# Patient Record
Sex: FEMALE | Race: WHITE | NOT HISPANIC OR LATINO | Employment: OTHER | URBAN - METROPOLITAN AREA
[De-identification: names, ages, dates, MRNs, and addresses within clinical notes are randomized per-mention and may not be internally consistent; named-entity substitution may affect disease eponyms.]

---

## 2017-03-01 ENCOUNTER — HOSPITAL ENCOUNTER (OUTPATIENT)
Dept: LAB | Facility: MEDICAL CENTER | Age: 72
End: 2017-03-01
Attending: INTERNAL MEDICINE
Payer: MEDICARE

## 2017-03-01 ENCOUNTER — HOSPITAL ENCOUNTER (OUTPATIENT)
Dept: LAB | Facility: MEDICAL CENTER | Age: 72
End: 2017-03-01
Attending: FAMILY MEDICINE
Payer: MEDICARE

## 2017-03-01 DIAGNOSIS — I10 ESSENTIAL HYPERTENSION: ICD-10-CM

## 2017-03-01 DIAGNOSIS — D50.0 IRON DEFICIENCY ANEMIA SECONDARY TO BLOOD LOSS (CHRONIC): ICD-10-CM

## 2017-03-01 DIAGNOSIS — E55.9 VITAMIN D DEFICIENCY: ICD-10-CM

## 2017-03-01 DIAGNOSIS — E11.9 DIABETES MELLITUS WITHOUT COMPLICATION (HCC): ICD-10-CM

## 2017-03-01 DIAGNOSIS — E03.9 HYPOTHYROIDISM, UNSPECIFIED TYPE: ICD-10-CM

## 2017-03-01 DIAGNOSIS — R74.8 ELEVATED LIVER ENZYMES: ICD-10-CM

## 2017-03-01 LAB
25(OH)D3 SERPL-MCNC: 32 NG/ML (ref 30–100)
ALBUMIN SERPL BCP-MCNC: 4.2 G/DL (ref 3.2–4.9)
ALBUMIN SERPL BCP-MCNC: 4.2 G/DL (ref 3.2–4.9)
ALBUMIN/GLOB SERPL: 1.4 G/DL
ALBUMIN/GLOB SERPL: 1.4 G/DL
ALP SERPL-CCNC: 98 U/L (ref 30–99)
ALP SERPL-CCNC: 99 U/L (ref 30–99)
ALT SERPL-CCNC: 55 U/L (ref 2–50)
ALT SERPL-CCNC: 56 U/L (ref 2–50)
ANION GAP SERPL CALC-SCNC: 10 MMOL/L (ref 0–11.9)
ANION GAP SERPL CALC-SCNC: 10 MMOL/L (ref 0–11.9)
AST SERPL-CCNC: 52 U/L (ref 12–45)
AST SERPL-CCNC: 52 U/L (ref 12–45)
BASOPHILS # BLD AUTO: 0.09 K/UL (ref 0–0.12)
BASOPHILS # BLD AUTO: 0.11 K/UL (ref 0–0.12)
BASOPHILS NFR BLD AUTO: 1.6 % (ref 0–1.8)
BASOPHILS NFR BLD AUTO: 1.9 % (ref 0–1.8)
BILIRUB SERPL-MCNC: 0.5 MG/DL (ref 0.1–1.5)
BILIRUB SERPL-MCNC: 0.5 MG/DL (ref 0.1–1.5)
BUN SERPL-MCNC: 18 MG/DL (ref 8–22)
BUN SERPL-MCNC: 18 MG/DL (ref 8–22)
CALCIUM SERPL-MCNC: 9.9 MG/DL (ref 8.5–10.5)
CALCIUM SERPL-MCNC: 9.9 MG/DL (ref 8.5–10.5)
CHLORIDE SERPL-SCNC: 103 MMOL/L (ref 96–112)
CHLORIDE SERPL-SCNC: 105 MMOL/L (ref 96–112)
CHOLEST SERPL-MCNC: 195 MG/DL (ref 100–199)
CO2 SERPL-SCNC: 23 MMOL/L (ref 20–33)
CO2 SERPL-SCNC: 23 MMOL/L (ref 20–33)
CREAT SERPL-MCNC: 0.94 MG/DL (ref 0.5–1.4)
CREAT SERPL-MCNC: 0.97 MG/DL (ref 0.5–1.4)
CREAT UR-MCNC: 137.8 MG/DL
EOSINOPHIL # BLD: 0.19 K/UL (ref 0–0.51)
EOSINOPHIL # BLD: 0.19 K/UL (ref 0–0.51)
EOSINOPHIL NFR BLD AUTO: 3.3 % (ref 0–6.9)
EOSINOPHIL NFR BLD AUTO: 3.3 % (ref 0–6.9)
ERYTHROCYTE [DISTWIDTH] IN BLOOD BY AUTOMATED COUNT: 47 FL (ref 35.9–50)
ERYTHROCYTE [DISTWIDTH] IN BLOOD BY AUTOMATED COUNT: 47.1 FL (ref 35.9–50)
EST. AVERAGE GLUCOSE BLD GHB EST-MCNC: 140 MG/DL
FERRITIN SERPL-MCNC: 6.8 NG/ML (ref 10–291)
GLOBULIN SER CALC-MCNC: 3.1 G/DL (ref 1.9–3.5)
GLOBULIN SER CALC-MCNC: 3.1 G/DL (ref 1.9–3.5)
GLUCOSE SERPL-MCNC: 174 MG/DL (ref 65–99)
GLUCOSE SERPL-MCNC: 174 MG/DL (ref 65–99)
HBA1C MFR BLD: 6.5 % (ref 0–5.6)
HCT VFR BLD AUTO: 36.5 % (ref 37–47)
HCT VFR BLD AUTO: 36.8 % (ref 37–47)
HDLC SERPL-MCNC: 49 MG/DL
HGB BLD-MCNC: 11 G/DL (ref 12–16)
HGB BLD-MCNC: 11.2 G/DL (ref 12–16)
IMM GRANULOCYTES # BLD AUTO: 0.01 K/UL (ref 0–0.11)
IMM GRANULOCYTES # BLD AUTO: 0.02 K/UL (ref 0–0.11)
IMM GRANULOCYTES NFR BLD AUTO: 0.2 % (ref 0–0.9)
IMM GRANULOCYTES NFR BLD AUTO: 0.3 % (ref 0–0.9)
IRON SATN MFR SERPL: 4 % (ref 15–55)
IRON SERPL-MCNC: 24 UG/DL (ref 40–170)
LDLC SERPL CALC-MCNC: 117 MG/DL
LYMPHOCYTES # BLD: 1.4 K/UL (ref 1–4.8)
LYMPHOCYTES # BLD: 1.4 K/UL (ref 1–4.8)
LYMPHOCYTES NFR BLD AUTO: 24.3 % (ref 22–41)
LYMPHOCYTES NFR BLD AUTO: 24.3 % (ref 22–41)
MCH RBC QN AUTO: 25.2 PG (ref 27–33)
MCH RBC QN AUTO: 25.3 PG (ref 27–33)
MCHC RBC AUTO-ENTMCNC: 30.1 G/DL (ref 33.6–35)
MCHC RBC AUTO-ENTMCNC: 30.4 G/DL (ref 33.6–35)
MCV RBC AUTO: 83.1 FL (ref 81.4–97.8)
MCV RBC AUTO: 83.5 FL (ref 81.4–97.8)
MICROALBUMIN UR-MCNC: 0.8 MG/DL
MICROALBUMIN/CREAT UR: 6 MG/G (ref 0–30)
MONOCYTES # BLD: 0.43 K/UL (ref 0–0.85)
MONOCYTES # BLD: 0.45 K/UL (ref 0–0.85)
MONOCYTES NFR BLD AUTO: 7.5 % (ref 0–13.4)
MONOCYTES NFR BLD AUTO: 7.8 % (ref 0–13.4)
NEUTROPHILS # BLD: 3.6 K/UL (ref 2–7.15)
NEUTROPHILS # BLD: 3.63 K/UL (ref 2–7.15)
NEUTROPHILS NFR BLD AUTO: 62.4 % (ref 44–72)
NEUTROPHILS NFR BLD AUTO: 63.1 % (ref 44–72)
NRBC # BLD AUTO: 0 K/UL
NRBC # BLD AUTO: 0 K/UL
NRBC BLD-RTO: 0 /100 WBC
NRBC BLD-RTO: 0 /100 WBC
PLATELET # BLD AUTO: 280 K/UL (ref 164–446)
PLATELET # BLD AUTO: 283 K/UL (ref 164–446)
PMV BLD AUTO: 9.8 FL (ref 9–12.9)
PMV BLD AUTO: 9.8 FL (ref 9–12.9)
POTASSIUM SERPL-SCNC: 4.6 MMOL/L (ref 3.6–5.5)
POTASSIUM SERPL-SCNC: 4.6 MMOL/L (ref 3.6–5.5)
PROT SERPL-MCNC: 7.3 G/DL (ref 6–8.2)
PROT SERPL-MCNC: 7.3 G/DL (ref 6–8.2)
RBC # BLD AUTO: 4.37 M/UL (ref 4.2–5.4)
RBC # BLD AUTO: 4.43 M/UL (ref 4.2–5.4)
SODIUM SERPL-SCNC: 136 MMOL/L (ref 135–145)
SODIUM SERPL-SCNC: 138 MMOL/L (ref 135–145)
T4 FREE SERPL-MCNC: 1.19 NG/DL (ref 0.53–1.43)
TIBC SERPL-MCNC: 594 UG/DL (ref 250–450)
TRIGL SERPL-MCNC: 143 MG/DL (ref 0–149)
TSH SERPL DL<=0.005 MIU/L-ACNC: 1.58 UIU/ML (ref 0.3–3.7)
WBC # BLD AUTO: 5.8 K/UL (ref 4.8–10.8)
WBC # BLD AUTO: 5.8 K/UL (ref 4.8–10.8)

## 2017-03-01 PROCEDURE — 36415 COLL VENOUS BLD VENIPUNCTURE: CPT

## 2017-03-01 PROCEDURE — 84439 ASSAY OF FREE THYROXINE: CPT

## 2017-03-01 PROCEDURE — 82306 VITAMIN D 25 HYDROXY: CPT

## 2017-03-01 PROCEDURE — 82043 UR ALBUMIN QUANTITATIVE: CPT

## 2017-03-01 PROCEDURE — 80061 LIPID PANEL: CPT

## 2017-03-01 PROCEDURE — 80053 COMPREHEN METABOLIC PANEL: CPT

## 2017-03-01 PROCEDURE — 83036 HEMOGLOBIN GLYCOSYLATED A1C: CPT

## 2017-03-01 PROCEDURE — 82570 ASSAY OF URINE CREATININE: CPT

## 2017-03-01 PROCEDURE — 84443 ASSAY THYROID STIM HORMONE: CPT

## 2017-03-01 PROCEDURE — 85025 COMPLETE CBC W/AUTO DIFF WBC: CPT

## 2017-03-17 DIAGNOSIS — E03.8 OTHER SPECIFIED HYPOTHYROIDISM: ICD-10-CM

## 2017-03-17 RX ORDER — LOSARTAN POTASSIUM 50 MG/1
TABLET ORAL
Qty: 90 TAB | Refills: 1 | Status: SHIPPED | OUTPATIENT
Start: 2017-03-17 | End: 2017-10-30 | Stop reason: SDUPTHER

## 2017-03-17 RX ORDER — LEVOTHYROXINE SODIUM 0.1 MG/1
TABLET ORAL
Qty: 90 TAB | Refills: 1 | Status: SHIPPED | OUTPATIENT
Start: 2017-03-17 | End: 2017-09-13 | Stop reason: SDUPTHER

## 2017-03-17 NOTE — TELEPHONE ENCOUNTER
1. Caller Name: Ally Doherty                      Call Back Number: [phone] (home)     2. Message: Notified pt prescriptions sent to pharmacy.    3. Patient approves office to leave a detailed voicemail/MyChart message: N\A

## 2017-03-20 ENCOUNTER — OUTPATIENT INFUSION SERVICES (OUTPATIENT)
Dept: ONCOLOGY | Facility: MEDICAL CENTER | Age: 72
End: 2017-03-20
Attending: INTERNAL MEDICINE
Payer: MEDICARE

## 2017-03-20 VITALS
HEART RATE: 77 BPM | SYSTOLIC BLOOD PRESSURE: 135 MMHG | RESPIRATION RATE: 18 BRPM | BODY MASS INDEX: 31.43 KG/M2 | WEIGHT: 195.55 LBS | DIASTOLIC BLOOD PRESSURE: 72 MMHG | HEIGHT: 66 IN | OXYGEN SATURATION: 97 % | TEMPERATURE: 97.4 F

## 2017-03-20 PROCEDURE — 96365 THER/PROPH/DIAG IV INF INIT: CPT

## 2017-03-20 PROCEDURE — 306780 HCHG STAT FOR TRANSFUSION PER CASE

## 2017-03-20 PROCEDURE — 700105 HCHG RX REV CODE 258: Performed by: INTERNAL MEDICINE

## 2017-03-20 PROCEDURE — 96366 THER/PROPH/DIAG IV INF ADDON: CPT

## 2017-03-20 PROCEDURE — 700102 HCHG RX REV CODE 250 W/ 637 OVERRIDE(OP): Performed by: INTERNAL MEDICINE

## 2017-03-20 PROCEDURE — 96375 TX/PRO/DX INJ NEW DRUG ADDON: CPT

## 2017-03-20 PROCEDURE — A9270 NON-COVERED ITEM OR SERVICE: HCPCS | Performed by: INTERNAL MEDICINE

## 2017-03-20 PROCEDURE — 700111 HCHG RX REV CODE 636 W/ 250 OVERRIDE (IP): Performed by: INTERNAL MEDICINE

## 2017-03-20 RX ORDER — METHYLPREDNISOLONE SODIUM SUCCINATE 125 MG/2ML
125 INJECTION, POWDER, LYOPHILIZED, FOR SOLUTION INTRAMUSCULAR; INTRAVENOUS ONCE
Status: COMPLETED | OUTPATIENT
Start: 2017-03-20 | End: 2017-03-20

## 2017-03-20 RX ORDER — M-VIT,TX,IRON,MINS/CALC/FOLIC 27MG-0.4MG
1 TABLET ORAL DAILY
COMMUNITY

## 2017-03-20 RX ORDER — ACETAMINOPHEN 325 MG/1
650 TABLET ORAL ONCE
Status: COMPLETED | OUTPATIENT
Start: 2017-03-20 | End: 2017-03-20

## 2017-03-20 RX ADMIN — METHYLPREDNISOLONE SODIUM SUCCINATE 125 MG: 125 INJECTION, POWDER, FOR SOLUTION INTRAMUSCULAR; INTRAVENOUS at 10:08

## 2017-03-20 RX ADMIN — ACETAMINOPHEN 650 MG: 325 TABLET, FILM COATED ORAL at 10:08

## 2017-03-20 RX ADMIN — IRON DEXTRAN 1500 MG: 50 INJECTION INTRAMUSCULAR; INTRAVENOUS at 12:34

## 2017-03-20 RX ADMIN — DIPHENHYDRAMINE HYDROCHLORIDE 25 MG: 50 INJECTION INTRAMUSCULAR; INTRAVENOUS at 10:08

## 2017-03-20 RX ADMIN — IRON DEXTRAN 25 MG: 50 INJECTION INTRAMUSCULAR; INTRAVENOUS at 10:47

## 2017-03-20 ASSESSMENT — PAIN SCALES - GENERAL: PAINLEVEL: NO PAIN

## 2017-03-21 ENCOUNTER — OFFICE VISIT (OUTPATIENT)
Dept: MEDICAL GROUP | Facility: PHYSICIAN GROUP | Age: 72
End: 2017-03-21
Payer: MEDICARE

## 2017-03-21 VITALS
BODY MASS INDEX: 31.82 KG/M2 | HEIGHT: 66 IN | SYSTOLIC BLOOD PRESSURE: 120 MMHG | OXYGEN SATURATION: 95 % | TEMPERATURE: 98.6 F | RESPIRATION RATE: 16 BRPM | WEIGHT: 198 LBS | HEART RATE: 72 BPM | DIASTOLIC BLOOD PRESSURE: 68 MMHG

## 2017-03-21 DIAGNOSIS — E78.5 HYPERLIPIDEMIA, UNSPECIFIED HYPERLIPIDEMIA TYPE: ICD-10-CM

## 2017-03-21 DIAGNOSIS — E03.9 HYPOTHYROIDISM, UNSPECIFIED TYPE: ICD-10-CM

## 2017-03-21 DIAGNOSIS — D50.0 IRON DEFICIENCY ANEMIA SECONDARY TO BLOOD LOSS (CHRONIC): ICD-10-CM

## 2017-03-21 DIAGNOSIS — I10 ESSENTIAL HYPERTENSION: ICD-10-CM

## 2017-03-21 DIAGNOSIS — E11.9 DIABETES MELLITUS WITHOUT COMPLICATION (HCC): ICD-10-CM

## 2017-03-21 DIAGNOSIS — E55.9 VITAMIN D DEFICIENCY: ICD-10-CM

## 2017-03-21 PROCEDURE — 99214 OFFICE O/P EST MOD 30 MIN: CPT | Performed by: FAMILY MEDICINE

## 2017-03-21 PROCEDURE — G8482 FLU IMMUNIZE ORDER/ADMIN: HCPCS | Performed by: FAMILY MEDICINE

## 2017-03-21 PROCEDURE — 3017F COLORECTAL CA SCREEN DOC REV: CPT | Performed by: FAMILY MEDICINE

## 2017-03-21 PROCEDURE — G8419 CALC BMI OUT NRM PARAM NOF/U: HCPCS | Performed by: FAMILY MEDICINE

## 2017-03-21 PROCEDURE — 3014F SCREEN MAMMO DOC REV: CPT | Performed by: FAMILY MEDICINE

## 2017-03-21 PROCEDURE — 1101F PT FALLS ASSESS-DOCD LE1/YR: CPT | Performed by: FAMILY MEDICINE

## 2017-03-21 PROCEDURE — 1036F TOBACCO NON-USER: CPT | Performed by: FAMILY MEDICINE

## 2017-03-21 PROCEDURE — 3044F HG A1C LEVEL LT 7.0%: CPT | Performed by: FAMILY MEDICINE

## 2017-03-21 PROCEDURE — 4040F PNEUMOC VAC/ADMIN/RCVD: CPT | Performed by: FAMILY MEDICINE

## 2017-03-21 PROCEDURE — G8432 DEP SCR NOT DOC, RNG: HCPCS | Performed by: FAMILY MEDICINE

## 2017-03-21 ASSESSMENT — PATIENT HEALTH QUESTIONNAIRE - PHQ9: CLINICAL INTERPRETATION OF PHQ2 SCORE: 0

## 2017-03-21 NOTE — PROGRESS NOTES
"Subjective:      Ally Doherty is a 72 y.o. female who presents with Follow-Up            HPI     Patient returns for follow-up of her medical problems.    In terms of her diabetes mellitus type 2, she continues to take metformin. Blood work was done before this visit.    Her blood pressure is under control on losartan.    For her dyslipidemia, she couldn't tolerate statins because of elevation of liver enzymes so she has been managing this only with her own efforts.    She continues to take thyroid replacement for hypothyroidism.    She takes vitamin D supplementation 3000 international units daily for vitamin D deficiency.    She continues to follow-up with her hematologist/oncologist Dr. Oliveira for history of breast cancer. She also has been dealing with iron deficiency anemia requiring iron infusion. 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 Her most recent blood work that was done about 2 weeks ago came back her hemoglobin dropped from 14-11 and her hematologist send her for another eye in infusion which she did yesterday. She couldn't take iron supplement by mouth because of stomach upset and constipation. Her hematologist follows her closely and will send her for the infusion if needed.    Past medical history, past surgical history, family history reviewed-no changes    Social history reviewed-no changes    Allergies reviewed-no changes    Medications reviewed-no changes    ROS    Review of systems as per history of present illness, the rest are negative.     Objective:     /68 mmHg  Pulse 72  Temp(Src) 37 °C (98.6 °F)  Resp 16  Ht 1.676 m (5' 5.98\")  Wt 89.812 kg (198 lb)  BMI 31.97 kg/m2  SpO2 95%  LMP 02/27/1997     Physical Exam     Examined alert, awake, oriented, not in distress    Neck-supple, no lymphadenopathy, no thyromegaly  Lungs-clear to auscultation, no rales, no " wheezes  Heart-regular rate and rhythm, no murmur  Extremities-no edema, clubbing, cyanosis       Results for CHARU BAEZ (MRN [id number]) as of 3/21/2017 08:15   Ref. Range 3/1/2017 10:11   Sodium Latest Ref Range: 135-145 mmol/L 138   Potassium Latest Ref Range: 3.6-5.5 mmol/L 4.6   Chloride Latest Ref Range:  mmol/L 105   Co2 Latest Ref Range: 20-33 mmol/L 23   Anion Gap Latest Ref Range: 0.0-11.9  10.0   Glucose Latest Ref Range: 65-99 mg/dL 174 (H)   Bun Latest Ref Range: 8-22 mg/dL 18   Creatinine Latest Ref Range: 0.50-1.40 mg/dL 0.97   GFR If  Latest Ref Range: >60 mL/min/1.73 m 2 >60   GFR If Non  Latest Ref Range: >60 mL/min/1.73 m 2 57 (A)   Calcium Latest Ref Range: 8.5-10.5 mg/dL 9.9   AST(SGOT) Latest Ref Range: 12-45 U/L 52 (H) previously 64    ALT(SGPT) Latest Ref Range: 2-50 U/L 55 (H) previously 77    Alkaline Phosphatase Latest Ref Range: 30-99 U/L 99   Total Bilirubin Latest Ref Range: 0.1-1.5 mg/dL 0.5   Albumin Latest Ref Range: 3.2-4.9 g/dL 4.2   Total Protein Latest Ref Range: 6.0-8.2 g/dL 7.3   Globulin Latest Ref Range: 1.9-3.5 g/dL 3.1   A-G Ratio Latest Units: g/dL 1.4   Glycohemoglobin Latest Ref Range: 0.0-5.6 % 6.5 (H) previously 6.9    Estim. Avg Glu Latest Units: mg/dL 140   Cholesterol,Tot Latest Ref Range: 100-199 mg/dL 195   Triglycerides Latest Ref Range: 0-149 mg/dL 143 previously 184    HDL Latest Ref Range: >=40 mg/dL  49 previously 49   LDL Latest Ref Range: <100 mg/dL 117 (H) previously 141    Micro Alb Creat Ratio Latest Ref Range: 0-30 mg/g 6   Creatinine, Urine Latest Units: mg/dL 137.80   Microalbumin, Urine Random Latest Units: mg/dL 0.8   25-Hydroxy   Vitamin D 25 Latest Ref Range:  ng/mL 32 previously 24    TSH Latest Ref Range: 0.300-3.700 uIU/mL 1.580   Free T-4 Latest Ref Range: 0.53-1.43 ng/dL 1.19        Assessment/Plan:     There are no diagnoses linked to this encounter.    1. Diabetes mellitus without  complication (CMS-HCC)  LIPID PROFILE    COMP METABOLIC PANEL    HEMOGLOBIN A1C    CBC WITH DIFFERENTIAL    VITAMIN D,25 HYDROXY    TSH   2. Essential hypertension  LIPID PROFILE    COMP METABOLIC PANEL    HEMOGLOBIN A1C    CBC WITH DIFFERENTIAL    VITAMIN D,25 HYDROXY    TSH   3. Hyperlipidemia, unspecified hyperlipidemia type  LIPID PROFILE    COMP METABOLIC PANEL    HEMOGLOBIN A1C    CBC WITH DIFFERENTIAL    VITAMIN D,25 HYDROXY    TSH   4. Hypothyroidism, unspecified type  LIPID PROFILE    COMP METABOLIC PANEL    HEMOGLOBIN A1C    CBC WITH DIFFERENTIAL    VITAMIN D,25 HYDROXY    TSH   5. 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 This is still under control even better than before. Continue metformin.  2. Controlled on her medication.  3. LDL improved with her own efforts only. She couldn't tolerate statins because of elevation of liver enzymes. Continue with own efforts.  4. Adequately replaced. Continue the same dose of thyroid replacement.  5. This is improved in is already normal. Continue current dose of thyroid medication.  6. She just had iron infusion ordered by her hematologist. We will follow along. She has a follow-up appointment with her hematologist in 3 months. I will see her in 6 months and we will do CBC at that time. I will get records from the hematologist.      Please note that this dictation was created using voice recognition software. I have worked with consultants from the vendor as well as technical experts from [employer] to optimize the interface. I have made every reasonable attempt to correct obvious errors, but I expect that there are errors of grammar and possibly content I did not discover before finalizing  the note.

## 2017-03-21 NOTE — PROGRESS NOTES
Pt arrived ambulatory, here for total body iron. IV access established with significant difficulty. No labs required. Premeds given and test dose administered.  Pt monitored for one hour - no apparent reactions observed. Full dose of iron administered. Pt vannesa well. Line flushed clear. IV flushed and removed. Pressure dressing applied. Pt will follow up with her MD, no future appts in place at this time. Pt discharged home under self care in no apparent distress.

## 2017-03-21 NOTE — MR AVS SNAPSHOT
"        Ally Doherty   3/21/2017 8:00 AM   Office Visit   MRN: [id number]    Department:  [facility]   Dept Phone:  [phone]    Description:  Female : 1945   Provider:  Su Randhawa M.D.           Reason for Visit     Follow-Up 3 month follow up      Allergies as of 3/21/2017     Allergen Noted Reactions    Penicillins 2012   Rash    ALl over    Statins [Hmg-Coa-R Inhibitors] 2015       Elevated liver enzymes      You were diagnosed with     Diabetes mellitus without complication (CMS-HCC)   [435288]       Essential hypertension   [5648747]       Hyperlipidemia, unspecified hyperlipidemia type   [5170219]       Hypothyroidism, unspecified type   [1380052]       Vitamin D deficiency   [9730158]       Iron deficiency anemia secondary to blood loss (chronic)   [280.0.ICD-9-CM]         Vital Signs     Blood Pressure Pulse Temperature Respirations Height Weight    120/68 mmHg 72 37 °C (98.6 °F) 16 1.676 m (5' 5.98\") 89.812 kg (198 lb)    Body Mass Index Oxygen Saturation Last Menstrual Period Smoking Status          31.97 kg/m2 95% 1997 Never Smoker         Basic Information     Date Of Birth Sex Race Ethnicity Preferred Language    1945 Female White Non- English      Your appointments     Sep 12, 2017  8:00 AM   Established Patient with Su Randhawa M.D.   [facility] (--)    [address]  [address]  [facility] [id number] [phone]           You will be receiving a confirmation call a few days before your appointment from our automated call confirmation system.              Problem List              ICD-10-CM Priority Class Noted - Resolved    HTN (hypertension) I10   2012 - Present    Hyperlipidemia E78.5   2012 - Present    Hypothyroidism E03.9   2012 - Present    Malignant neoplasm of right breast (CMS-HCC) C50.911   2012 - Present    DM II (diabetes mellitus, type II), controlled (CMS-HCC) E11.9   2012 - Present   "    S/P breast reconstruction Z98.82   6/24/2013 - Present    Fatty liver disease, nonalcoholic K76.0   9/10/2013 - Present    Vitamin D deficiency E55.9   5/27/2014 - Present    Diabetes mellitus without complication (CMS-HCC) E11.9   12/22/2016 - Present      Health Maintenance        Date Due Completion Dates    MAMMOGRAM 5/17/2017 5/17/2016, 8/5/2014, 7/29/2013, 6/21/2012, 7/17/2007, 7/17/2007    RETINAL SCREENING 8/3/2017 8/3/2016    A1C SCREENING 9/1/2017 3/1/2017, 9/8/2016, 3/16/2016, 11/5/2015, 6/22/2015, 12/16/2014, 11/12/2014, 5/16/2014, 12/5/2013, 9/3/2013, 3/4/2013, 10/22/2012, 7/9/2012, 3/16/2012    DIABETES MONOFILAMENT / LE EXAM 9/13/2017 9/13/2016 (N/S), 7/20/2015 (N/S), 12/1/2014 (N/S), 5/27/2014 (N/S), 5/28/2013 (Done)    Override on 9/13/2016: (N/S)    Override on 7/20/2015: (N/S)    Override on 12/1/2014: (N/S)    Override on 5/27/2014: (N/S)    Override on 5/28/2013: Done (Done at [facility])    FASTING LIPID PROFILE 3/1/2018 3/1/2017, 9/8/2016, 3/16/2016, 11/5/2015, 6/22/2015, 12/16/2014, 5/16/2014, 12/5/2013, 9/3/2013, 3/4/2013, 10/22/2012, 7/9/2012, 3/16/2012    URINE ACR / MICROALBUMIN 3/1/2018 3/1/2017, 3/16/2016, 6/22/2015, 5/16/2014, 7/9/2012, 3/16/2012    SERUM CREATININE 3/1/2018 3/1/2017, 3/1/2017, 9/8/2016, 3/16/2016, 11/5/2015, 8/12/2015, 6/22/2015, 2/10/2015, 1/9/2015, 11/12/2014, 8/11/2014, 6/23/2014, 5/16/2014, 2/28/2014, 12/5/2013, 9/3/2013, 6/19/2013, 4/23/2013, 3/4/2013, 10/22/2012, 7/9/2012, 6/19/2012, 3/16/2012    COLONOSCOPY 5/26/2021 5/26/2016, 1/3/2011    BONE DENSITY 9/6/2021 9/6/2016, 9/11/2014    IMM DTaP/Tdap/Td Vaccine (2 - Td) 10/4/2022 10/4/2012            Current Immunizations     13-VALENT PCV PREVNAR 7/20/2015 12:05 PM    Influenza TIV (IM) 10/30/2014, 10/6/2012    Influenza Vaccine Adult HD 9/6/2016  1:42 PM, 10/6/2015 11:33 AM, 9/2/2014    Pneumococcal polysaccharide vaccine (PPSV-23) 11/8/2012  9:30 AM    SHINGLES VACCINE 10/4/2012  9:45 AM     Tdap Vaccine 10/4/2012  9:45 AM      Below and/or attached are the medications your provider expects you to take. Review all of your home medications and newly ordered medications with your provider and/or pharmacist. Follow medication instructions as directed by your provider and/or pharmacist. Please keep your medication list with you and share with your provider. Update the information when medications are discontinued, doses are changed, or new medications (including over-the-counter products) are added; and carry medication information at all times in the event of emergency situations     Allergies:  PENICILLINS - Rash     STATINS - (reactions not documented)               Medications  Valid as of: March 21, 2017 -  8:27 AM    Generic Name Brand Name Tablet Size Instructions for use    Anastrozole (Tab) ARIMIDEX 1 MG Take 1 mg by mouth every morning.        Calcium Carbonate-Vitamin D   Take  by mouth.        Cholecalciferol (Cap) Vitamin D 2000 UNITS Take 2 Caps by mouth every day.        Levothyroxine Sodium (Tab) SYNTHROID 100 MCG TAKE 1 TABLET BY MOUTH EVERY MORNING ON AN EMPTYSTOMACH        Losartan Potassium (Tab) COZAAR 50 MG TAKE 1 TABLET BY MOUTH EVERY EVENING        MetFORMIN HCl (Tab) GLUCOPHAGE 1000 MG Take 1 Tab by mouth 2 times a day, with meals.        Multiple Vitamins-Minerals (Tab) THERAGRAN-M  Take 1 Tab by mouth every day.        .                 Medicines prescribed today were sent to:     [facility] # [facility] [place] - [address] [facility]    [address] [place] [address]    Phone: [phone] Fax: [phone]    Open 24 Hours?: No      Medication refill instructions:       If your prescription bottle indicates you have medication refills left, it is not necessary to call your provider’s office. Please contact your pharmacy and they will refill your medication.    If your prescription bottle indicates you do not have any refills left, you may request refills at any time through one  of the following ways: The online [employer] system (except Urgent Care), by calling your provider’s office, or by asking your pharmacy to contact your provider’s office with a refill request. Medication refills are processed only during regular business hours and may not be available until the next business day. Your provider may request additional information or to have a follow-up visit with you prior to refilling your medication.   *Please Note: Medication refills are assigned a new Rx number when refilled electronically. Your pharmacy may indicate that no refills were authorized even though a new prescription for the same medication is available at the pharmacy. Please request the medicine by name with the pharmacy before contacting your provider for a refill.        Your To Do List     Future Labs/Procedures Complete By Expires    CBC WITH DIFFERENTIAL  As directed 3/22/2018    COMP METABOLIC PANEL  As directed 3/22/2018    HEMOGLOBIN A1C  As directed 3/22/2018    LIPID PROFILE  As directed 3/22/2018    TSH  As directed 3/22/2018    VITAMIN D,25 HYDROXY  As directed 3/22/2018      Other Notes About Your Plan                 Please assess the following diagnosis:  E11.9-DM without complications  Z85.3-history of breast cancer                [employer] Access Code: Activation code not generated  Current [employer] Status: Active

## 2017-03-21 NOTE — Clinical Note
[facility]  Su Randhawa M.D.  [address]  [place] [address]  Fax: [phone]   Authorization for Release/Disclosure of   Protected Health Information   Name: CHARU BAEZ : 1945 SSN: [id number]   Address: [address] [place] [address]  [place] [address] Phone:    [phone] (home)    I authorize the entity listed below to release/disclose the PHI below to:   [facility]/Su Randhawa M.D. and Su Randhawa M.D.   Provider or Entity Name:     Address   City, State, Zip   Phone:      Fax:     Reason for request: continuity of care   Information to be released:    [  ] LAST COLONOSCOPY,  including any PATH REPORT and follow-up  [  ] LAST FIT/COLOGUARD RESULT [  ] LAST DEXA  [  ] LAST MAMMOGRAM  [  ] LAST PAP  [  ] LAST LABS [  ] RETINA EXAM REPORT  [  ] IMMUNIZATION RECORDS  [  ] Release all info      [  ] Check here and initial the line next to each item to release ALL health information INCLUDING  _____ Care and treatment for drug and / or alcohol abuse  _____ HIV testing, infection status, or AIDS  _____ Genetic Testing    DATES OF SERVICE OR TIME PERIOD TO BE DISCLOSED: _____________  I understand and acknowledge that:  * This Authorization may be revoked at any time by you in writing, except if your health information has already been used or disclosed.  * Your health information that will be used or disclosed as a result of you signing this authorization could be re-disclosed by the recipient. If this occurs, your re-disclosed health information may no longer be protected by State or Federal laws.  * You may refuse to sign this Authorization. Your refusal will not affect your ability to obtain treatment.  * This Authorization becomes effective upon signing and will  on (date) __________.      If no date is indicated, this Authorization will  one (1) year from the signature date.    Name: Charu Baez    Signature:   Date:     3/21/2017       PLEASE FAX  REQUESTED RECORDS BACK TO: [phone]

## 2017-04-26 ENCOUNTER — HOSPITAL ENCOUNTER (OUTPATIENT)
Dept: LAB | Facility: MEDICAL CENTER | Age: 72
End: 2017-04-26
Attending: PHYSICIAN ASSISTANT
Payer: MEDICARE

## 2017-04-26 LAB
ANISOCYTOSIS BLD QL SMEAR: ABNORMAL
BASOPHILS # BLD AUTO: 5.4 % (ref 0–1.8)
BASOPHILS # BLD: 0.31 K/UL (ref 0–0.12)
EOSINOPHIL # BLD AUTO: 0.42 K/UL (ref 0–0.51)
EOSINOPHIL NFR BLD: 7.3 % (ref 0–6.9)
ERYTHROCYTE [DISTWIDTH] IN BLOOD BY AUTOMATED COUNT: 73.6 FL (ref 35.9–50)
HCT VFR BLD AUTO: 47.6 % (ref 37–47)
HGB BLD-MCNC: 14 G/DL (ref 12–16)
LYMPHOCYTES # BLD AUTO: 1.53 K/UL (ref 1–4.8)
LYMPHOCYTES NFR BLD: 26.4 % (ref 22–41)
MACROCYTES BLD QL SMEAR: ABNORMAL
MANUAL DIFF BLD: NORMAL
MCH RBC QN AUTO: 26.8 PG (ref 27–33)
MCHC RBC AUTO-ENTMCNC: 29.4 G/DL (ref 33.6–35)
MCV RBC AUTO: 91 FL (ref 81.4–97.8)
MICROCYTES BLD QL SMEAR: ABNORMAL
MONOCYTES # BLD AUTO: 0.68 K/UL (ref 0–0.85)
MONOCYTES NFR BLD AUTO: 11.8 % (ref 0–13.4)
MORPHOLOGY BLD-IMP: NORMAL
NEUTROPHILS # BLD AUTO: 2.85 K/UL (ref 2–7.15)
NEUTROPHILS NFR BLD: 49.1 % (ref 44–72)
NRBC # BLD AUTO: 0 K/UL
NRBC BLD AUTO-RTO: 0 /100 WBC
PLATELET # BLD AUTO: 175 K/UL (ref 164–446)
PLATELET BLD QL SMEAR: NORMAL
PMV BLD AUTO: 10.8 FL (ref 9–12.9)
RBC # BLD AUTO: 5.23 M/UL (ref 4.2–5.4)
RBC BLD AUTO: PRESENT
SMUDGE CELLS BLD QL SMEAR: NORMAL
WBC # BLD AUTO: 5.8 K/UL (ref 4.8–10.8)

## 2017-04-26 PROCEDURE — 36415 COLL VENOUS BLD VENIPUNCTURE: CPT

## 2017-04-26 PROCEDURE — 85007 BL SMEAR W/DIFF WBC COUNT: CPT

## 2017-04-26 PROCEDURE — 85027 COMPLETE CBC AUTOMATED: CPT

## 2017-06-05 ENCOUNTER — HOSPITAL ENCOUNTER (OUTPATIENT)
Dept: LAB | Facility: MEDICAL CENTER | Age: 72
End: 2017-06-05
Attending: INTERNAL MEDICINE
Payer: MEDICARE

## 2017-06-05 LAB
FERRITIN SERPL-MCNC: 76.1 NG/ML (ref 10–291)
IRON SATN MFR SERPL: 14 % (ref 15–55)
IRON SERPL-MCNC: 60 UG/DL (ref 40–170)
TIBC SERPL-MCNC: 441 UG/DL (ref 250–450)

## 2017-06-05 PROCEDURE — 83540 ASSAY OF IRON: CPT

## 2017-06-05 PROCEDURE — 82728 ASSAY OF FERRITIN: CPT

## 2017-06-05 PROCEDURE — 36415 COLL VENOUS BLD VENIPUNCTURE: CPT

## 2017-06-05 PROCEDURE — 83550 IRON BINDING TEST: CPT

## 2017-07-19 ENCOUNTER — HOSPITAL ENCOUNTER (OUTPATIENT)
Dept: LAB | Facility: MEDICAL CENTER | Age: 72
End: 2017-07-19
Attending: PHYSICIAN ASSISTANT
Payer: MEDICARE

## 2017-07-19 LAB
BASOPHILS # BLD AUTO: 1.4 % (ref 0–1.8)
BASOPHILS # BLD: 0.09 K/UL (ref 0–0.12)
EOSINOPHIL # BLD AUTO: 0.2 K/UL (ref 0–0.51)
EOSINOPHIL NFR BLD: 3.1 % (ref 0–6.9)
ERYTHROCYTE [DISTWIDTH] IN BLOOD BY AUTOMATED COUNT: 48.5 FL (ref 35.9–50)
HCT VFR BLD AUTO: 48.1 % (ref 37–47)
HGB BLD-MCNC: 15.1 G/DL (ref 12–16)
IMM GRANULOCYTES # BLD AUTO: 0.03 K/UL (ref 0–0.11)
IMM GRANULOCYTES NFR BLD AUTO: 0.5 % (ref 0–0.9)
LYMPHOCYTES # BLD AUTO: 1.25 K/UL (ref 1–4.8)
LYMPHOCYTES NFR BLD: 19.3 % (ref 22–41)
MCH RBC QN AUTO: 27.8 PG (ref 27–33)
MCHC RBC AUTO-ENTMCNC: 31.4 G/DL (ref 33.6–35)
MCV RBC AUTO: 88.4 FL (ref 81.4–97.8)
MONOCYTES # BLD AUTO: 0.53 K/UL (ref 0–0.85)
MONOCYTES NFR BLD AUTO: 8.2 % (ref 0–13.4)
NEUTROPHILS # BLD AUTO: 4.37 K/UL (ref 2–7.15)
NEUTROPHILS NFR BLD: 67.5 % (ref 44–72)
NRBC # BLD AUTO: 0 K/UL
NRBC BLD AUTO-RTO: 0 /100 WBC
PLATELET # BLD AUTO: 157 K/UL (ref 164–446)
PMV BLD AUTO: 10.3 FL (ref 9–12.9)
RBC # BLD AUTO: 5.44 M/UL (ref 4.2–5.4)
WBC # BLD AUTO: 6.5 K/UL (ref 4.8–10.8)

## 2017-07-19 PROCEDURE — 85025 COMPLETE CBC W/AUTO DIFF WBC: CPT

## 2017-07-19 PROCEDURE — 36415 COLL VENOUS BLD VENIPUNCTURE: CPT

## 2017-09-01 ENCOUNTER — HOSPITAL ENCOUNTER (OUTPATIENT)
Dept: LAB | Facility: MEDICAL CENTER | Age: 72
End: 2017-09-01
Attending: FAMILY MEDICINE
Payer: MEDICARE

## 2017-09-01 DIAGNOSIS — I10 ESSENTIAL HYPERTENSION: ICD-10-CM

## 2017-09-01 DIAGNOSIS — E11.9 DIABETES MELLITUS WITHOUT COMPLICATION (HCC): ICD-10-CM

## 2017-09-01 DIAGNOSIS — D50.0 IRON DEFICIENCY ANEMIA SECONDARY TO BLOOD LOSS (CHRONIC): ICD-10-CM

## 2017-09-01 DIAGNOSIS — E03.9 HYPOTHYROIDISM, UNSPECIFIED TYPE: ICD-10-CM

## 2017-09-01 DIAGNOSIS — E55.9 VITAMIN D DEFICIENCY: ICD-10-CM

## 2017-09-01 DIAGNOSIS — E78.5 HYPERLIPIDEMIA, UNSPECIFIED HYPERLIPIDEMIA TYPE: ICD-10-CM

## 2017-09-01 LAB
25(OH)D3 SERPL-MCNC: 29 NG/ML (ref 30–100)
ALBUMIN SERPL BCP-MCNC: 4.3 G/DL (ref 3.2–4.9)
ALBUMIN/GLOB SERPL: 1.5 G/DL
ALP SERPL-CCNC: 97 U/L (ref 30–99)
ALT SERPL-CCNC: 97 U/L (ref 2–50)
ANION GAP SERPL CALC-SCNC: 10 MMOL/L (ref 0–11.9)
AST SERPL-CCNC: 82 U/L (ref 12–45)
BASOPHILS # BLD AUTO: 1.9 % (ref 0–1.8)
BASOPHILS # BLD: 0.11 K/UL (ref 0–0.12)
BILIRUB SERPL-MCNC: 0.3 MG/DL (ref 0.1–1.5)
BUN SERPL-MCNC: 15 MG/DL (ref 8–22)
CALCIUM SERPL-MCNC: 9.4 MG/DL (ref 8.5–10.5)
CHLORIDE SERPL-SCNC: 102 MMOL/L (ref 96–112)
CHOLEST SERPL-MCNC: 210 MG/DL (ref 100–199)
CO2 SERPL-SCNC: 25 MMOL/L (ref 20–33)
CREAT SERPL-MCNC: 0.75 MG/DL (ref 0.5–1.4)
EOSINOPHIL # BLD AUTO: 0.27 K/UL (ref 0–0.51)
EOSINOPHIL NFR BLD: 4.8 % (ref 0–6.9)
ERYTHROCYTE [DISTWIDTH] IN BLOOD BY AUTOMATED COUNT: 50.4 FL (ref 35.9–50)
EST. AVERAGE GLUCOSE BLD GHB EST-MCNC: 183 MG/DL
FASTING STATUS PATIENT QL REPORTED: NORMAL
GFR SERPL CREATININE-BSD FRML MDRD: >60 ML/MIN/1.73 M 2
GLOBULIN SER CALC-MCNC: 2.8 G/DL (ref 1.9–3.5)
GLUCOSE SERPL-MCNC: 207 MG/DL (ref 65–99)
HBA1C MFR BLD: 8 % (ref 0–5.6)
HCT VFR BLD AUTO: 45.7 % (ref 37–47)
HDLC SERPL-MCNC: 42 MG/DL
HGB BLD-MCNC: 14 G/DL (ref 12–16)
IMM GRANULOCYTES # BLD AUTO: 0.04 K/UL (ref 0–0.11)
IMM GRANULOCYTES NFR BLD AUTO: 0.7 % (ref 0–0.9)
LDLC SERPL CALC-MCNC: 129 MG/DL
LYMPHOCYTES # BLD AUTO: 1.33 K/UL (ref 1–4.8)
LYMPHOCYTES NFR BLD: 23.5 % (ref 22–41)
MCH RBC QN AUTO: 27.6 PG (ref 27–33)
MCHC RBC AUTO-ENTMCNC: 30.6 G/DL (ref 33.6–35)
MCV RBC AUTO: 90.1 FL (ref 81.4–97.8)
MONOCYTES # BLD AUTO: 0.45 K/UL (ref 0–0.85)
MONOCYTES NFR BLD AUTO: 8 % (ref 0–13.4)
NEUTROPHILS # BLD AUTO: 3.45 K/UL (ref 2–7.15)
NEUTROPHILS NFR BLD: 61.1 % (ref 44–72)
NRBC # BLD AUTO: 0 K/UL
NRBC BLD AUTO-RTO: 0 /100 WBC
PLATELET # BLD AUTO: 169 K/UL (ref 164–446)
PMV BLD AUTO: 10.6 FL (ref 9–12.9)
POTASSIUM SERPL-SCNC: 4.8 MMOL/L (ref 3.6–5.5)
PROT SERPL-MCNC: 7.1 G/DL (ref 6–8.2)
RBC # BLD AUTO: 5.07 M/UL (ref 4.2–5.4)
SODIUM SERPL-SCNC: 137 MMOL/L (ref 135–145)
TRIGL SERPL-MCNC: 194 MG/DL (ref 0–149)
TSH SERPL DL<=0.005 MIU/L-ACNC: 1.61 UIU/ML (ref 0.3–3.7)
WBC # BLD AUTO: 5.7 K/UL (ref 4.8–10.8)

## 2017-09-01 PROCEDURE — 82306 VITAMIN D 25 HYDROXY: CPT

## 2017-09-01 PROCEDURE — 36415 COLL VENOUS BLD VENIPUNCTURE: CPT

## 2017-09-01 PROCEDURE — 84443 ASSAY THYROID STIM HORMONE: CPT

## 2017-09-01 PROCEDURE — 85025 COMPLETE CBC W/AUTO DIFF WBC: CPT

## 2017-09-01 PROCEDURE — 80061 LIPID PANEL: CPT

## 2017-09-01 PROCEDURE — 83036 HEMOGLOBIN GLYCOSYLATED A1C: CPT

## 2017-09-01 PROCEDURE — 80053 COMPREHEN METABOLIC PANEL: CPT

## 2017-09-11 ENCOUNTER — TELEPHONE (OUTPATIENT)
Dept: MEDICAL GROUP | Facility: PHYSICIAN GROUP | Age: 72
End: 2017-09-11

## 2017-09-11 NOTE — TELEPHONE ENCOUNTER
ESTABLISHED PATIENT PRE-VISIT PLANNING     Note: Patient will not be contacted if there is no indication to call.     1.  Reviewed notes from the last few office visits within the medical group: Yes    2.  If any orders were placed at last visit or intended to be done for this visit (i.e. 6 mos follow-up), do we have Results/Consult Notes?        •  Labs - Labs ordered, completed and results are in chart.       •  Imaging - Imaging was not ordered at last office visit.       •  Referrals - No referrals were ordered at last office visit.    3. Is this appointment scheduled as a Hospital Follow-Up? No    4.  Immunizations were updated in [employer] using Web[employer]?: Yes       •  Web [employer] Recommendations: FLU, MMR  and TD    5.  Patient is due for the following Health Maintenance Topics:   Health Maintenance Due   Topic Date Due   • Annual Wellness Visit  08/21/2014   • MAMMOGRAM  05/17/2017   • RETINAL SCREENING  08/03/2017   • IMM INFLUENZA (1) 09/01/2017       - Patient has completed FLU, PNEUMOVAX (PPSV23), PREVNAR (PCV13) , TDAP and ZOSTAVAX (Shingles) Immunization(s) per WebIZ. Chart has been updated.      6.  Patient was NOT informed to arrive 15 min prior to their scheduled appointment and bring in their medication bottles.

## 2017-09-12 ENCOUNTER — OFFICE VISIT (OUTPATIENT)
Dept: MEDICAL GROUP | Facility: PHYSICIAN GROUP | Age: 72
End: 2017-09-12
Payer: MEDICARE

## 2017-09-12 VITALS
RESPIRATION RATE: 16 BRPM | TEMPERATURE: 97.8 F | SYSTOLIC BLOOD PRESSURE: 130 MMHG | BODY MASS INDEX: 32.6 KG/M2 | WEIGHT: 202.82 LBS | DIASTOLIC BLOOD PRESSURE: 90 MMHG | HEIGHT: 66 IN | OXYGEN SATURATION: 93 % | HEART RATE: 77 BPM

## 2017-09-12 DIAGNOSIS — E55.9 VITAMIN D DEFICIENCY: ICD-10-CM

## 2017-09-12 DIAGNOSIS — Z12.39 SCREENING FOR BREAST CANCER: ICD-10-CM

## 2017-09-12 DIAGNOSIS — E03.9 HYPOTHYROIDISM, UNSPECIFIED TYPE: ICD-10-CM

## 2017-09-12 DIAGNOSIS — Z23 NEED FOR INFLUENZA VACCINATION: ICD-10-CM

## 2017-09-12 DIAGNOSIS — D50.0 IRON DEFICIENCY ANEMIA DUE TO CHRONIC BLOOD LOSS: ICD-10-CM

## 2017-09-12 DIAGNOSIS — I10 ESSENTIAL HYPERTENSION: ICD-10-CM

## 2017-09-12 DIAGNOSIS — E78.5 DYSLIPIDEMIA: ICD-10-CM

## 2017-09-12 DIAGNOSIS — R74.8 ELEVATED LIVER ENZYMES: ICD-10-CM

## 2017-09-12 PROCEDURE — G0008 ADMIN INFLUENZA VIRUS VAC: HCPCS | Performed by: FAMILY MEDICINE

## 2017-09-12 PROCEDURE — 99214 OFFICE O/P EST MOD 30 MIN: CPT | Mod: 25 | Performed by: FAMILY MEDICINE

## 2017-09-12 PROCEDURE — 90686 IIV4 VACC NO PRSV 0.5 ML IM: CPT | Performed by: FAMILY MEDICINE

## 2017-09-13 NOTE — PROGRESS NOTES
Subjective:      Ally Doherty is a 72 y.o. female who presents with Follow-Up and Other (lump lft side neck)            HPI     Patient returns for follow-up of her medical problems.    In terms of her diabetes mellitus type 2, she continues to take metformin 1000 mg twice daily. She did blood work before his visit.    She continues to take losartan for hypertension. Blood pressure specifically the diastolic blood pressure is slightly elevated but previously running good.    In terms of her dyslipidemia, she continues to manage this with her own efforts. She had elevated liver enzymes with statins.    She continues to take thyroid replacement for hypothyroidism.    She takes vitamin D supplementation for vitamin D deficiency 2000 international units daily.    She has chronically elevated liver enzymes but not as high as they used to be. Prior imaging studies showed fatty infiltration. Her GI specialist is following this closely. Prior workup came back negative for viral hepatitis and other hepatobiliary diseases.    She is followed by her hematologist for iron deficiency anemia. She has required iron infusion in the past. GI workup with upper endoscopy showed ulcers in the cardia and colonoscopy showed angioectasia of the cecum which may be the sources of the bleeding. She couldn't take iron supplementation by mouth because of stomach upset and constipation. She said she normally gets the infusion every 8 months. Her CBC are being monitored closely. She takes omeprazole prescribed by GI specialist for upper GI problems mentioned above.    Past medical history, past surgical history, family history reviewed-no changes    Social history reviewed-no changes    Allergies reviewed-no changes    Medications reviewed-no changes    ROS     Review of systems as per history of present illness, the rest are negative.       Objective:     /90   Pulse 77   Temp 36.6 °C (97.8 °F)   Resp 16   Ht 1.676 m (5'  "6\")   Wt 92 kg (202 lb 13.2 oz)   LMP 02/27/1997   SpO2 93%   BMI 32.74 kg/m²      Physical Exam     Examined alert, awake, oriented, not in distress    Neck-supple, no lymphadenopathy, no thyromegaly  Lungs-clear to auscultation, no rales, no wheezes  Heart-regular rate and rhythm, no murmur  Extremities-no edema, clubbing, cyanosis  Monofilament testing with a 10 gram force: sensation intact: intact bilaterally  Visual Inspection: Feet without maceration, ulcers, fissures.  Pedal pulses: intact bilaterally       Hospital Outpatient Visit on 09/01/2017   Component Date Value   • Cholesterol,Tot 09/01/2017 210*Previously 195    • Triglycerides 09/01/2017 194*Previously 143    • HDL 09/01/2017 42 Previously 49    • LDL 09/01/2017 129*Previously 117    • Sodium 09/01/2017 137    • Potassium 09/01/2017 4.8    • Chloride 09/01/2017 102    • Co2 09/01/2017 25    • Anion Gap 09/01/2017 10.0    • Glucose 09/01/2017 207*   • Bun 09/01/2017 15    • Creatinine 09/01/2017 0.75    • Calcium 09/01/2017 9.4    • AST(SGOT) 09/01/2017 82*Previously 52    • ALT(SGPT) 09/01/2017 97*Previously 56    • Alkaline Phosphatase 09/01/2017 97    • Total Bilirubin 09/01/2017 0.3    • Albumin 09/01/2017 4.3    • Total Protein 09/01/2017 7.1    • Globulin 09/01/2017 2.8    • A-G Ratio 09/01/2017 1.5    • Glycohemoglobin 09/01/2017 8.0*Previously 6.5    • Est Avg Glucose 09/01/2017 183    • WBC 09/01/2017 5.7    • RBC 09/01/2017 5.07    • Hemoglobin 09/01/2017 14.0    • Hematocrit 09/01/2017 45.7    • MCV 09/01/2017 90.1    • MCH 09/01/2017 27.6    • MCHC 09/01/2017 30.6*   • RDW 09/01/2017 50.4*   • Platelet Count 09/01/2017 169    • MPV 09/01/2017 10.6    • Neutrophils-Polys 09/01/2017 61.10    • Lymphocytes 09/01/2017 23.50    • Monocytes 09/01/2017 8.00    • Eosinophils 09/01/2017 4.80    • Basophils 09/01/2017 1.90*   • Immature Granulocytes 09/01/2017 0.70    • Nucleated RBC 09/01/2017 0.00    • Neutrophils (Absolute) 09/01/2017 3.45  "   • Lymphs (Absolute) 09/01/2017 1.33    • Monos (Absolute) 09/01/2017 0.45    • Eos (Absolute) 09/01/2017 0.27    • Baso (Absolute) 09/01/2017 0.11    • Immature Granulocytes (a* 09/01/2017 0.04    • NRBC (Absolute) 09/01/2017 0.00    • 25-Hydroxy   Vitamin D 25 09/01/2017 29*   • TSH 09/01/2017 1.610    • Fasting Status 09/01/2017 Fasting    • GFR If  09/01/2017 >60    • GFR If Non  Ameri* 09/01/2017 >60         Assessment/Plan:     1. Uncontrolled type 2 diabetes mellitus without complication, without long-term current use of insulin (CMS-[facility])  This is now out of control. She wants to work on getting this improved with diet, exercise and weight loss. We will keep her on metformin 1000 mg twice a day and recheck in 3 months. If there is no improvement at that time or if this is worse we will start a second agent.  - Diabetic Monofilament Lower Extremity Exam  - LIPID PROFILE; Future  - COMP METABOLIC PANEL; Future  - HEMOGLOBIN A1C; Future  - VITAMIN D,25 HYDROXY; Future    2. Essential hypertension  Diastolic blood pressure slightly elevated. She will monitor her blood pressure who can keep a record. Advised to exercise and lose weight. Watch the salt foods. Reevaluate in 3 months. If this is not improved or if worse we will increase the losartan dose.  - LIPID PROFILE; Future  - COMP METABOLIC PANEL; Future  - HEMOGLOBIN A1C; Future  - VITAMIN D,25 HYDROXY; Future    3. Dyslipidemia  She is managing this with her efforts because she had elevated liver enzymes with statin. We discussed Zetia but this is not proven to prevent cardiovascular disease. For now she will continue to manage this with her own efforts with diet, exercise and weight loss. We need to get the LDL below 100.   - LIPID PROFILE; Future  - COMP METABOLIC PANEL; Future  - HEMOGLOBIN A1C; Future  - VITAMIN D,25 HYDROXY; Future    4. Hypothyroidism, unspecified type  Adequately replaced. Continue same dose of thyroid  medication.    5. Vitamin D deficiency  This is slightly low. She will increase her vitamin D supplementation to 3000 international units daily.  - LIPID PROFILE; Future  - COMP METABOLIC PANEL; Future  - HEMOGLOBIN A1C; Future  - VITAMIN D,25 HYDROXY; Future    6. Elevated liver enzymes  Liver enzymes slightly higher than before but not as high as they used to be in the 100s. She is followed closely by her GI specialist. She has fatty infiltration of the liver on imaging studies and this is probably due to that.  - LIPID PROFILE; Future  - COMP METABOLIC PANEL; Future  - HEMOGLOBIN A1C; Future  - VITAMIN D,25 HYDROXY; Future    7. Iron deficiency anemia due to chronic blood loss  H&H are currently normal. She is followed closely by her hematologist and GI specialist. She will go for iron transfusion as needed for hematologist.    8. Screening for breast cancer  Screening mammogram ordered.  - MA-SCREEN MAMMO W/CAD-BILAT    9. Need for influenza vaccination  Flu shot was given.  - Flu Quad Inj >3 Year Pre-Filled PF      Please note that this dictation was created using voice recognition software. I have worked with consultants from the vendor as well as technical experts from [employer] to optimize the interface. I have made every reasonable attempt to correct obvious errors, but I expect that there are errors of grammar and possibly content I did not discover before finalizing the note.

## 2017-09-14 RX ORDER — LEVOTHYROXINE SODIUM 0.1 MG/1
TABLET ORAL
Qty: 90 TAB | Refills: 1 | Status: SHIPPED | OUTPATIENT
Start: 2017-09-14 | End: 2018-03-13 | Stop reason: SDUPTHER

## 2017-09-20 ENCOUNTER — HOSPITAL ENCOUNTER (OUTPATIENT)
Dept: RADIOLOGY | Facility: MEDICAL CENTER | Age: 72
End: 2017-09-20
Attending: FAMILY MEDICINE
Payer: MEDICARE

## 2017-09-20 ENCOUNTER — TELEPHONE (OUTPATIENT)
Dept: MEDICAL GROUP | Facility: PHYSICIAN GROUP | Age: 72
End: 2017-09-20

## 2017-09-20 PROCEDURE — G0202 SCR MAMMO BI INCL CAD: HCPCS

## 2017-09-20 NOTE — TELEPHONE ENCOUNTER
----- Message from Su Randhawa M.D. sent at 9/20/2017 12:36 PM PDT -----  Mammogram came back within normal limits.

## 2017-12-04 ENCOUNTER — HOSPITAL ENCOUNTER (OUTPATIENT)
Dept: LAB | Facility: MEDICAL CENTER | Age: 72
End: 2017-12-04
Attending: FAMILY MEDICINE
Payer: MEDICARE

## 2017-12-04 DIAGNOSIS — I10 ESSENTIAL HYPERTENSION: ICD-10-CM

## 2017-12-04 DIAGNOSIS — E78.5 DYSLIPIDEMIA: ICD-10-CM

## 2017-12-04 DIAGNOSIS — R74.8 ELEVATED LIVER ENZYMES: ICD-10-CM

## 2017-12-04 DIAGNOSIS — E55.9 VITAMIN D DEFICIENCY: ICD-10-CM

## 2017-12-04 LAB
25(OH)D3 SERPL-MCNC: 30 NG/ML (ref 30–100)
ALBUMIN SERPL BCP-MCNC: 3.3 G/DL (ref 3.2–4.9)
ALBUMIN/GLOB SERPL: 1 G/DL
ALP SERPL-CCNC: 99 U/L (ref 30–99)
ALT SERPL-CCNC: 94 U/L (ref 2–50)
ANION GAP SERPL CALC-SCNC: 10 MMOL/L (ref 0–11.9)
AST SERPL-CCNC: 88 U/L (ref 12–45)
BILIRUB SERPL-MCNC: 0.5 MG/DL (ref 0.1–1.5)
BUN SERPL-MCNC: 13 MG/DL (ref 8–22)
CALCIUM SERPL-MCNC: 9.7 MG/DL (ref 8.5–10.5)
CHLORIDE SERPL-SCNC: 106 MMOL/L (ref 96–112)
CHOLEST SERPL-MCNC: 173 MG/DL (ref 100–199)
CO2 SERPL-SCNC: 27 MMOL/L (ref 20–33)
CREAT SERPL-MCNC: 0.78 MG/DL (ref 0.5–1.4)
EST. AVERAGE GLUCOSE BLD GHB EST-MCNC: 226 MG/DL
GFR SERPL CREATININE-BSD FRML MDRD: >60 ML/MIN/1.73 M 2
GLOBULIN SER CALC-MCNC: 3.3 G/DL (ref 1.9–3.5)
GLUCOSE SERPL-MCNC: 234 MG/DL (ref 65–99)
HBA1C MFR BLD: 9.5 % (ref 0–5.6)
HDLC SERPL-MCNC: 41 MG/DL
LDLC SERPL CALC-MCNC: 95 MG/DL
POTASSIUM SERPL-SCNC: 4.2 MMOL/L (ref 3.6–5.5)
PROT SERPL-MCNC: 6.6 G/DL (ref 6–8.2)
SODIUM SERPL-SCNC: 143 MMOL/L (ref 135–145)
TRIGL SERPL-MCNC: 187 MG/DL (ref 0–149)

## 2017-12-04 PROCEDURE — 80053 COMPREHEN METABOLIC PANEL: CPT

## 2017-12-04 PROCEDURE — 83036 HEMOGLOBIN GLYCOSYLATED A1C: CPT

## 2017-12-04 PROCEDURE — 82306 VITAMIN D 25 HYDROXY: CPT

## 2017-12-04 PROCEDURE — 80061 LIPID PANEL: CPT

## 2017-12-04 PROCEDURE — 36415 COLL VENOUS BLD VENIPUNCTURE: CPT

## 2017-12-11 ENCOUNTER — TELEPHONE (OUTPATIENT)
Dept: MEDICAL GROUP | Facility: PHYSICIAN GROUP | Age: 72
End: 2017-12-11

## 2017-12-11 NOTE — TELEPHONE ENCOUNTER
ESTABLISHED PATIENT PRE-VISIT PLANNING     Note: Patient will not be contacted if there is no indication to call.     1.  Reviewed notes from the last few office visits within the medical group: Yes    2.  If any orders were placed at last visit or intended to be done for this visit (i.e. 6 mos follow-up), do we have Results/Consult Notes?        •  Labs - Labs ordered, completed on 12/04/17 and results are in chart.   Note: If patient appointment is for lab review and patient did not complete labs, check with provider if OK to reschedule patient until labs completed.       •  Imaging - Imaging ordered, completed and results are in chart.  Mammo and DEXA done 09/06/17 & 09/12/17       •  Referrals - No referrals were ordered at last office visit.    3. Is this appointment scheduled as a Hospital Follow-Up? No    4.  Immunizations were updated in [employer] using WebIZ?: Yes Web IZ not working this morning went by what pt already had in the Immunization Tab       •  Web Iz Recommendations: Patient is up to date on all vaccines    5.  Patient is due for the following Health Maintenance Topics:   Health Maintenance Due   Topic Date Due   • Annual Wellness Visit  08/21/2014   • RETINAL SCREENING  08/03/2017       - Patient has completed FLU, PNEUMOVAX (PPSV23), PREVNAR (PCV13) , TDAP and ZOSTAVAX (Shingles) Immunization(s) per WebIZ. Chart has been updated.      6.  Patient was NOT informed to arrive 15 min prior to their scheduled appointment and bring in their medication bottles.

## 2017-12-12 ENCOUNTER — HOSPITAL ENCOUNTER (OUTPATIENT)
Dept: LAB | Facility: MEDICAL CENTER | Age: 72
End: 2017-12-12
Attending: FAMILY MEDICINE
Payer: MEDICARE

## 2017-12-12 ENCOUNTER — OFFICE VISIT (OUTPATIENT)
Dept: MEDICAL GROUP | Facility: PHYSICIAN GROUP | Age: 72
End: 2017-12-12
Payer: MEDICARE

## 2017-12-12 VITALS
BODY MASS INDEX: 31.78 KG/M2 | WEIGHT: 197.75 LBS | OXYGEN SATURATION: 91 % | HEIGHT: 66 IN | HEART RATE: 94 BPM | DIASTOLIC BLOOD PRESSURE: 70 MMHG | SYSTOLIC BLOOD PRESSURE: 134 MMHG | TEMPERATURE: 96.6 F

## 2017-12-12 DIAGNOSIS — D50.0 IRON DEFICIENCY ANEMIA DUE TO CHRONIC BLOOD LOSS: ICD-10-CM

## 2017-12-12 DIAGNOSIS — I10 ESSENTIAL HYPERTENSION: ICD-10-CM

## 2017-12-12 DIAGNOSIS — E78.5 DYSLIPIDEMIA: ICD-10-CM

## 2017-12-12 LAB
ERYTHROCYTE [DISTWIDTH] IN BLOOD BY AUTOMATED COUNT: 46.6 FL (ref 35.9–50)
HCT VFR BLD AUTO: 38.8 % (ref 37–47)
HGB BLD-MCNC: 12 G/DL (ref 12–16)
MCH RBC QN AUTO: 26.4 PG (ref 27–33)
MCHC RBC AUTO-ENTMCNC: 30.9 G/DL (ref 33.6–35)
MCV RBC AUTO: 85.3 FL (ref 81.4–97.8)
PLATELET # BLD AUTO: 231 K/UL (ref 164–446)
PMV BLD AUTO: 10.5 FL (ref 9–12.9)
RBC # BLD AUTO: 4.55 M/UL (ref 4.2–5.4)
WBC # BLD AUTO: 5.5 K/UL (ref 4.8–10.8)

## 2017-12-12 PROCEDURE — 85027 COMPLETE CBC AUTOMATED: CPT

## 2017-12-12 PROCEDURE — 99214 OFFICE O/P EST MOD 30 MIN: CPT | Performed by: FAMILY MEDICINE

## 2017-12-12 PROCEDURE — 36415 COLL VENOUS BLD VENIPUNCTURE: CPT

## 2017-12-12 RX ORDER — GLIMEPIRIDE 1 MG/1
1 TABLET ORAL EVERY MORNING
Qty: 90 TAB | Refills: 1 | Status: SHIPPED | OUTPATIENT
Start: 2017-12-12 | End: 2018-03-13 | Stop reason: SDUPTHER

## 2017-12-12 RX ORDER — OMEPRAZOLE 20 MG/1
20 CAPSULE, DELAYED RELEASE ORAL DAILY
COMMUNITY
End: 2018-03-13 | Stop reason: SDUPTHER

## 2017-12-14 NOTE — PROGRESS NOTES
"Subjective:      Ally Doherty is a 72 y.o. female who presents with Follow-Up            HPI     Patient returns for follow-up of her medical problems.    3 months ago. Her diabetes got out of control with hemoglobin A1c increased from 6.5-8.0. I recommended adding a second agent because she was already taking maximum dose of metformin, but she wanted to work on this first with watching the diet better, exercise and weight loss. She has lost 5 pounds since last visit. Blood work was done before his visit.    In terms of her hypertension, this is under control on losartan.    For her dyslipidemia, she is managing this with her own efforts only. Statins caused liver enzyme elevation.    We follow her for iron deficiency anemia. The cause of the problem is most likely from GI bleed. She had prior GI workup with upper endoscopy showing ulcers in the cardia and colonoscopy showing angioectasia of the cecum. She couldn't tolerate iron supplementation by mouth due to upset stomach and constipation. She gets iron infusion through her hematologist every 8 months. She takes omeprazole prescribed by her GI specialist regularly. She said she had an episode of black stools lasting for 3 days recently. She called her GI specialist office, but she couldn't get an appointment until March 2018. At this time. She is wondering if her anemia is back because she feels a little bit weak, although not that bad.    Past medical history, past surgical history, family history reviewed-no changes    Social history reviewed-no changes    Allergies reviewed-no changes    Medications reviewed-no changes    ROS     Review of systems as per history of present illness, the rest are negative.       Objective:     /70   Pulse 94   Temp 35.9 °C (96.6 °F)   Ht 1.676 m (5' 6\")   Wt 89.7 kg (197 lb 12 oz)   LMP 02/27/1997   SpO2 91%   BMI 31.92 kg/m²      Physical Exam     Examined alert, awake, oriented, not in " distress    Neck-supple, no lymphadenopathy, no thyromegaly  Lungs-clear to auscultation, no rales, no wheezes  Heart-regular rate and rhythm, no murmur  Extremities-no edema, clubbing, cyanosis        Results for CHARU BAEZ (MRN [id number]) as of 12/13/2017 17:00   Ref. Range 9/1/2017 10:25 12/4/2017 09:55   Sodium Latest Ref Range: 135 - 145 mmol/L 137 143   Potassium Latest Ref Range: 3.6 - 5.5 mmol/L 4.8 4.2   Chloride Latest Ref Range: 96 - 112 mmol/L 102 106   Co2 Latest Ref Range: 20 - 33 mmol/L 25 27   Anion Gap Latest Ref Range: 0.0 - 11.9  10.0 10.0   Glucose Latest Ref Range: 65 - 99 mg/dL 207 (H) 234 (H)   Bun Latest Ref Range: 8 - 22 mg/dL 15 13   Creatinine Latest Ref Range: 0.50 - 1.40 mg/dL 0.75 0.78   GFR If  Latest Ref Range: >60 mL/min/1.73 m 2 >60 >60   GFR If Non  Latest Ref Range: >60 mL/min/1.73 m 2 >60 >60   Calcium Latest Ref Range: 8.5 - 10.5 mg/dL 9.4 9.7   AST(SGOT) Latest Ref Range: 12 - 45 U/L 82 (H) 88 (H)   ALT(SGPT) Latest Ref Range: 2 - 50 U/L 97 (H) 94 (H)   Alkaline Phosphatase Latest Ref Range: 30 - 99 U/L 97 99   Total Bilirubin Latest Ref Range: 0.1 - 1.5 mg/dL 0.3 0.5   Albumin Latest Ref Range: 3.2 - 4.9 g/dL 4.3 3.3   Total Protein Latest Ref Range: 6.0 - 8.2 g/dL 7.1 6.6   Globulin Latest Ref Range: 1.9 - 3.5 g/dL 2.8 3.3   A-G Ratio Latest Units: g/dL 1.5 1.0   Glycohemoglobin Latest Ref Range: 0.0 - 5.6 % 8.0 (H) 9.5 (H)   Estim. Avg Glu Latest Units: mg/dL 183 226   Fasting Status Unknown Fasting    Cholesterol,Tot Latest Ref Range: 100 - 199 mg/dL 210 (H) 173   Triglycerides Latest Ref Range: 0 - 149 mg/dL 194 (H) 187 (H)   HDL Latest Ref Range: >=40 mg/dL 42 41   LDL Latest Ref Range: <100 mg/dL 129 (H) 95   Results for SASHA SHREYASDESTIN PICKENS (MRN [id number]) as of 12/13/2017 17:00   Ref. Range 9/1/2017 10:25 12/4/2017 09:55   25-Hydroxy   Vitamin D 25 Latest Ref Range: 30 - 100 ng/mL 29 (L) 30   TSH Latest Ref Range: 0.300  - 3.700 uIU/mL 1.610      Assessment/Plan:     1. Uncontrolled type 2 diabetes mellitus without complication, without long-term current use of insulin (CMS-[facility])  This is more out of control than before. I told the patient she needs to start on a second agent. We discussed different classes of meds and potential cost. She wants to be on something that she can afford. I will put her on glimepiride. Made her aware of potential weight gain with this medication. Other medications that are available are brand names and more expensive. We will proceed with glimepiride 1 mg one tablet daily every morning with breakfast. Continue metformin 1000 mg twice daily. Discussed diet, exercise and weight loss. We will do follow-up in 3 months.  - glimepiride (AMARYL) 1 MG tablet; Take 1 Tab by mouth every morning.  Dispense: 90 Tab; Refill: 1  - COMP METABOLIC PANEL; Future  - LIPID PROFILE; Future  - HEMOGLOBIN A1C; Future  - MICROALBUMIN CREAT RATIO URINE; Future    2. Essential hypertension  Controlled on her medication.  - COMP METABOLIC PANEL; Future  - LIPID PROFILE; Future  - HEMOGLOBIN A1C; Future  - MICROALBUMIN CREAT RATIO URINE; Future    3. Dyslipidemia  Triglycerides improved and closer to goal. HDL at 41. Advised increase activity to make this better. LDL is improved and is the low 100s. She can't tolerate statins because of elevated liver enzymes. She will continue to manage this with her own efforts.  - COMP METABOLIC PANEL; Future  - LIPID PROFILE; Future  - HEMOGLOBIN A1C; Future  - MICROALBUMIN CREAT RATIO URINE; Future    4. Iron deficiency anemia due to chronic blood loss  She had a recent tarry stools which lasted for 3 days. Her next appointment with GI specialist is not until March 2018. I will get a CBC to see if she became anemic from this recent GI bleed. Continue omeprazole. We will call her with results.  - CBC WITHOUT DIFFERENTIAL; Future      Please note that this dictation was created using voice  recognition software. I have worked with consultants from the vendor as well as technical experts from [facility] to optimize the interface. I have made every reasonable attempt to correct obvious errors, but I expect that there are errors of grammar and possibly content I did not discover before finalizing the note.

## 2018-01-16 ENCOUNTER — PATIENT OUTREACH (OUTPATIENT)
Dept: HEALTH INFORMATION MANAGEMENT | Facility: OTHER | Age: 73
End: 2018-01-16

## 2018-01-16 NOTE — PROGRESS NOTES
1. Attempt #: Final    2. HealthConnect Verified: yes    3. Verify PCP: yes    4. Care Team Updated:       •   DME Company (gait device, O2, CPAP, etc.): NO       •   Other Specialists (eye doctor, derm, GYN, cardiology, endo, etc): YES// Pt has another oncologist, will bring his name to the appt.    5.  Reviewed/Updated the following with patient:       •   Communication Preference Obtained? YES       •   Preferred Pharmacy? YES       •   Preferred Lab? YES       •   Family History (document living status of immediate family members and if + hx of cancer, diabetes, hypertension, hyperlipidemia, heart attack, stroke) NO// Pt said that she was adopted and doesn't have too much info.    6. [facility] Activation: already active    7. [facility] Anita: yes    8. Annual Wellness Visit Scheduling  Scheduling Status:Scheduled      9. Care Gap Scheduling (Attempt to Schedule EACH Overdue Care Gap!)     Health Maintenance Due   Topic Date Due   • Annual Wellness Visit  08/21/2014   • RETINAL SCREENING  08/03/2017 / Pt said that she was going to have the eye exam today (1/16/18)        Scheduled patient for Annual Wellness Visit      10. Patient was advised: “This is a free wellness visit. The provider will screen for medical conditions to help you stay healthy. If you have other concerns to address you may be asked to discuss these at a separate visit or there may be an additional fee.”     11. Patient was informed to arrive 15 min prior to their scheduled appointment and bring in their medication bottles.

## 2018-02-20 ENCOUNTER — TELEPHONE (OUTPATIENT)
Dept: MEDICAL GROUP | Facility: PHYSICIAN GROUP | Age: 73
End: 2018-02-20

## 2018-02-20 NOTE — TELEPHONE ENCOUNTER
Left message for patient to call back regarding pre-visit planning. Please transfer call to [place] at [phone].

## 2018-02-20 NOTE — TELEPHONE ENCOUNTER
PVP WITH OUTREACH  Future Appointments       Provider Department [place]    2/27/2018 10:00 AM Su Randhawa M.D.; [facility]  [facility] [place]     3/13/2018 8:00 AM Su Randhawa M.D. [facility] [place]           ANNUAL WELLNESS VISIT PRE-VISIT PLANNING     1.  Immunizations were updated in Epic using WebIZ?: Yes       •  WebIZ Recommendations: Patient is up to date on all vaccines       •  Is patient due for Tdap? NO       •  Is patient due for Shingles? NO     2.  Specialty Comments was updated with diagnosis information provided by [facility]: NO

## 2018-02-27 ENCOUNTER — OFFICE VISIT (OUTPATIENT)
Dept: MEDICAL GROUP | Facility: PHYSICIAN GROUP | Age: 73
End: 2018-02-27
Payer: MEDICARE

## 2018-02-27 VITALS
SYSTOLIC BLOOD PRESSURE: 135 MMHG | TEMPERATURE: 97.6 F | DIASTOLIC BLOOD PRESSURE: 78 MMHG | OXYGEN SATURATION: 93 % | BODY MASS INDEX: 30.69 KG/M2 | RESPIRATION RATE: 16 BRPM | WEIGHT: 195.55 LBS | HEART RATE: 82 BPM | HEIGHT: 67 IN

## 2018-02-27 DIAGNOSIS — E78.5 DYSLIPIDEMIA: ICD-10-CM

## 2018-02-27 DIAGNOSIS — E66.9 OBESITY (BMI 30-39.9): ICD-10-CM

## 2018-02-27 DIAGNOSIS — Z17.0 MALIGNANT NEOPLASM OF UPPER-INNER QUADRANT OF RIGHT BREAST IN FEMALE, ESTROGEN RECEPTOR POSITIVE (HCC): ICD-10-CM

## 2018-02-27 DIAGNOSIS — I10 ESSENTIAL HYPERTENSION: ICD-10-CM

## 2018-02-27 DIAGNOSIS — C50.211 MALIGNANT NEOPLASM OF UPPER-INNER QUADRANT OF RIGHT BREAST IN FEMALE, ESTROGEN RECEPTOR POSITIVE (HCC): ICD-10-CM

## 2018-02-27 DIAGNOSIS — D50.0 IRON DEFICIENCY ANEMIA DUE TO CHRONIC BLOOD LOSS: ICD-10-CM

## 2018-02-27 DIAGNOSIS — K76.0 FATTY LIVER DISEASE, NONALCOHOLIC: ICD-10-CM

## 2018-02-27 DIAGNOSIS — E55.9 VITAMIN D DEFICIENCY: ICD-10-CM

## 2018-02-27 DIAGNOSIS — Z00.00 MEDICARE ANNUAL WELLNESS VISIT, SUBSEQUENT: ICD-10-CM

## 2018-02-27 DIAGNOSIS — E03.9 HYPOTHYROIDISM, UNSPECIFIED TYPE: ICD-10-CM

## 2018-02-27 PROCEDURE — G0439 PPPS, SUBSEQ VISIT: HCPCS | Performed by: FAMILY MEDICINE

## 2018-02-27 ASSESSMENT — PATIENT HEALTH QUESTIONNAIRE - PHQ9
5. POOR APPETITE OR OVEREATING: 0 - NOT AT ALL
CLINICAL INTERPRETATION OF PHQ2 SCORE: 2
SUM OF ALL RESPONSES TO PHQ QUESTIONS 1-9: 4

## 2018-02-27 ASSESSMENT — ACTIVITIES OF DAILY LIVING (ADL): BATHING_REQUIRES_ASSISTANCE: 0

## 2018-02-27 NOTE — PROGRESS NOTES
Chief Complaint   Patient presents with   • Annual Wellness Visit         HPI:  Ally is a 73 y.o. here for Medicare Annual Wellness Visit        Patient Active Problem List    Diagnosis Date Noted   • Uncontrolled type 2 diabetes mellitus without complication, without long-term current use of insulin (CMS-HCC) 12/22/2016   • Vitamin D deficiency 05/27/2014   • Fatty liver disease, nonalcoholic 09/10/2013   • S/P breast reconstruction 06/24/2013   • DM II (diabetes mellitus, type II), controlled (CMS-HCC) 06/21/2012   • Essential hypertension 02/27/2012   • Hyperlipidemia 02/27/2012   • Hypothyroidism 02/27/2012   • Malignant neoplasm of right breast (CMS-HCC) 02/27/2012       Current Outpatient Prescriptions   Medication Sig Dispense Refill   • omeprazole (PRILOSEC) 20 MG delayed-release capsule Take 20 mg by mouth every day.     • glimepiride (AMARYL) 1 MG tablet Take 1 Tab by mouth every morning. 90 Tab 1   • losartan (COZAAR) 50 MG Tab TAKE 1 TABLET BY MOUTH EVERY EVENING 90 Tab 1   • metformin (GLUCOPHAGE) 1000 MG tablet TAKE 1 TAB BY MOUTH 2 TIMES A DAY, WITH MEALS. 180 Tab 1   • levothyroxine (SYNTHROID) 100 MCG Tab TAKE 1 TABLET BY MOUTH EVERY MORNING ON AN EMPTYSTOMACH 90 Tab 1   • therapeutic multivitamin-minerals (THERAGRAN-M) Tab Take 1 Tab by mouth every day.     • Calcium Carbonate-Vitamin D (CALTRATE 600+D PO) Take  by mouth.     • anastrozole (ARIMIDEX) 1 MG TABS Take 1 mg by mouth every morning.       No current facility-administered medications for this visit.         Patient is taking medications as noted in medication list.  Current supplements as per medication list.     Allergies: Byetta; Penicillins; and Statins [hmg-coa-r inhibitors]    Current social contact/activities: shopping, [employer] outings with friends and family      Is patient current with immunizations? Yes.    She  reports that she has never smoked. She has never used smokeless tobacco. She reports that she drinks alcohol. She  reports that she does not use drugs.  Counseling given: Not Answered        DPA/Advanced directive: Patient has Advanced Directive on file.     ROS:    Gait: Uses no assistive device   Ostomy: NO  Other tubes: NO   Amputations: no   Chronic oxygen use no   Last eye exam 02/18   Wears hearing aids: no   : Denies any urinary leakage during the last 6 months      Screening:        Little interest or pleasure in doing things?  1 - several days  Feeling down, depressed, or hopeless? 1 - several days  Trouble falling or staying asleep, or sleeping too much?  2 - more than half the days  Feeling tired or having little energy?  0 - not at all  Poor appetite or overeating?  0 - not at all  Feeling bad about yourself - or that you are a failure or have let yourself or your family down? 0 - not at all  Trouble concentrating on things, such as reading the newspaper or watching television? 0 - not at all  Moving or speaking so slowly that other people could have noticed.  Or the opposite - being so fidgety or restless that you have been moving around a lot more than usual?  0 - not at all  Thoughts that you would be better off dead, or of hurting yourself?  0 - not at all  Patient Health Questionnaire Score: 4      If depressive symptoms identified deferred to follow up visit unless specifically addressed in assessment and plan.    Interpretation of PHQ-9 Total Score   Score Severity   1-4 No Depression   5-9 Mild Depression   10-14 Moderate Depression   15-19 Moderately Severe Depression   20-27 Severe Depression      Screening for Cognitive Impairment    Three Minute Recall (apple, watch, chanel)  3/3 Village, Kitchen, Baby  Draw clock face with all 12 numbers set to the hand to show 10 minutes past 11 o'clock  1 Time 3:40  5/5  If cognitive concerns identified, deferred for follow up unless specifically addressed in assessment and plan.    Fall Risk Assessment    Has the patient had two or more falls in the last year or any  fall with injury in the last year?  No  If fall risk identified, deferred for follow up unless specifically addressed in assessment and plan.      Safety Assessment    Throw rugs on floor.  Yes  Handrails on all stairs.  Yes  Good lighting in all hallways.  Yes  Difficulty hearing.  No  Patient counseled about all safety risks that were identified.    Functional Assessment ADLs    Are there any barriers preventing you from cooking for yourself or meeting nutritional needs?  No.    Are there any barriers preventing you from driving safely or obtaining transportation?  No.    Are there any barriers preventing you from using a telephone or calling for help?  No.    Are there any barriers preventing you from shopping?  No.    Are there any barriers preventing you from taking care of your own finances?  No.    Are there any barriers preventing you from managing your medications?    No.    Are there any barriers preventing you from showering/bathing yourself?  No.    Are you currently engaging any exercise or physical activity?  No.       Health Maintenance Summary                Annual Wellness Visit Overdue 8/21/2014      Done 8/20/2013     RETINAL SCREENING Overdue 8/3/2017      Done 8/3/2016 REFERRAL FOR RETINAL SCREENING EXAM    URINE ACR / MICROALBUMIN Next Due 3/1/2018      Done 3/1/2017 MICROALBUMIN CREAT RATIO URINE     Patient has more history with this topic...    A1C SCREENING Next Due 6/4/2018      Done 12/4/2017 HEMOGLOBIN A1C      Patient has more history with this topic...    DIABETES MONOFILAMENT / LE EXAM Next Due 9/12/2018      Done 9/12/2017 AMB DIABETIC MONOFILAMENT LOWER EXTREMITY EXAM     Patient has more history with this topic...    MAMMOGRAM Next Due 9/20/2018      Done 9/20/2017 MA-MAMMO SCREENING BILAT W/TOMOSYNTHESIS W/CAD     Patient has more history with this topic...    FASTING LIPID PROFILE Next Due 12/4/2018      Done 12/4/2017 LIPID PROFILE      Patient has more history with this  topic...    SERUM CREATININE Next Due 12/4/2018      Done 12/4/2017 COMP METABOLIC PANEL      Patient has more history with this topic...    COLONOSCOPY Next Due 5/26/2021      Done 5/26/2016 AMB REFERRAL TO GI FOR COLONOSCOPY     Patient has more history with this topic...    BONE DENSITY Next Due 9/6/2021      Done 9/6/2016 DS-BONE DENSITY STUDY (DEXA)     Patient has more history with this topic...    IMM DTaP/Tdap/Td Vaccine Next Due 10/4/2022      Done 10/4/2012 Imm Admin: Tdap Vaccine          Patient Care Team:  Su Randhawa M.D. as PCP - General (Family Medicine)  Salomon Hilton as Consulting Physician (Optometry)  Micky Breen M.D. as Consulting Physician (Gastroenterology)  TARUN Cardona M.D. as Consulting Physician (Oncology)      Social History   Substance Use Topics   • Smoking status: Never Smoker   • Smokeless tobacco: Never Used   • Alcohol use 0.0 oz/week      Comment: < 1 per week     Family History   Problem Relation Age of Onset   • Other       adopted     She  has a past medical history of Anemia; Breast cancer (CMS-HCC) (2/27/2012); Cancer (CMS-HCC) (2010); DM hyperosmolarity type II (CMS-[facility]) (2/27/2012); Hiatus hernia syndrome; High cholesterol (02/10/15); HTN (hypertension) (02/12/15); Hyperlipidemia (2/27/2012); and Hypothyroidism (2/27/2012). She also has no past medical history of Cold; Dental disorder; or Pain.   Past Surgical History:   Procedure Laterality Date   • BREAST RECONSTRUCTION  2/12/2015    Performed by Edward Boo M.D. at [facility]   • MAMMOPLASTY REDUCTION  2/12/2015    Performed by Edward Boo M.D. at [facility]   • MOLE EXCISION  2/12/2015    Performed by Edward Boo M.D. at [facility]   • CAPSULECTOMY  3/6/2014    Performed by Edward Boo M.D. at [facility] ORS   • BREAST RECONSTRUCTION  6/24/2013    Performed by Edward Boo M.D. at [facility]   • TISSUE EXPANDER PLACE/REMOVE  6/24/2013     "Performed by Edward Boo M.D. at SURGERY [facility] ORS   • CAYETANO BY LAPAROSCOPY  2008   • TUBAL LIGATION  1974   • TONSILLECTOMY  1953   • MASTECTOMY      right breast   • PB RADIATION THERAPY PLAN SIMPLE     • OH CHEMOTHERAPY, UNSPECIFIED PROCEDURE           Exam:     Blood pressure 135/78, pulse 82, temperature 36.4 °C (97.6 °F), resp. rate 16, height 1.689 m (5' 6.5\"), weight 88.7 kg (195 lb 8.8 oz), last menstrual period 02/27/1997, SpO2 93 %. Body mass index is 31.09 kg/m².    Hearing good.    Dentition fair  Alert, oriented in no acute distress.  Eye contact is good, speech goal directed, affect calm    Skin:                 Warm, no rashes in visible areas    Head:               Atraumatic, normocephalic    Eyes:               Pupils equal, round and reactive to light, extraocular muscles movement  intact, clear conjunctivae    Ears:               Tympanic membranes intact without evidence of infection    Nose:              No nasal discharge, no obstruction    Mouth:             No oral lesions    Throat:            Tonsils not enlarged, no exudates    Neck:              Trachea midline, supple, no lymphadenopathy, no thyromegaly    Lungs:             Clear to auscultation, no rales, no wheezes, no rhonchi    Heart:              Regular rate and rhythm, no murmur    Abdomen:       Good bowel sounds, soft, nontender, no hepatosplenomegaly, no masses    Extremities:    No edema, no clubbing, no cyanosis    Psych:            Alert and oriented x3, normal affect    Neuro:            Cranial nerves intact, Motor strength 5/5 upper and lower extremities, DTRs 2+, sensation intact to light touch, negative Romberg    Assessment and Plan. The following treatment and monitoring plan is recommended:      1. Medicare annual wellness visit, subsequent  Up-to-date with immunizations. Up-to-date with flu shot and bone density scan.    2. Obesity (BMI 30-39.9)  Discussed diet, exercise and weight loss.    3. 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 She is on anastrozole followed by her oncologist. She has been cancer free.    4. Uncontrolled type 2 diabetes mellitus without complication, without long-term current use of insulin (CMS-HCC)  We added glimepiride 1 mg one tablet daily 3 months ago to her metformin 1000 mg twice a because her diabetes has not been controlled with hemoglobin A1c increased from 8.0-9.5. She has been tolerating the glimepiride. She will follow-up with me next month to see if the diabetes is better controlled. She will do blood work before that visit.    5. Essential hypertension  Controlled on losartan.    6. Dyslipidemia  She is managing this with her own efforts because the statins increased her liver enzymes. She will do follow-up lipid panel next visit.    7. Iron deficiency anemia due to chronic blood loss  She has iron deficiency anemia due to chronic blood loss from GI bleed. She is followed by the hematologist/oncologist and GI specialist. GI workup with upper endoscopy showed ulcers in the cardia and colonoscopy showing angioectasia of the cecum. She couldn't tolerate oral iron supplementation due to upset stomach and constipation. She gets iron infusion through her hematologist whenever H&H dropped significantly about every 8 months. She had recent episode of black tarry stools that lasted for 3 days in December 2017. This has not recurred. I did a CBC at that time that came back no evidence of anemia. She has a follow-up appointment with her GI specialist next month. We will await further recommendation.    8. Hypothyroidism, unspecified type  She is on thyroid replacement and this has been adequately replaced per last TSH in September 2017.    9. Vitamin D deficiency  She is on vitamin D supplementation and this was at normal level of 30 in December 2017.    10. Fatty  liver disease, nonalcoholic  She continues to have mild elevation of her liver enzymes due to fatty liver. She is followed closely by her GI specialist.      Services suggested: No services needed at this time  Health Care Screening recommendations as per orders if indicated.  Referrals offered: PT/OT/Nutrition counseling/Behavioral Health/Smoking cessation as per orders if indicated.    Discussion today about general wellness and lifestyle habits:    · Prevent falls and reduce trip hazards; Cautioned about securing or removing rugs.  · Have a working fire alarm and carbon monoxide detector;   · Engage in regular physical activity and social activities       Follow-up: Keep appointment as scheduled next month.      Please note that this dictation was created using voice recognition software. I have worked with consultants from the vendor as well as technical experts from [employer] to optimize the interface. I have made every reasonable attempt to correct obvious errors, but I expect that there are errors of grammar and possibly content I did not discover before finalizing the note.

## 2018-03-07 ENCOUNTER — HOSPITAL ENCOUNTER (OUTPATIENT)
Dept: LAB | Facility: MEDICAL CENTER | Age: 73
End: 2018-03-07
Attending: FAMILY MEDICINE
Payer: MEDICARE

## 2018-03-07 DIAGNOSIS — E78.5 DYSLIPIDEMIA: ICD-10-CM

## 2018-03-07 DIAGNOSIS — I10 ESSENTIAL HYPERTENSION: ICD-10-CM

## 2018-03-07 LAB
ALBUMIN SERPL BCP-MCNC: 4.4 G/DL (ref 3.2–4.9)
ALBUMIN/GLOB SERPL: 1.6 G/DL
ALP SERPL-CCNC: 111 U/L (ref 30–99)
ALT SERPL-CCNC: 83 U/L (ref 2–50)
ANION GAP SERPL CALC-SCNC: 11 MMOL/L (ref 0–11.9)
AST SERPL-CCNC: 83 U/L (ref 12–45)
BILIRUB SERPL-MCNC: 0.5 MG/DL (ref 0.1–1.5)
BUN SERPL-MCNC: 11 MG/DL (ref 8–22)
CALCIUM SERPL-MCNC: 9.3 MG/DL (ref 8.5–10.5)
CHLORIDE SERPL-SCNC: 107 MMOL/L (ref 96–112)
CHOLEST SERPL-MCNC: 183 MG/DL (ref 100–199)
CO2 SERPL-SCNC: 22 MMOL/L (ref 20–33)
CREAT SERPL-MCNC: 0.9 MG/DL (ref 0.5–1.4)
CREAT UR-MCNC: 145.8 MG/DL
EST. AVERAGE GLUCOSE BLD GHB EST-MCNC: 171 MG/DL
GLOBULIN SER CALC-MCNC: 2.8 G/DL (ref 1.9–3.5)
GLUCOSE SERPL-MCNC: 172 MG/DL (ref 65–99)
HBA1C MFR BLD: 7.6 % (ref 0–5.6)
HDLC SERPL-MCNC: 41 MG/DL
LDLC SERPL CALC-MCNC: 108 MG/DL
MICROALBUMIN UR-MCNC: 3.1 MG/DL
MICROALBUMIN/CREAT UR: 21 MG/G (ref 0–30)
POTASSIUM SERPL-SCNC: 4.8 MMOL/L (ref 3.6–5.5)
PROT SERPL-MCNC: 7.2 G/DL (ref 6–8.2)
SODIUM SERPL-SCNC: 140 MMOL/L (ref 135–145)
TRIGL SERPL-MCNC: 172 MG/DL (ref 0–149)

## 2018-03-07 PROCEDURE — 80061 LIPID PANEL: CPT

## 2018-03-07 PROCEDURE — 80053 COMPREHEN METABOLIC PANEL: CPT

## 2018-03-07 PROCEDURE — 83036 HEMOGLOBIN GLYCOSYLATED A1C: CPT

## 2018-03-07 PROCEDURE — 36415 COLL VENOUS BLD VENIPUNCTURE: CPT

## 2018-03-07 PROCEDURE — 82570 ASSAY OF URINE CREATININE: CPT

## 2018-03-07 PROCEDURE — 82043 UR ALBUMIN QUANTITATIVE: CPT

## 2018-03-12 ENCOUNTER — TELEPHONE (OUTPATIENT)
Dept: MEDICAL GROUP | Facility: PHYSICIAN GROUP | Age: 73
End: 2018-03-12

## 2018-03-12 NOTE — TELEPHONE ENCOUNTER
ESTABLISHED PATIENT PRE-VISIT PLANNING     Note: Patient will not be contacted if there is no indication to call.     1.  Reviewed notes from the last few office visits within the medical group: Yes    2.  If any orders were placed at last visit or intended to be done for this visit (i.e. 6 mos follow-up), do we have Results/Consult Notes?        •  Labs - Labs ordered, completed on 03/07/18 and results are in chart.   Note: If patient appointment is for lab review and patient did not complete labs, check with provider if OK to reschedule patient until labs completed.       •  Imaging - Imaging ordered, completed and results are in chart.       •  Referrals - No referrals were ordered at last office visit.    3. Is this appointment scheduled as a Hospital Follow-Up? No    4.  Immunizations were updated in [facility] [employer] using WebIZ?: Yes       •  Web Iz Recommendations: MMR  and TD    5.  Patient is due for the following Health Maintenance Topics:   There are no preventive care reminders to display for this patient.    - Patient has completed FLU, PNEUMOVAX (PPSV23), PREVNAR (PCV13) , TDAP and ZOSTAVAX (Shingles) Immunization(s) per WebIZ. Chart has been updated.    6.  MDX printed and highlighted for Provider? YES    7.  Patient was NOT informed to arrive 15 min prior to their scheduled appointment and bring in their medication bottles.

## 2018-03-13 ENCOUNTER — OFFICE VISIT (OUTPATIENT)
Dept: MEDICAL GROUP | Facility: PHYSICIAN GROUP | Age: 73
End: 2018-03-13
Payer: MEDICARE

## 2018-03-13 VITALS
HEART RATE: 96 BPM | TEMPERATURE: 97.3 F | SYSTOLIC BLOOD PRESSURE: 130 MMHG | OXYGEN SATURATION: 96 % | DIASTOLIC BLOOD PRESSURE: 70 MMHG | WEIGHT: 196.87 LBS | HEIGHT: 67 IN | BODY MASS INDEX: 30.9 KG/M2

## 2018-03-13 DIAGNOSIS — Z17.0 MALIGNANT NEOPLASM OF UPPER-INNER QUADRANT OF RIGHT BREAST IN FEMALE, ESTROGEN RECEPTOR POSITIVE (HCC): ICD-10-CM

## 2018-03-13 DIAGNOSIS — E03.9 HYPOTHYROIDISM, UNSPECIFIED TYPE: ICD-10-CM

## 2018-03-13 DIAGNOSIS — C50.211 MALIGNANT NEOPLASM OF UPPER-INNER QUADRANT OF RIGHT BREAST IN FEMALE, ESTROGEN RECEPTOR POSITIVE (HCC): ICD-10-CM

## 2018-03-13 DIAGNOSIS — I10 ESSENTIAL HYPERTENSION: ICD-10-CM

## 2018-03-13 DIAGNOSIS — E78.5 DYSLIPIDEMIA: ICD-10-CM

## 2018-03-13 PROCEDURE — 99214 OFFICE O/P EST MOD 30 MIN: CPT | Performed by: FAMILY MEDICINE

## 2018-03-13 RX ORDER — LOSARTAN POTASSIUM 50 MG/1
TABLET ORAL
Qty: 90 TAB | Refills: 1 | Status: SHIPPED | OUTPATIENT
Start: 2018-03-13 | End: 2018-07-30 | Stop reason: SDUPTHER

## 2018-03-13 RX ORDER — OMEPRAZOLE 20 MG/1
20 CAPSULE, DELAYED RELEASE ORAL DAILY
Qty: 90 CAP | Refills: 1 | Status: SHIPPED | OUTPATIENT
Start: 2018-03-13 | End: 2018-09-10 | Stop reason: SDUPTHER

## 2018-03-13 RX ORDER — LEVOTHYROXINE SODIUM 0.1 MG/1
TABLET ORAL
Qty: 90 TAB | Refills: 1 | Status: SHIPPED | OUTPATIENT
Start: 2018-03-13 | End: 2018-10-12 | Stop reason: SDUPTHER

## 2018-03-13 RX ORDER — GLIMEPIRIDE 1 MG/1
1 TABLET ORAL EVERY MORNING
Qty: 90 TAB | Refills: 1 | Status: SHIPPED | OUTPATIENT
Start: 2018-03-13 | End: 2018-09-10 | Stop reason: SDUPTHER

## 2018-03-14 NOTE — PROGRESS NOTES
"Subjective:      Ally Doherty is a 73 y.o. female who presents with Follow-Up and Medication Refill (all)            HPI     Patient returns for follow-up of her medical problems.    In terms of her diabetes mellitus type 2, we added glimepiride 3 months ago because her diabetes got out of control with hemoglobin A1c increased from 8.0-9.5 on maximum dose of metformin. She is tolerating the glimepiride without any side effects including hypoglycemia. Blood work was done before this visit.    Blood pressures under control on losartan.    She is managing her dyslipidemia with her own efforts only because she had elevation of her liver enzymes with statins. Liver enzymes continued to be elevated off statins but not as high as when she was on them. She was found to have fatty liver on abdominal ultrasound. She is followed by the gastroenterologist for the fatty liver.    She continues to take thyroid replacement for hypothyroidism. The last TSH in 9/17 came back adequately replaced.    In terms of her history of right breast cancer status post radical mastectomy with reconstructive surgery, she continues to take anastrozole. She is followed by her oncologist closely. She has been disease-free.    Past medical history, past surgical history, family history reviewed-no changes    Social history reviewed-no changes    Allergies reviewed-no changes    Medications reviewed-no changes        ROS     As per history of present illness, the rest are negative.       Objective:     /70   Pulse 96   Temp 36.3 °C (97.3 °F)   Ht 1.689 m (5' 6.5\")   Wt 89.3 kg (196 lb 13.9 oz)   LMP 02/27/1997   SpO2 96%   BMI 31.30 kg/m²      Physical Exam     Examined alert, awake, oriented, not in distress    Neck-supple, no lymphadenopathy, no thyromegaly  Lungs-clear to auscultation, no rales, no wheezes  Heart-regular rate and rhythm, no murmur  Extremities-no edema, clubbing, cyanosis        Results for SASHA, " CHARU PICKENS (MRN [id number]) as of 3/14/2018 06:13   Ref. Range 12/4/2017 09:55 3/7/2018 09:59   Sodium Latest Ref Range: 135 - 145 mmol/L 143 140   Potassium Latest Ref Range: 3.6 - 5.5 mmol/L 4.2 4.8   Chloride Latest Ref Range: 96 - 112 mmol/L 106 107   Co2 Latest Ref Range: 20 - 33 mmol/L 27 22   Anion Gap Latest Ref Range: 0.0 - 11.9  10.0 11.0   Glucose Latest Ref Range: 65 - 99 mg/dL 234 (H) 172 (H)   Bun Latest Ref Range: 8 - 22 mg/dL 13 11   Creatinine Latest Ref Range: 0.50 - 1.40 mg/dL 0.78 0.90   GFR If  Latest Ref Range: >60 mL/min/1.73 m 2 >60 >60   GFR If Non  Latest Ref Range: >60 mL/min/1.73 m 2 >60 >60   Calcium Latest Ref Range: 8.5 - 10.5 mg/dL 9.7 9.3   AST(SGOT) Latest Ref Range: 12 - 45 U/L 88 (H) 83 (H)   ALT(SGPT) Latest Ref Range: 2 - 50 U/L 94 (H) 83 (H)   Alkaline Phosphatase Latest Ref Range: 30 - 99 U/L 99 111 (H)   Total Bilirubin Latest Ref Range: 0.1 - 1.5 mg/dL 0.5 0.5   Albumin Latest Ref Range: 3.2 - 4.9 g/dL 3.3 4.4   Total Protein Latest Ref Range: 6.0 - 8.2 g/dL 6.6 7.2   Globulin Latest Ref Range: 1.9 - 3.5 g/dL 3.3 2.8   A-G Ratio Latest Units: g/dL 1.0 1.6   Glycohemoglobin Latest Ref Range: 0.0 - 5.6 % 9.5 (H) 7.6 (H)   Estim. Avg Glu Latest Units: mg/dL 226 171   Cholesterol,Tot Latest Ref Range: 100 - 199 mg/dL 173 183   Triglycerides Latest Ref Range: 0 - 149 mg/dL 187 (H) 172 (H)   HDL Latest Ref Range: >=40 mg/dL 41 41   LDL Latest Ref Range: <100 mg/dL 95 108 (H)   Results for CHARU BAEZ (MRN [id number]) as of 3/14/2018 06:13   Ref. Range 3/7/2018 10:00   Micro Alb Creat Ratio Latest Ref Range: 0 - 30 mg/g 21   Creatinine, Urine Latest Units: mg/dL 145.80   Microalbumin, Urine Random Latest Units: mg/dL 3.1     Assessment/Plan:     1. Uncontrolled type 2 diabetes mellitus without complication, without long-term current use of insulin (CMS-formerly [facility])  This is much improved and almost at goal. I will keep her on the same dose of  glimepiride and metformin. She will work harder on watching the diet, exercise and weight loss. Recheck blood work in 3 months.  - glimepiride (AMARYL) 1 MG tablet; Take 1 Tab by mouth every morning.  Dispense: 90 Tab; Refill: 1  - LIPID PROFILE; Future  - COMP METABOLIC PANEL; Future  - HEMOGLOBIN A1C; Future  - TSH; Future    2. Essential hypertension  Controlled on her medication.  - LIPID PROFILE; Future  - COMP METABOLIC PANEL; Future  - HEMOGLOBIN A1C; Future  - TSH; Future    3. Dyslipidemia  LDL cholesterol higher than last time. She is managing this with her own efforts only. Advised to watch the diet more closely in terms of facts. Increase vegetables and fish in the diet. Exercise regularly and keep a healthy weight. Triglycerides improve closer to goal. Continue to avoid sweets and decrease carbohydrates.  - LIPID PROFILE; Future  - COMP METABOLIC PANEL; Future  - HEMOGLOBIN A1C; Future  - TSH; Future    4. Hypothyroidism, unspecified type  Adequately replace per last blood work in September 2017. We will check another TSH next visit.  - LIPID PROFILE; Future  - COMP METABOLIC PANEL; Future  - HEMOGLOBIN A1C; Future  - TSH; Future    5. Malignant neoplasm of upper-inner quadrant of right breast in female, estrogen receptor positive (CMS-HCC)  Status post radical mastectomy with a constructive surgery currently on anastrozole. She is followed by her oncologist closely. She has been cancer free.      Please note that this dictation was created using voice recognition software. I have worked with consultants from the vendor as well as technical experts from [facility]  [employer] to optimize the interface. I have made every reasonable attempt to correct obvious errors, but I expect that there are errors of grammar and possibly content I did not discover before finalizing the note.

## 2018-03-26 ENCOUNTER — HOSPITAL ENCOUNTER (OUTPATIENT)
Dept: LAB | Facility: MEDICAL CENTER | Age: 73
End: 2018-03-26
Attending: INTERNAL MEDICINE
Payer: MEDICARE

## 2018-03-26 LAB
ALBUMIN SERPL BCP-MCNC: 4.1 G/DL (ref 3.2–4.9)
ALBUMIN/GLOB SERPL: 1.5 G/DL
ALP SERPL-CCNC: 100 U/L (ref 30–99)
ALT SERPL-CCNC: 68 U/L (ref 2–50)
ANION GAP SERPL CALC-SCNC: 10 MMOL/L (ref 0–11.9)
ANISOCYTOSIS BLD QL SMEAR: ABNORMAL
AST SERPL-CCNC: 65 U/L (ref 12–45)
BASOPHILS # BLD AUTO: 0.9 % (ref 0–1.8)
BASOPHILS # BLD: 0.05 K/UL (ref 0–0.12)
BILIRUB SERPL-MCNC: 0.5 MG/DL (ref 0.1–1.5)
BUN SERPL-MCNC: 14 MG/DL (ref 8–22)
CALCIUM SERPL-MCNC: 9.2 MG/DL (ref 8.5–10.5)
CHLORIDE SERPL-SCNC: 109 MMOL/L (ref 96–112)
CO2 SERPL-SCNC: 23 MMOL/L (ref 20–33)
CREAT SERPL-MCNC: 0.84 MG/DL (ref 0.5–1.4)
DACRYOCYTES BLD QL SMEAR: NORMAL
EOSINOPHIL # BLD AUTO: 0.47 K/UL (ref 0–0.51)
EOSINOPHIL NFR BLD: 8 % (ref 0–6.9)
ERYTHROCYTE [DISTWIDTH] IN BLOOD BY AUTOMATED COUNT: 48.8 FL (ref 35.9–50)
FERRITIN SERPL-MCNC: 7 NG/ML (ref 10–291)
GLOBULIN SER CALC-MCNC: 2.8 G/DL (ref 1.9–3.5)
GLUCOSE SERPL-MCNC: 174 MG/DL (ref 65–99)
HCT VFR BLD AUTO: 35.2 % (ref 37–47)
HGB BLD-MCNC: 10 G/DL (ref 12–16)
IRON SATN MFR SERPL: 4 % (ref 15–55)
IRON SERPL-MCNC: 22 UG/DL (ref 40–170)
LYMPHOCYTES # BLD AUTO: 1.15 K/UL (ref 1–4.8)
LYMPHOCYTES NFR BLD: 19.5 % (ref 22–41)
MANUAL DIFF BLD: NORMAL
MCH RBC QN AUTO: 21.1 PG (ref 27–33)
MCHC RBC AUTO-ENTMCNC: 28.4 G/DL (ref 33.6–35)
MCV RBC AUTO: 74.4 FL (ref 81.4–97.8)
MICROCYTES BLD QL SMEAR: ABNORMAL
MONOCYTES # BLD AUTO: 0.21 K/UL (ref 0–0.85)
MONOCYTES NFR BLD AUTO: 3.5 % (ref 0–13.4)
MORPHOLOGY BLD-IMP: NORMAL
NEUTROPHILS # BLD AUTO: 4.02 K/UL (ref 2–7.15)
NEUTROPHILS NFR BLD: 68.1 % (ref 44–72)
NRBC # BLD AUTO: 0 K/UL
NRBC BLD-RTO: 0 /100 WBC
OVALOCYTES BLD QL SMEAR: NORMAL
PLATELET # BLD AUTO: 228 K/UL (ref 164–446)
PLATELET BLD QL SMEAR: NORMAL
PMV BLD AUTO: 10.5 FL (ref 9–12.9)
POIKILOCYTOSIS BLD QL SMEAR: NORMAL
POTASSIUM SERPL-SCNC: 4.4 MMOL/L (ref 3.6–5.5)
PROT SERPL-MCNC: 6.9 G/DL (ref 6–8.2)
RBC # BLD AUTO: 4.73 M/UL (ref 4.2–5.4)
RBC BLD AUTO: PRESENT
SODIUM SERPL-SCNC: 142 MMOL/L (ref 135–145)
TIBC SERPL-MCNC: 610 UG/DL (ref 250–450)
WBC # BLD AUTO: 5.9 K/UL (ref 4.8–10.8)

## 2018-03-26 PROCEDURE — 83540 ASSAY OF IRON: CPT

## 2018-03-26 PROCEDURE — 85027 COMPLETE CBC AUTOMATED: CPT

## 2018-03-26 PROCEDURE — 85007 BL SMEAR W/DIFF WBC COUNT: CPT

## 2018-03-26 PROCEDURE — 36415 COLL VENOUS BLD VENIPUNCTURE: CPT

## 2018-03-26 PROCEDURE — 82728 ASSAY OF FERRITIN: CPT

## 2018-03-26 PROCEDURE — 83550 IRON BINDING TEST: CPT

## 2018-03-26 PROCEDURE — 80053 COMPREHEN METABOLIC PANEL: CPT

## 2018-04-25 NOTE — PROGRESS NOTES
"1455 RN notified Dr. Oliveira that Iron Dextran infusion remains unavailable through pharmacy at this time, MD is ok with pt receiving Venofer 200 mg IV x 5 doses over course of 14 days as a substitute if pt is agreeable. 1506 RN notified pt of Venofer POC and pt declines to receive this course of treatment as she \"does not want that many IVs\" over course of 14 days. RN left [facility] to update Dr. Oliveira of pt's decision and  will contact pt once Iron Dextran is available for her infusion.   "

## 2018-04-26 ENCOUNTER — APPOINTMENT (OUTPATIENT)
Dept: ONCOLOGY | Facility: MEDICAL CENTER | Age: 73
End: 2018-04-26
Attending: INTERNAL MEDICINE
Payer: MEDICARE

## 2018-07-09 ENCOUNTER — HOSPITAL ENCOUNTER (OUTPATIENT)
Dept: LAB | Facility: MEDICAL CENTER | Age: 73
End: 2018-07-09
Attending: FAMILY MEDICINE
Payer: MEDICARE

## 2018-07-09 DIAGNOSIS — I10 ESSENTIAL HYPERTENSION: ICD-10-CM

## 2018-07-09 DIAGNOSIS — E03.9 HYPOTHYROIDISM, UNSPECIFIED TYPE: ICD-10-CM

## 2018-07-09 DIAGNOSIS — E78.5 DYSLIPIDEMIA: ICD-10-CM

## 2018-07-09 LAB
ALBUMIN SERPL BCP-MCNC: 4.4 G/DL (ref 3.2–4.9)
ALBUMIN/GLOB SERPL: 1.6 G/DL
ALP SERPL-CCNC: 100 U/L (ref 30–99)
ALT SERPL-CCNC: 81 U/L (ref 2–50)
ANION GAP SERPL CALC-SCNC: 11 MMOL/L (ref 0–11.9)
AST SERPL-CCNC: 87 U/L (ref 12–45)
BILIRUB SERPL-MCNC: 0.5 MG/DL (ref 0.1–1.5)
BUN SERPL-MCNC: 15 MG/DL (ref 8–22)
CALCIUM SERPL-MCNC: 10.1 MG/DL (ref 8.5–10.5)
CHLORIDE SERPL-SCNC: 104 MMOL/L (ref 96–112)
CHOLEST SERPL-MCNC: 169 MG/DL (ref 100–199)
CO2 SERPL-SCNC: 22 MMOL/L (ref 20–33)
CREAT SERPL-MCNC: 0.9 MG/DL (ref 0.5–1.4)
EST. AVERAGE GLUCOSE BLD GHB EST-MCNC: 197 MG/DL
GLOBULIN SER CALC-MCNC: 2.8 G/DL (ref 1.9–3.5)
GLUCOSE SERPL-MCNC: 181 MG/DL (ref 65–99)
HBA1C MFR BLD: 8.5 % (ref 0–5.6)
HDLC SERPL-MCNC: 40 MG/DL
LDLC SERPL CALC-MCNC: 104 MG/DL
POTASSIUM SERPL-SCNC: 4.6 MMOL/L (ref 3.6–5.5)
PROT SERPL-MCNC: 7.2 G/DL (ref 6–8.2)
SODIUM SERPL-SCNC: 137 MMOL/L (ref 135–145)
TRIGL SERPL-MCNC: 123 MG/DL (ref 0–149)
TSH SERPL DL<=0.005 MIU/L-ACNC: 1.92 UIU/ML (ref 0.38–5.33)

## 2018-07-09 PROCEDURE — 80061 LIPID PANEL: CPT

## 2018-07-09 PROCEDURE — 84443 ASSAY THYROID STIM HORMONE: CPT

## 2018-07-09 PROCEDURE — 36415 COLL VENOUS BLD VENIPUNCTURE: CPT

## 2018-07-09 PROCEDURE — 83036 HEMOGLOBIN GLYCOSYLATED A1C: CPT

## 2018-07-09 PROCEDURE — 80053 COMPREHEN METABOLIC PANEL: CPT

## 2018-07-16 ENCOUNTER — TELEPHONE (OUTPATIENT)
Dept: MEDICAL GROUP | Facility: PHYSICIAN GROUP | Age: 73
End: 2018-07-16

## 2018-07-16 NOTE — TELEPHONE ENCOUNTER
PVP WITH OUTREACH  Future Appointments       Provider Department Center    7/17/2018 9:00 AM Su Randhawa M.D. [facility]         ESTABLISHED PATIENT PRE-VISIT PLANNING     Note: Patient will not be contacted if there is no indication to call.     1.  Reviewed notes from the last few office visits within the medical group: Yes    2.  If any orders were placed at last visit or intended to be done for this visit (i.e. 6 mos follow-up), do we have Results/Consult Notes?        •  Labs - Labs ordered, completed on 07/09/18 and results are in chart.   Note: If patient appointment is for lab review and patient did not complete labs, check with provider if OK to reschedule patient until labs completed.       •  Imaging - Imaging was not ordered at last office visit.       •  Referrals - No referrals were ordered at last office visit.    3. Is this appointment scheduled as a Hospital Follow-Up? No    4.  Immunizations were updated in Epic using WebIZ?: Epic matches WebIZ       •  Web Iz Recommendations: FLU, MMR , TD and ZOSTAVAX (Shingles)    5.  Patient is due for the following Health Maintenance Topics:   There are no preventive care reminders to display for this patient.    - Patient has completed FLU, PNEUMOVAX (PPSV23), PREVNAR (PCV13) , TDAP and SHINGRIX (Shingles) Immunization(s) per WebIZ. Chart has been updated.    6.  MDX printed for Provider? NO    7.  Patient was NOT informed to arrive 15 min prior to their scheduled appointment and bring in their medication bottles.

## 2018-07-17 ENCOUNTER — OFFICE VISIT (OUTPATIENT)
Dept: MEDICAL GROUP | Facility: PHYSICIAN GROUP | Age: 73
End: 2018-07-17
Payer: MEDICARE

## 2018-07-17 VITALS
DIASTOLIC BLOOD PRESSURE: 70 MMHG | HEART RATE: 94 BPM | SYSTOLIC BLOOD PRESSURE: 140 MMHG | OXYGEN SATURATION: 94 % | HEIGHT: 67 IN | BODY MASS INDEX: 31.18 KG/M2 | TEMPERATURE: 96.9 F | WEIGHT: 198.63 LBS

## 2018-07-17 DIAGNOSIS — E03.9 HYPOTHYROIDISM, UNSPECIFIED TYPE: ICD-10-CM

## 2018-07-17 DIAGNOSIS — K76.0 FATTY LIVER DISEASE, NONALCOHOLIC: ICD-10-CM

## 2018-07-17 DIAGNOSIS — E78.5 DYSLIPIDEMIA: ICD-10-CM

## 2018-07-17 DIAGNOSIS — R07.81 RIB PAIN: ICD-10-CM

## 2018-07-17 DIAGNOSIS — I10 ESSENTIAL HYPERTENSION: ICD-10-CM

## 2018-07-17 PROCEDURE — 99214 OFFICE O/P EST MOD 30 MIN: CPT | Performed by: FAMILY MEDICINE

## 2018-07-18 NOTE — PROGRESS NOTES
"Subjective:      Ally Doherty is a 73 y.o. female who presents with Diabetes            HPI     Patient returns for follow-up of her medical problems.    Her diabetes got better controlled with the addition of glimepiride 1 mg daily. She continues to take metformin 1000 mg twice a day. She admits that she has not been taking the glimepiride regularly because the pharmacist told her this has to be taken right before breakfast and she said she forgets to take it. She said she can remember better if she takes it after eating. Blood work was done before this visit.    She continues to take losartan for hypertension. Systolic blood pressure slightly elevated. Prior blood pressure readings were under control.    In terms of the dyslipidemia, this is managed with her own efforts only. She has elevated liver enzymes due to fatty liver and so we have not given her statin. She is followed by her GI specialist for the fatty liver.    We follow her for hypothyroidism and continues to take thyroid replacement.    She complains of pain on the anterior rib cage below the breasts bilaterally radiating laterally for about a month now. This is noted with movement but not with inspiration. She denies any trauma. She denies any cough, fever, chills.    Past medical history, past surgical history, family history reviewed-no changes    Social history reviewed-no changes    Allergies reviewed-no changes    Medications reviewed-no changes    ROS     As per history of present illness, the rest are negative.       Objective:     /70   Pulse 94   Temp 36.1 °C (96.9 °F)   Ht 1.689 m (5' 6.5\")   Wt 90.1 kg (198 lb 10.2 oz)   LMP 02/27/1997   SpO2 94%   BMI 31.58 kg/m²      Physical Exam     Examined alert, awake, oriented, not in distress    Neck-supple, no lymphadenopathy, no thyromegaly  Lungs-clear to auscultation, no rales, no wheezes  Heart-regular rate and rhythm, no murmur  Chest-no skin changes/ecchymosis, " there is tenderness on palpation of the anterior 10th through 12th ribs bilaterally  Extremities-no edema, clubbing, cyanosis     Results for CHARU BAEZ (MRN [id number]) as of 7/18/2018 12:19   Ref. Range 12/4/2017 09:55 3/7/2018 09:59 3/7/2018 10:00 3/26/2018 10:51 7/9/2018 08:42   Sodium Latest Ref Range: 135 - 145 mmol/L 143 140  142 137   Potassium Latest Ref Range: 3.6 - 5.5 mmol/L 4.2 4.8  4.4 4.6   Chloride Latest Ref Range: 96 - 112 mmol/L 106 107  109 104   Co2 Latest Ref Range: 20 - 33 mmol/L 27 22  23 22   Anion Gap Latest Ref Range: 0.0 - 11.9  10.0 11.0  10.0 11.0   Glucose Latest Ref Range: 65 - 99 mg/dL 234 (H) 172 (H)  174 (H) 181 (H)   Bun Latest Ref Range: 8 - 22 mg/dL 13 11  14 15   Creatinine Latest Ref Range: 0.50 - 1.40 mg/dL 0.78 0.90  0.84 0.90   GFR If  Latest Ref Range: >60 mL/min/1.73 m 2 >60 >60  >60 >60   GFR If Non  Latest Ref Range: >60 mL/min/1.73 m 2 >60 >60  >60 >60   Calcium Latest Ref Range: 8.5 - 10.5 mg/dL 9.7 9.3  9.2 10.1   AST(SGOT) Latest Ref Range: 12 - 45 U/L 88 (H) 83 (H)  65 (H) 87 (H)   ALT(SGPT) Latest Ref Range: 2 - 50 U/L 94 (H) 83 (H)  68 (H) 81 (H)   Alkaline Phosphatase Latest Ref Range: 30 - 99 U/L 99 111 (H)  100 (H) 100 (H)   Total Bilirubin Latest Ref Range: 0.1 - 1.5 mg/dL 0.5 0.5  0.5 0.5   Albumin Latest Ref Range: 3.2 - 4.9 g/dL 3.3 4.4  4.1 4.4   Total Protein Latest Ref Range: 6.0 - 8.2 g/dL 6.6 7.2  6.9 7.2   Globulin Latest Ref Range: 1.9 - 3.5 g/dL 3.3 2.8  2.8 2.8   A-G Ratio Latest Units: g/dL 1.0 1.6  1.5 1.6   Iron Latest Ref Range: 40 - 170 ug/dL    22 (L)    Total Iron Binding Latest Ref Range: 250 - 450 ug/dL    610 (H)    % Saturation Latest Ref Range: 15 - 55 %    4 (L)    Glycohemoglobin Latest Ref Range: 0.0 - 5.6 % 9.5 (H) 7.6 (H)   8.5 (H)   Estim. Avg Glu Latest Units: mg/dL 226 171   197   Cholesterol,Tot Latest Ref Range: 100 - 199 mg/dL 173 183   169   Triglycerides Latest Ref Range: 0 -  149 mg/dL 187 (H) 172 (H)   123   HDL Latest Ref Range: >=40 mg/dL 41 41   40   LDL Latest Ref Range: <100 mg/dL 95 108 (H)   104 (H)   Micro Alb Creat Ratio Latest Ref Range: 0 - 30 mg/g   21     Creatinine, Urine Latest Units: mg/dL   145.80     Microalbumin, Urine Random Latest Units: mg/dL   3.1     Results for CHARU BAEZ (MRN [id number]) as of 7/18/2018 12:19   Ref. Range 7/9/2018 08:42   TSH Latest Ref Range: 0.380 - 5.330 uIU/mL 1.920        Assessment/Plan:     1. Uncontrolled type 2 diabetes mellitus without complication, without long-term current use of insulin (HCC)   Hemoglobin A1c increased from 7.6-8.5 which makes her diabetes more out of control. She has not been taking the glimepiride regularly. Advised patient to take it regularly and she can take it right after eating her breakfast if she has difficulty remembering to take it right before breakfast. Continue metformin twice daily regularly. Recheck blood work next visit. If not improved we will increase the glimepiride dose.  - LIPID PROFILE; Future  - COMP METABOLIC PANEL; Future  - HEMOGLOBIN A1C; Future    2. Essential hypertension  Mild elevation of the systolic blood pressure but previously under control. Continue current dose of medication and recheck next visit. Consider higher dose not improved.  - LIPID PROFILE; Future  - COMP METABOLIC PANEL; Future  - HEMOGLOBIN A1C; Future    3. Dyslipidemia  LDL slightly better but still needs to be below 100. Triglycerides down to goal. I discussed with patient statin. She continues have elevation of her liver enzymes due to fatty liver disease but these are mild elevations. I made her aware that treating her with statin may help with her fatty liver disease. Patient continues to refuse statin.  - LIPID PROFILE; Future  - COMP METABOLIC PANEL; Future  - HEMOGLOBIN A1C; Future    4. Hypothyroidism, unspecified type  Adequately replaced. Continue the same dose of thyroid medication.    5.  Fatty liver disease, nonalcoholic  Stable liver enzyme elevation. Continue to follow-up with GI specialist.    6. Rib pain  No history of trauma. She denies pleuritic pain. No cough, fever, chills. We will do x-ray of the ribs with one view of the chest. Most likely rib strain Advised warm compresses. We will communicate results to patient. Further recommendation will depend on results.  - [id number] (WITH 1-VIEW CXR); Future      Please note that this dictation was created using voice recognition software. I have worked with consultants from the vendor as well as technical experts from [facility]  [employer] to optimize the interface. I have made every reasonable attempt to correct obvious errors, but I expect that there are errors of grammar and possibly content I did not discover before finalizing the note.

## 2018-07-19 ENCOUNTER — HOSPITAL ENCOUNTER (OUTPATIENT)
Dept: RADIOLOGY | Facility: MEDICAL CENTER | Age: 73
End: 2018-07-19
Attending: FAMILY MEDICINE
Payer: MEDICARE

## 2018-07-19 DIAGNOSIS — R07.81 RIB PAIN: ICD-10-CM

## 2018-07-19 PROCEDURE — 71111 X-RAY EXAM RIBS/CHEST4/> VWS: CPT

## 2018-07-31 RX ORDER — LOSARTAN POTASSIUM 50 MG/1
TABLET ORAL
Qty: 90 TAB | Refills: 1 | Status: SHIPPED | OUTPATIENT
Start: 2018-07-31 | End: 2018-11-28

## 2018-08-15 ENCOUNTER — HOSPITAL ENCOUNTER (OUTPATIENT)
Dept: LAB | Facility: MEDICAL CENTER | Age: 73
End: 2018-08-15
Attending: PHYSICIAN ASSISTANT
Payer: MEDICARE

## 2018-08-15 ENCOUNTER — OFFICE VISIT (OUTPATIENT)
Dept: URGENT CARE | Facility: CLINIC | Age: 73
End: 2018-08-15
Payer: MEDICARE

## 2018-08-15 VITALS
OXYGEN SATURATION: 97 % | SYSTOLIC BLOOD PRESSURE: 120 MMHG | HEART RATE: 84 BPM | DIASTOLIC BLOOD PRESSURE: 82 MMHG | BODY MASS INDEX: 30.61 KG/M2 | RESPIRATION RATE: 16 BRPM | WEIGHT: 195 LBS | TEMPERATURE: 97.8 F | HEIGHT: 67 IN

## 2018-08-15 DIAGNOSIS — R10.9 ACUTE RIGHT FLANK PAIN: ICD-10-CM

## 2018-08-15 LAB
ALBUMIN SERPL BCP-MCNC: 4.4 G/DL (ref 3.2–4.9)
ALBUMIN/GLOB SERPL: 1.4 G/DL
ALP SERPL-CCNC: 99 U/L (ref 30–99)
ALT SERPL-CCNC: 71 U/L (ref 2–50)
ANION GAP SERPL CALC-SCNC: 12 MMOL/L (ref 0–11.9)
ANISOCYTOSIS BLD QL SMEAR: ABNORMAL
APPEARANCE UR: NORMAL
AST SERPL-CCNC: 85 U/L (ref 12–45)
BASOPHILS # BLD AUTO: 1.1 % (ref 0–1.8)
BASOPHILS # BLD: 0.07 K/UL (ref 0–0.12)
BILIRUB SERPL-MCNC: 0.5 MG/DL (ref 0.1–1.5)
BILIRUB UR STRIP-MCNC: NORMAL MG/DL
BUN SERPL-MCNC: 10 MG/DL (ref 8–22)
CALCIUM SERPL-MCNC: 9.6 MG/DL (ref 8.5–10.5)
CHLORIDE SERPL-SCNC: 106 MMOL/L (ref 96–112)
CO2 SERPL-SCNC: 22 MMOL/L (ref 20–33)
COLOR UR AUTO: YELLOW
COMMENT 1642: NORMAL
CREAT SERPL-MCNC: 0.85 MG/DL (ref 0.5–1.4)
EOSINOPHIL # BLD AUTO: 0.34 K/UL (ref 0–0.51)
EOSINOPHIL NFR BLD: 5.5 % (ref 0–6.9)
ERYTHROCYTE [DISTWIDTH] IN BLOOD BY AUTOMATED COUNT: 49.1 FL (ref 35.9–50)
GLOBULIN SER CALC-MCNC: 3.1 G/DL (ref 1.9–3.5)
GLUCOSE SERPL-MCNC: 139 MG/DL (ref 65–99)
GLUCOSE UR STRIP.AUTO-MCNC: NORMAL MG/DL
HCT VFR BLD AUTO: 34.8 % (ref 37–47)
HGB BLD-MCNC: 9.6 G/DL (ref 12–16)
HYPOCHROMIA BLD QL SMEAR: ABNORMAL
IMM GRANULOCYTES # BLD AUTO: 0.02 K/UL (ref 0–0.11)
IMM GRANULOCYTES NFR BLD AUTO: 0.3 % (ref 0–0.9)
KETONES UR STRIP.AUTO-MCNC: NORMAL MG/DL
LEUKOCYTE ESTERASE UR QL STRIP.AUTO: NORMAL
LYMPHOCYTES # BLD AUTO: 1.45 K/UL (ref 1–4.8)
LYMPHOCYTES NFR BLD: 23.6 % (ref 22–41)
MCH RBC QN AUTO: 19 PG (ref 27–33)
MCHC RBC AUTO-ENTMCNC: 27.6 G/DL (ref 33.6–35)
MCV RBC AUTO: 68.8 FL (ref 81.4–97.8)
MICROCYTES BLD QL SMEAR: ABNORMAL
MONOCYTES # BLD AUTO: 0.58 K/UL (ref 0–0.85)
MONOCYTES NFR BLD AUTO: 9.4 % (ref 0–13.4)
MORPHOLOGY BLD-IMP: NORMAL
NEUTROPHILS # BLD AUTO: 3.68 K/UL (ref 2–7.15)
NEUTROPHILS NFR BLD: 60.1 % (ref 44–72)
NITRITE UR QL STRIP.AUTO: NORMAL
NRBC # BLD AUTO: 0 K/UL
NRBC BLD-RTO: 0 /100 WBC
OVALOCYTES BLD QL SMEAR: NORMAL
PH UR STRIP.AUTO: 6 [PH] (ref 5–8)
PLATELET # BLD AUTO: 181 K/UL (ref 164–446)
PLATELET BLD QL SMEAR: NORMAL
PMV BLD AUTO: 9.9 FL (ref 9–12.9)
POIKILOCYTOSIS BLD QL SMEAR: NORMAL
POTASSIUM SERPL-SCNC: 4.8 MMOL/L (ref 3.6–5.5)
PROT SERPL-MCNC: 7.5 G/DL (ref 6–8.2)
PROT UR QL STRIP: NORMAL MG/DL
RBC # BLD AUTO: 5.06 M/UL (ref 4.2–5.4)
RBC BLD AUTO: PRESENT
RBC UR QL AUTO: NORMAL
SODIUM SERPL-SCNC: 140 MMOL/L (ref 135–145)
SP GR UR STRIP.AUTO: NORMAL
UROBILINOGEN UR STRIP-MCNC: 0.2 MG/DL
WBC # BLD AUTO: 6.1 K/UL (ref 4.8–10.8)

## 2018-08-15 PROCEDURE — 36415 COLL VENOUS BLD VENIPUNCTURE: CPT

## 2018-08-15 PROCEDURE — 81002 URINALYSIS NONAUTO W/O SCOPE: CPT | Performed by: PHYSICIAN ASSISTANT

## 2018-08-15 PROCEDURE — 80053 COMPREHEN METABOLIC PANEL: CPT

## 2018-08-15 PROCEDURE — 99213 OFFICE O/P EST LOW 20 MIN: CPT | Performed by: PHYSICIAN ASSISTANT

## 2018-08-15 PROCEDURE — 85025 COMPLETE CBC W/AUTO DIFF WBC: CPT

## 2018-08-15 RX ORDER — CIPROFLOXACIN 500 MG/1
500 TABLET, FILM COATED ORAL 2 TIMES DAILY
Qty: 14 TAB | Refills: 0 | Status: SHIPPED | OUTPATIENT
Start: 2018-08-15 | End: 2018-08-22

## 2018-08-15 RX ORDER — KETOROLAC TROMETHAMINE 30 MG/ML
30 INJECTION, SOLUTION INTRAMUSCULAR; INTRAVENOUS ONCE
Status: COMPLETED | OUTPATIENT
Start: 2018-08-15 | End: 2018-08-15

## 2018-08-15 RX ORDER — NAPROXEN 500 MG/1
500 TABLET ORAL 2 TIMES DAILY WITH MEALS
Qty: 14 TAB | Refills: 0 | Status: SHIPPED | OUTPATIENT
Start: 2018-08-15 | End: 2019-12-04

## 2018-08-15 RX ADMIN — KETOROLAC TROMETHAMINE 30 MG: 30 INJECTION, SOLUTION INTRAMUSCULAR; INTRAVENOUS at 14:38

## 2018-08-15 ASSESSMENT — ENCOUNTER SYMPTOMS
FEVER: 0
ABDOMINAL PAIN: 1
NEUROLOGICAL NEGATIVE: 1
CONSTITUTIONAL NEGATIVE: 1
MUSCULOSKELETAL NEGATIVE: 1

## 2018-08-15 NOTE — PROGRESS NOTES
"Subjective:      Ally Doherty is a 73 y.o. female who presents with Side Pain (x yesterday, Rt. side pain)            Other   This is a new problem. The current episode started yesterday (r side pn). The problem occurs constantly. The problem has been unchanged. Associated symptoms include abdominal pain and chest pain. Pertinent negatives include no fever. Nothing aggravates the symptoms. She has tried nothing for the symptoms. The treatment provided no relief.       Review of Systems   Constitutional: Negative.  Negative for fever.   HENT: Negative.    Cardiovascular: Positive for chest pain.   Gastrointestinal: Positive for abdominal pain.   Genitourinary: Negative.    Musculoskeletal: Negative.    Skin: Negative.    Neurological: Negative.           Objective:     /82   Pulse 84   Temp 36.6 °C (97.8 °F)   Resp 16   Ht 1.689 m (5' 6.5\")   Wt 88.5 kg (195 lb)   LMP 02/27/1997   SpO2 97%   BMI 31.00 kg/m²      Physical Exam   Constitutional: She is oriented to person, place, and time. She appears well-developed and well-nourished. No distress.   Abdominal: She exhibits no distension. There is tenderness (ttp along r flank/costal margin area; no CVAT).   Neurological: She is alert and oriented to person, place, and time.   Skin: Skin is warm and dry.   Psychiatric: She has a normal mood and affect. Her behavior is normal.   Nursing note and vitals reviewed.    Active Ambulatory Problems     Diagnosis Date Noted   • Essential hypertension 02/27/2012   • Hyperlipidemia 02/27/2012   • Hypothyroidism 02/27/2012   • Malignant neoplasm of right breast (HCC) 02/27/2012   • DM II (diabetes mellitus, type II), controlled ([facility]) 06/21/2012   • S/P breast reconstruction 06/24/2013   • Fatty liver disease, nonalcoholic 09/10/2013   • Vitamin D deficiency 05/27/2014   • Uncontrolled type 2 diabetes mellitus without complication, without long-term current use of insulin ([facility]) 12/22/2016   • Obesity (BMI " 30-39.9) 02/27/2018   • Malignant neoplasm of upper-inner quadrant of right breast in female, estrogen receptor positive (HCC) 03/13/2018   • Dyslipidemia 07/17/2018     Resolved Ambulatory Problems     Diagnosis Date Noted   • DM hyperosmolarity type II (HCC) 02/27/2012   • Vitamin C deficiency 11/08/2012   • Vitamin d deficiency 03/08/2013   • Acquired absence of breast and nipple 06/24/2013   • Capsular contracture of breast implant 03/06/2014   • 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/2015   • Hypoxemia requiring supplemental oxygen 02/12/2015     Past Medical History:   Diagnosis Date   • Anemia    • Breast cancer (HCC) 2/27/2012   • Cancer (HCC) 2010   • DM hyperosmolarity type II (HCC) 2/27/2012   • Hiatus hernia syndrome    • High cholesterol 02/10/15   • HTN (hypertension) 02/12/15   • Hyperlipidemia 2/27/2012   • Hypothyroidism 2/27/2012     Current Outpatient Prescriptions on File Prior to Visit   Medication Sig Dispense Refill   • losartan (COZAAR) 50 MG Tab take 1 tablet by mouth every evening 90 Tab 1   • omeprazole (PRILOSEC) 20 MG delayed-release capsule Take 1 Cap by mouth every day. 90 Cap 1   • glimepiride (AMARYL) 1 MG tablet Take 1 Tab by mouth every morning. 90 Tab 1   • metformin (GLUCOPHAGE) 1000 MG tablet Take 1 Tab by mouth 2 times a day, with meals. 180 Tab 1   • levothyroxine (SYNTHROID) 100 MCG Tab TAKE 1 TABLET BY MOUTH EVERY MORNING ON AN EMPTYSTOMACH 90 Tab 1   • therapeutic multivitamin-minerals (THERAGRAN-M) Tab Take 1 Tab by mouth every day.     • Calcium Carbonate-Vitamin D (CALTRATE 600+D PO) Take  by mouth.     • anastrozole (ARIMIDEX) 1 MG TABS Take 1 mg by mouth every morning.       No current facility-administered medications on file prior to visit.      Social History     Social History   • Marital status:      Spouse name: N/A   • Number of children: N/A   • Years of education: N/A     Occupational History   • Not on file.     Social History Main Topics   • Smoking status:  Never Smoker   • Smokeless tobacco: Never Used   • Alcohol use 0.0 oz/week      Comment: < 1 per week   • Drug use: No   • Sexual activity: No     Other Topics Concern   • Not on file     Social History Narrative   • No narrative on file     Family History   Problem Relation Age of Onset   • Other Unknown         adopted     [place]; Penicillins; and Statins [hmg-coa-r inhibitors]              Assessment/Plan:     ·  r side pn      ·

## 2018-08-16 ENCOUNTER — TELEPHONE (OUTPATIENT)
Dept: URGENT CARE | Facility: CLINIC | Age: 73
End: 2018-08-16

## 2018-08-16 ENCOUNTER — HOSPITAL ENCOUNTER (OUTPATIENT)
Dept: RADIOLOGY | Facility: MEDICAL CENTER | Age: 73
End: 2018-08-16
Attending: PHYSICIAN ASSISTANT
Payer: MEDICARE

## 2018-08-16 DIAGNOSIS — R10.9 ACUTE RIGHT FLANK PAIN: ICD-10-CM

## 2018-08-16 PROCEDURE — 74176 CT ABD & PELVIS W/O CONTRAST: CPT

## 2018-08-17 NOTE — TELEPHONE ENCOUNTER
Called back on labs [hx chronic anemia, sl elev lft's...]; no acute pathol on CT; pt improving on cipro. rw

## 2018-09-10 RX ORDER — OMEPRAZOLE 20 MG/1
20 CAPSULE, DELAYED RELEASE ORAL DAILY
Qty: 90 CAP | Refills: 1 | Status: SHIPPED | OUTPATIENT
Start: 2018-09-10 | End: 2019-03-11 | Stop reason: SDUPTHER

## 2018-09-10 RX ORDER — GLIMEPIRIDE 1 MG/1
1 TABLET ORAL EVERY MORNING
Qty: 90 TAB | Refills: 1 | Status: SHIPPED | OUTPATIENT
Start: 2018-09-10 | End: 2019-08-30 | Stop reason: SDUPTHER

## 2018-10-08 ENCOUNTER — HOSPITAL ENCOUNTER (OUTPATIENT)
Dept: LAB | Facility: MEDICAL CENTER | Age: 73
End: 2018-10-08
Attending: INTERNAL MEDICINE
Payer: MEDICARE

## 2018-10-08 LAB
ALBUMIN SERPL BCP-MCNC: 3.9 G/DL (ref 3.2–4.9)
ALBUMIN/GLOB SERPL: 1.4 G/DL
ALP SERPL-CCNC: 79 U/L (ref 30–99)
ALT SERPL-CCNC: 53 U/L (ref 2–50)
ANION GAP SERPL CALC-SCNC: 13 MMOL/L (ref 0–11.9)
ANISOCYTOSIS BLD QL SMEAR: ABNORMAL
AST SERPL-CCNC: 52 U/L (ref 12–45)
BASOPHILS # BLD AUTO: 1 % (ref 0–1.8)
BASOPHILS # BLD: 0.06 K/UL (ref 0–0.12)
BILIRUB SERPL-MCNC: 0.5 MG/DL (ref 0.1–1.5)
BUN SERPL-MCNC: 15 MG/DL (ref 8–22)
CALCIUM SERPL-MCNC: 9.5 MG/DL (ref 8.5–10.5)
CHLORIDE SERPL-SCNC: 104 MMOL/L (ref 96–112)
CO2 SERPL-SCNC: 20 MMOL/L (ref 20–33)
COMMENT 1642: NORMAL
CREAT SERPL-MCNC: 0.84 MG/DL (ref 0.5–1.4)
EOSINOPHIL # BLD AUTO: 0.21 K/UL (ref 0–0.51)
EOSINOPHIL NFR BLD: 3.5 % (ref 0–6.9)
ERYTHROCYTE [DISTWIDTH] IN BLOOD BY AUTOMATED COUNT: 45.7 FL (ref 35.9–50)
GLOBULIN SER CALC-MCNC: 2.7 G/DL (ref 1.9–3.5)
GLUCOSE SERPL-MCNC: 157 MG/DL (ref 65–99)
HCT VFR BLD AUTO: 27.2 % (ref 37–47)
HGB BLD-MCNC: 7.2 G/DL (ref 12–16)
IMM GRANULOCYTES # BLD AUTO: 0.03 K/UL (ref 0–0.11)
IMM GRANULOCYTES NFR BLD AUTO: 0.5 % (ref 0–0.9)
IRON SATN MFR SERPL: ABNORMAL % (ref 15–55)
IRON SERPL-MCNC: <10 UG/DL (ref 40–170)
LYMPHOCYTES # BLD AUTO: 1.34 K/UL (ref 1–4.8)
LYMPHOCYTES NFR BLD: 22.4 % (ref 22–41)
MCH RBC QN AUTO: 17.9 PG (ref 27–33)
MCHC RBC AUTO-ENTMCNC: 26.5 G/DL (ref 33.6–35)
MCV RBC AUTO: 67.7 FL (ref 81.4–97.8)
MICROCYTES BLD QL SMEAR: ABNORMAL
MONOCYTES # BLD AUTO: 0.59 K/UL (ref 0–0.85)
MONOCYTES NFR BLD AUTO: 9.8 % (ref 0–13.4)
MORPHOLOGY BLD-IMP: NORMAL
NEUTROPHILS # BLD AUTO: 3.76 K/UL (ref 2–7.15)
NEUTROPHILS NFR BLD: 62.8 % (ref 44–72)
NRBC # BLD AUTO: 0 K/UL
NRBC BLD-RTO: 0 /100 WBC
OVALOCYTES BLD QL SMEAR: NORMAL
PLATELET # BLD AUTO: 194 K/UL (ref 164–446)
PLATELET BLD QL SMEAR: NORMAL
PMV BLD AUTO: 10.5 FL (ref 9–12.9)
POIKILOCYTOSIS BLD QL SMEAR: NORMAL
POTASSIUM SERPL-SCNC: 4.5 MMOL/L (ref 3.6–5.5)
PROT SERPL-MCNC: 6.6 G/DL (ref 6–8.2)
RBC # BLD AUTO: 4.02 M/UL (ref 4.2–5.4)
RBC BLD AUTO: PRESENT
SODIUM SERPL-SCNC: 137 MMOL/L (ref 135–145)
TIBC SERPL-MCNC: 568 UG/DL (ref 250–450)
WBC # BLD AUTO: 6 K/UL (ref 4.8–10.8)

## 2018-10-08 PROCEDURE — 82728 ASSAY OF FERRITIN: CPT

## 2018-10-08 PROCEDURE — 80053 COMPREHEN METABOLIC PANEL: CPT

## 2018-10-08 PROCEDURE — 83540 ASSAY OF IRON: CPT

## 2018-10-08 PROCEDURE — 83550 IRON BINDING TEST: CPT

## 2018-10-08 PROCEDURE — 36415 COLL VENOUS BLD VENIPUNCTURE: CPT

## 2018-10-08 PROCEDURE — 85025 COMPLETE CBC W/AUTO DIFF WBC: CPT

## 2018-10-09 LAB — FERRITIN SERPL-MCNC: 3.8 NG/ML (ref 10–291)

## 2018-10-12 DIAGNOSIS — D50.8 OTHER IRON DEFICIENCY ANEMIA: ICD-10-CM

## 2018-10-12 PROBLEM — D50.9 IRON DEFICIENCY ANEMIA: Status: ACTIVE | Noted: 2018-10-12

## 2018-10-12 RX ORDER — ACETAMINOPHEN 325 MG/1
650 TABLET ORAL ONCE
Status: CANCELLED | OUTPATIENT
Start: 2018-10-15 | End: 2018-10-15

## 2018-10-12 RX ORDER — DIPHENHYDRAMINE HCL 25 MG
25 TABLET ORAL ONCE
Status: CANCELLED | OUTPATIENT
Start: 2018-10-15 | End: 2018-10-15

## 2018-10-12 RX ORDER — ACETAMINOPHEN 325 MG/1
650 TABLET ORAL PRN
Status: CANCELLED | OUTPATIENT
Start: 2018-10-15

## 2018-10-15 ENCOUNTER — OUTPATIENT INFUSION SERVICES (OUTPATIENT)
Dept: ONCOLOGY | Facility: MEDICAL CENTER | Age: 73
End: 2018-10-15
Attending: INTERNAL MEDICINE
Payer: MEDICARE

## 2018-10-15 VITALS
HEIGHT: 67 IN | WEIGHT: 192.46 LBS | DIASTOLIC BLOOD PRESSURE: 52 MMHG | HEART RATE: 73 BPM | RESPIRATION RATE: 18 BRPM | BODY MASS INDEX: 30.21 KG/M2 | TEMPERATURE: 98.2 F | OXYGEN SATURATION: 95 % | SYSTOLIC BLOOD PRESSURE: 132 MMHG

## 2018-10-15 DIAGNOSIS — D50.8 OTHER IRON DEFICIENCY ANEMIA: ICD-10-CM

## 2018-10-15 LAB
ABO GROUP BLD: NORMAL
BARCODED ABORH UBTYP: 5100
BARCODED ABORH UBTYP: 5100
BARCODED PRD CODE UBPRD: NORMAL
BARCODED PRD CODE UBPRD: NORMAL
BARCODED UNIT NUM UBUNT: NORMAL
BARCODED UNIT NUM UBUNT: NORMAL
BLD GP AB SCN SERPL QL: NORMAL
COMPONENT R 8504R: NORMAL
COMPONENT R 8504R: NORMAL
PRODUCT TYPE UPROD: NORMAL
PRODUCT TYPE UPROD: NORMAL
RH BLD: NORMAL
UNIT STATUS USTAT: NORMAL
UNIT STATUS USTAT: NORMAL

## 2018-10-15 PROCEDURE — 86923 COMPATIBILITY TEST ELECTRIC: CPT

## 2018-10-15 PROCEDURE — 86901 BLOOD TYPING SEROLOGIC RH(D): CPT

## 2018-10-15 PROCEDURE — A9270 NON-COVERED ITEM OR SERVICE: HCPCS | Performed by: INTERNAL MEDICINE

## 2018-10-15 PROCEDURE — 306780 HCHG STAT FOR TRANSFUSION PER CASE

## 2018-10-15 PROCEDURE — P9016 RBC LEUKOCYTES REDUCED: HCPCS

## 2018-10-15 PROCEDURE — 36430 TRANSFUSION BLD/BLD COMPNT: CPT

## 2018-10-15 PROCEDURE — 86900 BLOOD TYPING SEROLOGIC ABO: CPT

## 2018-10-15 PROCEDURE — 36415 COLL VENOUS BLD VENIPUNCTURE: CPT

## 2018-10-15 PROCEDURE — 700102 HCHG RX REV CODE 250 W/ 637 OVERRIDE(OP): Performed by: INTERNAL MEDICINE

## 2018-10-15 PROCEDURE — 86850 RBC ANTIBODY SCREEN: CPT

## 2018-10-15 RX ORDER — ACETAMINOPHEN 325 MG/1
650 TABLET ORAL ONCE
Status: COMPLETED | OUTPATIENT
Start: 2018-10-15 | End: 2018-10-15

## 2018-10-15 RX ORDER — ACETAMINOPHEN 325 MG/1
650 TABLET ORAL ONCE
Status: CANCELLED | OUTPATIENT
Start: 2018-10-15 | End: 2018-10-15

## 2018-10-15 RX ORDER — DIPHENHYDRAMINE HCL 25 MG
25 TABLET ORAL ONCE
Status: CANCELLED | OUTPATIENT
Start: 2018-10-15 | End: 2018-10-15

## 2018-10-15 RX ORDER — DIPHENHYDRAMINE HCL 25 MG
25 TABLET ORAL ONCE
Status: COMPLETED | OUTPATIENT
Start: 2018-10-15 | End: 2018-10-15

## 2018-10-15 RX ORDER — ACETAMINOPHEN 325 MG/1
650 TABLET ORAL PRN
Status: CANCELLED | OUTPATIENT
Start: 2018-10-15

## 2018-10-15 RX ADMIN — DIPHENHYDRAMINE HCL 25 MG: 25 TABLET ORAL at 09:23

## 2018-10-15 RX ADMIN — ACETAMINOPHEN 650 MG: 325 TABLET, FILM COATED ORAL at 09:23

## 2018-10-15 ASSESSMENT — PAIN SCALES - GENERAL: PAINLEVEL_OUTOF10: 0

## 2018-10-15 NOTE — PROGRESS NOTES
Pt ambulated into department for a blood transfusion with . Pt reported being fatigued, denied any other symptoms at this time. PIV started, had brisk blood return. Pt had CBC drawn within the past 7 days; Hb.2 on 10/8/18 and met parameters for transfuse. POC explained to Pt and , blood consents reviewed and signed. Pt given 2 units of PRBC's, tolerated transfusion well, VSS throughout. IV removed after, bleeding controlled with gauze and coban. Pt's next appointment on 10/17/18 at 08:00 am for Iron Dextran confirmed with Pt prior to leaving, left department with  appearing in good spirits and NAD.

## 2018-10-17 ENCOUNTER — OUTPATIENT INFUSION SERVICES (OUTPATIENT)
Dept: ONCOLOGY | Facility: MEDICAL CENTER | Age: 73
End: 2018-10-17
Attending: INTERNAL MEDICINE
Payer: MEDICARE

## 2018-10-17 VITALS
RESPIRATION RATE: 18 BRPM | TEMPERATURE: 97.8 F | WEIGHT: 194.45 LBS | BODY MASS INDEX: 30.52 KG/M2 | HEIGHT: 67 IN | DIASTOLIC BLOOD PRESSURE: 56 MMHG | HEART RATE: 85 BPM | OXYGEN SATURATION: 96 % | SYSTOLIC BLOOD PRESSURE: 144 MMHG

## 2018-10-17 PROCEDURE — 306780 HCHG STAT FOR TRANSFUSION PER CASE

## 2018-10-17 PROCEDURE — 96366 THER/PROPH/DIAG IV INF ADDON: CPT

## 2018-10-17 PROCEDURE — 700105 HCHG RX REV CODE 258: Performed by: INTERNAL MEDICINE

## 2018-10-17 PROCEDURE — A9270 NON-COVERED ITEM OR SERVICE: HCPCS | Performed by: INTERNAL MEDICINE

## 2018-10-17 PROCEDURE — 96375 TX/PRO/DX INJ NEW DRUG ADDON: CPT

## 2018-10-17 PROCEDURE — 700102 HCHG RX REV CODE 250 W/ 637 OVERRIDE(OP): Performed by: INTERNAL MEDICINE

## 2018-10-17 PROCEDURE — 700111 HCHG RX REV CODE 636 W/ 250 OVERRIDE (IP): Performed by: INTERNAL MEDICINE

## 2018-10-17 PROCEDURE — 96365 THER/PROPH/DIAG IV INF INIT: CPT

## 2018-10-17 RX ORDER — METHYLPREDNISOLONE SODIUM SUCCINATE 125 MG/2ML
125 INJECTION, POWDER, LYOPHILIZED, FOR SOLUTION INTRAMUSCULAR; INTRAVENOUS ONCE
Status: COMPLETED | OUTPATIENT
Start: 2018-10-17 | End: 2018-10-17

## 2018-10-17 RX ORDER — ACETAMINOPHEN 325 MG/1
650 TABLET ORAL ONCE
Status: COMPLETED | OUTPATIENT
Start: 2018-10-17 | End: 2018-10-17

## 2018-10-17 RX ADMIN — ACETAMINOPHEN 650 MG: 325 TABLET, FILM COATED ORAL at 08:30

## 2018-10-17 RX ADMIN — IRON DEXTRAN 2000 MG: 50 INJECTION INTRAMUSCULAR; INTRAVENOUS at 11:42

## 2018-10-17 RX ADMIN — DIPHENHYDRAMINE HYDROCHLORIDE 25 MG: 50 INJECTION INTRAMUSCULAR; INTRAVENOUS at 08:40

## 2018-10-17 RX ADMIN — METHYLPREDNISOLONE SODIUM SUCCINATE 125 MG: 125 INJECTION, POWDER, FOR SOLUTION INTRAMUSCULAR; INTRAVENOUS at 08:40

## 2018-10-17 RX ADMIN — IRON DEXTRAN 25 MG: 50 INJECTION INTRAMUSCULAR; INTRAVENOUS at 09:50

## 2018-10-17 ASSESSMENT — PAIN SCALES - GENERAL: PAINLEVEL_OUTOF10: 0

## 2018-10-18 NOTE — PROGRESS NOTES
Pt arrived ambulatory to  for iron dextran infusion.  POC discussed, pt verbalized understanding.  She confirmed she has mad medication in the past without complications.  PIV started to LFA after multiple attempts by 3 RNs.  PIV in place with good blood return.  Premedications administered as ordered, tolerated well.  Iron test dose given over 30 minutes followed by one hour of observation per pharmacy protocol.  No adverse reaction.  Therapeutic dose delivered over 4.5 hours, pt tolerated well without reaction.  PIV flushed, removed, and site covered with gauze and coban.  No additional appointments needed at this time.  Pt discharged from  in [facility] under care of spouse.

## 2018-10-30 ENCOUNTER — HOSPITAL ENCOUNTER (OUTPATIENT)
Dept: LAB | Facility: MEDICAL CENTER | Age: 73
End: 2018-10-30
Attending: INTERNAL MEDICINE
Payer: MEDICARE

## 2018-10-30 LAB
ALBUMIN SERPL BCP-MCNC: 4.2 G/DL (ref 3.2–4.9)
ALBUMIN/GLOB SERPL: 1.4 G/DL
ALP SERPL-CCNC: 116 U/L (ref 30–99)
ALT SERPL-CCNC: 73 U/L (ref 2–50)
ANION GAP SERPL CALC-SCNC: 10 MMOL/L (ref 0–11.9)
ANISOCYTOSIS BLD QL SMEAR: ABNORMAL
AST SERPL-CCNC: 84 U/L (ref 12–45)
BASOPHILS # BLD AUTO: 1.2 % (ref 0–1.8)
BASOPHILS # BLD: 0.08 K/UL (ref 0–0.12)
BILIRUB SERPL-MCNC: 0.5 MG/DL (ref 0.1–1.5)
BUN SERPL-MCNC: 9 MG/DL (ref 8–22)
CALCIUM SERPL-MCNC: 10 MG/DL (ref 8.5–10.5)
CHLORIDE SERPL-SCNC: 108 MMOL/L (ref 96–112)
CO2 SERPL-SCNC: 24 MMOL/L (ref 20–33)
COMMENT 1642: NORMAL
CREAT SERPL-MCNC: 0.75 MG/DL (ref 0.5–1.4)
DACRYOCYTES BLD QL SMEAR: NORMAL
EOSINOPHIL # BLD AUTO: 0.27 K/UL (ref 0–0.51)
EOSINOPHIL NFR BLD: 4.1 % (ref 0–6.9)
ERYTHROCYTE [DISTWIDTH] IN BLOOD BY AUTOMATED COUNT: 92.9 FL (ref 35.9–50)
FASTING STATUS PATIENT QL REPORTED: NORMAL
FERRITIN SERPL-MCNC: 848.8 NG/ML (ref 10–291)
GLOBULIN SER CALC-MCNC: 3 G/DL (ref 1.9–3.5)
GLUCOSE SERPL-MCNC: 150 MG/DL (ref 65–99)
HCT VFR BLD AUTO: 40.5 % (ref 37–47)
HGB BLD-MCNC: 12 G/DL (ref 12–16)
HYPOCHROMIA BLD QL SMEAR: ABNORMAL
IMM GRANULOCYTES # BLD AUTO: 0.04 K/UL (ref 0–0.11)
IMM GRANULOCYTES NFR BLD AUTO: 0.6 % (ref 0–0.9)
IRON SATN MFR SERPL: 19 % (ref 15–55)
IRON SERPL-MCNC: 82 UG/DL (ref 40–170)
LYMPHOCYTES # BLD AUTO: 1.19 K/UL (ref 1–4.8)
LYMPHOCYTES NFR BLD: 17.9 % (ref 22–41)
MACROCYTES BLD QL SMEAR: ABNORMAL
MCH RBC QN AUTO: 24.2 PG (ref 27–33)
MCHC RBC AUTO-ENTMCNC: 29.6 G/DL (ref 33.6–35)
MCV RBC AUTO: 81.7 FL (ref 81.4–97.8)
MICROCYTES BLD QL SMEAR: ABNORMAL
MONOCYTES # BLD AUTO: 0.55 K/UL (ref 0–0.85)
MONOCYTES NFR BLD AUTO: 8.3 % (ref 0–13.4)
MORPHOLOGY BLD-IMP: NORMAL
NEUTROPHILS # BLD AUTO: 4.53 K/UL (ref 2–7.15)
NEUTROPHILS NFR BLD: 67.9 % (ref 44–72)
NRBC # BLD AUTO: 0 K/UL
NRBC BLD-RTO: 0 /100 WBC
OVALOCYTES BLD QL SMEAR: NORMAL
PLATELET # BLD AUTO: 127 K/UL (ref 164–446)
PLATELET BLD QL SMEAR: NORMAL
POIKILOCYTOSIS BLD QL SMEAR: NORMAL
POTASSIUM SERPL-SCNC: 4.3 MMOL/L (ref 3.6–5.5)
PROT SERPL-MCNC: 7.2 G/DL (ref 6–8.2)
RBC # BLD AUTO: 4.96 M/UL (ref 4.2–5.4)
RBC BLD AUTO: PRESENT
SCHISTOCYTES BLD QL SMEAR: NORMAL
SODIUM SERPL-SCNC: 142 MMOL/L (ref 135–145)
TIBC SERPL-MCNC: 430 UG/DL (ref 250–450)
WBC # BLD AUTO: 6.7 K/UL (ref 4.8–10.8)

## 2018-10-30 PROCEDURE — 85025 COMPLETE CBC W/AUTO DIFF WBC: CPT

## 2018-10-30 PROCEDURE — 36415 COLL VENOUS BLD VENIPUNCTURE: CPT

## 2018-10-30 PROCEDURE — 80053 COMPREHEN METABOLIC PANEL: CPT

## 2018-10-30 PROCEDURE — 83540 ASSAY OF IRON: CPT

## 2018-10-30 PROCEDURE — 82728 ASSAY OF FERRITIN: CPT

## 2018-10-30 PROCEDURE — 83550 IRON BINDING TEST: CPT

## 2018-11-26 ENCOUNTER — HOSPITAL ENCOUNTER (OUTPATIENT)
Dept: LAB | Facility: MEDICAL CENTER | Age: 73
End: 2018-11-26
Attending: FAMILY MEDICINE
Payer: MEDICARE

## 2018-11-26 DIAGNOSIS — E78.5 DYSLIPIDEMIA: ICD-10-CM

## 2018-11-26 DIAGNOSIS — I10 ESSENTIAL HYPERTENSION: ICD-10-CM

## 2018-11-26 LAB
EST. AVERAGE GLUCOSE BLD GHB EST-MCNC: 160 MG/DL
HBA1C MFR BLD: 7.2 % (ref 0–5.6)

## 2018-11-26 PROCEDURE — 83036 HEMOGLOBIN GLYCOSYLATED A1C: CPT

## 2018-11-26 PROCEDURE — 80061 LIPID PANEL: CPT

## 2018-11-26 PROCEDURE — 80053 COMPREHEN METABOLIC PANEL: CPT

## 2018-11-26 PROCEDURE — 36415 COLL VENOUS BLD VENIPUNCTURE: CPT

## 2018-11-27 ENCOUNTER — TELEPHONE (OUTPATIENT)
Dept: MEDICAL GROUP | Facility: PHYSICIAN GROUP | Age: 73
End: 2018-11-27

## 2018-11-27 LAB
ALBUMIN SERPL BCP-MCNC: 4.1 G/DL (ref 3.2–4.9)
ALBUMIN/GLOB SERPL: 1.3 G/DL
ALP SERPL-CCNC: 129 U/L (ref 30–99)
ALT SERPL-CCNC: 82 U/L (ref 2–50)
ANION GAP SERPL CALC-SCNC: 10 MMOL/L (ref 0–11.9)
AST SERPL-CCNC: 75 U/L (ref 12–45)
BILIRUB SERPL-MCNC: 0.7 MG/DL (ref 0.1–1.5)
BUN SERPL-MCNC: 13 MG/DL (ref 8–22)
CALCIUM SERPL-MCNC: 9.6 MG/DL (ref 8.5–10.5)
CHLORIDE SERPL-SCNC: 105 MMOL/L (ref 96–112)
CHOLEST SERPL-MCNC: 196 MG/DL (ref 100–199)
CO2 SERPL-SCNC: 25 MMOL/L (ref 20–33)
CREAT SERPL-MCNC: 0.83 MG/DL (ref 0.5–1.4)
FASTING STATUS PATIENT QL REPORTED: NORMAL
GLOBULIN SER CALC-MCNC: 3.1 G/DL (ref 1.9–3.5)
GLUCOSE SERPL-MCNC: 222 MG/DL (ref 65–99)
HDLC SERPL-MCNC: 44 MG/DL
LDLC SERPL CALC-MCNC: 119 MG/DL
POTASSIUM SERPL-SCNC: 3.9 MMOL/L (ref 3.6–5.5)
PROT SERPL-MCNC: 7.2 G/DL (ref 6–8.2)
SODIUM SERPL-SCNC: 140 MMOL/L (ref 135–145)
TRIGL SERPL-MCNC: 164 MG/DL (ref 0–149)

## 2018-11-27 NOTE — TELEPHONE ENCOUNTER
ESTABLISHED PATIENT PRE-VISIT PLANNING     Patient was NOT contacted to complete PVP.     Note: Patient will not be contacted if there is no indication to call.     1.  Reviewed notes from the last few office visits within the medical group: Yes    2.  If any orders were placed at last visit or intended to be done for this visit (i.e. 6 mos follow-up), do we have Results/Consult Notes?        •  Labs - Labs ordered, completed on 11/26/18 and results are in chart.   Note: If patient appointment is for lab review and patient did not complete labs, check with provider if OK to reschedule patient until labs completed.       •  Imaging - Imaging ordered, NOT completed. Patient advised to complete prior to next appointment.       •  Referrals - No referrals were ordered at last office visit.    3. Is this appointment scheduled as a Hospital Follow-Up? No    4.  Immunizations were updated in Epic using WebIZ?: Epic matches WebIZ       •  Web Iz Recommendations: FLU, MMR , TD and ZOSTAVAX (Shingles)    5.  Patient is due for the following Health Maintenance Topics:   Health Maintenance Due   Topic Date Due   • IMM HEP B VACCINE (1 of 3 - Risk 3-dose series) 02/04/1964   • IMM ZOSTER VACCINES (2 of 3) 11/29/2012   • IMM INFLUENZA (1) 09/01/2018   • DIABETES MONOFILAMENT / LE EXAM  09/12/2018   • MAMMOGRAM  09/20/2018       - Patient has completed FLU, PNEUMOVAX (PPSV23), PREVNAR (PCV13) , TDAP and SHINGRIX (Shingles) Immunization(s) per WebIZ. Chart has been updated.    6.  MDX printed for Provider? NO    7.  Patient was NOT informed to arrive 15 min prior to their scheduled appointment and bring in their medication bottles.

## 2018-11-28 ENCOUNTER — OFFICE VISIT (OUTPATIENT)
Dept: MEDICAL GROUP | Facility: PHYSICIAN GROUP | Age: 73
End: 2018-11-28
Payer: MEDICARE

## 2018-11-28 VITALS
WEIGHT: 188.49 LBS | HEIGHT: 66 IN | DIASTOLIC BLOOD PRESSURE: 80 MMHG | TEMPERATURE: 97.6 F | OXYGEN SATURATION: 94 % | BODY MASS INDEX: 30.29 KG/M2 | HEART RATE: 87 BPM | SYSTOLIC BLOOD PRESSURE: 160 MMHG

## 2018-11-28 DIAGNOSIS — E78.5 DYSLIPIDEMIA: ICD-10-CM

## 2018-11-28 DIAGNOSIS — K76.0 FATTY LIVER: ICD-10-CM

## 2018-11-28 DIAGNOSIS — Z23 NEED FOR VACCINATION: ICD-10-CM

## 2018-11-28 DIAGNOSIS — I10 ESSENTIAL HYPERTENSION: ICD-10-CM

## 2018-11-28 DIAGNOSIS — E03.9 HYPOTHYROIDISM, UNSPECIFIED TYPE: ICD-10-CM

## 2018-11-28 PROCEDURE — 99214 OFFICE O/P EST MOD 30 MIN: CPT | Mod: 25 | Performed by: FAMILY MEDICINE

## 2018-11-28 PROCEDURE — 90662 IIV NO PRSV INCREASED AG IM: CPT | Performed by: FAMILY MEDICINE

## 2018-11-28 PROCEDURE — G0008 ADMIN INFLUENZA VIRUS VAC: HCPCS | Performed by: FAMILY MEDICINE

## 2018-11-28 RX ORDER — LOSARTAN POTASSIUM 100 MG/1
100 TABLET ORAL DAILY
Qty: 90 TAB | Refills: 1 | Status: SHIPPED | OUTPATIENT
Start: 2018-11-28 | End: 2019-03-11 | Stop reason: SDUPTHER

## 2018-11-28 NOTE — PROGRESS NOTES
Subjective:      Ally Doherty is a 73 y.o. female who presents with Diabetes        HPI:  The patient is a 73 y.o. Female who presents for her 4 month follow up on her medical problems.     Her diabetes is managed with glimepiride 1mg every morning and metformin 1000mg twice daily. She states she is not taking her glimepiride daily, because she forgets to take it. She thinks she only takes glimepiride about 3 times a week. She has lost 6lbs since her last visit.     Her hypothyroid is well controlled on levothyroxine 100mg daily.     For hypertension, she continues to take losartan 50 mg 1 tablet daily regularly.  Today the blood pressure is elevated.  On her last visit it was already slightly elevated.  She denies any headache, dizziness, lightheadedness, chest pain, palpitations, shortness of breath, leg edema.    For her dyslipidemia, patient is managing this with her own efforts only.  She has refused statin.    She has not received an influenza vaccine. She is requesting one.    She has not received a shingrix vaccine and will receive it during her next appointment when available.    She notes she receives an iron infusion and blood transfusion about every 8 months. After her most recent infusions her anemia improved. She states her upper GI bleed is very slow and gradual. She denies any pain and only knows she is anemic when she becomes tired and has episodes of melena. When she receives her infusions the melena resolves and her energy returns.  Per patient her problem can be fixed with surgery however as long as she can tolerate the iron infusions they do not want to proceed with surgery.     We also follow her for elevated liver enzymes due to fatty liver.  She is followed by her GI specialist.      Past medical history, past surgical history, family history reviewed-no changes    Social history reviewed-no changes    Allergies reviewed-no changes    Medications reviewed-no changes      ROS:  As  "per the HPI as shown above, the rest are negative.       Objective:     /80 (BP Location: Left arm, Patient Position: Sitting, BP Cuff Size: Adult)   Pulse 87   Temp 36.4 °C (97.6 °F) (Temporal)   Ht 1.676 m (5' 6\")   Wt 85.5 kg (188 lb 7.9 oz)   LMP 02/27/1997   SpO2 94%   BMI 30.42 kg/m²     Physical Exam  Examined alert, awake, oriented, not in distress    Neck-supple, no lymphadenopathy, no thyromegaly  Lungs-clear to auscultation, no rales, no wheezes  Heart-regular rate and rhythm, no murmur  Extremities-no edema, clubbing, cyanosis  Monofilament testing with a 10 gram force: sensation intact: intact bilaterally  Visual Inspection: Feet without maceration, ulcers, fissures.  Pedal pulses: intact bilaterally       Labs:  Hospital Outpatient Visit on 11/26/2018   Component Date Value Ref Range Status   • Sodium 11/26/2018 140  135 - 145 mmol/L Final   • Potassium 11/26/2018 3.9  3.6 - 5.5 mmol/L Final   • Chloride 11/26/2018 105  96 - 112 mmol/L Final   • Co2 11/26/2018 25  20 - 33 mmol/L Final   • Anion Gap 11/26/2018 10.0  0.0 - 11.9 Final   • Glucose 11/26/2018 222* 65 - 99 mg/dL Final   • Bun 11/26/2018 13  8 - 22 mg/dL Final   • Creatinine 11/26/2018 0.83  0.50 - 1.40 mg/dL Final   • Calcium 11/26/2018 9.6  8.5 - 10.5 mg/dL Final   • AST(SGOT) 11/26/2018 75* 12 - 45 U/L Final   • ALT(SGPT) 11/26/2018 82* 2 - 50 U/L Final   • Alkaline Phosphatase 11/26/2018 129* 30 - 99 U/L Final   • Total Bilirubin 11/26/2018 0.7  0.1 - 1.5 mg/dL Final   • Albumin 11/26/2018 4.1  3.2 - 4.9 g/dL Final   • Total Protein 11/26/2018 7.2  6.0 - 8.2 g/dL Final   • Globulin 11/26/2018 3.1  1.9 - 3.5 g/dL Final   • A-G Ratio 11/26/2018 1.3  g/dL Final   • Glycohemoglobin 11/26/2018 7.2* 0.0 - 5.6 % Final    Comment: Increased risk for diabetes:  5.7 -6.4%  Diabetes:  >6.4%  Glycemic control for adults with diabetes:  <7.0%  The above interpretations are per ADA guidelines.  Diagnosis  of diabetes mellitus on the basis " of elevated Hemoglobin A1c  should be confirmed by repeating the Hb A1c test.     • Est Avg Glucose 11/26/2018 160  mg/dL Final    Comment: The eAG calculation is based on the A1c-Derived Daily Glucose  (ADAG) study.  See the ADA's website for additional information.     • Cholesterol,Tot 11/26/2018 196  100 - 199 mg/dL Final   • Triglycerides 11/26/2018 164* 0 - 149 mg/dL Final   • HDL 11/26/2018 44  >=40 mg/dL Final   • LDL 11/26/2018 119* <100 mg/dL Final   • Fasting Status 11/26/2018 Fasting   Final   • GFR If  11/26/2018 >60  >60 mL/min/1.73 m 2 Final   • GFR If Non  11/26/2018 >60  >60 mL/min/1.73 m 2 Final          Assessment/Plan:    1. Need for vaccination  Given in office today.   - INFLUENZA VACCINE, HIGH DOSE (65+ ONLY)    2. Uncontrolled type 2 diabetes mellitus without complication, without long-term current use of insulin (HCC)  Her glucose is elevated at 222. Her hemoglobin A1C decreased from 8.5 to 7.2. I instructed her to continue improving her diet. I advised her to take her glimepiride and metformin together after eating so she will not forget. Her liver and kidneys are functioning well.   - Diabetic Monofilament Lower Extremity Exam  - Lipid Profile; Future  - COMP METABOLIC PANEL; Future  - HEMOGLOBIN A1C; Future  - TSH; Future  - MICROALBUMIN CREAT RATIO URINE; Future    3. Dyslipidemia  Her LDL has increased to 119 and her HDL has decreased to 44. Her triglyceride levels have also elevated to 164.  She continues to refuse statin.  Advised to continue to work harder on watching her diet, exercise and keeping a healthy weight.  - Lipid Profile; Future  - COMP METABOLIC PANEL; Future  - HEMOGLOBIN A1C; Future  - TSH; Future  - MICROALBUMIN CREAT RATIO URINE; Future    4. Essential hypertension  Blood pressure is not controlled.  I will increase her Losartan dose to 100mg. Repeat blood pressure measured at 156/90 in office. I instructed her to monitor her blood  pressure at home to make sure it is below 140/90.  I will reevaluate next visit.  - Lipid Profile; Future  - COMP METABOLIC PANEL; Future  - HEMOGLOBIN A1C; Future  - TSH; Future  - MICROALBUMIN CREAT RATIO URINE; Future    5. Fatty liver  Liver enzymes are still elevated and about the same as before.  Continue follow-up with GI specialist.  - Lipid Profile; Future  - COMP METABOLIC PANEL; Future  - HEMOGLOBIN A1C; Future  - TSH; Future  - MICROALBUMIN CREAT RATIO URINE; Future    6. Hypothyroidism, unspecified type  Last TSH was in July 2018 and was adequately replaced.  Continue the same dose of thyroid medication.    - Lipid Profile; Future  - COMP METABOLIC PANEL; Future  - HEMOGLOBIN A1C; Future  - TSH; Future  - MICROALBUMIN CREAT RATIO URINE; Future      Follow up with me in 4 months with repeat blood work.        IMichelle (Scribe), am scribing for, and in the presence of, Su Randhawa MD     Electronically signed by: Michelle Marino (Scribe), 11/28/2018    ISu MD personally performed the services described in this documentation, as scribed by Michelle Marino in my presence, and it is both accurate and complete.

## 2018-12-04 ENCOUNTER — HOSPITAL ENCOUNTER (OUTPATIENT)
Dept: RADIOLOGY | Facility: MEDICAL CENTER | Age: 73
End: 2018-12-04
Attending: FAMILY MEDICINE
Payer: MEDICARE

## 2018-12-04 DIAGNOSIS — Z12.31 VISIT FOR SCREENING MAMMOGRAM: ICD-10-CM

## 2018-12-04 PROCEDURE — 77067 SCR MAMMO BI INCL CAD: CPT

## 2019-03-11 DIAGNOSIS — I10 ESSENTIAL HYPERTENSION: ICD-10-CM

## 2019-03-11 RX ORDER — LOSARTAN POTASSIUM 100 MG/1
100 TABLET ORAL DAILY
Qty: 90 TAB | Refills: 1 | Status: SHIPPED | OUTPATIENT
Start: 2019-03-11 | End: 2019-04-03 | Stop reason: SDUPTHER

## 2019-03-11 RX ORDER — OMEPRAZOLE 20 MG/1
20 CAPSULE, DELAYED RELEASE ORAL DAILY
Qty: 90 CAP | Refills: 1 | Status: SHIPPED | OUTPATIENT
Start: 2019-03-11 | End: 2019-09-16 | Stop reason: SDUPTHER

## 2019-03-11 RX ORDER — LEVOTHYROXINE SODIUM 0.1 MG/1
100 TABLET ORAL EVERY MORNING
Qty: 90 TAB | Refills: 1 | Status: SHIPPED | OUTPATIENT
Start: 2019-03-11 | End: 2019-10-15 | Stop reason: SDUPTHER

## 2019-03-28 ENCOUNTER — HOSPITAL ENCOUNTER (OUTPATIENT)
Dept: LAB | Facility: MEDICAL CENTER | Age: 74
End: 2019-03-28
Attending: FAMILY MEDICINE
Payer: MEDICARE

## 2019-03-28 DIAGNOSIS — E03.9 HYPOTHYROIDISM, UNSPECIFIED TYPE: ICD-10-CM

## 2019-03-28 DIAGNOSIS — I10 ESSENTIAL HYPERTENSION: ICD-10-CM

## 2019-03-28 DIAGNOSIS — K76.0 FATTY LIVER: ICD-10-CM

## 2019-03-28 DIAGNOSIS — E78.5 DYSLIPIDEMIA: ICD-10-CM

## 2019-03-28 LAB
ALBUMIN SERPL BCP-MCNC: 4.3 G/DL (ref 3.2–4.9)
ALBUMIN/GLOB SERPL: 1.5 G/DL
ALP SERPL-CCNC: 117 U/L (ref 30–99)
ALT SERPL-CCNC: 80 U/L (ref 2–50)
ANION GAP SERPL CALC-SCNC: 11 MMOL/L (ref 0–11.9)
AST SERPL-CCNC: 69 U/L (ref 12–45)
BILIRUB SERPL-MCNC: 0.8 MG/DL (ref 0.1–1.5)
BUN SERPL-MCNC: 13 MG/DL (ref 8–22)
CALCIUM SERPL-MCNC: 9.6 MG/DL (ref 8.5–10.5)
CHLORIDE SERPL-SCNC: 104 MMOL/L (ref 96–112)
CHOLEST SERPL-MCNC: 207 MG/DL (ref 100–199)
CO2 SERPL-SCNC: 25 MMOL/L (ref 20–33)
CREAT SERPL-MCNC: 0.8 MG/DL (ref 0.5–1.4)
CREAT UR-MCNC: 99.6 MG/DL
FASTING STATUS PATIENT QL REPORTED: NORMAL
GLOBULIN SER CALC-MCNC: 2.8 G/DL (ref 1.9–3.5)
GLUCOSE SERPL-MCNC: 213 MG/DL (ref 65–99)
HDLC SERPL-MCNC: 51 MG/DL
LDLC SERPL CALC-MCNC: 125 MG/DL
MICROALBUMIN UR-MCNC: 12 MG/DL
MICROALBUMIN/CREAT UR: 120 MG/G (ref 0–30)
POTASSIUM SERPL-SCNC: 4.2 MMOL/L (ref 3.6–5.5)
PROT SERPL-MCNC: 7.1 G/DL (ref 6–8.2)
SODIUM SERPL-SCNC: 140 MMOL/L (ref 135–145)
TRIGL SERPL-MCNC: 156 MG/DL (ref 0–149)
TSH SERPL DL<=0.005 MIU/L-ACNC: 1.29 UIU/ML (ref 0.38–5.33)

## 2019-03-28 PROCEDURE — 80061 LIPID PANEL: CPT

## 2019-03-28 PROCEDURE — 36415 COLL VENOUS BLD VENIPUNCTURE: CPT

## 2019-03-28 PROCEDURE — 82043 UR ALBUMIN QUANTITATIVE: CPT

## 2019-03-28 PROCEDURE — 84443 ASSAY THYROID STIM HORMONE: CPT

## 2019-03-28 PROCEDURE — 80053 COMPREHEN METABOLIC PANEL: CPT

## 2019-03-28 PROCEDURE — 82570 ASSAY OF URINE CREATININE: CPT

## 2019-03-28 PROCEDURE — 83036 HEMOGLOBIN GLYCOSYLATED A1C: CPT

## 2019-03-29 LAB
EST. AVERAGE GLUCOSE BLD GHB EST-MCNC: 209 MG/DL
HBA1C MFR BLD: 8.9 % (ref 0–5.6)

## 2019-04-03 DIAGNOSIS — I10 ESSENTIAL HYPERTENSION: ICD-10-CM

## 2019-04-03 RX ORDER — LOSARTAN POTASSIUM 100 MG/1
100 TABLET ORAL DAILY
Qty: 100 TAB | Refills: 1 | Status: SHIPPED | OUTPATIENT
Start: 2019-04-03 | End: 2019-06-10 | Stop reason: SDUPTHER

## 2019-04-03 NOTE — TELEPHONE ENCOUNTER
Was the patient seen in the last year in this department? Yes    Does patient have an active prescription for medications requested? No     Received Request Via: Pharmacy       SCP requesting 100 day supply

## 2019-04-09 ENCOUNTER — OFFICE VISIT (OUTPATIENT)
Dept: MEDICAL GROUP | Facility: PHYSICIAN GROUP | Age: 74
End: 2019-04-09
Payer: MEDICARE

## 2019-04-09 VITALS
SYSTOLIC BLOOD PRESSURE: 140 MMHG | HEIGHT: 66 IN | WEIGHT: 194.45 LBS | DIASTOLIC BLOOD PRESSURE: 80 MMHG | OXYGEN SATURATION: 93 % | TEMPERATURE: 99.2 F | BODY MASS INDEX: 31.25 KG/M2 | HEART RATE: 82 BPM

## 2019-04-09 DIAGNOSIS — C50.211 MALIGNANT NEOPLASM OF UPPER-INNER QUADRANT OF RIGHT BREAST IN FEMALE, ESTROGEN RECEPTOR POSITIVE (HCC): ICD-10-CM

## 2019-04-09 DIAGNOSIS — I10 ESSENTIAL HYPERTENSION: ICD-10-CM

## 2019-04-09 DIAGNOSIS — E03.9 HYPOTHYROIDISM, UNSPECIFIED TYPE: ICD-10-CM

## 2019-04-09 DIAGNOSIS — E78.5 DYSLIPIDEMIA: ICD-10-CM

## 2019-04-09 DIAGNOSIS — Z17.0 MALIGNANT NEOPLASM OF UPPER-INNER QUADRANT OF RIGHT BREAST IN FEMALE, ESTROGEN RECEPTOR POSITIVE (HCC): ICD-10-CM

## 2019-04-09 PROCEDURE — 8041 PR SCP AHA: Performed by: FAMILY MEDICINE

## 2019-04-09 PROCEDURE — 99214 OFFICE O/P EST MOD 30 MIN: CPT | Performed by: FAMILY MEDICINE

## 2019-04-09 ASSESSMENT — PATIENT HEALTH QUESTIONNAIRE - PHQ9: CLINICAL INTERPRETATION OF PHQ2 SCORE: 0

## 2019-04-09 NOTE — PROGRESS NOTES
Annual Health Assessment Questions:    1.  Are you currently engaging in any exercise or physical activity? No    2.  How would you describe your mood or emotional well-being today? good    3.  Have you had any falls in the last year? No    4.  Have you noticed any problems with your balance or had difficulty walking? No    5.  In the last six months have you experienced any leakage of urine? No    6. DPA/Advanced Directive: Patient has Advanced Directive on file.

## 2019-04-09 NOTE — PROGRESS NOTES
Subjective:      Ally Doherty is a 74 y.o. female who presents with Diabetes (SCP AHA)        HPI:    Patient is here for follow-up of her medical problems as well as for some concerns.    Uncontrolled type 2 diabetes mellitus without complication, without long-term current use of insulin  Her diabetes is managed with glimepiride 1 mg every morning and metformin 1000 mg twice daily. She has been taking the glimepiride more frequently than in November 2018, but she still does not take it every day. She takes it maybe 3-4 times a week and was taking it around 1-2 times before our visit in November 2018. She had her eye exam on 3/20/2019 at [facility]. She states she has a cataract forming, but her eye exam was otherwise normal.  We do not have the eye exam report.    Dyslipidemia  Patient is managing her dyslipidemia this with her own efforts only. She cannot tolerate a statin because it causes her liver enzymes to increase relative to her baseline elevation.    Essential hypertension  For hypertension, she is taking losartan 100 mg daily. This was increased from 50 mg during our last visit. She denies any side effects. Her blood pressures is in the 120s/80s at home. Her blood pressure is 140/80 in the office today.    Hypothyroidism, unspecified type  She is adequately replaced on levothyroxine 100mg daily.    Iron deficiency anemia  She has not required an iron infusion since our last visit. Patient denies any recent problems associated with her anemia including blood in stools or decreased energy.    Breast cancer  In 2009 the patient was treated with mastectomy, chemotherapy and radiation therapy for stage II breast cancer. She also underwent breast reconstructive surgery. Patient is in remission. She is taking anastrozole prescribed by Dr. Oliveira, Oncology.    Annual Health Assessment Questions:     1.  Are you currently engaging in any exercise or physical activity? No     2.  How would you  "describe your mood or emotional well-being today? good     3.  Have you had any falls in the last year? No     4.  Have you noticed any problems with your balance or had difficulty walking? No     5.  In the last six months have you experienced any leakage of urine? No     6. DPA/Advanced Directive: Patient has Advanced Directive on file.     Past medical history, past surgical history, family history reviewed-no changes    Social history reviewed-no changes    Allergies reviewed-no changes    Medications reviewed-no changes    ROS:  As per the HPI as shown above, the rest are negative.       Objective:     /80 (BP Location: Left arm, Patient Position: Sitting, BP Cuff Size: Adult)   Pulse 82   Temp 37.3 °C (99.2 °F) (Temporal)   Ht 1.676 m (5' 6\")   Wt 88.2 kg (194 lb 7.1 oz)   LMP 02/27/1997   SpO2 93%   BMI 31.38 kg/m²     Physical Exam    Examined alert, awake, oriented, not in distress    Neck-supple, no lymphadenopathy, no thyromegaly  Lungs-clear to auscultation, no rales, no wheezes  Heart-regular rate and rhythm, no murmur  Extremities-no edema, clubbing, cyanosis     Labs:  Hospital Outpatient Visit on 03/28/2019   Component Date Value Ref Range Status   • Cholesterol,Tot 03/28/2019 207* 100 - 199 mg/dL Final   • Triglycerides 03/28/2019 156* 0 - 149 mg/dL Final   • HDL 03/28/2019 51  >=40 mg/dL Final   • LDL 03/28/2019 125* <100 mg/dL Final   • Sodium 03/28/2019 140  135 - 145 mmol/L Final   • Potassium 03/28/2019 4.2  3.6 - 5.5 mmol/L Final   • Chloride 03/28/2019 104  96 - 112 mmol/L Final   • Co2 03/28/2019 25  20 - 33 mmol/L Final   • Anion Gap 03/28/2019 11.0  0.0 - 11.9 Final   • Glucose 03/28/2019 213* 65 - 99 mg/dL Final   • Bun 03/28/2019 13  8 - 22 mg/dL Final   • Creatinine 03/28/2019 0.80  0.50 - 1.40 mg/dL Final   • Calcium 03/28/2019 9.6  8.5 - 10.5 mg/dL Final   • AST(SGOT) 03/28/2019 69* 12 - 45 U/L Final   • ALT(SGPT) 03/28/2019 80* 2 - 50 U/L Final   • Alkaline Phosphatase " 03/28/2019 117* 30 - 99 U/L Final   • Total Bilirubin 03/28/2019 0.8  0.1 - 1.5 mg/dL Final   • Albumin 03/28/2019 4.3  3.2 - 4.9 g/dL Final   • Total Protein 03/28/2019 7.1  6.0 - 8.2 g/dL Final   • Globulin 03/28/2019 2.8  1.9 - 3.5 g/dL Final   • A-G Ratio 03/28/2019 1.5  g/dL Final   • Glycohemoglobin 03/28/2019 8.9* 0.0 - 5.6 % Final    Comment: Increased risk for diabetes:  5.7 -6.4%  Diabetes:  >6.4%  Glycemic control for adults with diabetes:  <7.0%  The above interpretations are per ADA guidelines.  Diagnosis  of diabetes mellitus on the basis of elevated Hemoglobin A1c  should be confirmed by repeating the Hb A1c test.     • Est Avg Glucose 03/28/2019 209  mg/dL Final    Comment: The eAG calculation is based on the A1c-Derived Daily Glucose  (ADAG) study.  See the ADA's website for additional information.     • TSH 03/28/2019 1.290  0.380 - 5.330 uIU/mL Final    Comment: Please note new reference ranges effective 12/14/2017 10:00 AM  Pregnant Females, 1st Trimester  0.050-3.700  Pregnant Females, 2nd Trimester  0.310-4.350  Pregnant Females, 3rd Trimester  0.410-5.180     • Creatinine, Urine 03/28/2019 99.60  mg/dL Final   • Microalbumin, Urine Random 03/28/2019 12.0  mg/dL Final   • Micro Alb Creat Ratio 03/28/2019 120* 0 - 30 mg/g Final   • Fasting Status 03/28/2019 Fasting   Final   • GFR If  03/28/2019 >60  >60 mL/min/1.73 m 2 Final   • GFR If Non  03/28/2019 >60  >60 mL/min/1.73 m 2 Final       Assessment/Plan:     1. Uncontrolled type 2 diabetes mellitus without complication, without long-term current use of insulin (HCC)  Uncontrolled. Her A1c is elevated at 8.9 on most recent blood work, which is increased from 7.2 in November 2018. I explained that she has to take the glimepiride 1 tablet daily with her metformin. If we cannot get her diabetes under control we may need to increase the glimepiride dose.  Recheck blood work in 3 months.  Advised to work harder on  watching her diet.  - Lipid Profile; Future  - Comp Metabolic Panel; Future  - HEMOGLOBIN A1C; Future    2. Dyslipidemia  Total cholesterol is elevated at 207, triglycerides elevated 156, HDL at the right level at 51, and LDL elevated 125 on most recent lipid panel. Total cholesterol and LDL are both increased from prior blood work in November 2019. She cannot tolerate statins and we may need to start her on Zetia. Advised avoid fatty foods, try to have fish 3 times a week eat more vegetables and fruits, exercise regularly and keep a healthy weight.  Recheck in 3 months.  - Lipid Profile; Future  - Comp Metabolic Panel; Future  - HEMOGLOBIN A1C; Future    3. Essential hypertension  Blood pressure is running better at home than in the office.  Continue current dose of blood pressure medication.  Continue to monitor blood pressure at home and keep a record and we will reevaluate next visit.  - Lipid Profile; Future  - Comp Metabolic Panel; Future  - HEMOGLOBIN A1C; Future    4. Hypothyroidism, unspecified type  Adequately replaced. TSH of 1.290 on most recent blood work.    5. Malignant neoplasm of upper-inner quadrant of right breast in female, estrogen receptor positive (HCC)  Stable and disease-free. Currently taking taking anastrozole prescribed by Dr. Oliveira Oncology. Continue follow up with Oncology.    6.  BMI 31.0-31.9, adult  Patient's body mass index is 31.38 kg/m². Exercise and nutrition counseling were performed at this visit.    Jj ABAD (Scribe), am scribing for, and in the presence of, Su Randhawa MD     Electronically signed by: Jj Livingston (Scribe), 4/9/2019    ISu MD personally performed the services described in this documentation, as scribed by Jj Livingston in my presence, and it is both accurate and complete.

## 2019-04-09 NOTE — LETTER
[facility]  Su Randhawa M.D.  [address]  [place] [address]  Fax: [phone]   Authorization for Release/Disclosure of   Protected Health Information   Name: CHARU BAEZ : 1945 SSN: [id number]   Address: [address] [place] [address]  [place] [address] Phone:    [phone] (home)    I authorize the entity listed below to release/disclose the PHI below to:   [facility]/Su Randhawa M.D. and Su Randhawa M.D.   Provider or Entity Name:    [address], [facility], [facility]   Phone:      Fax:     Reason for request: continuity of care   Information to be released:    [  ] LAST COLONOSCOPY,  including any PATH REPORT and follow-up  [  ] LAST FIT/COLOGUARD RESULT [  ] LAST DEXA  [  ] LAST MAMMOGRAM  [  ] LAST PAP  [  ] LAST LABS [x  ] RETINA EXAM REPORT  [  ] IMMUNIZATION RECORDS  [  ] Release all info      [  ] Check here and initial the line next to each item to release ALL health information INCLUDING  _____ Care and treatment for drug and / or alcohol abuse  _____ HIV testing, infection status, or AIDS  _____ Genetic Testing    DATES OF SERVICE OR TIME PERIOD TO BE DISCLOSED: _____________  I understand and acknowledge that:  * This Authorization may be revoked at any time by you in writing, except if your health information has already been used or disclosed.  * Your health information that will be used or disclosed as a result of you signing this authorization could be re-disclosed by the recipient. If this occurs, your re-disclosed health information may no longer be protected by State or Federal laws.  * You may refuse to sign this Authorization. Your refusal will not affect your ability to obtain treatment.  * This Authorization becomes effective upon signing and will  on (date) __________.      If no date is indicated, this Authorization will  one (1) year from the signature date.    Name: Charu Baez    Signature:   Date:      4/9/2019       PLEASE FAX REQUESTED RECORDS BACK TO: [phone]

## 2019-04-17 ENCOUNTER — OFFICE VISIT (OUTPATIENT)
Dept: URGENT CARE | Facility: CLINIC | Age: 74
End: 2019-04-17
Payer: MEDICARE

## 2019-04-17 VITALS
HEIGHT: 66 IN | WEIGHT: 187 LBS | TEMPERATURE: 98.6 F | BODY MASS INDEX: 30.05 KG/M2 | SYSTOLIC BLOOD PRESSURE: 140 MMHG | DIASTOLIC BLOOD PRESSURE: 86 MMHG | RESPIRATION RATE: 16 BRPM | OXYGEN SATURATION: 97 % | HEART RATE: 86 BPM

## 2019-04-17 DIAGNOSIS — J22 LRTI (LOWER RESPIRATORY TRACT INFECTION): ICD-10-CM

## 2019-04-17 PROCEDURE — 99214 OFFICE O/P EST MOD 30 MIN: CPT | Performed by: FAMILY MEDICINE

## 2019-04-17 RX ORDER — AZITHROMYCIN 250 MG/1
TABLET, FILM COATED ORAL
Qty: 6 TAB | Refills: 0 | Status: SHIPPED | OUTPATIENT
Start: 2019-04-17 | End: 2019-12-04

## 2019-04-17 ASSESSMENT — ENCOUNTER SYMPTOMS
CHILLS: 0
FEVER: 0
SHORTNESS OF BREATH: 1
COUGH: 1
WHEEZING: 1

## 2019-04-17 NOTE — PROGRESS NOTES
"Subjective:   Ally Doherty is a 74 y.o. female who presents for Cough (x 3 days, productive cough, chest hurst from coughing)        Cough   This is a new problem. The current episode started in the past 7 days. The problem has been gradually worsening. The problem occurs every few minutes. The cough is productive of sputum. Associated symptoms include shortness of breath and wheezing. Pertinent negatives include no chills, fever, nasal congestion or postnasal drip.     Review of Systems   Constitutional: Negative for chills and fever.   HENT: Negative for postnasal drip.    Respiratory: Positive for cough, shortness of breath and wheezing.      Allergies   Allergen Reactions   • Byetta      pancreatitis   • Penicillins Rash     ALl over   • Statins [Hmg-Coa-R Inhibitors]      Elevated liver enzymes      Objective:   /86 (BP Location: Left arm, Patient Position: Sitting, BP Cuff Size: Large adult)   Pulse 86   Temp 37 °C (98.6 °F) (Temporal)   Resp 16   Ht 1.676 m (5' 6\")   Wt 84.8 kg (187 lb)   LMP 02/27/1997   SpO2 97%   BMI 30.18 kg/m²   Physical Exam   Constitutional: She is oriented to person, place, and time. She appears well-developed and well-nourished. No distress.   HENT:   Head: Normocephalic and atraumatic.   Eyes: Pupils are equal, round, and reactive to light. Conjunctivae and EOM are normal.   Cardiovascular: Normal rate and regular rhythm.    No murmur heard.  Pulmonary/Chest: Effort normal. No respiratory distress. She has wheezes. She has no rales.   Abdominal: Soft. She exhibits no distension. There is no tenderness.   Neurological: She is alert and oriented to person, place, and time. She has normal reflexes. No sensory deficit.   Skin: Skin is warm and dry.   Psychiatric: She has a normal mood and affect.         Assessment/Plan:   1. LRTI (lower respiratory tract infection)  - azithromycin (ZITHROMAX) 250 MG Tab; Take 2 tablets by mouth on day one. Take one tablet by " mouth the remaining days until gone  Dispense: 6 Tab; Refill: 0    Differential diagnosis, natural history, supportive care, and indications for immediate follow-up discussed.

## 2019-05-13 ENCOUNTER — HOSPITAL ENCOUNTER (OUTPATIENT)
Dept: LAB | Facility: MEDICAL CENTER | Age: 74
End: 2019-05-13
Attending: INTERNAL MEDICINE
Payer: MEDICARE

## 2019-05-13 LAB
ALBUMIN SERPL BCP-MCNC: 3.9 G/DL (ref 3.2–4.9)
ALBUMIN/GLOB SERPL: 1.3 G/DL
ALP SERPL-CCNC: 95 U/L (ref 30–99)
ALT SERPL-CCNC: 70 U/L (ref 2–50)
ANION GAP SERPL CALC-SCNC: 10 MMOL/L (ref 0–11.9)
AST SERPL-CCNC: 63 U/L (ref 12–45)
BASOPHILS # BLD AUTO: 1.4 % (ref 0–1.8)
BASOPHILS # BLD: 0.06 K/UL (ref 0–0.12)
BILIRUB SERPL-MCNC: 0.4 MG/DL (ref 0.1–1.5)
BUN SERPL-MCNC: 13 MG/DL (ref 8–22)
CALCIUM SERPL-MCNC: 9.3 MG/DL (ref 8.5–10.5)
CHLORIDE SERPL-SCNC: 110 MMOL/L (ref 96–112)
CO2 SERPL-SCNC: 23 MMOL/L (ref 20–33)
CREAT SERPL-MCNC: 0.8 MG/DL (ref 0.5–1.4)
EOSINOPHIL # BLD AUTO: 0.16 K/UL (ref 0–0.51)
EOSINOPHIL NFR BLD: 3.7 % (ref 0–6.9)
ERYTHROCYTE [DISTWIDTH] IN BLOOD BY AUTOMATED COUNT: 50.1 FL (ref 35.9–50)
FERRITIN SERPL-MCNC: 115.4 NG/ML (ref 10–291)
GLOBULIN SER CALC-MCNC: 3 G/DL (ref 1.9–3.5)
GLUCOSE SERPL-MCNC: 187 MG/DL (ref 65–99)
HCT VFR BLD AUTO: 46.3 % (ref 37–47)
HGB BLD-MCNC: 14.4 G/DL (ref 12–16)
IMM GRANULOCYTES # BLD AUTO: 0.01 K/UL (ref 0–0.11)
IMM GRANULOCYTES NFR BLD AUTO: 0.2 % (ref 0–0.9)
IRON SATN MFR SERPL: 15 % (ref 15–55)
IRON SERPL-MCNC: 60 UG/DL (ref 40–170)
LYMPHOCYTES # BLD AUTO: 1.08 K/UL (ref 1–4.8)
LYMPHOCYTES NFR BLD: 24.7 % (ref 22–41)
MCH RBC QN AUTO: 29.4 PG (ref 27–33)
MCHC RBC AUTO-ENTMCNC: 31.1 G/DL (ref 33.6–35)
MCV RBC AUTO: 94.7 FL (ref 81.4–97.8)
MONOCYTES # BLD AUTO: 0.39 K/UL (ref 0–0.85)
MONOCYTES NFR BLD AUTO: 8.9 % (ref 0–13.4)
NEUTROPHILS # BLD AUTO: 2.67 K/UL (ref 2–7.15)
NEUTROPHILS NFR BLD: 61.1 % (ref 44–72)
NRBC # BLD AUTO: 0 K/UL
NRBC BLD-RTO: 0 /100 WBC
PLATELET # BLD AUTO: 99 K/UL (ref 164–446)
PMV BLD AUTO: 10.5 FL (ref 9–12.9)
POTASSIUM SERPL-SCNC: 4.2 MMOL/L (ref 3.6–5.5)
PROT SERPL-MCNC: 6.9 G/DL (ref 6–8.2)
RBC # BLD AUTO: 4.89 M/UL (ref 4.2–5.4)
SODIUM SERPL-SCNC: 143 MMOL/L (ref 135–145)
TIBC SERPL-MCNC: 402 UG/DL (ref 250–450)
WBC # BLD AUTO: 4.4 K/UL (ref 4.8–10.8)

## 2019-05-13 PROCEDURE — 80053 COMPREHEN METABOLIC PANEL: CPT

## 2019-05-13 PROCEDURE — 83540 ASSAY OF IRON: CPT

## 2019-05-13 PROCEDURE — 85025 COMPLETE CBC W/AUTO DIFF WBC: CPT

## 2019-05-13 PROCEDURE — 36415 COLL VENOUS BLD VENIPUNCTURE: CPT

## 2019-05-13 PROCEDURE — 83550 IRON BINDING TEST: CPT

## 2019-05-13 PROCEDURE — 82728 ASSAY OF FERRITIN: CPT

## 2019-05-24 ENCOUNTER — APPOINTMENT (OUTPATIENT)
Dept: RADIOLOGY | Facility: MEDICAL CENTER | Age: 74
End: 2019-05-24
Attending: INTERNAL MEDICINE
Payer: MEDICARE

## 2019-05-30 ENCOUNTER — HOSPITAL ENCOUNTER (OUTPATIENT)
Dept: LAB | Facility: MEDICAL CENTER | Age: 74
End: 2019-05-30
Attending: INTERNAL MEDICINE
Payer: MEDICARE

## 2019-05-30 LAB
BASOPHILS # BLD AUTO: 1.2 % (ref 0–1.8)
BASOPHILS # BLD: 0.05 K/UL (ref 0–0.12)
EOSINOPHIL # BLD AUTO: 0.11 K/UL (ref 0–0.51)
EOSINOPHIL NFR BLD: 2.7 % (ref 0–6.9)
ERYTHROCYTE [DISTWIDTH] IN BLOOD BY AUTOMATED COUNT: 48.9 FL (ref 35.9–50)
HCT VFR BLD AUTO: 45.3 % (ref 37–47)
HGB BLD-MCNC: 14.2 G/DL (ref 12–16)
IMM GRANULOCYTES # BLD AUTO: 0.01 K/UL (ref 0–0.11)
IMM GRANULOCYTES NFR BLD AUTO: 0.2 % (ref 0–0.9)
LYMPHOCYTES # BLD AUTO: 0.88 K/UL (ref 1–4.8)
LYMPHOCYTES NFR BLD: 21.6 % (ref 22–41)
MCH RBC QN AUTO: 28.9 PG (ref 27–33)
MCHC RBC AUTO-ENTMCNC: 31.3 G/DL (ref 33.6–35)
MCV RBC AUTO: 92.3 FL (ref 81.4–97.8)
MONOCYTES # BLD AUTO: 0.37 K/UL (ref 0–0.85)
MONOCYTES NFR BLD AUTO: 9.1 % (ref 0–13.4)
NEUTROPHILS # BLD AUTO: 2.65 K/UL (ref 2–7.15)
NEUTROPHILS NFR BLD: 65.2 % (ref 44–72)
NRBC # BLD AUTO: 0 K/UL
NRBC BLD-RTO: 0 /100 WBC
PLATELET # BLD AUTO: 87 K/UL (ref 164–446)
PMV BLD AUTO: 10.1 FL (ref 9–12.9)
RBC # BLD AUTO: 4.91 M/UL (ref 4.2–5.4)
WBC # BLD AUTO: 4.1 K/UL (ref 4.8–10.8)

## 2019-05-30 PROCEDURE — 85025 COMPLETE CBC W/AUTO DIFF WBC: CPT

## 2019-05-30 PROCEDURE — 36415 COLL VENOUS BLD VENIPUNCTURE: CPT

## 2019-05-31 ENCOUNTER — HOSPITAL ENCOUNTER (OUTPATIENT)
Dept: RADIOLOGY | Facility: MEDICAL CENTER | Age: 74
End: 2019-05-31
Attending: INTERNAL MEDICINE
Payer: MEDICARE

## 2019-05-31 DIAGNOSIS — C50.211 MALIGNANT NEOPLASM OF UPPER-INNER QUADRANT OF RIGHT FEMALE BREAST, UNSPECIFIED ESTROGEN RECEPTOR STATUS (HCC): ICD-10-CM

## 2019-05-31 DIAGNOSIS — D69.6 THROMBOCYTOPENIA, UNSPECIFIED (HCC): ICD-10-CM

## 2019-05-31 PROCEDURE — 76700 US EXAM ABDOM COMPLETE: CPT

## 2019-06-10 DIAGNOSIS — I10 ESSENTIAL HYPERTENSION: ICD-10-CM

## 2019-06-10 RX ORDER — LOSARTAN POTASSIUM 100 MG/1
100 TABLET ORAL DAILY
Qty: 100 TAB | Refills: 1 | Status: SHIPPED | OUTPATIENT
Start: 2019-06-10 | End: 2019-12-30

## 2019-06-11 ENCOUNTER — HOSPITAL ENCOUNTER (OUTPATIENT)
Dept: LAB | Facility: MEDICAL CENTER | Age: 74
End: 2019-06-11
Attending: PHYSICIAN ASSISTANT
Payer: MEDICARE

## 2019-06-11 LAB
25(OH)D3 SERPL-MCNC: 18 NG/ML (ref 30–100)
HBV SURFACE AB SERPL IA-ACNC: 3.62 MIU/ML (ref 0–10)
INR PPP: 1.02 (ref 0.87–1.13)
PROTHROMBIN TIME: 13.6 SEC (ref 12–14.6)

## 2019-06-11 PROCEDURE — 86708 HEPATITIS A ANTIBODY: CPT

## 2019-06-11 PROCEDURE — 36415 COLL VENOUS BLD VENIPUNCTURE: CPT

## 2019-06-11 PROCEDURE — 84630 ASSAY OF ZINC: CPT

## 2019-06-11 PROCEDURE — 82105 ALPHA-FETOPROTEIN SERUM: CPT

## 2019-06-11 PROCEDURE — 86706 HEP B SURFACE ANTIBODY: CPT

## 2019-06-11 PROCEDURE — 82306 VITAMIN D 25 HYDROXY: CPT

## 2019-06-11 PROCEDURE — 84590 ASSAY OF VITAMIN A: CPT

## 2019-06-11 PROCEDURE — 85610 PROTHROMBIN TIME: CPT

## 2019-06-13 LAB
AFP-TM SERPL-MCNC: 6 NG/ML (ref 0–9)
HAV AB SER QL IA: NEGATIVE

## 2019-06-14 LAB — ZINC SERPL-MCNC: 71.1 UG/DL (ref 60–120)

## 2019-06-15 LAB
ANNOTATION COMMENT IMP: NORMAL
RETINYL PALMITATE SERPL-MCNC: <0.02 MG/L (ref 0–0.1)
VIT A SERPL-MCNC: 0.44 MG/L (ref 0.3–1.2)

## 2019-06-26 ENCOUNTER — HOSPITAL ENCOUNTER (OUTPATIENT)
Facility: MEDICAL CENTER | Age: 74
End: 2019-06-26
Attending: PHYSICIAN ASSISTANT
Payer: MEDICARE

## 2019-06-26 PROCEDURE — 87209 SMEAR COMPLEX STAIN: CPT

## 2019-06-26 PROCEDURE — 87046 STOOL CULTR AEROBIC BACT EA: CPT

## 2019-06-26 PROCEDURE — 89055 LEUKOCYTE ASSESSMENT FECAL: CPT

## 2019-06-26 PROCEDURE — 87899 AGENT NOS ASSAY W/OPTIC: CPT

## 2019-06-26 PROCEDURE — 87177 OVA AND PARASITES SMEARS: CPT

## 2019-06-26 PROCEDURE — 87493 C DIFF AMPLIFIED PROBE: CPT

## 2019-06-26 PROCEDURE — 87045 FECES CULTURE AEROBIC BACT: CPT

## 2019-06-27 LAB — WBC STL QL MICRO: NORMAL

## 2019-06-27 PROCEDURE — 87209 SMEAR COMPLEX STAIN: CPT

## 2019-06-27 PROCEDURE — 87177 OVA AND PARASITES SMEARS: CPT

## 2019-06-28 LAB
C DIFF DNA SPEC QL NAA+PROBE: NEGATIVE
C DIFF TOX GENS STL QL NAA+PROBE: NEGATIVE
E COLI SXT1+2 STL IA: NORMAL
SIGNIFICANT IND 70042: NORMAL
SITE SITE: NORMAL
SOURCE SOURCE: NORMAL

## 2019-06-30 LAB
BACTERIA STL CULT: NORMAL
E COLI SXT1+2 STL IA: NORMAL
SIGNIFICANT IND 70042: NORMAL
SITE SITE: NORMAL
SOURCE SOURCE: NORMAL

## 2019-07-02 ENCOUNTER — TELEPHONE (OUTPATIENT)
Dept: MEDICAL GROUP | Facility: PHYSICIAN GROUP | Age: 74
End: 2019-07-02

## 2019-07-02 LAB
O+P STL MICRO: NEGATIVE
O+P STL TRI STN: NEGATIVE

## 2019-07-05 ENCOUNTER — HOSPITAL ENCOUNTER (OUTPATIENT)
Dept: LAB | Facility: MEDICAL CENTER | Age: 74
End: 2019-07-05
Attending: FAMILY MEDICINE
Payer: MEDICARE

## 2019-07-05 DIAGNOSIS — I10 ESSENTIAL HYPERTENSION: ICD-10-CM

## 2019-07-05 DIAGNOSIS — E78.5 DYSLIPIDEMIA: ICD-10-CM

## 2019-07-05 LAB
ALBUMIN SERPL BCP-MCNC: 4.3 G/DL (ref 3.2–4.9)
ALBUMIN/GLOB SERPL: 1.4 G/DL
ALP SERPL-CCNC: 94 U/L (ref 30–99)
ALT SERPL-CCNC: 63 U/L (ref 2–50)
ANION GAP SERPL CALC-SCNC: 14 MMOL/L (ref 0–11.9)
AST SERPL-CCNC: 60 U/L (ref 12–45)
BILIRUB SERPL-MCNC: 0.9 MG/DL (ref 0.1–1.5)
BUN SERPL-MCNC: 15 MG/DL (ref 8–22)
CALCIUM SERPL-MCNC: 9.8 MG/DL (ref 8.5–10.5)
CHLORIDE SERPL-SCNC: 108 MMOL/L (ref 96–112)
CHOLEST SERPL-MCNC: 184 MG/DL (ref 100–199)
CO2 SERPL-SCNC: 21 MMOL/L (ref 20–33)
CREAT SERPL-MCNC: 0.85 MG/DL (ref 0.5–1.4)
EST. AVERAGE GLUCOSE BLD GHB EST-MCNC: 169 MG/DL
FASTING STATUS PATIENT QL REPORTED: NORMAL
GLOBULIN SER CALC-MCNC: 3 G/DL (ref 1.9–3.5)
GLUCOSE SERPL-MCNC: 150 MG/DL (ref 65–99)
HBA1C MFR BLD: 7.5 % (ref 0–5.6)
HDLC SERPL-MCNC: 41 MG/DL
LDLC SERPL CALC-MCNC: 110 MG/DL
POTASSIUM SERPL-SCNC: 4.1 MMOL/L (ref 3.6–5.5)
PROT SERPL-MCNC: 7.3 G/DL (ref 6–8.2)
SODIUM SERPL-SCNC: 143 MMOL/L (ref 135–145)
TRIGL SERPL-MCNC: 165 MG/DL (ref 0–149)

## 2019-07-05 PROCEDURE — 80053 COMPREHEN METABOLIC PANEL: CPT

## 2019-07-05 PROCEDURE — 36415 COLL VENOUS BLD VENIPUNCTURE: CPT

## 2019-07-05 PROCEDURE — 80061 LIPID PANEL: CPT

## 2019-07-05 PROCEDURE — 83036 HEMOGLOBIN GLYCOSYLATED A1C: CPT

## 2019-07-09 ENCOUNTER — OFFICE VISIT (OUTPATIENT)
Dept: MEDICAL GROUP | Facility: PHYSICIAN GROUP | Age: 74
End: 2019-07-09
Payer: MEDICARE

## 2019-07-09 VITALS
TEMPERATURE: 98.3 F | HEIGHT: 66 IN | HEART RATE: 74 BPM | BODY MASS INDEX: 30.05 KG/M2 | WEIGHT: 186.95 LBS | OXYGEN SATURATION: 98 % | DIASTOLIC BLOOD PRESSURE: 60 MMHG | SYSTOLIC BLOOD PRESSURE: 140 MMHG

## 2019-07-09 DIAGNOSIS — E78.5 DYSLIPIDEMIA: ICD-10-CM

## 2019-07-09 DIAGNOSIS — K74.60 CIRRHOSIS OF LIVER WITH ASCITES, UNSPECIFIED HEPATIC CIRRHOSIS TYPE (HCC): ICD-10-CM

## 2019-07-09 DIAGNOSIS — I10 ESSENTIAL HYPERTENSION: ICD-10-CM

## 2019-07-09 DIAGNOSIS — Z23 NEED FOR HEPATITIS B VACCINATION: ICD-10-CM

## 2019-07-09 DIAGNOSIS — E03.9 HYPOTHYROIDISM, UNSPECIFIED TYPE: ICD-10-CM

## 2019-07-09 DIAGNOSIS — R18.8 CIRRHOSIS OF LIVER WITH ASCITES, UNSPECIFIED HEPATIC CIRRHOSIS TYPE (HCC): ICD-10-CM

## 2019-07-09 PROCEDURE — 90746 HEPB VACCINE 3 DOSE ADULT IM: CPT | Performed by: FAMILY MEDICINE

## 2019-07-09 PROCEDURE — G0010 ADMIN HEPATITIS B VACCINE: HCPCS | Performed by: FAMILY MEDICINE

## 2019-07-09 PROCEDURE — 99214 OFFICE O/P EST MOD 30 MIN: CPT | Mod: 25 | Performed by: FAMILY MEDICINE

## 2019-07-09 NOTE — PROGRESS NOTES
Subjective:      Ally Doherty is a 74 y.o. female who presents with Diabetes        HPI:    The patient is here for follow-up of her chronic medical problems.     Uncontrolled type 2 diabetes mellitus without complication, without long-term current use of insulin (HCC)  For her diabetes patient was previously noncompliant with taking the glimepiride but since the last visit she has only missed 1 dose when she was sick.  The patient has been taking Glimepiride 1 mg daily as well as Metformin 1000 mg BID. Her A1C has improved from 8.9 to 7.5 at this time with a goal of 7.0 or below.     Dyslipidemia  Regarding her dyslipidemia, patient continues to manage this with her own dietary efforts. She cannot tolerate a statin due to her baseline elevation in liver enzymes. Her most recent labs indicate that her total cholesterol has decreased from 207 to 184. Her LDL has decreased from 125 to 110 as well.     Essential hypertension  Patient continues to take Losartan 100 mg daily for her hypertension. She denies any side effects with the medication. Her blood pressure is 140/60 in office today. She states that it tends to run in the 120s/80s and home and sometimes even in the 70s diastolicaly.     Hypothyroidism, unspecified type  This is chronic and stable on levothyroxine 100 mcg daily. No thyroid related complaints today.     Cirrhosis of liver with ascites, unspecified hepatic cirrhosis type (HCC)  This is a new problem for the patient. She was recently diagnosed with cirrhosis of the liver, splenomegaly and ascites after meeting with GI and completing an ultrasound. There is no evidence of cancer on the ultrasound. She does have a history of chronically elevated liver enzymes.  Previous work-up were negative for autoimmune disease or infectious cause but she had evidence of fatty liver disease in the past.  Her most recent AST and ALT are elevated at 60 and 63, respectively. GI has recommended that the  "patient be given vaccinations against hepatitis B due to the newly diagnosed cirrhosis. She denies any abdominal pain at this time. Patient will follow up with GI in 3 months.         Past medical history, past surgical history, family history reviewed-no changes    Social history reviewed-no changes    Allergies reviewed-no changes    Medications reviewed-no changes      ROS:  As per the HPI as shown above, the rest are negative.       Objective:     /60 (BP Location: Left arm, Patient Position: Sitting, BP Cuff Size: Adult)   Pulse 74   Temp 36.8 °C (98.3 °F) (Temporal)   Ht 1.676 m (5' 6\")   Wt 84.8 kg (186 lb 15.2 oz)   LMP 02/27/1997   SpO2 98%   BMI 30.17 kg/m²     Physical Exam  Examined alert, awake, oriented, not in distress    Neck-supple, no lymphadenopathy, no thyromegaly  Lungs-clear to auscultation, no rales, no wheezes  Heart-regular rate and rhythm, no murmur  Extremities-no edema, clubbing, cyanosis       Labs:  Hospital Outpatient Visit on 07/05/2019   Component Date Value Ref Range Status   • Cholesterol,Tot 07/05/2019 184  100 - 199 mg/dL Final   • Triglycerides 07/05/2019 165* 0 - 149 mg/dL Final   • HDL 07/05/2019 41  >=40 mg/dL Final   • LDL 07/05/2019 110* <100 mg/dL Final   • Sodium 07/05/2019 143  135 - 145 mmol/L Final   • Potassium 07/05/2019 4.1  3.6 - 5.5 mmol/L Final   • Chloride 07/05/2019 108  96 - 112 mmol/L Final   • Co2 07/05/2019 21  20 - 33 mmol/L Final   • Anion Gap 07/05/2019 14.0* 0.0 - 11.9 Final   • Glucose 07/05/2019 150* 65 - 99 mg/dL Final   • Bun 07/05/2019 15  8 - 22 mg/dL Final   • Creatinine 07/05/2019 0.85  0.50 - 1.40 mg/dL Final   • Calcium 07/05/2019 9.8  8.5 - 10.5 mg/dL Final   • AST(SGOT) 07/05/2019 60* 12 - 45 U/L Final   • ALT(SGPT) 07/05/2019 63* 2 - 50 U/L Final   • Alkaline Phosphatase 07/05/2019 94  30 - 99 U/L Final   • Total Bilirubin 07/05/2019 0.9  0.1 - 1.5 mg/dL Final   • Albumin 07/05/2019 4.3  3.2 - 4.9 g/dL Final   • Total Protein " 07/05/2019 7.3  6.0 - 8.2 g/dL Final   • Globulin 07/05/2019 3.0  1.9 - 3.5 g/dL Final   • A-G Ratio 07/05/2019 1.4  g/dL Final   • Glycohemoglobin 07/05/2019 7.5* 0.0 - 5.6 % Final   • Est Avg Glucose 07/05/2019 169  mg/dL Final   • Fasting Status 07/05/2019 Fasting   Final   • GFR If  07/05/2019 >60  >60 mL/min/1.73 m 2 Final   • GFR If Non  07/05/2019 >60  >60 mL/min/1.73 m 2 Final          Assessment/Plan:     1. Uncontrolled type 2 diabetes mellitus without complication, without long-term current use of insulin (HCC)  A1C has improved from 8.9 to 7.5 now that she is compliant with taking the glimepiride. Patient is to continue taking her Glimepiride and Metformin as prescribed to hopefully lower her A1C below 7.0. Encouraged patient to continue to watch her diet, weight loss and engage in daily exercise. Recheck A1C in 4 months.   - Lipid Profile; Future  - Comp Metabolic Panel; Future  - HEMOGLOBIN A1C; Future  - TSH; Future    2. Dyslipidemia  Patient continues to manage this with her own dietary efforts. Her most recent labs indicate that her total cholesterol has decreased from 207 to 184. Her LDL has decreased from 125 to 110 as well. Advised avoid fatty foods, try to have fish 3 times a week eat more vegetables and fruits, exercise regularly and keep a healthy weight. Follow up lipid panel ordered.   - Lipid Profile; Future  - Comp Metabolic Panel; Future  - HEMOGLOBIN A1C; Future  - TSH; Future    3. Essential hypertension  Blood pressure remains in the 120s/80s at home.  Blood pressure tends to run higher in the office.  Continue current antihypertensive medication regimen. Patient is to continue monitoring her blood pressure at home. Follow up labs ordered.   - Lipid Profile; Future  - Comp Metabolic Panel; Future  - HEMOGLOBIN A1C; Future  - TSH; Future    4. Hypothyroidism, unspecified type  Chronic and stable on current dose of levothyroxine.  The last TSH in March  2019 came back adequately replaced.  Follow up TSH ordered.   - Lipid Profile; Future  - Comp Metabolic Panel; Future  - HEMOGLOBIN A1C; Future  - TSH; Future    5. Cirrhosis of liver with ascites, unspecified hepatic cirrhosis type (HCC)  This is a newly diagnosed problem for the patient revealed through ultrasound. She is closely followed by GI. Her AST and ALT remain elevated at 60 and 63, respectively. Follow up labs ordered.   - Lipid Profile; Future  - Comp Metabolic Panel; Future  - HEMOGLOBIN A1C; Future  - TSH; Future    6. Need for hepatitis B vaccination  We have discussed vaccinating the patient against hepatitis B given her immunity status and newly diagnosed cirrhosis of the liver as recommended by GI. Patient understands and would like to begin receiving the vaccination. First dose given in office today.  She will return in 1 month for second dose and in 6 months for the third dose.  - Hep B Adult 20+      Iftikhar ABAD (Gera), am scribing for, and in the presence of, Su Randhawa MD     Electronically signed by: Iftikhar Sotelo (Gera), 7/9/2019    ISu MD personally performed the services described in this documentation, as scribed by Iftikhar Sotelo in my presence, and it is both accurate and complete.

## 2019-08-13 ENCOUNTER — HOSPITAL ENCOUNTER (OUTPATIENT)
Dept: LAB | Facility: MEDICAL CENTER | Age: 74
End: 2019-08-13
Attending: INTERNAL MEDICINE
Payer: MEDICARE

## 2019-08-13 ENCOUNTER — NON-PROVIDER VISIT (OUTPATIENT)
Dept: MEDICAL GROUP | Facility: PHYSICIAN GROUP | Age: 74
End: 2019-08-13
Payer: MEDICARE

## 2019-08-13 DIAGNOSIS — Z23 NEED FOR VACCINATION: ICD-10-CM

## 2019-08-13 LAB
ALBUMIN SERPL BCP-MCNC: 3.9 G/DL (ref 3.2–4.9)
ALBUMIN/GLOB SERPL: 1.1 G/DL
ALP SERPL-CCNC: 93 U/L (ref 30–99)
ALT SERPL-CCNC: 56 U/L (ref 2–50)
ANION GAP SERPL CALC-SCNC: 11 MMOL/L (ref 0–11.9)
AST SERPL-CCNC: 48 U/L (ref 12–45)
BASOPHILS # BLD AUTO: 1.1 % (ref 0–1.8)
BASOPHILS # BLD: 0.04 K/UL (ref 0–0.12)
BILIRUB SERPL-MCNC: 0.5 MG/DL (ref 0.1–1.5)
BUN SERPL-MCNC: 16 MG/DL (ref 8–22)
CALCIUM SERPL-MCNC: 9.8 MG/DL (ref 8.5–10.5)
CHLORIDE SERPL-SCNC: 107 MMOL/L (ref 96–112)
CO2 SERPL-SCNC: 23 MMOL/L (ref 20–33)
CREAT SERPL-MCNC: 0.98 MG/DL (ref 0.5–1.4)
EOSINOPHIL # BLD AUTO: 0.17 K/UL (ref 0–0.51)
EOSINOPHIL NFR BLD: 4.5 % (ref 0–6.9)
ERYTHROCYTE [DISTWIDTH] IN BLOOD BY AUTOMATED COUNT: 48.2 FL (ref 35.9–50)
FERRITIN SERPL-MCNC: 74.5 NG/ML (ref 10–291)
GLOBULIN SER CALC-MCNC: 3.4 G/DL (ref 1.9–3.5)
GLUCOSE SERPL-MCNC: 227 MG/DL (ref 65–99)
HCT VFR BLD AUTO: 44.5 % (ref 37–47)
HGB BLD-MCNC: 13.9 G/DL (ref 12–16)
IMM GRANULOCYTES # BLD AUTO: 0.01 K/UL (ref 0–0.11)
IMM GRANULOCYTES NFR BLD AUTO: 0.3 % (ref 0–0.9)
IRON SATN MFR SERPL: 16 % (ref 15–55)
IRON SERPL-MCNC: 62 UG/DL (ref 40–170)
LYMPHOCYTES # BLD AUTO: 1.05 K/UL (ref 1–4.8)
LYMPHOCYTES NFR BLD: 28.1 % (ref 22–41)
MCH RBC QN AUTO: 28.4 PG (ref 27–33)
MCHC RBC AUTO-ENTMCNC: 31.2 G/DL (ref 33.6–35)
MCV RBC AUTO: 90.8 FL (ref 81.4–97.8)
MONOCYTES # BLD AUTO: 0.3 K/UL (ref 0–0.85)
MONOCYTES NFR BLD AUTO: 8 % (ref 0–13.4)
NEUTROPHILS # BLD AUTO: 2.17 K/UL (ref 2–7.15)
NEUTROPHILS NFR BLD: 58 % (ref 44–72)
NRBC # BLD AUTO: 0 K/UL
NRBC BLD-RTO: 0 /100 WBC
PLATELET # BLD AUTO: 86 K/UL (ref 164–446)
PMV BLD AUTO: 10.3 FL (ref 9–12.9)
POTASSIUM SERPL-SCNC: 4.2 MMOL/L (ref 3.6–5.5)
PROT SERPL-MCNC: 7.3 G/DL (ref 6–8.2)
RBC # BLD AUTO: 4.9 M/UL (ref 4.2–5.4)
SODIUM SERPL-SCNC: 141 MMOL/L (ref 135–145)
TIBC SERPL-MCNC: 385 UG/DL (ref 250–450)
WBC # BLD AUTO: 3.7 K/UL (ref 4.8–10.8)

## 2019-08-13 PROCEDURE — 83540 ASSAY OF IRON: CPT

## 2019-08-13 PROCEDURE — 85025 COMPLETE CBC W/AUTO DIFF WBC: CPT

## 2019-08-13 PROCEDURE — 80053 COMPREHEN METABOLIC PANEL: CPT

## 2019-08-13 PROCEDURE — 36415 COLL VENOUS BLD VENIPUNCTURE: CPT

## 2019-08-13 PROCEDURE — 90746 HEPB VACCINE 3 DOSE ADULT IM: CPT | Performed by: FAMILY MEDICINE

## 2019-08-13 PROCEDURE — G0010 ADMIN HEPATITIS B VACCINE: HCPCS | Performed by: FAMILY MEDICINE

## 2019-08-13 PROCEDURE — 83550 IRON BINDING TEST: CPT

## 2019-08-13 PROCEDURE — 82728 ASSAY OF FERRITIN: CPT

## 2019-08-13 NOTE — PROGRESS NOTES
"Ally Doherty is a 74 y.o. female here for a non-provider visit for:   HEPATITIS B 2 of 3    Reason for immunization: Overdue/Provider Recommended  Immunization records indicate need for vaccine: Yes, confirmed with Epic  Minimum interval has been met for this vaccine: Yes  ABN completed: Yes    Order and dose verified by: Azul BASHIR Dated  10/12/2018 was given to patient: Yes  All IAC Questionnaire questions were answered \"No.\"    Patient tolerated injection and no adverse effects were observed or reported: Yes    Pt scheduled for next dose in series: No  "

## 2019-08-30 RX ORDER — GLIMEPIRIDE 1 MG/1
TABLET ORAL
Qty: 90 TAB | Refills: 1 | Status: SHIPPED | OUTPATIENT
Start: 2019-08-30 | End: 2019-09-05 | Stop reason: SDUPTHER

## 2019-09-05 RX ORDER — GLIMEPIRIDE 1 MG/1
TABLET ORAL
Qty: 100 TAB | Refills: 1 | Status: SHIPPED
Start: 2019-09-05 | End: 2020-03-13

## 2019-10-01 ENCOUNTER — IMMUNIZATION (OUTPATIENT)
Dept: SOCIAL WORK | Facility: CLINIC | Age: 74
End: 2019-10-01
Payer: MEDICARE

## 2019-10-01 DIAGNOSIS — Z23 NEED FOR VACCINATION: ICD-10-CM

## 2019-10-01 PROCEDURE — 90662 IIV NO PRSV INCREASED AG IM: CPT | Performed by: REGISTERED NURSE

## 2019-10-01 PROCEDURE — G0008 ADMIN INFLUENZA VIRUS VAC: HCPCS | Performed by: REGISTERED NURSE

## 2019-10-08 ENCOUNTER — TELEPHONE (OUTPATIENT)
Dept: MEDICAL GROUP | Facility: PHYSICIAN GROUP | Age: 74
End: 2019-10-08

## 2019-10-08 NOTE — TELEPHONE ENCOUNTER
Phone Number Called: [phone] (home)     Call outcome: left message for patient to call back regarding message below    Message: LVM for patient to call back to provide questions or specifics on what exactly she wants  to advise on

## 2019-10-08 NOTE — TELEPHONE ENCOUNTER
"VOICEMAIL  1. Caller Name: Ally Doherty                          Call Back Number: [phone] (home)       2. Message: Patient called saying she is suppose to have surgery soon to have one breast implant removed as well as have a mastectomy of the other breast. Patient wanted \" to know what  thought of this surgery\"    No other details     "

## 2019-10-10 NOTE — TELEPHONE ENCOUNTER
Phone Number Called: [phone] (home)     Call outcome: left message for patient to call back regarding message below    Message: 2nd attempt to reach patient in regards to voicemail

## 2019-10-11 NOTE — TELEPHONE ENCOUNTER
Phone Number Called: [phone] (home)     Call outcome: spoke to patient regarding message below    Message: Spoke to patient and she wants Dr. Cassidy opinion on whether or not she should be under anesthesia for surgery while being a type 2 diabetic     Please advise

## 2019-10-13 NOTE — TELEPHONE ENCOUNTER
As far as I know these surgical procedures (removal of breast implant and mastectomy) require general anesthesia.  Her surgeon may request her to be cleared by me before she goes for surgery.  She will have to ask and confirm with her surgeon if surgeon requires medical clearance from her primary care physician.

## 2019-10-15 RX ORDER — LEVOTHYROXINE SODIUM 0.1 MG/1
TABLET ORAL
Qty: 90 TAB | Refills: 1 | Status: SHIPPED | OUTPATIENT
Start: 2019-10-15 | End: 2020-03-12 | Stop reason: SDUPTHER

## 2019-10-15 NOTE — TELEPHONE ENCOUNTER
Phone Number Called: [phone] (home)     Call outcome: left message for patient to call back regarding message below    Message: 2nd attempt to reach patient to give Dr. Cassidy message

## 2019-10-15 NOTE — TELEPHONE ENCOUNTER
Lisa called back and I called and talked to her about the implant removal and having the other breast removed.  She spoke with Dr. Randhawa about the surgery and if she should or should or should not have it done and if she would be ok since she is a diabetic.

## 2019-11-04 ENCOUNTER — HOSPITAL ENCOUNTER (OUTPATIENT)
Dept: LAB | Facility: MEDICAL CENTER | Age: 74
End: 2019-11-04
Attending: FAMILY MEDICINE
Payer: MEDICARE

## 2019-11-04 ENCOUNTER — HOSPITAL ENCOUNTER (OUTPATIENT)
Dept: LAB | Facility: MEDICAL CENTER | Age: 74
End: 2019-11-04
Attending: PHYSICIAN ASSISTANT
Payer: MEDICARE

## 2019-11-04 DIAGNOSIS — K74.60 CIRRHOSIS OF LIVER WITH ASCITES, UNSPECIFIED HEPATIC CIRRHOSIS TYPE (HCC): ICD-10-CM

## 2019-11-04 DIAGNOSIS — E78.5 DYSLIPIDEMIA: ICD-10-CM

## 2019-11-04 DIAGNOSIS — R18.8 CIRRHOSIS OF LIVER WITH ASCITES, UNSPECIFIED HEPATIC CIRRHOSIS TYPE (HCC): ICD-10-CM

## 2019-11-04 DIAGNOSIS — I10 ESSENTIAL HYPERTENSION: ICD-10-CM

## 2019-11-04 DIAGNOSIS — E03.9 HYPOTHYROIDISM, UNSPECIFIED TYPE: ICD-10-CM

## 2019-11-04 PROCEDURE — 36415 COLL VENOUS BLD VENIPUNCTURE: CPT

## 2019-11-04 PROCEDURE — 80061 LIPID PANEL: CPT

## 2019-11-04 PROCEDURE — 84443 ASSAY THYROID STIM HORMONE: CPT

## 2019-11-04 PROCEDURE — 80053 COMPREHEN METABOLIC PANEL: CPT

## 2019-11-04 PROCEDURE — 80076 HEPATIC FUNCTION PANEL: CPT

## 2019-11-04 PROCEDURE — 83036 HEMOGLOBIN GLYCOSYLATED A1C: CPT

## 2019-11-04 PROCEDURE — 82306 VITAMIN D 25 HYDROXY: CPT

## 2019-11-05 LAB
25(OH)D3 SERPL-MCNC: 62 NG/ML (ref 30–100)
ALBUMIN SERPL BCP-MCNC: 4.3 G/DL (ref 3.2–4.9)
ALBUMIN SERPL BCP-MCNC: 4.3 G/DL (ref 3.2–4.9)
ALBUMIN/GLOB SERPL: 1.3 G/DL
ALP SERPL-CCNC: 100 U/L (ref 30–99)
ALP SERPL-CCNC: 100 U/L (ref 30–99)
ALT SERPL-CCNC: 54 U/L (ref 2–50)
ALT SERPL-CCNC: 54 U/L (ref 2–50)
ANION GAP SERPL CALC-SCNC: 9 MMOL/L (ref 0–11.9)
AST SERPL-CCNC: 56 U/L (ref 12–45)
AST SERPL-CCNC: 57 U/L (ref 12–45)
BILIRUB CONJ SERPL-MCNC: 0.2 MG/DL (ref 0.1–0.5)
BILIRUB INDIRECT SERPL-MCNC: 0.4 MG/DL (ref 0–1)
BILIRUB SERPL-MCNC: 0.6 MG/DL (ref 0.1–1.5)
BILIRUB SERPL-MCNC: 0.7 MG/DL (ref 0.1–1.5)
BUN SERPL-MCNC: 19 MG/DL (ref 8–22)
CALCIUM SERPL-MCNC: 10.2 MG/DL (ref 8.5–10.5)
CHLORIDE SERPL-SCNC: 107 MMOL/L (ref 96–112)
CHOLEST SERPL-MCNC: 205 MG/DL (ref 100–199)
CO2 SERPL-SCNC: 25 MMOL/L (ref 20–33)
CREAT SERPL-MCNC: 0.92 MG/DL (ref 0.5–1.4)
EST. AVERAGE GLUCOSE BLD GHB EST-MCNC: 171 MG/DL
GLOBULIN SER CALC-MCNC: 3.2 G/DL (ref 1.9–3.5)
GLUCOSE SERPL-MCNC: 177 MG/DL (ref 65–99)
HBA1C MFR BLD: 7.6 % (ref 0–5.6)
HDLC SERPL-MCNC: 46 MG/DL
LDLC SERPL CALC-MCNC: 132 MG/DL
POTASSIUM SERPL-SCNC: 4.3 MMOL/L (ref 3.6–5.5)
PROT SERPL-MCNC: 7.5 G/DL (ref 6–8.2)
PROT SERPL-MCNC: 7.6 G/DL (ref 6–8.2)
SODIUM SERPL-SCNC: 141 MMOL/L (ref 135–145)
TRIGL SERPL-MCNC: 137 MG/DL (ref 0–149)
TSH SERPL DL<=0.005 MIU/L-ACNC: 1.17 UIU/ML (ref 0.38–5.33)

## 2019-11-12 ENCOUNTER — OFFICE VISIT (OUTPATIENT)
Dept: MEDICAL GROUP | Facility: PHYSICIAN GROUP | Age: 74
End: 2019-11-12
Payer: MEDICARE

## 2019-11-12 VITALS
SYSTOLIC BLOOD PRESSURE: 136 MMHG | WEIGHT: 186.51 LBS | HEIGHT: 66 IN | OXYGEN SATURATION: 91 % | HEART RATE: 78 BPM | BODY MASS INDEX: 29.97 KG/M2 | DIASTOLIC BLOOD PRESSURE: 70 MMHG | TEMPERATURE: 98.8 F

## 2019-11-12 DIAGNOSIS — E78.5 DYSLIPIDEMIA: ICD-10-CM

## 2019-11-12 DIAGNOSIS — E55.9 VITAMIN D DEFICIENCY: ICD-10-CM

## 2019-11-12 DIAGNOSIS — K74.60 CIRRHOSIS OF LIVER WITH ASCITES, UNSPECIFIED HEPATIC CIRRHOSIS TYPE (HCC): ICD-10-CM

## 2019-11-12 DIAGNOSIS — I10 ESSENTIAL HYPERTENSION: ICD-10-CM

## 2019-11-12 DIAGNOSIS — R18.8 CIRRHOSIS OF LIVER WITH ASCITES, UNSPECIFIED HEPATIC CIRRHOSIS TYPE (HCC): ICD-10-CM

## 2019-11-12 DIAGNOSIS — E03.9 HYPOTHYROIDISM, UNSPECIFIED TYPE: ICD-10-CM

## 2019-11-12 DIAGNOSIS — Z23 NEED FOR SHINGLES VACCINE: ICD-10-CM

## 2019-11-12 PROCEDURE — 90750 HZV VACC RECOMBINANT IM: CPT | Performed by: FAMILY MEDICINE

## 2019-11-12 PROCEDURE — 99214 OFFICE O/P EST MOD 30 MIN: CPT | Mod: 25 | Performed by: FAMILY MEDICINE

## 2019-11-12 PROCEDURE — 90471 IMMUNIZATION ADMIN: CPT | Performed by: FAMILY MEDICINE

## 2019-11-12 NOTE — PROGRESS NOTES
Subjective:      Ally oDherty is a 74 y.o. female who presents with Diabetes      HPI:    The patient is here for follow up on her chronic medical problems.    Uncontrolled type 2 diabetes mellitus without complication, without long-term current use of insulin (HCC)  The patient has been compliant limepiride 1 mg daily as well as Metformin 1000 mg BID. Her A1C has increased from 7.5 to 7.6 on 11/4/2019 with a goal of 7.0 or below.   Patient denies any side effects from the medications.     Essential hypertension  Patient continues to take Losartan 100 mg daily for her hypertension. She denies any side effects with the medication. Her Losartan medication was not among the lot numbers that were recalled. Her blood pressure is 130/70 in office today.     Dyslipidemia  Regarding her dyslipidemia, patient continues to manage this with her own dietary efforts.  We could not give her statin due to her baseline elevation in liver enzymes from fatty liver disease. Her most recent labs indicate that her total cholesterol has increased from 184 to 205 and her LDL has increased from 110 to 132. Her HDL and triglycerides are within goal.     Hypothyroidism, unspecified type  This is chronic and stable on levothyroxine 100 mcg daily. Her last TSH was in 1.17 and came back adequately replaced. No thyroid related complaints today.      Cirrhosis of liver with ascites, unspecified hepatic cirrhosis type (HCC)  The patient was diagnosed with cirrhosis of the liver, splenomegaly and ascites after meeting with GI. She does have a history of chronically elevated liver enzymes. Previous work-up were negative for autoimmune disease or infectious cause but she had evidence of fatty liver disease in the past.  Her most recent AST and ALT are elevated at 57 and 54, respectively.  She had normal upper endoscopy in September 2019 without evidence of esophageal varices. She denies any abdominal pain at this time. She has had two  "Hepatitis B shots and is due for her 3rd shot in January 2020.     Vitamin D deficiency  She took a prescription course of Vitamin D supplementation prescribed by her GI doctor. Blood work was completed prior to this visit.    Health Maintenance   The patient is due for a mammogram but states she has an appointment scheduled for a complete mastectomy because of history of breast cancer including removal of breast implant, which she states has been giving her mild discomfort.  She therefore does not feel that she would need to have a screening mammogram anymore.  She is requesting her shingles vaccination.    Past medical history, past surgical history, family history reviewed-no changes    Social history reviewed-no changes    Allergies reviewed-no changes    Medications reviewed-no changes      ROS:  As per the HPI as shown above, the rest are negative.       Objective:     /70 (BP Location: Left arm, Patient Position: Sitting, BP Cuff Size: Adult)   Pulse 78   Temp 37.1 °C (98.8 °F) (Temporal)   Ht 1.676 m (5' 6\")   Wt 84.6 kg (186 lb 8.2 oz)   LMP 02/27/1997   SpO2 91%   BMI 30.10 kg/m²     Physical Exam  Examined alert, awake, oriented, not in distress    Neck-supple, no lymphadenopathy, no thyromegaly  Lungs-clear to auscultation, no rales, no wheezes  Heart-regular rate and rhythm, no murmur  Extremities-no edema, clubbing, cyanosis    Monofilament testing with a 10 gram force: sensation intact: intact bilaterally  Visual Inspection: Feet without maceration, ulcers, fissures.  Pedal pulses: intact bilaterally       Labs:  Component Date Value Ref Range Status   • TSH 11/04/2019 1.170  0.380 - 5.330 uIU/mL Final   • Glycohemoglobin 11/04/2019 7.6* 0.0 - 5.6 % Final   • Est Avg Glucose 11/04/2019 171  mg/dL Final   • Glucose 11/04/2019 177* 65 - 99 mg/dL Final   • AST(SGOT) 11/04/2019 57* 12 - 45 U/L Final   • ALT(SGPT) 11/04/2019 54* 2 - 50 U/L Final   • Cholesterol,Tot 11/04/2019 205* 100 - 199 " mg/dL Final   • Triglycerides 11/04/2019 137  0 - 149 mg/dL Final   • HDL 11/04/2019 46  >=40 mg/dL Final   • LDL 11/04/2019 132* <100 mg/dL Final     Assessment/Plan:     1. Uncontrolled type 2 diabetes mellitus without complication, without long-term current use of insulin (HCC)  Hemoglobin A1c increased from 7.5-7.6 which is slightly more out of control than before.  She wants to work harder on getting this improved with our goal of 7.0 or below.  She does not want me to increase the dose of glimepiride yet at this time.  We will continue the same dose of glimepiride and metformin.  Advised to work harder on watching the diet with avoidance of sweets, decreasing the carbs, regular exercise and weight loss.  If not improved next visit or if worse we will increase the glimepiride dose.   - Lipid Profile; Future  - Comp Metabolic Panel; Future  - HEMOGLOBIN A1C; Future  - MICROALBUMIN CREAT RATIO URINE; Future    2. Essential hypertension  Blood pressure is stable and within goal on current medications. We will continue the current dosages. I have advised her to avoid salty foods and exercise regularly.    - Lipid Profile; Future  - Comp Metabolic Panel; Future  - HEMOGLOBIN A1C; Future  - MICROALBUMIN CREAT RATIO URINE; Future    3. Dyslipidemia  Her lipid panel HDL and Triglyceride values were within goal, her LDL was elevated at 132 and her total cholesterol at 205. Patient will continue to manage this with her own efforts. She cannot take statin due to her baseline elevation in liver enzymes from liver disease. I have advised the patient to increase her exercise regimen, lose weight and avoid fatty foods. Labs have been ordered for the next follow up visit.     - Lipid Profile; Future  - Comp Metabolic Panel; Future  - HEMOGLOBIN A1C; Future  - MICROALBUMIN CREAT RATIO URINE; Future    4. Hypothyroidism, unspecified type  Stable on thyroid replacement medications. TSH is adequately replaced at 1.17. We will  continue the current plan of care. Labs have been ordered for the next follow up visit.    - Lipid Profile; Future  - Comp Metabolic Panel; Future  - HEMOGLOBIN A1C; Future  - MICROALBUMIN CREAT RATIO URINE; Future    5. Cirrhosis of liver with ascites, unspecified hepatic cirrhosis type (HCC)  She is being closely followed by GI. Her 3rd Hepatitis B vaccination will be given on her next visit. Her most recent AST and ALT are still elevated at 57 and 54, respectively and stable compared to before. Follow up labs ordered.  - Lipid Profile; Future  - Comp Metabolic Panel; Future  - HEMOGLOBIN A1C; Future  - MICROALBUMIN CREAT RATIO URINE; Future    6. Vitamin D deficiency  Her Vitamin D deficiency is being managed by her gastroenterologist. She finished her course of prescription strength Vitamin D supplements and will have her levels reevaluated by GI.    Danielle ABAD (Gera), am scribing for, and in the presence of, Su Randhawa MD     Electronically signed by: Danielle Garcia (Gera), 11/12/2019    Su ABAD MD personally performed the services described in this documentation, as scribed by Danielle Garcia in my presence, and it is both accurate and complete.

## 2019-12-04 DIAGNOSIS — Z01.810 PRE-OPERATIVE CARDIOVASCULAR EXAMINATION: ICD-10-CM

## 2019-12-04 DIAGNOSIS — Z01.812 PRE-OPERATIVE LABORATORY EXAMINATION: ICD-10-CM

## 2019-12-04 LAB
ANION GAP SERPL CALC-SCNC: 12 MMOL/L (ref 0–11.9)
BUN SERPL-MCNC: 14 MG/DL (ref 8–22)
CALCIUM SERPL-MCNC: 9.2 MG/DL (ref 8.5–10.5)
CHLORIDE SERPL-SCNC: 108 MMOL/L (ref 96–112)
CO2 SERPL-SCNC: 23 MMOL/L (ref 20–33)
CREAT SERPL-MCNC: 0.82 MG/DL (ref 0.5–1.4)
EKG IMPRESSION: NORMAL
ERYTHROCYTE [DISTWIDTH] IN BLOOD BY AUTOMATED COUNT: 49.4 FL (ref 35.9–50)
GLUCOSE SERPL-MCNC: 152 MG/DL (ref 65–99)
HCT VFR BLD AUTO: 42.6 % (ref 37–47)
HGB BLD-MCNC: 13.4 G/DL (ref 12–16)
MCH RBC QN AUTO: 28.2 PG (ref 27–33)
MCHC RBC AUTO-ENTMCNC: 31.5 G/DL (ref 33.6–35)
MCV RBC AUTO: 89.7 FL (ref 81.4–97.8)
PLATELET # BLD AUTO: 112 K/UL (ref 164–446)
PMV BLD AUTO: 9.6 FL (ref 9–12.9)
POTASSIUM SERPL-SCNC: 3.8 MMOL/L (ref 3.6–5.5)
RBC # BLD AUTO: 4.75 M/UL (ref 4.2–5.4)
SODIUM SERPL-SCNC: 143 MMOL/L (ref 135–145)
WBC # BLD AUTO: 4 K/UL (ref 4.8–10.8)

## 2019-12-04 PROCEDURE — 93010 ELECTROCARDIOGRAM REPORT: CPT | Performed by: INTERNAL MEDICINE

## 2019-12-04 PROCEDURE — 85027 COMPLETE CBC AUTOMATED: CPT

## 2019-12-04 PROCEDURE — 93005 ELECTROCARDIOGRAM TRACING: CPT

## 2019-12-04 PROCEDURE — 80048 BASIC METABOLIC PNL TOTAL CA: CPT

## 2019-12-04 PROCEDURE — 36415 COLL VENOUS BLD VENIPUNCTURE: CPT

## 2019-12-04 SDOH — HEALTH STABILITY: MENTAL HEALTH: HOW OFTEN DO YOU HAVE A DRINK CONTAINING ALCOHOL?: 2-4 TIMES A MONTH

## 2019-12-09 ENCOUNTER — ANESTHESIA EVENT (OUTPATIENT)
Dept: SURGERY | Facility: MEDICAL CENTER | Age: 74
End: 2019-12-09
Payer: MEDICARE

## 2019-12-09 ENCOUNTER — HOSPITAL ENCOUNTER (OUTPATIENT)
Facility: MEDICAL CENTER | Age: 74
End: 2019-12-09
Attending: PLASTIC SURGERY | Admitting: PLASTIC SURGERY
Payer: MEDICARE

## 2019-12-09 ENCOUNTER — ANESTHESIA (OUTPATIENT)
Dept: SURGERY | Facility: MEDICAL CENTER | Age: 74
End: 2019-12-09
Payer: MEDICARE

## 2019-12-09 VITALS
TEMPERATURE: 97 F | HEART RATE: 76 BPM | RESPIRATION RATE: 15 BRPM | DIASTOLIC BLOOD PRESSURE: 62 MMHG | WEIGHT: 186.07 LBS | BODY MASS INDEX: 29.2 KG/M2 | OXYGEN SATURATION: 93 % | HEIGHT: 67 IN | SYSTOLIC BLOOD PRESSURE: 139 MMHG

## 2019-12-09 DIAGNOSIS — G89.18 ACUTE POST-OPERATIVE PAIN: ICD-10-CM

## 2019-12-09 LAB
GLUCOSE BLD-MCNC: 165 MG/DL (ref 65–99)
GLUCOSE BLD-MCNC: 170 MG/DL (ref 65–99)
PATHOLOGY CONSULT NOTE: NORMAL

## 2019-12-09 PROCEDURE — 700102 HCHG RX REV CODE 250 W/ 637 OVERRIDE(OP): Performed by: PLASTIC SURGERY

## 2019-12-09 PROCEDURE — 160036 HCHG PACU - EA ADDL 30 MINS PHASE I: Performed by: PLASTIC SURGERY

## 2019-12-09 PROCEDURE — 700111 HCHG RX REV CODE 636 W/ 250 OVERRIDE (IP): Performed by: ANESTHESIOLOGY

## 2019-12-09 PROCEDURE — 500054 HCHG BANDAGE, ELASTIC 6: Performed by: PLASTIC SURGERY

## 2019-12-09 PROCEDURE — A6402 STERILE GAUZE <= 16 SQ IN: HCPCS | Performed by: PLASTIC SURGERY

## 2019-12-09 PROCEDURE — 160035 HCHG PACU - 1ST 60 MINS PHASE I: Performed by: PLASTIC SURGERY

## 2019-12-09 PROCEDURE — 700102 HCHG RX REV CODE 250 W/ 637 OVERRIDE(OP): Performed by: ANESTHESIOLOGY

## 2019-12-09 PROCEDURE — 160002 HCHG RECOVERY MINUTES (STAT): Performed by: PLASTIC SURGERY

## 2019-12-09 PROCEDURE — 88305 TISSUE EXAM BY PATHOLOGIST: CPT

## 2019-12-09 PROCEDURE — 700101 HCHG RX REV CODE 250: Performed by: ANESTHESIOLOGY

## 2019-12-09 PROCEDURE — 160047 HCHG PACU  - EA ADDL 30 MINS PHASE II: Performed by: PLASTIC SURGERY

## 2019-12-09 PROCEDURE — A9270 NON-COVERED ITEM OR SERVICE: HCPCS | Performed by: PLASTIC SURGERY

## 2019-12-09 PROCEDURE — 700105 HCHG RX REV CODE 258: Performed by: PLASTIC SURGERY

## 2019-12-09 PROCEDURE — 160046 HCHG PACU - 1ST 60 MINS PHASE II: Performed by: PLASTIC SURGERY

## 2019-12-09 PROCEDURE — 700101 HCHG RX REV CODE 250

## 2019-12-09 PROCEDURE — 501838 HCHG SUTURE GENERAL: Performed by: PLASTIC SURGERY

## 2019-12-09 PROCEDURE — 500448 HCHG DRESSING, TELFA 3X4: Performed by: PLASTIC SURGERY

## 2019-12-09 PROCEDURE — 160048 HCHG OR STATISTICAL LEVEL 1-5: Performed by: PLASTIC SURGERY

## 2019-12-09 PROCEDURE — 160025 RECOVERY II MINUTES (STATS): Performed by: PLASTIC SURGERY

## 2019-12-09 PROCEDURE — 500371 HCHG DRAIN, BLAKE 10MM: Performed by: PLASTIC SURGERY

## 2019-12-09 PROCEDURE — 160028 HCHG SURGERY MINUTES - 1ST 30 MINS LEVEL 3: Performed by: PLASTIC SURGERY

## 2019-12-09 PROCEDURE — 160009 HCHG ANES TIME/MIN: Performed by: PLASTIC SURGERY

## 2019-12-09 PROCEDURE — 82962 GLUCOSE BLOOD TEST: CPT

## 2019-12-09 PROCEDURE — A9270 NON-COVERED ITEM OR SERVICE: HCPCS | Performed by: ANESTHESIOLOGY

## 2019-12-09 PROCEDURE — 160039 HCHG SURGERY MINUTES - EA ADDL 1 MIN LEVEL 3: Performed by: PLASTIC SURGERY

## 2019-12-09 PROCEDURE — 500389 HCHG DRAIN, RESERVOIR SUCT JP 100CC: Performed by: PLASTIC SURGERY

## 2019-12-09 RX ORDER — LIDOCAINE HYDROCHLORIDE 20 MG/ML
INJECTION, SOLUTION EPIDURAL; INFILTRATION; INTRACAUDAL; PERINEURAL PRN
Status: DISCONTINUED | OUTPATIENT
Start: 2019-12-09 | End: 2019-12-09 | Stop reason: SURG

## 2019-12-09 RX ORDER — ONDANSETRON 2 MG/ML
4 INJECTION INTRAMUSCULAR; INTRAVENOUS
Status: COMPLETED | OUTPATIENT
Start: 2019-12-09 | End: 2019-12-09

## 2019-12-09 RX ORDER — SCOLOPAMINE TRANSDERMAL SYSTEM 1 MG/1
1 PATCH, EXTENDED RELEASE TRANSDERMAL
Status: DISCONTINUED | OUTPATIENT
Start: 2019-12-09 | End: 2019-12-09 | Stop reason: HOSPADM

## 2019-12-09 RX ORDER — MEPERIDINE HYDROCHLORIDE 25 MG/ML
6.25 INJECTION INTRAMUSCULAR; INTRAVENOUS; SUBCUTANEOUS
Status: DISCONTINUED | OUTPATIENT
Start: 2019-12-09 | End: 2019-12-09 | Stop reason: HOSPADM

## 2019-12-09 RX ORDER — OXYCODONE HCL 5 MG/5 ML
5 SOLUTION, ORAL ORAL
Status: COMPLETED | OUTPATIENT
Start: 2019-12-09 | End: 2019-12-09

## 2019-12-09 RX ORDER — BUPIVACAINE HYDROCHLORIDE AND EPINEPHRINE 2.5; 5 MG/ML; UG/ML
INJECTION, SOLUTION EPIDURAL; INFILTRATION; INTRACAUDAL; PERINEURAL
Status: DISCONTINUED
Start: 2019-12-09 | End: 2019-12-09 | Stop reason: HOSPADM

## 2019-12-09 RX ORDER — SODIUM CHLORIDE, SODIUM LACTATE, POTASSIUM CHLORIDE, CALCIUM CHLORIDE 600; 310; 30; 20 MG/100ML; MG/100ML; MG/100ML; MG/100ML
INJECTION, SOLUTION INTRAVENOUS CONTINUOUS
Status: DISCONTINUED | OUTPATIENT
Start: 2019-12-09 | End: 2019-12-09 | Stop reason: HOSPADM

## 2019-12-09 RX ORDER — HALOPERIDOL 5 MG/ML
1 INJECTION INTRAMUSCULAR
Status: DISCONTINUED | OUTPATIENT
Start: 2019-12-09 | End: 2019-12-09 | Stop reason: HOSPADM

## 2019-12-09 RX ORDER — DEXAMETHASONE SODIUM PHOSPHATE 4 MG/ML
INJECTION, SOLUTION INTRA-ARTICULAR; INTRALESIONAL; INTRAMUSCULAR; INTRAVENOUS; SOFT TISSUE PRN
Status: DISCONTINUED | OUTPATIENT
Start: 2019-12-09 | End: 2019-12-09 | Stop reason: SURG

## 2019-12-09 RX ORDER — MIDAZOLAM HYDROCHLORIDE 1 MG/ML
INJECTION INTRAMUSCULAR; INTRAVENOUS PRN
Status: DISCONTINUED | OUTPATIENT
Start: 2019-12-09 | End: 2019-12-09 | Stop reason: SURG

## 2019-12-09 RX ORDER — OXYCODONE HCL 5 MG/5 ML
10 SOLUTION, ORAL ORAL
Status: COMPLETED | OUTPATIENT
Start: 2019-12-09 | End: 2019-12-09

## 2019-12-09 RX ORDER — DIPHENHYDRAMINE HYDROCHLORIDE 50 MG/ML
12.5 INJECTION INTRAMUSCULAR; INTRAVENOUS
Status: DISCONTINUED | OUTPATIENT
Start: 2019-12-09 | End: 2019-12-09 | Stop reason: HOSPADM

## 2019-12-09 RX ORDER — ACETAMINOPHEN 500 MG
TABLET ORAL
Status: DISCONTINUED
Start: 2019-12-09 | End: 2019-12-09 | Stop reason: HOSPADM

## 2019-12-09 RX ORDER — CELECOXIB 200 MG/1
CAPSULE ORAL
Status: DISCONTINUED
Start: 2019-12-09 | End: 2019-12-09 | Stop reason: HOSPADM

## 2019-12-09 RX ORDER — ONDANSETRON 2 MG/ML
INJECTION INTRAMUSCULAR; INTRAVENOUS PRN
Status: DISCONTINUED | OUTPATIENT
Start: 2019-12-09 | End: 2019-12-09 | Stop reason: SURG

## 2019-12-09 RX ORDER — BUPIVACAINE HYDROCHLORIDE AND EPINEPHRINE 2.5; 5 MG/ML; UG/ML
INJECTION, SOLUTION INFILTRATION; PERINEURAL
Status: DISCONTINUED | OUTPATIENT
Start: 2019-12-09 | End: 2019-12-09 | Stop reason: HOSPADM

## 2019-12-09 RX ORDER — SODIUM CHLORIDE, SODIUM LACTATE, POTASSIUM CHLORIDE, CALCIUM CHLORIDE 600; 310; 30; 20 MG/100ML; MG/100ML; MG/100ML; MG/100ML
INJECTION, SOLUTION INTRAVENOUS CONTINUOUS
Status: DISCONTINUED | OUTPATIENT
Start: 2019-12-09 | End: 2019-12-09

## 2019-12-09 RX ORDER — OXYCODONE HYDROCHLORIDE AND ACETAMINOPHEN 5; 325 MG/1; MG/1
1-2 TABLET ORAL EVERY 4 HOURS PRN
Qty: 30 TAB | Refills: 0 | Status: SHIPPED | OUTPATIENT
Start: 2019-12-09 | End: 2019-12-16

## 2019-12-09 RX ORDER — SODIUM CHLORIDE 9 MG/ML
INJECTION, SOLUTION INTRAVENOUS ONCE
Status: COMPLETED | OUTPATIENT
Start: 2019-12-09 | End: 2019-12-09

## 2019-12-09 RX ORDER — CEFAZOLIN SODIUM 1 G/3ML
INJECTION, POWDER, FOR SOLUTION INTRAMUSCULAR; INTRAVENOUS PRN
Status: DISCONTINUED | OUTPATIENT
Start: 2019-12-09 | End: 2019-12-09 | Stop reason: SURG

## 2019-12-09 RX ORDER — CEPHALEXIN 500 MG/1
500 CAPSULE ORAL 3 TIMES DAILY
Qty: 15 CAP | Refills: 0 | Status: SHIPPED | OUTPATIENT
Start: 2019-12-09 | End: 2020-11-19

## 2019-12-09 RX ADMIN — ONDANSETRON 4 MG: 2 INJECTION INTRAMUSCULAR; INTRAVENOUS at 09:00

## 2019-12-09 RX ADMIN — SODIUM CHLORIDE: 9 INJECTION, SOLUTION INTRAVENOUS at 08:10

## 2019-12-09 RX ADMIN — ONDANSETRON 4 MG: 2 INJECTION INTRAMUSCULAR; INTRAVENOUS at 09:59

## 2019-12-09 RX ADMIN — LIDOCAINE HYDROCHLORIDE 40 MG: 20 INJECTION, SOLUTION EPIDURAL; INFILTRATION; INTRACAUDAL at 08:33

## 2019-12-09 RX ADMIN — SCOPALAMINE 1 PATCH: 1 PATCH, EXTENDED RELEASE TRANSDERMAL at 07:43

## 2019-12-09 RX ADMIN — FENTANYL CITRATE 100 MCG: 50 INJECTION, SOLUTION INTRAMUSCULAR; INTRAVENOUS at 08:39

## 2019-12-09 RX ADMIN — CEFAZOLIN 2 G: 330 INJECTION, POWDER, FOR SOLUTION INTRAMUSCULAR; INTRAVENOUS at 08:06

## 2019-12-09 RX ADMIN — DEXAMETHASONE SODIUM PHOSPHATE 10 MG: 4 INJECTION, SOLUTION INTRA-ARTICULAR; INTRALESIONAL; INTRAMUSCULAR; INTRAVENOUS; SOFT TISSUE at 08:39

## 2019-12-09 RX ADMIN — FENTANYL CITRATE 50 MCG: 0.05 INJECTION, SOLUTION INTRAMUSCULAR; INTRAVENOUS at 10:00

## 2019-12-09 RX ADMIN — MIDAZOLAM 2 MG: 1 INJECTION INTRAMUSCULAR; INTRAVENOUS at 08:10

## 2019-12-09 RX ADMIN — FENTANYL CITRATE 100 MCG: 50 INJECTION, SOLUTION INTRAMUSCULAR; INTRAVENOUS at 08:33

## 2019-12-09 RX ADMIN — OXYCODONE HYDROCHLORIDE 10 MG: 5 SOLUTION ORAL at 09:19

## 2019-12-09 RX ADMIN — PROPOFOL 150 MG: 10 INJECTION, EMULSION INTRAVENOUS at 08:33

## 2019-12-09 ASSESSMENT — PAIN SCALES - GENERAL: PAIN_LEVEL: 2

## 2019-12-09 NOTE — OP REPORT
DATE OF SERVICE:  12/09/2019    PREOPERATIVE DIAGNOSIS:  Painful right breast reconstruction.    POSTOPERATIVE DIAGNOSIS:  Painful right breast reconstruction.    PROCEDURE PERFORMED:  1.  Explantation of right breast implant with en bloc capsulectomy of right   breast.  2.  Mastectomy simple complete of left breast for symmetry.    SURGEON:  Edward Boo MD    ASSISTANT:  Rip Hilario MD    ANESTHESIOLOGIST:  Sim Niño MD    OPERATIVE INDICATIONS:  The patient is 74.  She had a personal history of   right breast cancer.  She had a right breast reconstruction.  She developed a   minor infection, which was treated; however, she subsequently developed pain   consistent with a right breast capsular contracture.  The patient was given   the option of complete capsulectomy or capsulotomy for treatment.  However,   the patient felt that she has never liked her implant and that with the   capsule and the pain, she wished to have her implant removed.  The patient   also requested a mastectomy on the left for symmetry purposes.  The patient   states she would be totally fine with bilateral mastectomy defects.  In any   event, I referred her back to her primary surgeon.  However, the patient   wished that I just perform both procedures at the same time.  In the   preoperative holding area, we discussed the surgical procedures at hand.  We   confirmed that we would be doing a capsulectomy and implant removal on the   right and a left mastectomy.  She was marked and she consented to proceed with   surgery.    OPERATIVE DESCRIPTION:  After induction of general endotracheal anesthesia,   the patient's chest was prepped and draped sterilely.  On the right side, the   mastectomy scar, which was very thin was excised as an ellipse; #1, because I   thought we were going to have excess skin and #2, because it was so thin at   the scar, I wanted some healthier tissue.  In any event, skin incisions were   made.  An en bloc  capsulectomy was performed with the implant removed at the   same time.  This was rather easy without any complication.  The wounds were   made hemostatic.  The wounds were closed in layers over a 10 flat Sukhdev drain.    On the patient's left breast, the patient had a star-shaped incision to remove   the nipple.  This was done primarily because the patient had a previous   breast reduction and we could not use a standard mastectomy incision without   potentially compromising some of the skin flaps.  Skin flaps were elevated at   the tissue line between subcutaneous tissue and the breast parenchyma.    Mastectomy was performed including the fascia of the pectoralis major muscle.    Wounds were irrigated.  Wounds were made hemostatic.  Inspection of the skin   flaps found that they were uniform and of good thickness.  There was no area   of skin flap compromise.  A 10 flat Sukhdev drain was brought out through a   separate stab incision.  Wounds were closed in layers.  Sterile dressings and   compressive wraps were applied.  The patient was extubated and taken to   recovery room in good condition.       ____________________________________     MD KAYA DUBOSE / SARAH    DD:  12/09/2019 09:18:30  DT:  12/09/2019 09:44:25    D#:  [id number]  Job#:  [id number]

## 2019-12-09 NOTE — ANESTHESIA PREPROCEDURE EVALUATION
Relevant Problems   CARDIAC   (+) Essential hypertension         (+) Cirrhosis of liver with ascites (HCC)   (+) Fatty liver disease, nonalcoholic      ENDO   (+) DM II (diabetes mellitus, type II), controlled (HCC)   (+) Hypothyroidism   (+) Uncontrolled type 2 diabetes mellitus without complication, without long-term current use of insulin (HCC)       Physical Exam    Airway   Mallampati: II  TM distance: >3 FB  Neck ROM: full       Cardiovascular - normal exam  Rhythm: regular  Rate: normal  (-) murmur     Dental - normal exam         Pulmonary - normal exam  Breath sounds clear to auscultation     Abdominal    Neurological - normal exam                 Anesthesia Plan    ASA 2       Plan - general       Airway plan will be LMA        Induction: intravenous    Postoperative Plan: Postoperative administration of opioids is intended.    Pertinent diagnostic labs and testing reviewed    Informed Consent:    Anesthetic plan and risks discussed with patient.    Use of blood products discussed with: patient whom consented to blood products.

## 2019-12-09 NOTE — DISCHARGE INSTRUCTIONS
ACTIVITY: Rest and take it easy for the first 24 hours.  A responsible adult is recommended to remain with you during that time.  It is normal to feel sleepy.  We encourage you to not do anything that requires balance, judgment or coordination.    MILD FLU-LIKE SYMPTOMS ARE NORMAL. YOU MAY EXPERIENCE GENERALIZED MUSCLE ACHES, THROAT IRRITATION, HEADACHE AND/OR SOME NAUSEA.    FOR 24 HOURS DO NOT:  Drive, operate machinery or run household appliances.  Drink beer or alcoholic beverages.   Make important decisions or sign legal documents.    SPECIAL INSTRUCTIONS:OK to shower in 48 hours  Routine FREYA care  Continue with compression until dc'd by MD    DIET: To avoid nausea, slowly advance diet as tolerated, avoiding spicy or greasy foods for the first day.  Add more substantial food to your diet according to your physician's instructions.  Babies can be fed formula or breast milk as soon as they are hungry.  INCREASE FLUIDS AND FIBER TO AVOID CONSTIPATION.    SURGICAL DRESSING/BATHING: May shower in 48 hours**    FOLLOW-UP APPOINTMENT:  A follow-up appointment should be arranged with your doctor in *1 week; call to schedule..[phone]*    You should CALL YOUR PHYSICIAN if you develop:  Fever greater than 101 degrees F.  Pain not relieved by medication, or persistent nausea or vomiting.  Excessive bleeding (blood soaking through dressing) or unexpected drainage from the wound.  Extreme redness or swelling around the incision site, drainage of pus or foul smelling drainage.  Inability to urinate or empty your bladder within 8 hours.  Problems with breathing or chest pain.    You should call 911 if you develop problems with breathing or chest pain    If any questions arise, call your doctor.  If your doctor is not available, please feel free to call the Surgical Center at [phone].  The Center is open Monday through Friday from 7AM to 7PM.  You can also call the HEALTH HOTLINE open 24 hours/day, 7 days/week and  speak to a nurse at [phone], or toll free at [phone].    A registered nurse may call you a few days after your surgery to see how you are doing after your procedure.    MEDICATIONS: Resume taking daily medication.  Take prescribed pain medication with food.  If no medication is prescribed, you may take non-aspirin pain medication if needed.  PAIN MEDICATION CAN BE VERY CONSTIPATING.  Take a stool softener or laxative such as senokot, pericolace, or milk of magnesia if needed.    Prescription given for **Percocet and Keflex*.  Last pain medication given at *09:20 am*    If your physician has prescribed pain medication that includes Acetaminophen (Tylenol), do not take additional Acetaminophen (Tylenol) while taking the prescribed medication.    Depression / Suicide Risk    As you are discharged from this [facility] facility, it is important to learn how to keep safe from harming yourself.    Recognize the warning signs:  · Abrupt changes in personality, positive or negative- including increase in energy   · Giving away possessions  · Change in eating patterns- significant weight changes-  positive or negative  · Change in sleeping patterns- unable to sleep or sleeping all the time   · Unwillingness or inability to communicate  · Depression  · Unusual sadness, discouragement and loneliness  · Talk of wanting to die  · Neglect of personal appearance   · Rebelliousness- reckless behavior  · Withdrawal from people/activities they love  · Confusion- inability to concentrate     If you or a loved one observes any of these behaviors or has concerns about self-harm, here's what you can do:  · Talk about it- your feelings and reasons for harming yourself  · Remove any means that you might use to hurt yourself (examples: pills, rope, extension cords, firearm)  · Get professional help from the community (Mental Health, Substance Abuse, psychological counseling)  · Do not be alone:Call your Safe Contact- someone  whom you trust who will be there for you.  · Call your local CRISIS HOTLINE 624-0540 or 395-960-8096  · Call your local Children's Mobile Crisis Response Team [place] (416) 392-3388 or www.[employer]  · Call the toll free National Suicide Prevention Hotlines   · National Suicide Prevention Lifeline 111-730-BQUT (5246)  National [employer] Line Network 800-SUICIDE (102-3004)

## 2019-12-09 NOTE — OR SURGEON
Immediate Post OP Note    PreOp Diagnosis: painful right breast reconstruction, s/p left breast reduction, breast asymmetry after mastectomy for cancer    PostOp Diagnosis: Same    Procedure(s):  MASTECTOMY - Wound Class: Clean  REMOVAL, IMPLANT, BREAST - Wound Class: Clean    Surgeon(s):  KASSIDY Clarke Jr., M.D.    Anesthesiologist/Type of Anesthesia:  Anesthesiologist: Sim Niño M.D./General    Surgical Staff:  Circulator: Vy Anna R.N.  Scrub Person: Gurjit Geiger    Specimens removed if any:  ID Type Source Tests Collected by Time Destination   A : Left Breast Tissue Tissue Breast PATHOLOGY SPECIMEN Edward Boo M.D. 12/9/2019  7:12 AM        Estimated Blood Loss:3 mlk  Findings: capsule, no seroma or abnormality    Complications: none        12/9/2019 9:06 AM Edward Boo M.D.

## 2019-12-09 NOTE — ANESTHESIA QCDR
2019 [facility] Clinical Data Registry (for Quality Improvement)     Postoperative nausea/vomiting risk protocol (Adult = 18 yrs and Pediatric 3-17 yrs)- (430 and 463)  General inhalation anesthetic (NOT TIVA) with PONV risk factors: Yes  Provision of anti-emetic therapy with at least 2 different classes of agents: Yes   Patient DID NOT receive anti-emetic therapy and reason is documented in Medical Record:  N/A    Multimodal Pain Management- (AQI59)  Patient undergoing Elective Surgery (i.e. Outpatient, or ASC, or Prescheduled Surgery prior to Hospital Admission): Yes  Use of Multimodal Pain Management, two or more drugs and/or interventions, NOT including systemic opioids: Yes   Exception: Documented allergy to multiple classes of analgesics:  N/A    PACU assessment of acute postoperative pain prior to Anesthesia Care End- Applies to Patients Age = 18- (ABG7)  Initial PACU pain score is which of the following: < 7/10  Patient unable to report pain score: N/A    Post-anesthetic transfer of care checklist/protocol to PACU/ICU- (426 and 427)  Upon conclusion of case, patient transferred to which of the following locations: PACU/Non-ICU  Use of transfer checklist/protocol: Yes  Exclusion: Service Performed in Patient Hospital Room (and thus did not require transfer): N/A    PACU Reintubation- (AQI31)  General anesthesia requiring endotracheal intubation (ETT) along with subsequent extubation in OR or PACU: No  Required reintubation in the PACU: N/A  Extubation was a planned trial documented in the medical record prior to removal of the original airway device: N/A    Unplanned admission to ICU related to anesthesia service up through end of PACU care- (MD51)  Unplanned admission to ICU (not initially anticipated at anesthesia start time): No

## 2019-12-09 NOTE — ANESTHESIA PROCEDURE NOTES
Airway  Date/Time: 12/9/2019 8:17 AM  Performed by: Sim Niño M.D.  Authorized by: Sim Niño M.D.     Location:  OR  Urgency:  Elective  Indications for Airway Management:  Anesthesia  Spontaneous Ventilation: absent    Sedation Level:  Deep  Preoxygenated: Yes    Patient Position:  Sniffing  Final Airway Type:  Supraglottic airway  SGA Size:  4  Number of Attempts at Approach:  1

## 2019-12-09 NOTE — OR NURSING
0909  Arrived PACU  accompanied by anesthesia and OR RN. Airway patent, placed on cardiac monitor. Side rails up x 2. Arousing slightly. Dressings dry, FREYA drains x 2 patent. IV patent with normal saline infusing.    0919  Medicated for pain.    0959 medicated for nausea.    1000 Medicated for pain.    1005 Room air O2 sat 85%, O2 placed 2l NC.    1039 Sleeping, VSS.    1107 Meets stage two criteria. Eating a popsicle, spouse to side.     1215 Discharge instructions explained to patient and caregiver. Copy signed by adult caregiver, copy of instructions given to patient/family. IV discontinued, intact. Drain care taught and recording sheet given. Pt and spouse have empties draiins before and are comfortable with it, supportive bra in place. Drsg clean and dry.     1224 To family car by wheelchair. Home with spouse as . VSS.

## 2019-12-09 NOTE — ANESTHESIA POSTPROCEDURE EVALUATION
Patient: Ally Doherty    Procedure Summary     Date:  12/09/19 Room / Location:  [facility] ROOM 25 / SURGERY SAME DAY [facility]    Anesthesia Start:  0810 Anesthesia Stop:  0911    Procedures:       MASTECTOMY (Left Breast)      REMOVAL, IMPLANT, BREAST (Right Breast) Diagnosis:  (BREAST CANCER HISTORY)    Surgeon:  Edward Boo M.D. Responsible Provider:  Sim Niño M.D.    Anesthesia Type:  general ASA Status:  2          Final Anesthesia Type: general  Last vitals  BP   Blood Pressure : 134/58    Temp   37.1 °C (98.7 °F)    Pulse   Pulse: 86   Resp   14    SpO2   95 %      Anesthesia Post Evaluation    Patient location during evaluation: PACU  Patient participation: complete - patient participated  Level of consciousness: awake and alert  Pain score: 2    Airway patency: patent  Anesthetic complications: no  Cardiovascular status: hemodynamically stable  Respiratory status: acceptable  Hydration status: euvolemic    PONV: none           Nurse Pain Score: 0 (NPRS)

## 2019-12-09 NOTE — ANESTHESIA TIME REPORT
Anesthesia Start and Stop Event Times     Date Time Event    12/9/2019 0804 Ready for Procedure     0810 Anesthesia Start     0911 Anesthesia Stop        Responsible Staff  12/09/19    Name Role Begin End    Sim Niño M.D. Anesth 0810 0911        Preop Diagnosis (Free Text):  Pre-op Diagnosis     BREAST CANCER HISTORY        Preop Diagnosis (Codes):    Post op Diagnosis  Breast cancer (HCC)      Premium Reason  Non-Premium    Comments:

## 2019-12-09 NOTE — H&P
"Surgery Plastic History & Physical Note    Date  12/9/2019    Primary Care Physician  Su Randhawa M.D.    CC  Personal history of breast cancer, pt not happy with reconstructed right breast    HPI  This is a 74 y.o. female who presented with unhappiness with her right breast reconstruction secondary to capsular contracture.  She wishes the capsule and implant be removed on the right and she wishes a mastectomy on the left for symmetry reasons    Past Medical History:   Diagnosis Date   • Anemia    • Breast cancer (HCC) 2/27/2012   • Cancer (HCC) 2010    right breast   • DM hyperosmolarity type II (HCC) 2/27/2012    oral meds   • Hiatus hernia syndrome    • High cholesterol 02/10/15    Pt denies   • HTN (hypertension) 02/12/15    more \"preventative\" for DM-per her DR   • Hyperlipidemia 2/27/2012   • Hypothyroidism 2/27/2012   • Liver cirrhosis (HCC)     couldn't take statins       Past Surgical History:   Procedure Laterality Date   • BREAST RECONSTRUCTION  2/12/2015    Performed by Edward Boo M.D. at SURGERY [facility]   • MAMMOPLASTY REDUCTION  2/12/2015    Performed by Edward Boo M.D. at SURGERY [facility]   • MOLE EXCISION  2/12/2015    Performed by Edward Boo M.D. at SURGERY [facility]   • CAPSULECTOMY  3/6/2014    Performed by Edward Boo M.D. at SURGERY [facility]   • BREAST RECONSTRUCTION  6/24/2013    Performed by Edward Boo M.D. at SURGERY [facility]   • TISSUE EXPANDER PLACE/REMOVE  6/24/2013    Performed by Edward Boo M.D. at SURGERY [facility]   • CAYETANO BY LAPAROSCOPY  2008   • TUBAL LIGATION  1974   • TONSILLECTOMY  1953   • MASTECTOMY      right breast   • PB RADIATION THERAPY PLAN SIMPLE     • ID CHEMOTHERAPY, UNSPECIFIED PROCEDURE         No current facility-administered medications for this encounter.        Social History     Socioeconomic History   • Marital status:      Spouse name: Not on file   • Number of children: Not on file   • " Years of education: Not on file   • Highest education level: Not on file   Occupational History   • Not on file   Social Needs   • Financial resource strain: Not on file   • Food insecurity:     Worry: Not on file     Inability: Not on file   • Transportation needs:     Medical: Not on file     Non-medical: Not on file   Tobacco Use   • Smoking status: Never Smoker   • Smokeless tobacco: Never Used   Substance and Sexual Activity   • Alcohol use: Yes     Alcohol/week: 0.0 oz     Frequency: 2-4 times a month     Comment: < 1 per week   • Drug use: No   • Sexual activity: Never   Lifestyle   • Physical activity:     Days per week: Not on file     Minutes per session: Not on file   • Stress: Not on file   Relationships   • Social connections:     Talks on phone: Not on file     Gets together: Not on file     Attends Voodoo service: Not on file     Active member of club or organization: Not on file     Attends meetings of clubs or organizations: Not on file     Relationship status: Not on file   • Intimate partner violence:     Fear of current or ex partner: Not on file     Emotionally abused: Not on file     Physically abused: Not on file     Forced sexual activity: Not on file   Other Topics Concern   • Not on file   Social History Narrative   • Not on file       Family History   Problem Relation Age of Onset   • Other Other         adopted       Allergies  Byetta; Penicillins; and Statins [hmg-coa-r inhibitors]    Review of Systems  Non contributory    Physical Exam    Vital Signs                        Right breast with capsular contracture  Lungs are clear  Heart is RR    Labs:                    Radiology:  No orders to display         Assessment/Plan:  Painful right breast reconstruction    For right breast capsulectomy and explant of implant and left breast mastectomy for symmetry

## 2020-02-05 ENCOUNTER — PATIENT OUTREACH (OUTPATIENT)
Dept: HEALTH INFORMATION MANAGEMENT | Facility: OTHER | Age: 75
End: 2020-02-05

## 2020-02-13 ENCOUNTER — HOSPITAL ENCOUNTER (OUTPATIENT)
Dept: LAB | Facility: MEDICAL CENTER | Age: 75
End: 2020-02-13
Attending: PHYSICIAN ASSISTANT
Payer: MEDICARE

## 2020-02-13 LAB
ALBUMIN SERPL BCP-MCNC: 4.1 G/DL (ref 3.2–4.9)
ALP SERPL-CCNC: 98 U/L (ref 30–99)
ALT SERPL-CCNC: 42 U/L (ref 2–50)
ANISOCYTOSIS BLD QL SMEAR: ABNORMAL
AST SERPL-CCNC: 51 U/L (ref 12–45)
BASOPHILS # BLD AUTO: 1.5 % (ref 0–1.8)
BASOPHILS # BLD: 0.06 K/UL (ref 0–0.12)
BILIRUB CONJ SERPL-MCNC: 0.1 MG/DL (ref 0.1–0.5)
BILIRUB INDIRECT SERPL-MCNC: 0.4 MG/DL (ref 0–1)
BILIRUB SERPL-MCNC: 0.5 MG/DL (ref 0.1–1.5)
BURR CELLS BLD QL SMEAR: NORMAL
COMMENT 1642: NORMAL
EOSINOPHIL # BLD AUTO: 0.16 K/UL (ref 0–0.51)
EOSINOPHIL NFR BLD: 4 % (ref 0–6.9)
ERYTHROCYTE [DISTWIDTH] IN BLOOD BY AUTOMATED COUNT: 44.1 FL (ref 35.9–50)
HCT VFR BLD AUTO: 36 % (ref 37–47)
HGB BLD-MCNC: 10.4 G/DL (ref 12–16)
HYPOCHROMIA BLD QL SMEAR: ABNORMAL
IMM GRANULOCYTES # BLD AUTO: 0.01 K/UL (ref 0–0.11)
IMM GRANULOCYTES NFR BLD AUTO: 0.2 % (ref 0–0.9)
INR PPP: 1.01 (ref 0.87–1.13)
LYMPHOCYTES # BLD AUTO: 0.89 K/UL (ref 1–4.8)
LYMPHOCYTES NFR BLD: 22.1 % (ref 22–41)
MACROCYTES BLD QL SMEAR: ABNORMAL
MCH RBC QN AUTO: 22.9 PG (ref 27–33)
MCHC RBC AUTO-ENTMCNC: 28.9 G/DL (ref 33.6–35)
MCV RBC AUTO: 79.1 FL (ref 81.4–97.8)
MICROCYTES BLD QL SMEAR: ABNORMAL
MONOCYTES # BLD AUTO: 0.35 K/UL (ref 0–0.85)
MONOCYTES NFR BLD AUTO: 8.7 % (ref 0–13.4)
MORPHOLOGY BLD-IMP: NORMAL
NEUTROPHILS # BLD AUTO: 2.55 K/UL (ref 2–7.15)
NEUTROPHILS NFR BLD: 63.5 % (ref 44–72)
NRBC # BLD AUTO: 0 K/UL
NRBC BLD-RTO: 0 /100 WBC
OVALOCYTES BLD QL SMEAR: NORMAL
PLATELET # BLD AUTO: 140 K/UL (ref 164–446)
PLATELET BLD QL SMEAR: NORMAL
PMV BLD AUTO: 11.2 FL (ref 9–12.9)
POIKILOCYTOSIS BLD QL SMEAR: NORMAL
POLYCHROMASIA BLD QL SMEAR: NORMAL
PROT SERPL-MCNC: 7.3 G/DL (ref 6–8.2)
PROTHROMBIN TIME: 13.5 SEC (ref 12–14.6)
RBC # BLD AUTO: 4.55 M/UL (ref 4.2–5.4)
RBC BLD AUTO: PRESENT
WBC # BLD AUTO: 4 K/UL (ref 4.8–10.8)

## 2020-02-13 PROCEDURE — 80076 HEPATIC FUNCTION PANEL: CPT

## 2020-02-13 PROCEDURE — 85025 COMPLETE CBC W/AUTO DIFF WBC: CPT

## 2020-02-13 PROCEDURE — 36415 COLL VENOUS BLD VENIPUNCTURE: CPT

## 2020-02-13 PROCEDURE — 82105 ALPHA-FETOPROTEIN SERUM: CPT

## 2020-02-13 PROCEDURE — 85610 PROTHROMBIN TIME: CPT

## 2020-02-15 LAB — AFP-TM SERPL-MCNC: 4 NG/ML (ref 0–9)

## 2020-03-09 ENCOUNTER — HOSPITAL ENCOUNTER (OUTPATIENT)
Dept: LAB | Facility: MEDICAL CENTER | Age: 75
End: 2020-03-09
Attending: FAMILY MEDICINE
Payer: MEDICARE

## 2020-03-09 DIAGNOSIS — I10 ESSENTIAL HYPERTENSION: ICD-10-CM

## 2020-03-09 DIAGNOSIS — K74.60 CIRRHOSIS OF LIVER WITH ASCITES, UNSPECIFIED HEPATIC CIRRHOSIS TYPE (HCC): ICD-10-CM

## 2020-03-09 DIAGNOSIS — E03.9 HYPOTHYROIDISM, UNSPECIFIED TYPE: ICD-10-CM

## 2020-03-09 DIAGNOSIS — E78.5 DYSLIPIDEMIA: ICD-10-CM

## 2020-03-09 DIAGNOSIS — R18.8 CIRRHOSIS OF LIVER WITH ASCITES, UNSPECIFIED HEPATIC CIRRHOSIS TYPE (HCC): ICD-10-CM

## 2020-03-09 LAB
CREAT UR-MCNC: 83.8 MG/DL
EST. AVERAGE GLUCOSE BLD GHB EST-MCNC: 186 MG/DL
HBA1C MFR BLD: 8.1 % (ref 0–5.6)
MICROALBUMIN UR-MCNC: 1.7 MG/DL
MICROALBUMIN/CREAT UR: 20 MG/G (ref 0–30)

## 2020-03-09 PROCEDURE — 36415 COLL VENOUS BLD VENIPUNCTURE: CPT

## 2020-03-09 PROCEDURE — 82570 ASSAY OF URINE CREATININE: CPT

## 2020-03-09 PROCEDURE — 80053 COMPREHEN METABOLIC PANEL: CPT

## 2020-03-09 PROCEDURE — 82043 UR ALBUMIN QUANTITATIVE: CPT

## 2020-03-09 PROCEDURE — 80061 LIPID PANEL: CPT

## 2020-03-09 PROCEDURE — 83036 HEMOGLOBIN GLYCOSYLATED A1C: CPT

## 2020-03-10 ENCOUNTER — HOSPITAL ENCOUNTER (OUTPATIENT)
Dept: RADIOLOGY | Facility: MEDICAL CENTER | Age: 75
End: 2020-03-10
Attending: PHYSICIAN ASSISTANT
Payer: MEDICARE

## 2020-03-10 DIAGNOSIS — K74.60 HEPATIC CIRRHOSIS, UNSPECIFIED HEPATIC CIRRHOSIS TYPE, UNSPECIFIED WHETHER ASCITES PRESENT (HCC): ICD-10-CM

## 2020-03-10 DIAGNOSIS — R11.2 NAUSEA AND VOMITING, INTRACTABILITY OF VOMITING NOT SPECIFIED, UNSPECIFIED VOMITING TYPE: ICD-10-CM

## 2020-03-10 DIAGNOSIS — R19.4 FREQUENT BOWEL MOVEMENTS: ICD-10-CM

## 2020-03-10 LAB
ALBUMIN SERPL BCP-MCNC: 3.9 G/DL (ref 3.2–4.9)
ALBUMIN/GLOB SERPL: 1.3 G/DL
ALP SERPL-CCNC: 81 U/L (ref 30–99)
ALT SERPL-CCNC: 33 U/L (ref 2–50)
ANION GAP SERPL CALC-SCNC: 9 MMOL/L (ref 0–11.9)
AST SERPL-CCNC: 39 U/L (ref 12–45)
BILIRUB SERPL-MCNC: 0.5 MG/DL (ref 0.1–1.5)
BUN SERPL-MCNC: 19 MG/DL (ref 8–22)
CALCIUM SERPL-MCNC: 9.5 MG/DL (ref 8.5–10.5)
CHLORIDE SERPL-SCNC: 109 MMOL/L (ref 96–112)
CHOLEST SERPL-MCNC: 151 MG/DL (ref 100–199)
CO2 SERPL-SCNC: 22 MMOL/L (ref 20–33)
CREAT SERPL-MCNC: 0.94 MG/DL (ref 0.5–1.4)
FASTING STATUS PATIENT QL REPORTED: NORMAL
GLOBULIN SER CALC-MCNC: 2.9 G/DL (ref 1.9–3.5)
GLUCOSE SERPL-MCNC: 169 MG/DL (ref 65–99)
HDLC SERPL-MCNC: 46 MG/DL
LDLC SERPL CALC-MCNC: 88 MG/DL
POTASSIUM SERPL-SCNC: 4.7 MMOL/L (ref 3.6–5.5)
PROT SERPL-MCNC: 6.8 G/DL (ref 6–8.2)
SODIUM SERPL-SCNC: 140 MMOL/L (ref 135–145)
TRIGL SERPL-MCNC: 84 MG/DL (ref 0–149)

## 2020-03-10 PROCEDURE — 76700 US EXAM ABDOM COMPLETE: CPT

## 2020-03-11 ENCOUNTER — TELEPHONE (OUTPATIENT)
Dept: MEDICAL GROUP | Facility: PHYSICIAN GROUP | Age: 75
End: 2020-03-11

## 2020-03-11 NOTE — TELEPHONE ENCOUNTER
ESTABLISHED PATIENT PRE-VISIT PLANNING     Patient was NOT contacted to complete PVP.      1.  Reviewed notes from the last few office visits within the medical group: Yes    2.  If any orders were placed at last visit or intended to be done for this visit (i.e. 6 mos follow-up), do we have Results/Consult Notes?        •  Labs - Labs ordered, completed on 03/09/2020 and results are in chart.         •  Imaging - Imaging was not ordered at last office visit.       •  Referrals - No referrals were ordered at last office visit.    3. Is this appointment scheduled as a Hospital Follow-Up? No    4.  Immunizations were updated in Epic using WebIZ?: Epic matches WebIZ       •  Web Iz Recommendations: SHINGRIX (Shingles)    5.  Patient is due for the following Health Maintenance Topics:   Health Maintenance Due   Topic Date Due   • Annual Wellness Visit  02/28/2019   • MAMMOGRAM  12/04/2019   • IMM HEP B VACCINE (3 of 3 - Risk 3-dose series) 12/13/2019   • RETINAL SCREENING  03/20/2020       - Patient plans to schedule appointment for Annual Wellness Visit (AWV), Immunizations: HEPATITIS B and Mammogram.    6. Orders for overdue Health Maintenance topics pended in Pre-Charting? NO    7.  AHA (MDX) form printed for Provider? No, already completed    8.  Patient was NOT informed to arrive 15 min prior to their scheduled appointment and bring in their medication bottles.

## 2020-03-12 ENCOUNTER — OFFICE VISIT (OUTPATIENT)
Dept: MEDICAL GROUP | Facility: PHYSICIAN GROUP | Age: 75
End: 2020-03-12
Payer: MEDICARE

## 2020-03-12 VITALS
OXYGEN SATURATION: 95 % | BODY MASS INDEX: 29.3 KG/M2 | SYSTOLIC BLOOD PRESSURE: 140 MMHG | HEIGHT: 66 IN | TEMPERATURE: 97.6 F | HEART RATE: 83 BPM | DIASTOLIC BLOOD PRESSURE: 70 MMHG | WEIGHT: 182.32 LBS

## 2020-03-12 DIAGNOSIS — K74.60 CIRRHOSIS OF LIVER WITH ASCITES, UNSPECIFIED HEPATIC CIRRHOSIS TYPE (HCC): ICD-10-CM

## 2020-03-12 DIAGNOSIS — I10 ESSENTIAL HYPERTENSION: ICD-10-CM

## 2020-03-12 DIAGNOSIS — D69.6 THROMBOCYTOPENIA (HCC): ICD-10-CM

## 2020-03-12 DIAGNOSIS — E78.5 DYSLIPIDEMIA: ICD-10-CM

## 2020-03-12 DIAGNOSIS — D50.9 IRON DEFICIENCY ANEMIA, UNSPECIFIED IRON DEFICIENCY ANEMIA TYPE: ICD-10-CM

## 2020-03-12 DIAGNOSIS — Z17.0 MALIGNANT NEOPLASM OF UPPER-INNER QUADRANT OF RIGHT BREAST IN FEMALE, ESTROGEN RECEPTOR POSITIVE (HCC): ICD-10-CM

## 2020-03-12 DIAGNOSIS — C50.211 MALIGNANT NEOPLASM OF UPPER-INNER QUADRANT OF RIGHT BREAST IN FEMALE, ESTROGEN RECEPTOR POSITIVE (HCC): ICD-10-CM

## 2020-03-12 DIAGNOSIS — H61.21 IMPACTED CERUMEN, RIGHT EAR: ICD-10-CM

## 2020-03-12 DIAGNOSIS — E03.9 HYPOTHYROIDISM, UNSPECIFIED TYPE: ICD-10-CM

## 2020-03-12 DIAGNOSIS — R18.8 CIRRHOSIS OF LIVER WITH ASCITES, UNSPECIFIED HEPATIC CIRRHOSIS TYPE (HCC): ICD-10-CM

## 2020-03-12 DIAGNOSIS — Z23 NEED FOR HEPATITIS B VACCINATION: ICD-10-CM

## 2020-03-12 PROCEDURE — 90746 HEPB VACCINE 3 DOSE ADULT IM: CPT | Performed by: FAMILY MEDICINE

## 2020-03-12 PROCEDURE — G0010 ADMIN HEPATITIS B VACCINE: HCPCS | Performed by: FAMILY MEDICINE

## 2020-03-12 PROCEDURE — 69210 REMOVE IMPACTED EAR WAX UNI: CPT | Performed by: FAMILY MEDICINE

## 2020-03-12 PROCEDURE — 8041 PR SCP AHA: Performed by: FAMILY MEDICINE

## 2020-03-12 PROCEDURE — 99214 OFFICE O/P EST MOD 30 MIN: CPT | Mod: 25 | Performed by: FAMILY MEDICINE

## 2020-03-12 RX ORDER — GLIMEPIRIDE 2 MG/1
2 TABLET ORAL EVERY MORNING
Qty: 100 TAB | Refills: 1 | Status: SHIPPED | OUTPATIENT
Start: 2020-03-12 | End: 2020-10-05

## 2020-03-12 RX ORDER — LOSARTAN POTASSIUM 100 MG/1
100 TABLET ORAL DAILY
Qty: 100 TAB | Refills: 1 | Status: SHIPPED | OUTPATIENT
Start: 2020-03-12 | End: 2020-11-02

## 2020-03-12 RX ORDER — LEVOTHYROXINE SODIUM 0.1 MG/1
TABLET ORAL
Qty: 100 TAB | Refills: 1 | Status: SHIPPED | OUTPATIENT
Start: 2020-03-12 | End: 2020-10-05

## 2020-03-12 ASSESSMENT — FIBROSIS 4 INDEX: FIB4 SCORE: 3.54

## 2020-03-12 ASSESSMENT — PATIENT HEALTH QUESTIONNAIRE - PHQ9: CLINICAL INTERPRETATION OF PHQ2 SCORE: 0

## 2020-03-12 NOTE — PROGRESS NOTES
Subjective:      Ally Doherty is a 75 y.o. female who presents with Diabetes        HPI:    The patient is here for follow up on her chronic medical problems, evaluation of right ear pain and for a HA    Uncontrolled type 2 diabetes mellitus without complication, without long-term current use of insulin (HCC)  On her last visit hemoglobin A1c increased from 7.5-7.6.  She did not want me to increase the dose of her glimepiride at that time and she wanted to try to make this better with her efforts.  She states that she has been eating more sweets recently due to craving for them.  She said she tends to have craving for sweets when she is anemic.  She continues to take metformin 1000 mg BID and glimepiride 1 mg for diabetes mellitus type 2 without any problems or side effects. She is due for a retinal exam. Blood work was completed prior to this visit which demonstrate fasting blood glucose increased from 152 to 169, and hemoglobin A1c increased from 7.6 to 8.1, with a goal of 7.0 or below.     Essential Hypertension  She takes losartan 100 mg for her hypertension without any side effects. Blood pressure in the office today is elevated at 140/70.     Dyslipidemia  She continues to manage her dyslipidemia through her own efforts. Patient is unable to take statins due to baseline elevation in liver enzymes secondary to fatty liver disease. Most recent labs demonstrate total cholesterol improved from 205 to 151, and LDL improved from 132 to 88.     Hypothyroidism, unspecified type  Patient continues to take thyroid replacement medication of levothyroxine 100 mcg daily as prescribed. Last TSH was 1.170 on 11/4/19. No thyroid related complaints today.    Cirrhosis of liver with ascites, unspecified hepatic cirrhosis type (HCC)  Patient was previously diagnosed with cirrhosis of the liver, splenomegaly and ascites. Most recent imaging showed resolution of ascites and no gross masses. Impression of the ultrasound  "completed on 3/10 demonstrates: \"1.  Findings consistent with cirrhosis and portal hypertension.  2.  Prior cholecystectomy.  3.  No biliary dilation.  4.  Bilateral kidney cysts.\"  Previous work-up was negative for autoimmune disease or infectious cause.  This is most likely to fatty liver disease.  She continues to follow-up with GI specialist for this problem.    Iron deficiency anemia, unspecified iron deficiency anemia type  Hemoglobin decreased from 13.4 to 10.4, and hematocrit decreased from 42.6 to 36. Patient notes when she is anemic, she notices she craves sweets. Patient receives intermittent iron infusions by Oncology for anemia.    Malignant neoplasm of upper-inner quadrant of right breast in female, estrogen receptor positive (HCC)  Patient is s/p bilateral mastectomy, reconstructive surgery, chemotherapy and radiation treatment.  Breast implants removed subsequently due to discomfort.  Patient does not need any screening mammograms anymore. She continues to take anastrozole 1 mg and is followed by Dr. Oliveira.     Thrombocytopenia (HCC)  Most likely from liver cirrhosis. Patient's most recent platelet count improved from 112 to 140k.     Need for hepatitis B vaccination  She is due to 3rd dose of Hepatitis vaccine.     Impacted cerumen, right ear  She states she has right ear pain onset one week ago. Denies any hearing loss or drainage.      Annual Health Assessment Questions:     1.  Are you currently engaging in any exercise or physical activity? Yes     2.  How would you describe your mood or emotional well-being today? good     3.  Have you had any falls in the last year? No     4.  Have you noticed any problems with your balance or had difficulty walking? No     5.  In the last six months have you experienced any leakage of urine? No     6. DPA/Advanced Directive: Patient has Advanced Directive on file.             I reviewed the following    Past Medical History:   Diagnosis Date   • Anemia    • " "Breast cancer (HCC) 2/27/2012   • Cancer (HCC) 2010    right breast   • DM hyperosmolarity type II (HCC) 2/27/2012    oral meds   • Hiatus hernia syndrome    • High cholesterol 02/10/15    Pt denies   • HTN (hypertension) 02/12/15    more \"preventative\" for DM-per her DR   • Hyperlipidemia 2/27/2012   • Hypothyroidism 2/27/2012   • Liver cirrhosis (HCC)     couldn't take statins        Past Surgical History:   Procedure Laterality Date   • MASTECTOMY Left 12/9/2019    Procedure: MASTECTOMY;  Surgeon: Edward Boo M.D.;  Location: SURGERY SAME DAY [facility];  Service: Plastics   • BREAST IMPLANT REMOVAL Right 12/9/2019    Procedure: REMOVAL, IMPLANT, BREAST;  Surgeon: Edward Boo M.D.;  Location: SURGERY SAME DAY [facility];  Service: Plastics   • BREAST RECONSTRUCTION  2/12/2015    Performed by Edward Boo M.D. at SURGERY [facility]   • MAMMOPLASTY REDUCTION  2/12/2015    Performed by Edward Boo M.D. at SURGERY [facility]   • MOLE EXCISION  2/12/2015    Performed by Edward Boo M.D. at SURGERY [facility]   • CAPSULECTOMY  3/6/2014    Performed by Edward Boo M.D. at SURGERY [facility]   • BREAST RECONSTRUCTION  6/24/2013    Performed by Edward Boo M.D. at SURGERY [facility]   • TISSUE EXPANDER PLACE/REMOVE  6/24/2013    Performed by Edward oBo M.D. at SURGERY [facility]   • CAYETANO BY LAPAROSCOPY  2008   • TUBAL LIGATION  1974   • TONSILLECTOMY  1953   • MASTECTOMY      right breast   • PB RADIATION THERAPY PLAN SIMPLE     • OR CHEMOTHERAPY, UNSPECIFIED PROCEDURE         Allergies   Allergen Reactions   • Byetta      pancreatitis   • Penicillins Rash     ALl over   • Statins [Hmg-Coa-R Inhibitors]      Elevated liver enzymes       Current Outpatient Medications   Medication Sig Dispense Refill   • glimepiride (AMARYL) 2 MG Tab Take 1 Tab by mouth every morning. 100 Tab 1   • metformin (GLUCOPHAGE) 1000 MG tablet take 1 tablet by mouth twice daily with meals 200 " Tab 1   • losartan (COZAAR) 100 MG Tab Take 1 Tab by mouth every day. 100 Tab 1   • levothyroxine (SYNTHROID) 100 MCG Tab TAKE ONE TABLET BY MOUTH IN THE MORNING ON AN EMPTY STOMACH 100 Tab 1   • cephALEXin (KEFLEX) 500 MG Cap Take 1 Cap by mouth 3 times a day. 15 Cap 0   • omeprazole (PRILOSEC) 20 MG delayed-release capsule TAKE ONE CAPSULE BY MOUTH ONE TIME DAILY  (Patient taking differently: 20 mg every 48 hours.) 90 Cap 1   • glimepiride (AMARYL) 1 MG tablet take 1 tablet by mouth every morning 100 Tab 1   • therapeutic multivitamin-minerals (THERAGRAN-M) Tab Take 1 Tab by mouth every day.     • Calcium Carbonate-Vitamin D (CALTRATE 600+D PO) Take  by mouth.     • anastrozole (ARIMIDEX) 1 MG TABS Take 1 mg by mouth every morning.       No current facility-administered medications for this visit.         Family History   Problem Relation Age of Onset   • Other Other         adopted       Social History     Socioeconomic History   • Marital status:      Spouse name: Not on file   • Number of children: Not on file   • Years of education: Not on file   • Highest education level: Not on file   Occupational History   • Not on file   Social Needs   • Financial resource strain: Not on file   • Food insecurity     Worry: Not on file     Inability: Not on file   • Transportation needs     Medical: Not on file     Non-medical: Not on file   Tobacco Use   • Smoking status: Never Smoker   • Smokeless tobacco: Never Used   Substance and Sexual Activity   • Alcohol use: Yes     Alcohol/week: 0.0 oz     Frequency: 2-4 times a month     Comment: < 1 per week   • Drug use: No   • Sexual activity: Never   Lifestyle   • Physical activity     Days per week: Not on file     Minutes per session: Not on file   • Stress: Not on file   Relationships   • Social connections     Talks on phone: Not on file     Gets together: Not on file     Attends Anglican service: Not on file     Active member of club or organization: Not on file  "    Attends meetings of clubs or organizations: Not on file     Relationship status: Not on file   • Intimate partner violence     Fear of current or ex partner: Not on file     Emotionally abused: Not on file     Physically abused: Not on file     Forced sexual activity: Not on file   Other Topics Concern   • Not on file   Social History Narrative   • Not on file          ROS:  As per the HPI as shown above, the rest are negative.       Objective:     /70 (BP Location: Left arm, Patient Position: Sitting, BP Cuff Size: Adult)   Pulse 83   Temp 36.4 °C (97.6 °F) (Temporal)   Ht 1.676 m (5' 6\")   Wt 82.7 kg (182 lb 5.1 oz)   LMP 02/27/1997   SpO2 95%   BMI 29.43 kg/m²     Physical Exam  Examined alert, awake, oriented, not in distress       Ears-L ear: no evidence of cerumen in canal, TM is clear, No infection. R ear: excessive cerumen in canal which completely blocks the canal, TM is unable to be visualized.  Nose-turbinates normal without swelling, discharge, or obstruction   Throat-tonsils are normal without enlargement, exudate, or erythema   Neck-supple, no lymphadenopathy, no thyromegaly  Lungs-clear to auscultation, no rales, no wheezes  Heart-regular rate and rhythm, no murmur  Extremities-no edema, clubbing, cyanosis     Hospital Outpatient Visit on 03/09/2020   Component Date Value Ref Range Status   • Creatinine, Urine 03/09/2020 83.80  mg/dL Final   • Microalbumin, Urine Random 03/09/2020 1.7  mg/dL Final   • Micro Alb Creat Ratio 03/09/2020 20  0 - 30 mg/g Final   • Glycohemoglobin 03/09/2020 8.1* 0.0 - 5.6 % Final    Comment: Increased risk for diabetes:  5.7 -6.4%  Diabetes:  >6.4%  Glycemic control for adults with diabetes:  <7.0%  The above interpretations are per ADA guidelines.  Diagnosis  of diabetes mellitus on the basis of elevated Hemoglobin A1c  should be confirmed by repeating the Hb A1c test.     • Est Avg Glucose 03/09/2020 186  mg/dL Final    Comment: The eAG calculation is " based on the A1c-Derived Daily Glucose  (ADAG) study.  See the ADA's website for additional information.     • Sodium 03/09/2020 140  135 - 145 mmol/L Final   • Potassium 03/09/2020 4.7  3.6 - 5.5 mmol/L Final   • Chloride 03/09/2020 109  96 - 112 mmol/L Final   • Co2 03/09/2020 22  20 - 33 mmol/L Final   • Anion Gap 03/09/2020 9.0  0.0 - 11.9 Final   • Glucose 03/09/2020 169* 65 - 99 mg/dL Final   • Bun 03/09/2020 19  8 - 22 mg/dL Final   • Creatinine 03/09/2020 0.94  0.50 - 1.40 mg/dL Final   • Calcium 03/09/2020 9.5  8.5 - 10.5 mg/dL Final   • AST(SGOT) 03/09/2020 39  12 - 45 U/L Final   • ALT(SGPT) 03/09/2020 33  2 - 50 U/L Final   • Alkaline Phosphatase 03/09/2020 81  30 - 99 U/L Final   • Total Bilirubin 03/09/2020 0.5  0.1 - 1.5 mg/dL Final   • Albumin 03/09/2020 3.9  3.2 - 4.9 g/dL Final   • Total Protein 03/09/2020 6.8  6.0 - 8.2 g/dL Final   • Globulin 03/09/2020 2.9  1.9 - 3.5 g/dL Final   • A-G Ratio 03/09/2020 1.3  g/dL Final   • Cholesterol,Tot 03/09/2020 151  100 - 199 mg/dL Final   • Triglycerides 03/09/2020 84  0 - 149 mg/dL Final   • HDL 03/09/2020 46  >=40 mg/dL Final   • LDL 03/09/2020 88  <100 mg/dL Final   • Fasting Status 03/09/2020 Fasting   Final   • GFR If  03/09/2020 >60  >60 mL/min/1.73 m 2 Final   • GFR If Non  03/09/2020 58* >60 mL/min/1.73 m 2 Final            Assessment/Plan:     1. Uncontrolled type 2 diabetes mellitus without complication, without long-term current use of insulin (HCC)  Diabetes is now more out of control with hemoglobin A1c increased from 7.6 to 8.1.  We discussed the need to increase the glimepiride dose.  Glimepiride dosage will be increased to 2 mg daily.  Continue the same dose of metformin.  I advised her to make diet changes to improve her glucose levels.  Advised her to avoid sweets, decrease her carbohydrate intake, exercise regularly and keep a healthy weight. Labs have been ordered for the next follow up visit.      -  glimepiride (AMARYL) 2 MG Tab; Take 1 Tab by mouth every morning.  Dispense: 100 Tab; Refill: 1  - metformin (GLUCOPHAGE) 1000 MG tablet; take 1 tablet by mouth twice daily with meals  Dispense: 200 Tab; Refill: 1  - Lipid Profile; Future  - Comp Metabolic Panel; Future  - HEMOGLOBIN A1C; Future  - CBC WITH DIFFERENTIAL; Future  - TSH; Future    2. Essential hypertension  Blood pressure mildly elevated specifically the systolic blood pressure.  Advised to work harder on avoidance of salty foods, regular exercise and weight loss and reevaluate next visit.  We will add a second agent if this is not adequately controlled at that time.  - losartan (COZAAR) 100 MG Tab; Take 1 Tab by mouth every day.  Dispense: 100 Tab; Refill: 1  - Lipid Profile; Future  - Comp Metabolic Panel; Future  - HEMOGLOBIN A1C; Future  - CBC WITH DIFFERENTIAL; Future  - TSH; Future    3. Dyslipidemia  Most recent labs demonstrate total cholesterol improved from 205 to 151, and LDL improved from 132 to 88. She will continue to manage this through her own efforts. I have advised the patient to increase her exercise regimen and avoid fatty foods. Labs have been ordered for the next follow up visit.   - Lipid Profile; Future  - Comp Metabolic Panel; Future  - HEMOGLOBIN A1C; Future  - CBC WITH DIFFERENTIAL; Future  - TSH; Future    4. Hypothyroidism, unspecified type  TSH is adequately replaced at 1.17. We will continue the current plan of care. Labs have been ordered for the next follow up visit.  - levothyroxine (SYNTHROID) 100 MCG Tab; TAKE ONE TABLET BY MOUTH IN THE MORNING ON AN EMPTY STOMACH  Dispense: 100 Tab; Refill: 1  - Lipid Profile; Future  - Comp Metabolic Panel; Future  - HEMOGLOBIN A1C; Future  - CBC WITH DIFFERENTIAL; Future  - TSH; Future    5. Cirrhosis of liver with ascites, unspecified hepatic cirrhosis type (HCC)  She continues to be closely followed by GI. 3rd Hepatitis B vaccine was given today in office, without adverse  effect. AST and ALT improved to 39 and 33 respectively. Most recent ultrasound completed on 3/10/20 showed resolution of ascites and no gross masses.  AFP was within normal range at 4.  - Lipid Profile; Future  - Comp Metabolic Panel; Future  - HEMOGLOBIN A1C; Future  - CBC WITH DIFFERENTIAL; Future  - TSH; Future    6. Iron deficiency anemia, unspecified iron deficiency anemia type  Hemoglobin decreased from 13.4 to 10.4, and hematocrit decreased from 42.6 to 36. Patient receives intermittent iron infusions by Oncology for anemia.  Advised to follow-up with oncologist to get set up for iron infusion.  Will obtain future labs for follow up.  - Lipid Profile; Future  - Comp Metabolic Panel; Future  - HEMOGLOBIN A1C; Future  - CBC WITH DIFFERENTIAL; Future  - TSH; Future    7. Malignant neoplasm of upper-inner quadrant of right breast in female, estrogen receptor positive (HCC)  Patient is s/p bilateral mastectomy, chemotherapy, radiation treatment.  Continue to take anastrozole 1 mg and follow up with Oncology, Dr. Oliveira.   - Lipid Profile; Future  - Comp Metabolic Panel; Future  - HEMOGLOBIN A1C; Future  - CBC WITH DIFFERENTIAL; Future  - TSH; Future    8. Thrombocytopenia (HCC)  Most likely from liver cirrhosis. Patient's platelet count improved from 112 to 140k.  Will continue to follow closely.  Will obtain future labs for follow up.   - Lipid Profile; Future  - Comp Metabolic Panel; Future  - HEMOGLOBIN A1C; Future  - CBC WITH DIFFERENTIAL; Future  - TSH; Future    9. Need for hepatitis B vaccination  Vaccine was administered today without adverse event.   - Hep B Adult 20+    10. Impacted cerumen, right ear  Ear cerumen removal done in office today. See below for further details.      Ear Cerumen Removal Procedure    Indication: ear cerumen impaction and unable to visualize tympanic membrane    Procedure: After placing the patient's head in the appropriate position, the patient's right ear canal was irrigated.  Ear irrigation attempted without success, and cerumen was scooped out with curette with compete removal of cerumen. Reexamination showed intact TM without evidence of infection. At this point, the procedure was complete.     The patient tolerated the procedure well.    Complications: None       Lena ABAD (Scribe), am scribing for, and in the presence of, Su Randhawa MD     Electronically signed by: Lena Arana (Scribe), 3/12/2020    ISu MD personally performed the services described in this documentation, as scribed by Lena Arana in my presence, and it is both accurate and complete.

## 2020-05-05 ENCOUNTER — OUTPATIENT INFUSION SERVICES (OUTPATIENT)
Dept: ONCOLOGY | Facility: MEDICAL CENTER | Age: 75
End: 2020-05-05
Attending: INTERNAL MEDICINE
Payer: MEDICARE

## 2020-05-05 VITALS
HEART RATE: 94 BPM | TEMPERATURE: 97.3 F | WEIGHT: 182.98 LBS | RESPIRATION RATE: 18 BRPM | SYSTOLIC BLOOD PRESSURE: 149 MMHG | HEIGHT: 66 IN | DIASTOLIC BLOOD PRESSURE: 60 MMHG | BODY MASS INDEX: 29.41 KG/M2 | OXYGEN SATURATION: 95 %

## 2020-05-05 DIAGNOSIS — D50.0 IRON DEFICIENCY ANEMIA DUE TO CHRONIC BLOOD LOSS: ICD-10-CM

## 2020-05-05 LAB
ANISOCYTOSIS BLD QL SMEAR: ABNORMAL
BASOPHILS # BLD AUTO: 1.8 % (ref 0–1.8)
BASOPHILS # BLD: 0.1 K/UL (ref 0–0.12)
BURR CELLS BLD QL SMEAR: NORMAL
COMMENT 1642: NORMAL
DACRYOCYTES BLD QL SMEAR: NORMAL
EOSINOPHIL # BLD AUTO: 0.2 K/UL (ref 0–0.51)
EOSINOPHIL NFR BLD: 3.6 % (ref 0–6.9)
ERYTHROCYTE [DISTWIDTH] IN BLOOD BY AUTOMATED COUNT: 44.9 FL (ref 35.9–50)
FERRITIN SERPL-MCNC: 4.9 NG/ML (ref 10–291)
HCT VFR BLD AUTO: 29.6 % (ref 37–47)
HGB BLD-MCNC: 7.7 G/DL (ref 12–16)
HYPOCHROMIA BLD QL SMEAR: ABNORMAL
IMM GRANULOCYTES # BLD AUTO: 0.02 K/UL (ref 0–0.11)
IMM GRANULOCYTES NFR BLD AUTO: 0.4 % (ref 0–0.9)
IRON SATN MFR SERPL: 4 % (ref 15–55)
IRON SERPL-MCNC: 21 UG/DL (ref 40–170)
LYMPHOCYTES # BLD AUTO: 1.22 K/UL (ref 1–4.8)
LYMPHOCYTES NFR BLD: 22.1 % (ref 22–41)
MACROCYTES BLD QL SMEAR: ABNORMAL
MCH RBC QN AUTO: 17.8 PG (ref 27–33)
MCHC RBC AUTO-ENTMCNC: 26 G/DL (ref 33.6–35)
MCV RBC AUTO: 68.5 FL (ref 81.4–97.8)
MICROCYTES BLD QL SMEAR: ABNORMAL
MONOCYTES # BLD AUTO: 0.59 K/UL (ref 0–0.85)
MONOCYTES NFR BLD AUTO: 10.7 % (ref 0–13.4)
MORPHOLOGY BLD-IMP: NORMAL
NEUTROPHILS # BLD AUTO: 3.38 K/UL (ref 2–7.15)
NEUTROPHILS NFR BLD: 61.4 % (ref 44–72)
NRBC # BLD AUTO: 0 K/UL
NRBC BLD-RTO: 0 /100 WBC
OVALOCYTES BLD QL SMEAR: NORMAL
PLATELET # BLD AUTO: 198 K/UL (ref 164–446)
PLATELET BLD QL SMEAR: NORMAL
PMV BLD AUTO: 10.6 FL (ref 9–12.9)
POIKILOCYTOSIS BLD QL SMEAR: NORMAL
POLYCHROMASIA BLD QL SMEAR: NORMAL
RBC # BLD AUTO: 4.32 M/UL (ref 4.2–5.4)
RBC BLD AUTO: PRESENT
SCHISTOCYTES BLD QL SMEAR: NORMAL
TIBC SERPL-MCNC: 494 UG/DL (ref 250–450)
UIBC SERPL-MCNC: 473 UG/DL (ref 110–370)
WBC # BLD AUTO: 5.5 K/UL (ref 4.8–10.8)

## 2020-05-05 PROCEDURE — 96365 THER/PROPH/DIAG IV INF INIT: CPT

## 2020-05-05 PROCEDURE — 85025 COMPLETE CBC W/AUTO DIFF WBC: CPT

## 2020-05-05 PROCEDURE — A9270 NON-COVERED ITEM OR SERVICE: HCPCS | Performed by: INTERNAL MEDICINE

## 2020-05-05 PROCEDURE — 36415 COLL VENOUS BLD VENIPUNCTURE: CPT

## 2020-05-05 PROCEDURE — 96366 THER/PROPH/DIAG IV INF ADDON: CPT

## 2020-05-05 PROCEDURE — 82728 ASSAY OF FERRITIN: CPT

## 2020-05-05 PROCEDURE — 700105 HCHG RX REV CODE 258: Performed by: INTERNAL MEDICINE

## 2020-05-05 PROCEDURE — 306780 HCHG STAT FOR TRANSFUSION PER CASE

## 2020-05-05 PROCEDURE — 83550 IRON BINDING TEST: CPT

## 2020-05-05 PROCEDURE — 83540 ASSAY OF IRON: CPT

## 2020-05-05 PROCEDURE — 700111 HCHG RX REV CODE 636 W/ 250 OVERRIDE (IP): Mod: JG | Performed by: INTERNAL MEDICINE

## 2020-05-05 PROCEDURE — 700102 HCHG RX REV CODE 250 W/ 637 OVERRIDE(OP): Performed by: INTERNAL MEDICINE

## 2020-05-05 PROCEDURE — 96375 TX/PRO/DX INJ NEW DRUG ADDON: CPT

## 2020-05-05 RX ORDER — ACETAMINOPHEN 325 MG/1
650 TABLET ORAL ONCE
Status: COMPLETED | OUTPATIENT
Start: 2020-05-05 | End: 2020-05-05

## 2020-05-05 RX ORDER — DIPHENHYDRAMINE HCL 25 MG
25 TABLET ORAL ONCE
Status: COMPLETED | OUTPATIENT
Start: 2020-05-05 | End: 2020-05-05

## 2020-05-05 RX ADMIN — ACETAMINOPHEN 650 MG: 325 TABLET ORAL at 11:38

## 2020-05-05 RX ADMIN — DIPHENHYDRAMINE HCL 25 MG: 25 TABLET ORAL at 11:38

## 2020-05-05 RX ADMIN — SODIUM CHLORIDE 25 MG: 9 INJECTION, SOLUTION INTRAVENOUS at 12:02

## 2020-05-05 RX ADMIN — HYDROCORTISONE SODIUM SUCCINATE 100 MG: 100 INJECTION, POWDER, FOR SOLUTION INTRAMUSCULAR; INTRAVENOUS at 11:38

## 2020-05-05 RX ADMIN — SODIUM CHLORIDE 1000 MG: 9 INJECTION, SOLUTION INTRAVENOUS at 13:46

## 2020-05-05 ASSESSMENT — FIBROSIS 4 INDEX: FIB4 SCORE: 3.54

## 2020-05-05 NOTE — PROGRESS NOTES
Pt arrived ambulatory to IS for iron dextran.  POC discussed.  PIV started to LFA, blood return confirmed and labs drawn as ordered.  Results reviewed and reported to Dr. Oliveira.  Orders received to proceed with iron.  No blood transfusion indicated at this time.  Premedications given followed by iron dextran test dose.  One hour observation completed without adverse reaction.  Therapeutic dose infusing without issue.  Report given to MAGDA Velarde to assume care of patient for remainder of infusion.  No additional appts made at this time.

## 2020-05-06 NOTE — PROGRESS NOTES
Assumed care of patient. Iron dextran completed. PIV flushed with normal saline and removed; gauze/coban applied to site. Discharged to self care; no apparent distress noted.

## 2020-07-06 ENCOUNTER — HOSPITAL ENCOUNTER (OUTPATIENT)
Dept: LAB | Facility: MEDICAL CENTER | Age: 75
End: 2020-07-06
Attending: FAMILY MEDICINE
Payer: MEDICARE

## 2020-07-06 DIAGNOSIS — K74.60 CIRRHOSIS OF LIVER WITH ASCITES, UNSPECIFIED HEPATIC CIRRHOSIS TYPE (HCC): ICD-10-CM

## 2020-07-06 DIAGNOSIS — Z17.0 MALIGNANT NEOPLASM OF UPPER-INNER QUADRANT OF RIGHT BREAST IN FEMALE, ESTROGEN RECEPTOR POSITIVE (HCC): ICD-10-CM

## 2020-07-06 DIAGNOSIS — D50.9 IRON DEFICIENCY ANEMIA, UNSPECIFIED IRON DEFICIENCY ANEMIA TYPE: ICD-10-CM

## 2020-07-06 DIAGNOSIS — R18.8 CIRRHOSIS OF LIVER WITH ASCITES, UNSPECIFIED HEPATIC CIRRHOSIS TYPE (HCC): ICD-10-CM

## 2020-07-06 DIAGNOSIS — I10 ESSENTIAL HYPERTENSION: ICD-10-CM

## 2020-07-06 DIAGNOSIS — E78.5 DYSLIPIDEMIA: ICD-10-CM

## 2020-07-06 DIAGNOSIS — C50.211 MALIGNANT NEOPLASM OF UPPER-INNER QUADRANT OF RIGHT BREAST IN FEMALE, ESTROGEN RECEPTOR POSITIVE (HCC): ICD-10-CM

## 2020-07-06 DIAGNOSIS — E03.9 HYPOTHYROIDISM, UNSPECIFIED TYPE: ICD-10-CM

## 2020-07-06 DIAGNOSIS — D69.6 THROMBOCYTOPENIA (HCC): ICD-10-CM

## 2020-07-06 LAB
ALBUMIN SERPL BCP-MCNC: 4 G/DL (ref 3.2–4.9)
ALBUMIN/GLOB SERPL: 1.4 G/DL
ALP SERPL-CCNC: 103 U/L (ref 30–99)
ALT SERPL-CCNC: 37 U/L (ref 2–50)
ANION GAP SERPL CALC-SCNC: 14 MMOL/L (ref 7–16)
ANISOCYTOSIS BLD QL SMEAR: ABNORMAL
AST SERPL-CCNC: 35 U/L (ref 12–45)
BASOPHILS # BLD AUTO: 0.9 % (ref 0–1.8)
BASOPHILS # BLD: 0.04 K/UL (ref 0–0.12)
BILIRUB SERPL-MCNC: 0.3 MG/DL (ref 0.1–1.5)
BUN SERPL-MCNC: 11 MG/DL (ref 8–22)
CALCIUM SERPL-MCNC: 9 MG/DL (ref 8.5–10.5)
CHLORIDE SERPL-SCNC: 105 MMOL/L (ref 96–112)
CHOLEST SERPL-MCNC: 162 MG/DL (ref 100–199)
CO2 SERPL-SCNC: 22 MMOL/L (ref 20–33)
CREAT SERPL-MCNC: 0.85 MG/DL (ref 0.5–1.4)
EOSINOPHIL # BLD AUTO: 0.26 K/UL (ref 0–0.51)
EOSINOPHIL NFR BLD: 5.3 % (ref 0–6.9)
EST. AVERAGE GLUCOSE BLD GHB EST-MCNC: 180 MG/DL
FASTING STATUS PATIENT QL REPORTED: NORMAL
GLOBULIN SER CALC-MCNC: 2.9 G/DL (ref 1.9–3.5)
GLUCOSE SERPL-MCNC: 181 MG/DL (ref 65–99)
HBA1C MFR BLD: 7.9 % (ref 0–5.6)
HCT VFR BLD AUTO: 39.7 % (ref 37–47)
HDLC SERPL-MCNC: 45 MG/DL
HGB BLD-MCNC: 11.8 G/DL (ref 12–16)
LDLC SERPL CALC-MCNC: 97 MG/DL
LYMPHOCYTES # BLD AUTO: 1.29 K/UL (ref 1–4.8)
LYMPHOCYTES NFR BLD: 26.3 % (ref 22–41)
MANUAL DIFF BLD: NORMAL
MCH RBC QN AUTO: 25.2 PG (ref 27–33)
MCHC RBC AUTO-ENTMCNC: 29.7 G/DL (ref 33.6–35)
MCV RBC AUTO: 84.6 FL (ref 81.4–97.8)
MICROCYTES BLD QL SMEAR: ABNORMAL
MONOCYTES # BLD AUTO: 0.26 K/UL (ref 0–0.85)
MONOCYTES NFR BLD AUTO: 5.3 % (ref 0–13.4)
MORPHOLOGY BLD-IMP: NORMAL
NEUTROPHILS # BLD AUTO: 3.05 K/UL (ref 2–7.15)
NEUTROPHILS NFR BLD: 62.3 % (ref 44–72)
NRBC # BLD AUTO: 0 K/UL
NRBC BLD-RTO: 0 /100 WBC
OVALOCYTES BLD QL SMEAR: NORMAL
PLATELET # BLD AUTO: 151 K/UL (ref 164–446)
PLATELET BLD QL SMEAR: NORMAL
PMV BLD AUTO: 9.2 FL (ref 9–12.9)
POIKILOCYTOSIS BLD QL SMEAR: NORMAL
POLYCHROMASIA BLD QL SMEAR: NORMAL
POTASSIUM SERPL-SCNC: 4.2 MMOL/L (ref 3.6–5.5)
PROT SERPL-MCNC: 6.9 G/DL (ref 6–8.2)
RBC # BLD AUTO: 4.69 M/UL (ref 4.2–5.4)
RBC BLD AUTO: PRESENT
SODIUM SERPL-SCNC: 141 MMOL/L (ref 135–145)
TRIGL SERPL-MCNC: 99 MG/DL (ref 0–149)
TSH SERPL DL<=0.005 MIU/L-ACNC: 4.35 UIU/ML (ref 0.38–5.33)
WBC # BLD AUTO: 4.9 K/UL (ref 4.8–10.8)

## 2020-07-06 PROCEDURE — 80061 LIPID PANEL: CPT

## 2020-07-06 PROCEDURE — 83036 HEMOGLOBIN GLYCOSYLATED A1C: CPT

## 2020-07-06 PROCEDURE — 85027 COMPLETE CBC AUTOMATED: CPT

## 2020-07-06 PROCEDURE — 36415 COLL VENOUS BLD VENIPUNCTURE: CPT

## 2020-07-06 PROCEDURE — 85007 BL SMEAR W/DIFF WBC COUNT: CPT

## 2020-07-06 PROCEDURE — 84443 ASSAY THYROID STIM HORMONE: CPT

## 2020-07-06 PROCEDURE — 80053 COMPREHEN METABOLIC PANEL: CPT

## 2020-07-07 NOTE — PROGRESS NOTES
1. [facility] Verified: yes    2. Verify PCP: yes    3. Review and add  to Care Team: yes      4. Reviewed/Updated the following with patient:       •   Communication Preference Obtained? YES  • MyChart Activation: already active       •   E-Mail Address Obtained? YES       •   Appointment Day and Time Preferences? YES       •   Preferred Pharmacy? YES       •   Preferred Lab? YES    5. Care Gap Scheduling (Attempt to Schedule EACH Overdue Care Gap!)    Pended  AWV

## 2020-07-09 ENCOUNTER — TELEPHONE (OUTPATIENT)
Dept: MEDICAL GROUP | Facility: PHYSICIAN GROUP | Age: 75
End: 2020-07-09

## 2020-07-09 NOTE — TELEPHONE ENCOUNTER
ESTABLISHED PATIENT PRE-VISIT PLANNING     Patient was NOT contacted to complete PVP.     Note: Patient will not be contacted if there is no indication to call.     1.  Reviewed notes from the last few office visits within the medical group: Yes    2.  If any orders were placed at last visit or intended to be done for this visit (i.e. 6 mos follow-up), do we have Results/Consult Notes?        •  Labs - Labs ordered, completed on 07/06/2020 and results are in chart.   Note: If patient appointment is for lab review and patient did not complete labs, check with provider if OK to reschedule patient until labs completed.       •  Imaging - Imaging was not ordered at last office visit.       •  Referrals - No referrals were ordered at last office visit.    3. Is this appointment scheduled as a Hospital Follow-Up? No    4.  Immunizations were updated in Epic using WebIZ?: Epic matches WebIZ       •  Web Iz Recommendations: SHINGRIX (Shingles)    5.  Patient is due for the following Health Maintenance Topics:   Health Maintenance Due   Topic Date Due   • Annual Wellness Visit  02/28/2019   • RETINAL SCREENING  03/20/2020       - Patient plans to schedule appointment for Annual Wellness Visit (AWV) and Retinal Eye Exam.    6. Orders for overdue Health Maintenance topics pended in Pre-Charting? N\A    7.  AHA (MDX) form printed for Provider? YES    8.  Patient was NOT informed to arrive 15 min prior to their scheduled appointment and bring in their medication bottles.

## 2020-07-13 ENCOUNTER — OFFICE VISIT (OUTPATIENT)
Dept: MEDICAL GROUP | Facility: PHYSICIAN GROUP | Age: 75
End: 2020-07-13
Payer: MEDICARE

## 2020-07-13 VITALS
DIASTOLIC BLOOD PRESSURE: 60 MMHG | OXYGEN SATURATION: 97 % | SYSTOLIC BLOOD PRESSURE: 120 MMHG | WEIGHT: 185.41 LBS | TEMPERATURE: 97.4 F | BODY MASS INDEX: 30.89 KG/M2 | HEART RATE: 80 BPM | HEIGHT: 65 IN

## 2020-07-13 DIAGNOSIS — E03.9 HYPOTHYROIDISM, UNSPECIFIED TYPE: ICD-10-CM

## 2020-07-13 DIAGNOSIS — D50.9 IRON DEFICIENCY ANEMIA, UNSPECIFIED IRON DEFICIENCY ANEMIA TYPE: ICD-10-CM

## 2020-07-13 DIAGNOSIS — E11.65 UNCONTROLLED TYPE 2 DIABETES MELLITUS WITH HYPERGLYCEMIA (HCC): ICD-10-CM

## 2020-07-13 DIAGNOSIS — R18.8 CIRRHOSIS OF LIVER WITH ASCITES, UNSPECIFIED HEPATIC CIRRHOSIS TYPE (HCC): ICD-10-CM

## 2020-07-13 DIAGNOSIS — I10 ESSENTIAL HYPERTENSION: ICD-10-CM

## 2020-07-13 DIAGNOSIS — K74.60 CIRRHOSIS OF LIVER WITH ASCITES, UNSPECIFIED HEPATIC CIRRHOSIS TYPE (HCC): ICD-10-CM

## 2020-07-13 DIAGNOSIS — E78.5 DYSLIPIDEMIA: ICD-10-CM

## 2020-07-13 PROCEDURE — 99214 OFFICE O/P EST MOD 30 MIN: CPT | Performed by: FAMILY MEDICINE

## 2020-07-13 ASSESSMENT — FIBROSIS 4 INDEX: FIB4 SCORE: 2.78

## 2020-07-13 NOTE — LETTER
[facility]  Su Randhawa M.D.  [address]  [place] [address]  Fax: [phone]   Authorization for Release/Disclosure of   Protected Health Information   Name: CHARU BAEZ : 1945 SSN: [id number]   Address: [address] [place] [address]  [place] [address] Phone:    [phone] (home)    I authorize the entity listed below to release/disclose the PHI below to:   [facility]/Su Randhawa M.D. and Su Randhawa M.D.   Provider or Entity Name:    [facility]   Address   City, State, Zip   Phone:      Fax:     Reason for request: continuity of care   Information to be released:    [  ] LAST COLONOSCOPY,  including any PATH REPORT and follow-up  [  ] LAST FIT/COLOGUARD RESULT [  ] LAST DEXA  [  ] LAST MAMMOGRAM  [  ] LAST PAP  [  ] LAST LABS [  ] RETINA EXAM REPORT  [  ] IMMUNIZATION RECORDS  [  ] Release all info      [  ] Check here and initial the line next to each item to release ALL health information INCLUDING  _____ Care and treatment for drug and / or alcohol abuse  _____ HIV testing, infection status, or AIDS  _____ Genetic Testing    DATES OF SERVICE OR TIME PERIOD TO BE DISCLOSED: _____________  I understand and acknowledge that:  * This Authorization may be revoked at any time by you in writing, except if your health information has already been used or disclosed.  * Your health information that will be used or disclosed as a result of you signing this authorization could be re-disclosed by the recipient. If this occurs, your re-disclosed health information may no longer be protected by State or Federal laws.  * You may refuse to sign this Authorization. Your refusal will not affect your ability to obtain treatment.  * This Authorization becomes effective upon signing and will  on (date) __________.      If no date is indicated, this Authorization will  one (1) year from the signature date.    Name: Charu Baez    Signature:   Date:     2020       PLEASE FAX  REQUESTED RECORDS BACK TO: [phone]

## 2020-07-13 NOTE — LETTER
[facility]  Su Randhawa M.D.  [address]  [place] [address]  Fax: [phone]   Authorization for Release/Disclosure of   Protected Health Information   Name: CHARU DOHERTY : 1945 SSN: [id number]   Address: [address] [place] [address]  [place] [address] Phone:    [phone] (home)    I authorize the entity listed below to release/disclose the PHI below to:   [facility]/Su Randhawa M.D. and Su Randhawa M.D.   Provider or Entity Name:    Dr. Oliveira - hematology/oncology   Address   City, [facility], [facility]   Phone:      Fax:     Reason for request: continuity of care   Information to be released:    [  ] LAST COLONOSCOPY,  including any PATH REPORT and follow-up  [  ] LAST FIT/COLOGUARD RESULT [  ] LAST DEXA  [  ] LAST MAMMOGRAM  [  ] LAST PAP  [  ] LAST LABS [  ] RETINA EXAM REPORT  [  ] IMMUNIZATION RECORDS  [  ] Release all info      [  ] Check here and initial the line next to each item to release ALL health information INCLUDING  _____ Care and treatment for drug and / or alcohol abuse  _____ HIV testing, infection status, or AIDS  _____ Genetic Testing    DATES OF SERVICE OR TIME PERIOD TO BE DISCLOSED: _____________  I understand and acknowledge that:  * This Authorization may be revoked at any time by you in writing, except if your health information has already been used or disclosed.  * Your health information that will be used or disclosed as a result of you signing this authorization could be re-disclosed by the recipient. If this occurs, your re-disclosed health information may no longer be protected by State or Federal laws.  * You may refuse to sign this Authorization. Your refusal will not affect your ability to obtain treatment.  * This Authorization becomes effective upon signing and will  on (date) __________.      If no date is indicated, this Authorization will  one (1) year from the signature date.    Name: Charu Doherty    Signature:   Date:     2020       PLEASE FAX REQUESTED RECORDS BACK TO: [phone]

## 2020-07-14 NOTE — PROGRESS NOTES
Subjective:      Ally Doherty is a 75 y.o. female who presents with Diabetes            HPI     Patient returns for follow-up of her medical problems.    In terms of her uncontrolled diabetes mellitus type 2, we increase the dose of the glimepiride from 1 mg to 2 mg daily 4 months ago because her diabetes got more out of control.  She is also on metformin 1000 mg twice a day.  She is tolerating the higher dose without side effects.  She said in the last few months she has been forgetting to take the evening dose of the metformin.  She just started taking the metformin regularly twice daily recently.  Blood work was done before this visit.  Patient is due for retinal exam.    In terms of her hypertension, this is under control on losartan.  Denies any side effects.    For her dyslipidemia, she continues to manage just with her own efforts.  She could not take statins because of elevated liver enzymes due to fatty liver disease.    We follow her for hypothyroidism and she continues to take thyroid replacement.    For her iron deficiency anemia, she recently had iron transfusion in May 2020 ordered by her hematologist/oncologist because her hemoglobin dropped to 7.  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  She continues to take omeprazole 20 mg 1 tablet every other day as her maintenance dose.  She had about 1 and half weeks ago she had 3-day duration of small amount of blood mixed in the stool which cleared on its own.  She most likely continues to have on and off bleed coming from the colon due to the angioectasia causing periodic drop in her hemoglobin hematocrit.  Patient could not tolerate p.o. iron supplement.    For her cirrhosis of the liver due to fatty liver disease, she continues to follow-up with the GI specialist closely.  She has not had any recent ascites or lower extremity edema.  She had ultrasound of the abdomen in  "March 2020 that showed cirrhosis and portal hypertension, prior cholecystectomy, no biliary dilatation and bilateral kidney cysts.  Her GI specialist will do another ultrasound in 6 months including elastography to evaluate the cirrhosis more in detail.    Past medical history, past surgical history, family history reviewed-no changes    Social history reviewed-no changes    Allergies reviewed-no changes    Medications reviewed-no changes    ROS     Per HPI, the rest are negative.       Objective:     /60 (BP Location: Left arm, Patient Position: Sitting, BP Cuff Size: Adult)   Pulse 80   Temp 36.3 °C (97.4 °F) (Temporal)   Ht 1.651 m (5' 5\")   Wt 84.1 kg (185 lb 6.5 oz)   LMP 02/27/1997   SpO2 97%   BMI 30.85 kg/m²      Physical Exam     Examined alert, awake, oriented, not in distress    Neck-supple, no lymphadenopathy, no thyromegaly  Lungs-clear to auscultation, no rales, no wheezes  Heart-regular rate and rhythm, no murmur  Extremities-no edema, clubbing, cyanosis       Hospital Outpatient Visit on 07/06/2020   Component Date Value Ref Range Status   • TSH 07/06/2020 4.350  0.380 - 5.330 uIU/mL Final    Comment: Please note new reference ranges effective 12/14/2017 10:00 AM  Pregnant Females, 1st Trimester  0.050-3.700  Pregnant Females, 2nd Trimester  0.310-4.350  Pregnant Females, 3rd Trimester  0.410-5.180     • WBC 07/06/2020 4.9  4.8 - 10.8 K/uL Final   • RBC 07/06/2020 4.69  4.20 - 5.40 M/uL Final   • Hemoglobin 07/06/2020 11.8* 12.0 - 16.0 g/dL Final   • Hematocrit 07/06/2020 39.7  37.0 - 47.0 % Final   • MCV 07/06/2020 84.6  81.4 - 97.8 fL Final   • MCH 07/06/2020 25.2* 27.0 - 33.0 pg Final   • MCHC 07/06/2020 29.7* 33.6 - 35.0 g/dL Final   • Platelet Count 07/06/2020 151* 164 - 446 K/uL Final   • MPV 07/06/2020 9.2  9.0 - 12.9 fL Final   • Neutrophils-Polys 07/06/2020 62.30  44.00 - 72.00 % Final   • Lymphocytes 07/06/2020 26.30  22.00 - 41.00 % Final   • Monocytes 07/06/2020 5.30  0.00 - " 13.40 % Final   • Eosinophils 07/06/2020 5.30  0.00 - 6.90 % Final   • Basophils 07/06/2020 0.90  0.00 - 1.80 % Final   • Nucleated RBC 07/06/2020 0.00  /100 WBC Final   • Neutrophils (Absolute) 07/06/2020 3.05  2.00 - 7.15 K/uL Final    Includes immature neutrophils, if present.   • Lymphs (Absolute) 07/06/2020 1.29  1.00 - 4.80 K/uL Final   • Monos (Absolute) 07/06/2020 0.26  0.00 - 0.85 K/uL Final   • Eos (Absolute) 07/06/2020 0.26  0.00 - 0.51 K/uL Final   • Baso (Absolute) 07/06/2020 0.04  0.00 - 0.12 K/uL Final   • NRBC (Absolute) 07/06/2020 0.00  K/uL Final   • Anisocytosis 07/06/2020 1+   Final   • Microcytosis 07/06/2020 1+   Final   • Glycohemoglobin 07/06/2020 7.9* 0.0 - 5.6 % Final    Comment: Increased risk for diabetes:  5.7 -6.4%  Diabetes:  >6.4%  Glycemic control for adults with diabetes:  <7.0%  The above interpretations are per ADA guidelines.  Diagnosis  of diabetes mellitus on the basis of elevated Hemoglobin A1c  should be confirmed by repeating the Hb A1c test.     • Est Avg Glucose 07/06/2020 180  mg/dL Final    Comment: The eAG calculation is based on the A1c-Derived Daily Glucose  (ADAG) study.  See the ADA's website for additional information.     • Sodium 07/06/2020 141  135 - 145 mmol/L Final   • Potassium 07/06/2020 4.2  3.6 - 5.5 mmol/L Final   • Chloride 07/06/2020 105  96 - 112 mmol/L Final   • Co2 07/06/2020 22  20 - 33 mmol/L Final   • Anion Gap 07/06/2020 14.0  7.0 - 16.0 Final   • Glucose 07/06/2020 181* 65 - 99 mg/dL Final   • Bun 07/06/2020 11  8 - 22 mg/dL Final   • Creatinine 07/06/2020 0.85  0.50 - 1.40 mg/dL Final   • Calcium 07/06/2020 9.0  8.5 - 10.5 mg/dL Final   • AST(SGOT) 07/06/2020 35  12 - 45 U/L Final   • ALT(SGPT) 07/06/2020 37  2 - 50 U/L Final   • Alkaline Phosphatase 07/06/2020 103* 30 - 99 U/L Final   • Total Bilirubin 07/06/2020 0.3  0.1 - 1.5 mg/dL Final   • Albumin 07/06/2020 4.0  3.2 - 4.9 g/dL Final   • Total Protein 07/06/2020 6.9  6.0 - 8.2 g/dL Final    • Globulin 07/06/2020 2.9  1.9 - 3.5 g/dL Final   • A-G Ratio 07/06/2020 1.4  g/dL Final   • Cholesterol,Tot 07/06/2020 162  100 - 199 mg/dL Final   • Triglycerides 07/06/2020 99  0 - 149 mg/dL Final   • HDL 07/06/2020 45  >=40 mg/dL Final   • LDL 07/06/2020 97  <100 mg/dL Final   • Fasting Status 07/06/2020 Fasting   Final   • GFR If  07/06/2020 >60  >60 mL/min/1.73 m 2 Final   • GFR If Non  07/06/2020 >60  >60 mL/min/1.73 m 2 Final   • Manual Diff Status 07/06/2020 PERFORMED   Final   • Peripheral Smear Review 07/06/2020 see below   Final    Comment: Due to instrument suspect flags, further review of peripheral smear is  indicated on this patient sample. This review may or may not result in  abnormal findings.     • Plt Estimation 07/06/2020 Decreased   Final   • RBC Morphology 07/06/2020 Present   Final   • Polychromia 07/06/2020 1+   Final   • Poikilocytosis 07/06/2020 1+   Final   • Ovalocytes 07/06/2020 1+   Final          Assessment/Plan:       1. Uncontrolled type 2 diabetes mellitus with hyperglycemia (HCC)  Diabetes improved with hemoglobin A1c decreased from 8.1-7.9.  She feels that this would even be better if she was more compliant with the metformin.  She has been taking the metformin twice a day regularly recently and glimepiride 2 mg 1 tablet daily.  Advised to work harder on watching the diet in terms of avoidance of sweets and decreasing the carbs and recheck blood work in 4 months.  - Lipid Profile; Future  - Comp Metabolic Panel; Future  - HEMOGLOBIN A1C; Future  - CBC WITH DIFFERENTIAL; Future  - TSH; Future    2. Essential hypertension  Controlled on losartan.  - Lipid Profile; Future  - Comp Metabolic Panel; Future  - HEMOGLOBIN A1C; Future  - CBC WITH DIFFERENTIAL; Future  - TSH; Future    3. Dyslipidemia  Lipid panel still all at target.  Continue with own efforts.  - Lipid Profile; Future  - Comp Metabolic Panel; Future  - HEMOGLOBIN A1C; Future  - CBC WITH  DIFFERENTIAL; Future  - TSH; Future    4. Hypothyroidism, unspecified type  Adequately replaced.  Continue the same dose of thyroid medication.  - Lipid Profile; Future  - Comp Metabolic Panel; Future  - HEMOGLOBIN A1C; Future  - CBC WITH DIFFERENTIAL; Future  - TSH; Future    5. Iron deficiency anemia, unspecified iron deficiency anemia type  Significant improvement of the hemoglobin from 7.7-11.8 and hematocrit from 29.6-39.7.  She is followed closely by her hematologist and GI specialist.  Her hematologist will order the iron transfusion as needed depending on her hemoglobin level.  She has intermittent drop in her hemoglobin most likely from lower GI bleed from the angioectasia of the colon.  We will follow along with her specialists.  - Lipid Profile; Future  - Comp Metabolic Panel; Future  - HEMOGLOBIN A1C; Future  - CBC WITH DIFFERENTIAL; Future  - TSH; Future    6. Cirrhosis of liver with ascites, unspecified hepatic cirrhosis type (HCC)  Currently stable and compensated.  She is followed by her GI specialist closely and she will have another abdominal ultrasound including elastography in September.  At this time she is compensated.  - Lipid Profile; Future  - Comp Metabolic Panel; Future  - HEMOGLOBIN A1C; Future  - CBC WITH DIFFERENTIAL; Future  - TSH; Future    Follow-up in 4 months or sooner if needed.      Please note that this dictation was created using voice recognition software. I have worked with consultants from the vendor as well as technical experts from [employer] to optimize the interface. I have made every reasonable attempt to correct obvious errors, but I expect that there are errors of grammar and possibly content I did not discover before finalizing the note.

## 2020-07-28 ENCOUNTER — HOSPITAL ENCOUNTER (OUTPATIENT)
Dept: RADIOLOGY | Facility: MEDICAL CENTER | Age: 75
End: 2020-07-28
Attending: PHYSICIAN ASSISTANT
Payer: MEDICARE

## 2020-07-28 DIAGNOSIS — D50.9 IRON DEFICIENCY ANEMIA, UNSPECIFIED IRON DEFICIENCY ANEMIA TYPE: ICD-10-CM

## 2020-07-28 DIAGNOSIS — K74.60 HEPATIC CIRRHOSIS, UNSPECIFIED HEPATIC CIRRHOSIS TYPE, UNSPECIFIED WHETHER ASCITES PRESENT (HCC): ICD-10-CM

## 2020-07-28 PROCEDURE — 76700 US EXAM ABDOM COMPLETE: CPT

## 2020-08-14 ENCOUNTER — HOSPITAL ENCOUNTER (OUTPATIENT)
Dept: LAB | Facility: MEDICAL CENTER | Age: 75
End: 2020-08-14
Attending: PHYSICIAN ASSISTANT
Payer: MEDICARE

## 2020-08-14 LAB
ALBUMIN SERPL BCP-MCNC: 4.3 G/DL (ref 3.2–4.9)
ALBUMIN/GLOB SERPL: 1.5 G/DL
ALP SERPL-CCNC: 100 U/L (ref 30–99)
ALT SERPL-CCNC: 53 U/L (ref 2–50)
ANION GAP SERPL CALC-SCNC: 17 MMOL/L (ref 7–16)
ANISOCYTOSIS BLD QL SMEAR: ABNORMAL
AST SERPL-CCNC: 55 U/L (ref 12–45)
BASOPHILS # BLD AUTO: 1.5 % (ref 0–1.8)
BASOPHILS # BLD: 0.09 K/UL (ref 0–0.12)
BILIRUB SERPL-MCNC: 0.5 MG/DL (ref 0.1–1.5)
BUN SERPL-MCNC: 15 MG/DL (ref 8–22)
CALCIUM SERPL-MCNC: 9.5 MG/DL (ref 8.5–10.5)
CHLORIDE SERPL-SCNC: 101 MMOL/L (ref 96–112)
CO2 SERPL-SCNC: 22 MMOL/L (ref 20–33)
COMMENT 1642: NORMAL
CREAT SERPL-MCNC: 0.85 MG/DL (ref 0.5–1.4)
EOSINOPHIL # BLD AUTO: 0.31 K/UL (ref 0–0.51)
EOSINOPHIL NFR BLD: 5.3 % (ref 0–6.9)
ERYTHROCYTE [DISTWIDTH] IN BLOOD BY AUTOMATED COUNT: 49.6 FL (ref 35.9–50)
FASTING STATUS PATIENT QL REPORTED: NORMAL
GLOBULIN SER CALC-MCNC: 2.8 G/DL (ref 1.9–3.5)
GLUCOSE SERPL-MCNC: 177 MG/DL (ref 65–99)
HCT VFR BLD AUTO: 38.2 % (ref 37–47)
HGB BLD-MCNC: 11.2 G/DL (ref 12–16)
IMM GRANULOCYTES # BLD AUTO: 0.01 K/UL (ref 0–0.11)
IMM GRANULOCYTES NFR BLD AUTO: 0.2 % (ref 0–0.9)
INR PPP: 1.03 (ref 0.87–1.13)
LYMPHOCYTES # BLD AUTO: 1.14 K/UL (ref 1–4.8)
LYMPHOCYTES NFR BLD: 19.5 % (ref 22–41)
MCH RBC QN AUTO: 24.1 PG (ref 27–33)
MCHC RBC AUTO-ENTMCNC: 29.3 G/DL (ref 33.6–35)
MCV RBC AUTO: 82.2 FL (ref 81.4–97.8)
MICROCYTES BLD QL SMEAR: ABNORMAL
MONOCYTES # BLD AUTO: 0.45 K/UL (ref 0–0.85)
MONOCYTES NFR BLD AUTO: 7.7 % (ref 0–13.4)
MORPHOLOGY BLD-IMP: NORMAL
NEUTROPHILS # BLD AUTO: 3.85 K/UL (ref 2–7.15)
NEUTROPHILS NFR BLD: 65.8 % (ref 44–72)
NRBC # BLD AUTO: 0 K/UL
NRBC BLD-RTO: 0 /100 WBC
OVALOCYTES BLD QL SMEAR: NORMAL
PLATELET # BLD AUTO: 174 K/UL (ref 164–446)
PLATELET BLD QL SMEAR: NORMAL
PMV BLD AUTO: 10.3 FL (ref 9–12.9)
POIKILOCYTOSIS BLD QL SMEAR: NORMAL
POLYCHROMASIA BLD QL SMEAR: NORMAL
POTASSIUM SERPL-SCNC: 4.5 MMOL/L (ref 3.6–5.5)
PROT SERPL-MCNC: 7.1 G/DL (ref 6–8.2)
PROTHROMBIN TIME: 13.8 SEC (ref 12–14.6)
RBC # BLD AUTO: 4.65 M/UL (ref 4.2–5.4)
RBC BLD AUTO: PRESENT
SODIUM SERPL-SCNC: 140 MMOL/L (ref 135–145)
VARIANT LYMPHS BLD QL SMEAR: NORMAL
WBC # BLD AUTO: 5.9 K/UL (ref 4.8–10.8)

## 2020-08-14 PROCEDURE — 85025 COMPLETE CBC W/AUTO DIFF WBC: CPT

## 2020-08-14 PROCEDURE — 82105 ALPHA-FETOPROTEIN SERUM: CPT

## 2020-08-14 PROCEDURE — 36415 COLL VENOUS BLD VENIPUNCTURE: CPT

## 2020-08-14 PROCEDURE — 85610 PROTHROMBIN TIME: CPT

## 2020-08-14 PROCEDURE — 80053 COMPREHEN METABOLIC PANEL: CPT

## 2020-08-16 LAB — AFP-TM SERPL-MCNC: 4 NG/ML (ref 0–9)

## 2020-09-16 ENCOUNTER — HOSPITAL ENCOUNTER (OUTPATIENT)
Dept: LAB | Facility: MEDICAL CENTER | Age: 75
End: 2020-09-16
Attending: PHYSICIAN ASSISTANT
Payer: MEDICARE

## 2020-09-16 LAB
25(OH)D3 SERPL-MCNC: 30 NG/ML (ref 30–100)
ALBUMIN SERPL BCP-MCNC: 4.2 G/DL (ref 3.2–4.9)
ALBUMIN/GLOB SERPL: 1.7 G/DL
ALP SERPL-CCNC: 104 U/L (ref 30–99)
ALT SERPL-CCNC: 41 U/L (ref 2–50)
ANION GAP SERPL CALC-SCNC: 15 MMOL/L (ref 7–16)
AST SERPL-CCNC: 44 U/L (ref 12–45)
BILIRUB SERPL-MCNC: 0.5 MG/DL (ref 0.1–1.5)
BUN SERPL-MCNC: 12 MG/DL (ref 8–22)
CALCIUM SERPL-MCNC: 9.3 MG/DL (ref 8.5–10.5)
CHLORIDE SERPL-SCNC: 106 MMOL/L (ref 96–112)
CO2 SERPL-SCNC: 21 MMOL/L (ref 20–33)
CREAT SERPL-MCNC: 0.74 MG/DL (ref 0.5–1.4)
FASTING STATUS PATIENT QL REPORTED: NORMAL
FERRITIN SERPL-MCNC: 7 NG/ML (ref 10–291)
GLOBULIN SER CALC-MCNC: 2.5 G/DL (ref 1.9–3.5)
GLUCOSE SERPL-MCNC: 211 MG/DL (ref 65–99)
INR PPP: 1.05 (ref 0.87–1.13)
IRON SATN MFR SERPL: 7 % (ref 15–55)
IRON SERPL-MCNC: 35 UG/DL (ref 40–170)
POTASSIUM SERPL-SCNC: 4.8 MMOL/L (ref 3.6–5.5)
PROT SERPL-MCNC: 6.7 G/DL (ref 6–8.2)
PROTHROMBIN TIME: 14 SEC (ref 12–14.6)
SODIUM SERPL-SCNC: 142 MMOL/L (ref 135–145)
TIBC SERPL-MCNC: 485 UG/DL (ref 250–450)
UIBC SERPL-MCNC: 450 UG/DL (ref 110–370)
VIT B12 SERPL-MCNC: 493 PG/ML (ref 211–911)

## 2020-09-16 PROCEDURE — 82105 ALPHA-FETOPROTEIN SERUM: CPT

## 2020-09-16 PROCEDURE — 82728 ASSAY OF FERRITIN: CPT

## 2020-09-16 PROCEDURE — 85610 PROTHROMBIN TIME: CPT

## 2020-09-16 PROCEDURE — 84630 ASSAY OF ZINC: CPT

## 2020-09-16 PROCEDURE — 82607 VITAMIN B-12: CPT

## 2020-09-16 PROCEDURE — 80053 COMPREHEN METABOLIC PANEL: CPT

## 2020-09-16 PROCEDURE — 84590 ASSAY OF VITAMIN A: CPT

## 2020-09-16 PROCEDURE — 83540 ASSAY OF IRON: CPT

## 2020-09-16 PROCEDURE — 82306 VITAMIN D 25 HYDROXY: CPT

## 2020-09-16 PROCEDURE — 83550 IRON BINDING TEST: CPT

## 2020-09-16 PROCEDURE — 36415 COLL VENOUS BLD VENIPUNCTURE: CPT

## 2020-09-18 LAB — AFP-TM SERPL-MCNC: 4 NG/ML (ref 0–9)

## 2020-09-20 LAB — ZINC BLD-MCNC: 460.9 UG/DL (ref 440–860)

## 2020-09-21 LAB
ANNOTATION COMMENT IMP: ABNORMAL
RETINYL PALMITATE SERPL-MCNC: <0.02 MG/L (ref 0–0.1)
VIT A SERPL-MCNC: 0.28 MG/L (ref 0.3–1.2)

## 2020-10-05 DIAGNOSIS — E03.9 HYPOTHYROIDISM, UNSPECIFIED TYPE: ICD-10-CM

## 2020-10-05 RX ORDER — GLIMEPIRIDE 2 MG/1
TABLET ORAL
Qty: 100 TAB | Refills: 1 | Status: SHIPPED | OUTPATIENT
Start: 2020-10-05 | End: 2021-05-03

## 2020-10-05 RX ORDER — LEVOTHYROXINE SODIUM 0.1 MG/1
TABLET ORAL
Qty: 100 TAB | Refills: 1 | Status: SHIPPED | OUTPATIENT
Start: 2020-10-05 | End: 2021-04-28 | Stop reason: SDUPTHER

## 2020-10-14 ENCOUNTER — TELEPHONE (OUTPATIENT)
Dept: TRANSPLANT | Facility: MEDICAL CENTER | Age: 75
End: 2020-10-14

## 2020-10-14 ENCOUNTER — HOSPITAL ENCOUNTER (OUTPATIENT)
Dept: LAB | Facility: MEDICAL CENTER | Age: 75
End: 2020-10-14
Attending: INTERNAL MEDICINE
Payer: MEDICARE

## 2020-10-14 LAB
ALBUMIN SERPL BCP-MCNC: 4.2 G/DL (ref 3.2–4.9)
ALBUMIN/GLOB SERPL: 1.4 G/DL
ALP SERPL-CCNC: 88 U/L (ref 30–99)
ALT SERPL-CCNC: 34 U/L (ref 2–50)
ANION GAP SERPL CALC-SCNC: 12 MMOL/L (ref 7–16)
ANISOCYTOSIS BLD QL SMEAR: ABNORMAL
AST SERPL-CCNC: 40 U/L (ref 12–45)
BASOPHILS # BLD AUTO: 1.4 % (ref 0–1.8)
BASOPHILS # BLD: 0.06 K/UL (ref 0–0.12)
BILIRUB SERPL-MCNC: 0.4 MG/DL (ref 0.1–1.5)
BUN SERPL-MCNC: 12 MG/DL (ref 8–22)
BURR CELLS BLD QL SMEAR: NORMAL
CALCIUM SERPL-MCNC: 9.3 MG/DL (ref 8.5–10.5)
CHLORIDE SERPL-SCNC: 106 MMOL/L (ref 96–112)
CO2 SERPL-SCNC: 21 MMOL/L (ref 20–33)
COMMENT 1642: NORMAL
CREAT SERPL-MCNC: 0.85 MG/DL (ref 0.5–1.4)
DACRYOCYTES BLD QL SMEAR: NORMAL
EOSINOPHIL # BLD AUTO: 0.22 K/UL (ref 0–0.51)
EOSINOPHIL NFR BLD: 5 % (ref 0–6.9)
ERYTHROCYTE [DISTWIDTH] IN BLOOD BY AUTOMATED COUNT: 47.6 FL (ref 35.9–50)
FERRITIN SERPL-MCNC: 7.4 NG/ML (ref 10–291)
GLOBULIN SER CALC-MCNC: 2.9 G/DL (ref 1.9–3.5)
GLUCOSE SERPL-MCNC: 180 MG/DL (ref 65–99)
HCT VFR BLD AUTO: 33.5 % (ref 37–47)
HGB BLD-MCNC: 9.2 G/DL (ref 12–16)
HYPOCHROMIA BLD QL SMEAR: ABNORMAL
IMM GRANULOCYTES # BLD AUTO: 0.01 K/UL (ref 0–0.11)
IMM GRANULOCYTES NFR BLD AUTO: 0.2 % (ref 0–0.9)
IRON SATN MFR SERPL: 5 % (ref 15–55)
IRON SERPL-MCNC: 24 UG/DL (ref 40–170)
LYMPHOCYTES # BLD AUTO: 0.91 K/UL (ref 1–4.8)
LYMPHOCYTES NFR BLD: 20.6 % (ref 22–41)
MCH RBC QN AUTO: 20.8 PG (ref 27–33)
MCHC RBC AUTO-ENTMCNC: 27.5 G/DL (ref 33.6–35)
MCV RBC AUTO: 75.8 FL (ref 81.4–97.8)
MICROCYTES BLD QL SMEAR: ABNORMAL
MONOCYTES # BLD AUTO: 0.42 K/UL (ref 0–0.85)
MONOCYTES NFR BLD AUTO: 9.5 % (ref 0–13.4)
MORPHOLOGY BLD-IMP: NORMAL
NEUTROPHILS # BLD AUTO: 2.8 K/UL (ref 2–7.15)
NEUTROPHILS NFR BLD: 63.3 % (ref 44–72)
NRBC # BLD AUTO: 0 K/UL
NRBC BLD-RTO: 0 /100 WBC
OVALOCYTES BLD QL SMEAR: NORMAL
PLATELET # BLD AUTO: 153 K/UL (ref 164–446)
PLATELET BLD QL SMEAR: NORMAL
PMV BLD AUTO: 10.2 FL (ref 9–12.9)
POIKILOCYTOSIS BLD QL SMEAR: NORMAL
POLYCHROMASIA BLD QL SMEAR: NORMAL
POTASSIUM SERPL-SCNC: 4.5 MMOL/L (ref 3.6–5.5)
PROT SERPL-MCNC: 7.1 G/DL (ref 6–8.2)
RBC # BLD AUTO: 4.42 M/UL (ref 4.2–5.4)
RBC BLD AUTO: PRESENT
SCHISTOCYTES BLD QL SMEAR: NORMAL
SODIUM SERPL-SCNC: 139 MMOL/L (ref 135–145)
TIBC SERPL-MCNC: 469 UG/DL (ref 250–450)
TRANSFERRIN SERPL-MCNC: 388 MG/DL (ref 200–370)
UIBC SERPL-MCNC: 445 UG/DL (ref 110–370)
WBC # BLD AUTO: 4.4 K/UL (ref 4.8–10.8)

## 2020-10-14 PROCEDURE — 83540 ASSAY OF IRON: CPT

## 2020-10-14 PROCEDURE — 82728 ASSAY OF FERRITIN: CPT

## 2020-10-14 PROCEDURE — 85025 COMPLETE CBC W/AUTO DIFF WBC: CPT

## 2020-10-14 PROCEDURE — 84466 ASSAY OF TRANSFERRIN: CPT

## 2020-10-14 PROCEDURE — 83550 IRON BINDING TEST: CPT | Mod: XU

## 2020-10-14 PROCEDURE — 80053 COMPREHEN METABOLIC PANEL: CPT

## 2020-10-14 PROCEDURE — 36415 COLL VENOUS BLD VENIPUNCTURE: CPT

## 2020-10-14 NOTE — TELEPHONE ENCOUNTER
Pt called inquiring about the status of her referral this morning. Pt needs to be referred to [facility], not liver transplant. This RN had left a voicemail with PEREZ Prajapati after receiving the referral at the end of Sept and this RN spoke to the authorizations department at [facility] on 10/13 informing them that they need to re-route the referral and obtain any necessary auth.    Informed pt that she should stay in contact with [facility] to ensure that the referral gets re-routed and pt seen in outreach. Pt verbalized understanding.

## 2020-10-28 RX ORDER — EPINEPHRINE 1 MG/ML(1)
0.5 AMPUL (ML) INJECTION PRN
Status: CANCELLED | OUTPATIENT
Start: 2020-10-29

## 2020-10-28 RX ORDER — DIPHENHYDRAMINE HYDROCHLORIDE 50 MG/ML
50 INJECTION INTRAMUSCULAR; INTRAVENOUS PRN
Status: CANCELLED | OUTPATIENT
Start: 2020-10-29

## 2020-10-28 RX ORDER — HEPARIN SODIUM (PORCINE) LOCK FLUSH IV SOLN 100 UNIT/ML 100 UNIT/ML
500 SOLUTION INTRAVENOUS PRN
Status: CANCELLED | OUTPATIENT
Start: 2020-10-29

## 2020-10-28 RX ORDER — 0.9 % SODIUM CHLORIDE 0.9 %
10 VIAL (ML) INJECTION PRN
Status: CANCELLED | OUTPATIENT
Start: 2020-10-29

## 2020-10-28 RX ORDER — METHYLPREDNISOLONE SODIUM SUCCINATE 125 MG/2ML
125 INJECTION, POWDER, LYOPHILIZED, FOR SOLUTION INTRAMUSCULAR; INTRAVENOUS PRN
Status: CANCELLED | OUTPATIENT
Start: 2020-10-29

## 2020-10-28 RX ORDER — 0.9 % SODIUM CHLORIDE 0.9 %
VIAL (ML) INJECTION PRN
Status: CANCELLED | OUTPATIENT
Start: 2020-10-29

## 2020-10-28 RX ORDER — SODIUM CHLORIDE 9 MG/ML
INJECTION, SOLUTION INTRAVENOUS CONTINUOUS
Status: CANCELLED | OUTPATIENT
Start: 2020-10-29

## 2020-10-28 RX ORDER — 0.9 % SODIUM CHLORIDE 0.9 %
3 VIAL (ML) INJECTION PRN
Status: CANCELLED | OUTPATIENT
Start: 2020-10-29

## 2020-10-28 RX ORDER — ACETAMINOPHEN 325 MG/1
650 TABLET ORAL ONCE
Status: CANCELLED | OUTPATIENT
Start: 2020-10-29

## 2020-10-29 ENCOUNTER — OUTPATIENT INFUSION SERVICES (OUTPATIENT)
Dept: ONCOLOGY | Facility: MEDICAL CENTER | Age: 75
End: 2020-10-29
Attending: INTERNAL MEDICINE
Payer: MEDICARE

## 2020-10-29 VITALS
SYSTOLIC BLOOD PRESSURE: 146 MMHG | HEIGHT: 66 IN | OXYGEN SATURATION: 96 % | BODY MASS INDEX: 30.05 KG/M2 | TEMPERATURE: 97.3 F | WEIGHT: 186.95 LBS | RESPIRATION RATE: 18 BRPM | HEART RATE: 93 BPM | DIASTOLIC BLOOD PRESSURE: 49 MMHG

## 2020-10-29 DIAGNOSIS — D50.0 IRON DEFICIENCY ANEMIA DUE TO CHRONIC BLOOD LOSS: ICD-10-CM

## 2020-10-29 LAB
ERYTHROCYTE [DISTWIDTH] IN BLOOD BY AUTOMATED COUNT: 47.5 FL (ref 35.9–50)
HCT VFR BLD AUTO: 30 % (ref 37–47)
HGB BLD-MCNC: 8.4 G/DL (ref 12–16)
MCH RBC QN AUTO: 20.4 PG (ref 27–33)
MCHC RBC AUTO-ENTMCNC: 28 G/DL (ref 33.6–35)
MCV RBC AUTO: 72.8 FL (ref 81.4–97.8)
PLATELET # BLD AUTO: 168 K/UL (ref 164–446)
PMV BLD AUTO: 9.9 FL (ref 9–12.9)
RBC # BLD AUTO: 4.12 M/UL (ref 4.2–5.4)
WBC # BLD AUTO: 5.8 K/UL (ref 4.8–10.8)

## 2020-10-29 PROCEDURE — 306780 HCHG STAT FOR TRANSFUSION PER CASE

## 2020-10-29 PROCEDURE — 36415 COLL VENOUS BLD VENIPUNCTURE: CPT

## 2020-10-29 PROCEDURE — 700111 HCHG RX REV CODE 636 W/ 250 OVERRIDE (IP): Performed by: INTERNAL MEDICINE

## 2020-10-29 PROCEDURE — 700102 HCHG RX REV CODE 250 W/ 637 OVERRIDE(OP): Performed by: INTERNAL MEDICINE

## 2020-10-29 PROCEDURE — 96365 THER/PROPH/DIAG IV INF INIT: CPT

## 2020-10-29 PROCEDURE — 700105 HCHG RX REV CODE 258: Performed by: INTERNAL MEDICINE

## 2020-10-29 PROCEDURE — 96366 THER/PROPH/DIAG IV INF ADDON: CPT

## 2020-10-29 PROCEDURE — A9270 NON-COVERED ITEM OR SERVICE: HCPCS | Performed by: INTERNAL MEDICINE

## 2020-10-29 PROCEDURE — 85027 COMPLETE CBC AUTOMATED: CPT

## 2020-10-29 PROCEDURE — 96375 TX/PRO/DX INJ NEW DRUG ADDON: CPT

## 2020-10-29 RX ORDER — ACETAMINOPHEN 325 MG/1
650 TABLET ORAL ONCE
Status: COMPLETED | OUTPATIENT
Start: 2020-10-29 | End: 2020-10-29

## 2020-10-29 RX ADMIN — SODIUM CHLORIDE 1675 MG: 9 INJECTION, SOLUTION INTRAVENOUS at 13:36

## 2020-10-29 RX ADMIN — ACETAMINOPHEN 650 MG: 325 TABLET ORAL at 11:13

## 2020-10-29 RX ADMIN — SODIUM CHLORIDE 25 MG: 9 INJECTION, SOLUTION INTRAVENOUS at 11:53

## 2020-10-29 RX ADMIN — DIPHENHYDRAMINE HYDROCHLORIDE 25 MG: 50 INJECTION, SOLUTION INTRAMUSCULAR; INTRAVENOUS at 11:16

## 2020-10-29 ASSESSMENT — PAIN DESCRIPTION - PAIN TYPE: TYPE: ACUTE PAIN

## 2020-10-29 ASSESSMENT — FIBROSIS 4 INDEX: FIB4 SCORE: 3.27

## 2020-10-29 NOTE — PROGRESS NOTES
Pt ambulatory to [facility] for Iron Dextran infusion for iron deficiency anemia.  Pt w/ no s/s of infection, pt has no complaints at this time.  PIV established in LFA, brisk blood return noted, CBC drawn per protocol, flushed per protocol.  Pre-meds given 30 minutes prior to test dose.  Test dose administered w/ no adverse reactions.  1 hour obs completed w/ no adverse reactions.  Therapeutic dose administering over 4 hours .  1700  Report given to Jayla MAN who will finish caring for the pt.  No f/u appt needed at this time.

## 2020-10-29 NOTE — PROGRESS NOTES
Received report from MAGDA Cruz.  Assumed care of pt while Iron Dextran is infusing.  Iron infusion completed without adverse reaction.  PIV flushed with NS and site removed.  Pt dc home to self care.

## 2020-10-29 NOTE — PROGRESS NOTES
IV Iron Per Pharmacy Note    Patient Lean Body Weight:  59 kg  Reason for Iron Replacement: iron deficiency anemia      Lab Results   Component Value Date/Time    WBC 5.8 10/29/2020 10:20 AM    RBC 4.12 (L) 10/29/2020 10:20 AM    HEMOGLOBIN 8.4 (L) 10/29/2020 10:20 AM    HEMATOCRIT 30.0 (L) 10/29/2020 10:20 AM    MCV 72.8 (L) 10/29/2020 10:20 AM    MCH 20.4 (L) 10/29/2020 10:20 AM    MCHC 28.0 (L) 10/29/2020 10:20 AM    MPV 9.9 10/29/2020 10:20 AM       Assessment/Plan:    1. IV iron indicated.    2. Give Iron Dextran 25 mg IV test dose following diphenhydramine/acetaminophen premeds over 30 minutes per protocol.    3. If no reaction (anaphylaxis, hypotension, hypertension, NVD, chest pain, back pain, urticaria, pruritis) in the next hour, proceed to full dose.      4. Dosing calculation:  · IBW 59 kg rounded to 60 kg for dosing chart  · Hgb 8.4 g/dl rounded to 8 g/dl for dosing chart  · Total ml requirement of iron dextran = 34 x 50 mg/ml = 1700 mg total dose  · 1700 total dose - 25 mg test dose = 1675 mg full dose    5. Full dose Iron Dextran 1675 mg IV over 4 hours.  Continue to monitor for delayed ADR including: arthralgia, myalgia, headache, backache, chills, dizziness, malaise, moderate to high fever, [place].      Latesha Muse, PharmD

## 2020-11-01 DIAGNOSIS — I10 ESSENTIAL HYPERTENSION: ICD-10-CM

## 2020-11-02 RX ORDER — LOSARTAN POTASSIUM 100 MG/1
TABLET ORAL
Qty: 100 TAB | Refills: 1 | Status: SHIPPED | OUTPATIENT
Start: 2020-11-02 | End: 2021-08-01

## 2020-11-02 NOTE — TELEPHONE ENCOUNTER
Received request via: Pharmacy    Was the patient seen in the last year in this department? Yes    Does the patient have an active prescription (recently filled or refills available) for medication(s) requested? No     Future Appointments       Provider Department Center    11/17/2020 9:20 AM Su Randhawa M.D. [facility] - [address]

## 2020-11-06 ENCOUNTER — HOSPITAL ENCOUNTER (OUTPATIENT)
Dept: LAB | Facility: MEDICAL CENTER | Age: 75
End: 2020-11-06
Attending: FAMILY MEDICINE
Payer: MEDICARE

## 2020-11-06 DIAGNOSIS — E03.9 HYPOTHYROIDISM, UNSPECIFIED TYPE: ICD-10-CM

## 2020-11-06 DIAGNOSIS — E78.5 DYSLIPIDEMIA: ICD-10-CM

## 2020-11-06 DIAGNOSIS — I10 ESSENTIAL HYPERTENSION: ICD-10-CM

## 2020-11-06 DIAGNOSIS — D50.9 IRON DEFICIENCY ANEMIA, UNSPECIFIED IRON DEFICIENCY ANEMIA TYPE: ICD-10-CM

## 2020-11-06 DIAGNOSIS — K74.60 CIRRHOSIS OF LIVER WITH ASCITES, UNSPECIFIED HEPATIC CIRRHOSIS TYPE (HCC): ICD-10-CM

## 2020-11-06 DIAGNOSIS — E11.65 UNCONTROLLED TYPE 2 DIABETES MELLITUS WITH HYPERGLYCEMIA (HCC): ICD-10-CM

## 2020-11-06 DIAGNOSIS — R18.8 CIRRHOSIS OF LIVER WITH ASCITES, UNSPECIFIED HEPATIC CIRRHOSIS TYPE (HCC): ICD-10-CM

## 2020-11-06 LAB
ALBUMIN SERPL BCP-MCNC: 4.1 G/DL (ref 3.2–4.9)
ALBUMIN/GLOB SERPL: 1.6 G/DL
ALP SERPL-CCNC: 98 U/L (ref 30–99)
ALT SERPL-CCNC: 37 U/L (ref 2–50)
ANION GAP SERPL CALC-SCNC: 14 MMOL/L (ref 7–16)
ANISOCYTOSIS BLD QL SMEAR: ABNORMAL
AST SERPL-CCNC: 48 U/L (ref 12–45)
BASOPHILS # BLD AUTO: 1.1 % (ref 0–1.8)
BASOPHILS # BLD: 0.06 K/UL (ref 0–0.12)
BILIRUB SERPL-MCNC: 0.4 MG/DL (ref 0.1–1.5)
BUN SERPL-MCNC: 8 MG/DL (ref 8–22)
CALCIUM SERPL-MCNC: 9.5 MG/DL (ref 8.5–10.5)
CHLORIDE SERPL-SCNC: 105 MMOL/L (ref 96–112)
CHOLEST SERPL-MCNC: 153 MG/DL (ref 100–199)
CO2 SERPL-SCNC: 21 MMOL/L (ref 20–33)
COMMENT 1642: NORMAL
CREAT SERPL-MCNC: 0.76 MG/DL (ref 0.5–1.4)
EOSINOPHIL # BLD AUTO: 0.27 K/UL (ref 0–0.51)
EOSINOPHIL NFR BLD: 5.2 % (ref 0–6.9)
EST. AVERAGE GLUCOSE BLD GHB EST-MCNC: 140 MG/DL
FASTING STATUS PATIENT QL REPORTED: NORMAL
GLOBULIN SER CALC-MCNC: 2.6 G/DL (ref 1.9–3.5)
GLUCOSE SERPL-MCNC: 148 MG/DL (ref 65–99)
HBA1C MFR BLD: 6.5 % (ref 0–5.6)
HCT VFR BLD AUTO: 34.1 % (ref 37–47)
HDLC SERPL-MCNC: 43 MG/DL
HGB BLD-MCNC: 9.6 G/DL (ref 12–16)
IMM GRANULOCYTES # BLD AUTO: 0.02 K/UL (ref 0–0.11)
IMM GRANULOCYTES NFR BLD AUTO: 0.4 % (ref 0–0.9)
LDLC SERPL CALC-MCNC: 86 MG/DL
LYMPHOCYTES # BLD AUTO: 1.02 K/UL (ref 1–4.8)
LYMPHOCYTES NFR BLD: 19.5 % (ref 22–41)
MACROCYTES BLD QL SMEAR: ABNORMAL
MCH RBC QN AUTO: 22.6 PG (ref 27–33)
MCHC RBC AUTO-ENTMCNC: 28.2 G/DL (ref 33.6–35)
MCV RBC AUTO: 80.2 FL (ref 81.4–97.8)
MICROCYTES BLD QL SMEAR: ABNORMAL
MONOCYTES # BLD AUTO: 0.41 K/UL (ref 0–0.85)
MONOCYTES NFR BLD AUTO: 7.8 % (ref 0–13.4)
MORPHOLOGY BLD-IMP: NORMAL
NEUTROPHILS # BLD AUTO: 3.46 K/UL (ref 2–7.15)
NEUTROPHILS NFR BLD: 66 % (ref 44–72)
NRBC # BLD AUTO: 0 K/UL
NRBC BLD-RTO: 0 /100 WBC
OVALOCYTES BLD QL SMEAR: NORMAL
PLATELET # BLD AUTO: 132 K/UL (ref 164–446)
PLATELET BLD QL SMEAR: NORMAL
PMV BLD AUTO: 10.7 FL (ref 9–12.9)
POTASSIUM SERPL-SCNC: 4.2 MMOL/L (ref 3.6–5.5)
PROT SERPL-MCNC: 6.7 G/DL (ref 6–8.2)
RBC # BLD AUTO: 4.25 M/UL (ref 4.2–5.4)
RBC BLD AUTO: PRESENT
SODIUM SERPL-SCNC: 140 MMOL/L (ref 135–145)
TRIGL SERPL-MCNC: 120 MG/DL (ref 0–149)
WBC # BLD AUTO: 5.2 K/UL (ref 4.8–10.8)

## 2020-11-06 PROCEDURE — 36415 COLL VENOUS BLD VENIPUNCTURE: CPT

## 2020-11-06 PROCEDURE — 83036 HEMOGLOBIN GLYCOSYLATED A1C: CPT

## 2020-11-06 PROCEDURE — 80061 LIPID PANEL: CPT

## 2020-11-06 PROCEDURE — 80053 COMPREHEN METABOLIC PANEL: CPT

## 2020-11-06 PROCEDURE — 84443 ASSAY THYROID STIM HORMONE: CPT

## 2020-11-06 PROCEDURE — 85025 COMPLETE CBC W/AUTO DIFF WBC: CPT

## 2020-11-07 LAB — TSH SERPL DL<=0.005 MIU/L-ACNC: 3.68 UIU/ML (ref 0.38–5.33)

## 2020-11-13 ENCOUNTER — TELEPHONE (OUTPATIENT)
Dept: MEDICAL GROUP | Facility: PHYSICIAN GROUP | Age: 75
End: 2020-11-13

## 2020-11-13 NOTE — TELEPHONE ENCOUNTER
ESTABLISHED PATIENT PRE-VISIT PLANNING     Patient was NOT contacted to complete PVP.     Note: Patient will not be contacted if there is no indication to call.     1.  Reviewed notes from the last few office visits within the medical group: Yes    2.  If any orders were placed at last visit or intended to be done for this visit (i.e. 6 mos follow-up), do we have Results/Consult Notes?        •  Labs - Labs ordered, completed on 11/06/2020 and results are in chart.   Note: If patient appointment is for lab review and patient did not complete labs, check with provider if OK to reschedule patient until labs completed.       •  Imaging - Imaging was not ordered at last office visit.       •  Referrals - Referral ordered, patient has NOT been seen.    3. Is this appointment scheduled as a Hospital Follow-Up? No    4.  Immunizations were updated in Epic using WebIZ?: Epic matches WebIZ       •  Web Iz Recommendations: SHINGRIX (Shingles)    5.  Patient is due for the following Health Maintenance Topics:   Health Maintenance Due   Topic Date Due   • Annual Wellness Visit  02/28/2019   • IMM ZOSTER VACCINES (3 of 3) 01/07/2020   • RETINAL SCREENING  03/20/2020   • DIABETES MONOFILAMENT / LE EXAM  11/12/2020       - Patient plans to schedule appointment for Annual Wellness Visit (AWV) and Retinal Eye Exam.    6. Orders for overdue Health Maintenance topics pended in Pre-Charting? N\A    7.  AHA (MDX) form printed for Provider? No, already completed    8.  Patient was NOT informed to arrive 15 min prior to their scheduled appointment and bring in their medication bottles.

## 2020-11-18 ENCOUNTER — OFFICE VISIT (OUTPATIENT)
Dept: MEDICAL GROUP | Facility: PHYSICIAN GROUP | Age: 75
End: 2020-11-18
Payer: MEDICARE

## 2020-11-18 VITALS
BODY MASS INDEX: 27.93 KG/M2 | WEIGHT: 184.3 LBS | SYSTOLIC BLOOD PRESSURE: 144 MMHG | HEIGHT: 68 IN | HEART RATE: 76 BPM | TEMPERATURE: 97.8 F | DIASTOLIC BLOOD PRESSURE: 60 MMHG | OXYGEN SATURATION: 97 %

## 2020-11-18 DIAGNOSIS — I10 ESSENTIAL HYPERTENSION: ICD-10-CM

## 2020-11-18 DIAGNOSIS — E03.9 HYPOTHYROIDISM, UNSPECIFIED TYPE: ICD-10-CM

## 2020-11-18 DIAGNOSIS — K75.81 LIVER CIRRHOSIS SECONDARY TO NONALCOHOLIC STEATOHEPATITIS (NASH) (HCC): ICD-10-CM

## 2020-11-18 DIAGNOSIS — K74.60 LIVER CIRRHOSIS SECONDARY TO NONALCOHOLIC STEATOHEPATITIS (NASH) (HCC): ICD-10-CM

## 2020-11-18 DIAGNOSIS — D50.9 IRON DEFICIENCY ANEMIA, UNSPECIFIED IRON DEFICIENCY ANEMIA TYPE: ICD-10-CM

## 2020-11-18 DIAGNOSIS — Z23 NEED FOR SHINGLES VACCINE: ICD-10-CM

## 2020-11-18 DIAGNOSIS — E78.5 DYSLIPIDEMIA: ICD-10-CM

## 2020-11-18 DIAGNOSIS — E11.9 DIABETES MELLITUS WITHOUT COMPLICATION (HCC): ICD-10-CM

## 2020-11-18 PROCEDURE — 99214 OFFICE O/P EST MOD 30 MIN: CPT | Mod: 25 | Performed by: FAMILY MEDICINE

## 2020-11-18 PROCEDURE — 90750 HZV VACC RECOMBINANT IM: CPT | Performed by: FAMILY MEDICINE

## 2020-11-18 PROCEDURE — 90471 IMMUNIZATION ADMIN: CPT | Performed by: FAMILY MEDICINE

## 2020-11-18 ASSESSMENT — FIBROSIS 4 INDEX: FIB4 SCORE: 4.36

## 2020-11-19 NOTE — PROGRESS NOTES
"Subjective:      Ally Doherty is a 75 y.o. female who presents with Diabetes and Immunizations (shingrix)            HPI     Patient returns for follow-up of her medical problems.    In terms of diabetes mellitus type 2, she continues to take Metformin and glimepiride.    In terms of her hypertension, she continues to take losartan.  She is already on maximum dose of losartan.  Her systolic blood pressure tends to run slightly borderline elevated.    For the dyslipidemia, she continues to manage this with her own efforts only.    We follow her for hypothyroidism and she continues to take thyroid replacement.    For her iron deficiency anemia, she had iron infusion 3 weeks ago.  She gets iron infusion once or twice a year.  She could not tolerate p.o. iron because it makes her sick.  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  She said she knows that her hemoglobin drops because of ice craving.  She gets iron infusion if the hemoglobin is below 9.0.    Her GI specialist has been following her for liver cirrhosis most likely due to Hernandez.  She has been referred to hepatologist at [facility] for consultation.  This is yet to be set up.    Past medical history, past surgical history, family history reviewed-no changes    Social history reviewed-no changes    Allergies reviewed-no changes    Medications reviewed-no changes        ROS     Per HPI, the rest are negative.       Objective:     /60 (BP Location: Left arm, Patient Position: Sitting, BP Cuff Size: Adult)   Pulse 76   Temp 36.6 °C (97.8 °F) (Temporal)   Ht 1.715 m (5' 7.5\")   Wt 83.6 kg (184 lb 4.9 oz)   LMP 02/27/1997   SpO2 97%   BMI 28.44 kg/m²      Physical Exam     Examined alert, awake, oriented, not in distress    Neck-supple, no lymphadenopathy, no thyromegaly  Lungs-clear to auscultation, no rales, no wheezes  Heart-regular rate and rhythm, no murmur  Extremities-no edema, clubbing, " cyanosis  Monofilament testing with a 10 gram force: sensation intact: intact bilaterally  Visual Inspection: Feet without maceration, ulcers, fissures.  Pedal pulses: intact bilaterally       Hospital Outpatient Visit on 11/06/2020   Component Date Value Ref Range Status   • TSH 11/06/2020 3.680  0.380 - 5.330 uIU/mL Final    Comment: Please note new reference ranges effective 12/14/2017 10:00 AM  Pregnant Females, 1st Trimester  0.050-3.700  Pregnant Females, 2nd Trimester  0.310-4.350  Pregnant Females, 3rd Trimester  0.410-5.180     • WBC 11/06/2020 5.2  4.8 - 10.8 K/uL Final   • RBC 11/06/2020 4.25  4.20 - 5.40 M/uL Final   • Hemoglobin 11/06/2020 9.6* 12.0 - 16.0 g/dL Final   • Hematocrit 11/06/2020 34.1* 37.0 - 47.0 % Final   • MCV 11/06/2020 80.2* 81.4 - 97.8 fL Final   • MCH 11/06/2020 22.6* 27.0 - 33.0 pg Final   • MCHC 11/06/2020 28.2* 33.6 - 35.0 g/dL Final   • Platelet Count 11/06/2020 132* 164 - 446 K/uL Final   • MPV 11/06/2020 10.7  9.0 - 12.9 fL Final   • Neutrophils-Polys 11/06/2020 66.00  44.00 - 72.00 % Final   • Lymphocytes 11/06/2020 19.50* 22.00 - 41.00 % Final   • Monocytes 11/06/2020 7.80  0.00 - 13.40 % Final   • Eosinophils 11/06/2020 5.20  0.00 - 6.90 % Final   • Basophils 11/06/2020 1.10  0.00 - 1.80 % Final   • Immature Granulocytes 11/06/2020 0.40  0.00 - 0.90 % Final   • Nucleated RBC 11/06/2020 0.00  /100 WBC Final   • Neutrophils (Absolute) 11/06/2020 3.46  2.00 - 7.15 K/uL Final    Includes immature neutrophils, if present.   • Lymphs (Absolute) 11/06/2020 1.02  1.00 - 4.80 K/uL Final   • Monos (Absolute) 11/06/2020 0.41  0.00 - 0.85 K/uL Final   • Eos (Absolute) 11/06/2020 0.27  0.00 - 0.51 K/uL Final   • Baso (Absolute) 11/06/2020 0.06  0.00 - 0.12 K/uL Final   • Immature Granulocytes (abs) 11/06/2020 0.02  0.00 - 0.11 K/uL Final   • NRBC (Absolute) 11/06/2020 0.00  K/uL Final   • Anisocytosis 11/06/2020 2+*  Final   • Macrocytosis 11/06/2020 1+   Final   • Microcytosis  11/06/2020 1+   Final   • Glycohemoglobin 11/06/2020 6.5* 0.0 - 5.6 % Final    Comment: Increased risk for diabetes:  5.7 -6.4%  Diabetes:  >6.4%  Glycemic control for adults with diabetes:  <7.0%  The above interpretations are per ADA guidelines.  Diagnosis  of diabetes mellitus on the basis of elevated Hemoglobin A1c  should be confirmed by repeating the Hb A1c test.     • Est Avg Glucose 11/06/2020 140  mg/dL Final    Comment: The eAG calculation is based on the A1c-Derived Daily Glucose  (ADAG) study.  See the ADA's website for additional information.     • Sodium 11/06/2020 140  135 - 145 mmol/L Final   • Potassium 11/06/2020 4.2  3.6 - 5.5 mmol/L Final   • Chloride 11/06/2020 105  96 - 112 mmol/L Final   • Co2 11/06/2020 21  20 - 33 mmol/L Final   • Anion Gap 11/06/2020 14.0  7.0 - 16.0 Final   • Glucose 11/06/2020 148* 65 - 99 mg/dL Final   • Bun 11/06/2020 8  8 - 22 mg/dL Final   • Creatinine 11/06/2020 0.76  0.50 - 1.40 mg/dL Final   • Calcium 11/06/2020 9.5  8.5 - 10.5 mg/dL Final   • AST(SGOT) 11/06/2020 48* 12 - 45 U/L Final   • ALT(SGPT) 11/06/2020 37  2 - 50 U/L Final   • Alkaline Phosphatase 11/06/2020 98  30 - 99 U/L Final   • Total Bilirubin 11/06/2020 0.4  0.1 - 1.5 mg/dL Final   • Albumin 11/06/2020 4.1  3.2 - 4.9 g/dL Final   • Total Protein 11/06/2020 6.7  6.0 - 8.2 g/dL Final   • Globulin 11/06/2020 2.6  1.9 - 3.5 g/dL Final   • A-G Ratio 11/06/2020 1.6  g/dL Final   • Cholesterol,Tot 11/06/2020 153  100 - 199 mg/dL Final   • Triglycerides 11/06/2020 120  0 - 149 mg/dL Final   • HDL 11/06/2020 43  >=40 mg/dL Final   • LDL 11/06/2020 86  <100 mg/dL Final   • Fasting Status 11/06/2020 Fasting   Final   • Peripheral Smear Review 11/06/2020 see below   Final    Comment: Due to instrument suspect flags, further review of peripheral smear is  indicated on this patient sample. This review may or may not result in  abnormal findings.     • Plt Estimation 11/06/2020 Normal   Final   • RBC Morphology  11/06/2020 Present   Final   • Ovalocytes 11/06/2020 1+   Final   • Comments-Diff 11/06/2020 see below   Final    Results have been verified by peripheral blood smear review.   • GFR If  11/06/2020 >60  >60 mL/min/1.73 m 2 Final   • GFR If Non  11/06/2020 >60  >60 mL/min/1.73 m 2 Final          Assessment/Plan:        1. Diabetes mellitus without complication (HCC)  Diabetes still under control with hemoglobin A1c of 6.5.  Continue current medications.  - Lipid Profile; Future  - Comp Metabolic Panel; Future  - HEMOGLOBIN A1C; Future  - MICROALBUMIN CREAT RATIO URINE; Future  - TSH; Future  - Diabetic Monofilament Lower Extremity Exam    2. Essential hypertension  She tends to run borderline elevated systolic blood pressure in the office.  Advised to monitor blood pressure at home and if persistently elevated 150/90 or higher she will let me know so we can adjust her medication.  For now continue losartan.  - Lipid Profile; Future  - Comp Metabolic Panel; Future  - HEMOGLOBIN A1C; Future  - MICROALBUMIN CREAT RATIO URINE; Future  - TSH; Future    3. Dyslipidemia  Goal at goal without medication.  She will continue to manage this with her own efforts.  - Lipid Profile; Future  - Comp Metabolic Panel; Future  - HEMOGLOBIN A1C; Future  - MICROALBUMIN CREAT RATIO URINE; Future  - TSH; Future    4. Hypothyroidism, unspecified type  Adequately replaced.  Continue the same dose of levothyroxine.  - Lipid Profile; Future  - Comp Metabolic Panel; Future  - HEMOGLOBIN A1C; Future  - MICROALBUMIN CREAT RATIO URINE; Future  - TSH; Future    5. Iron deficiency anemia, unspecified iron deficiency anemia type  Anemia improved after iron infusion.  She is followed closely by her hematologist.    6. 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  This will be set up through telemedicine for consultation as recommended by her GI  specialist.  We will follow along.    7. Need for shingles vaccine  Second dose of shingles vaccine was given.  - Shingles Vaccine (Shingrix)      Please note that this dictation was created using voice recognition software. I have worked with consultants from the vendor as well as technical experts from [employer] to optimize the interface. I have made every reasonable attempt to correct obvious errors, but I expect that there are errors of grammar and possibly content I did not discover before finalizing the note.

## 2021-01-15 DIAGNOSIS — Z23 NEED FOR VACCINATION: ICD-10-CM

## 2021-02-06 ENCOUNTER — APPOINTMENT (OUTPATIENT)
Dept: FAMILY PLANNING/WOMEN'S HEALTH CLINIC | Facility: IMMUNIZATION CENTER | Age: 76
End: 2021-02-06
Attending: INTERNAL MEDICINE
Payer: MEDICARE

## 2021-02-06 ENCOUNTER — IMMUNIZATION (OUTPATIENT)
Dept: FAMILY PLANNING/WOMEN'S HEALTH CLINIC | Facility: IMMUNIZATION CENTER | Age: 76
End: 2021-02-06
Payer: MEDICARE

## 2021-02-06 DIAGNOSIS — Z23 NEED FOR VACCINATION: ICD-10-CM

## 2021-02-06 DIAGNOSIS — Z23 ENCOUNTER FOR VACCINATION: Primary | ICD-10-CM

## 2021-02-06 PROCEDURE — 0001A PFIZER SARS-COV-2 VACCINE: CPT | Performed by: INTERNAL MEDICINE

## 2021-02-06 PROCEDURE — 91300 PFIZER SARS-COV-2 VACCINE: CPT | Performed by: INTERNAL MEDICINE

## 2021-02-16 ENCOUNTER — HOSPITAL ENCOUNTER (OUTPATIENT)
Dept: RADIOLOGY | Facility: MEDICAL CENTER | Age: 76
End: 2021-02-16
Attending: PHYSICIAN ASSISTANT
Payer: MEDICARE

## 2021-02-16 DIAGNOSIS — E11.9 DIABETES MELLITUS WITHOUT COMPLICATION (HCC): ICD-10-CM

## 2021-02-16 PROCEDURE — 76705 ECHO EXAM OF ABDOMEN: CPT

## 2021-02-17 ENCOUNTER — HOSPITAL ENCOUNTER (OUTPATIENT)
Dept: LAB | Facility: MEDICAL CENTER | Age: 76
End: 2021-02-17
Attending: PHYSICIAN ASSISTANT
Payer: MEDICARE

## 2021-02-17 LAB
ALBUMIN SERPL BCP-MCNC: 4.2 G/DL (ref 3.2–4.9)
ALBUMIN/GLOB SERPL: 1.4 G/DL
ALP SERPL-CCNC: 115 U/L (ref 30–99)
ALT SERPL-CCNC: 41 U/L (ref 2–50)
ANION GAP SERPL CALC-SCNC: 16 MMOL/L (ref 7–16)
AST SERPL-CCNC: 45 U/L (ref 12–45)
BASOPHILS # BLD AUTO: 1.2 % (ref 0–1.8)
BASOPHILS # BLD: 0.06 K/UL (ref 0–0.12)
BILIRUB SERPL-MCNC: 0.6 MG/DL (ref 0.1–1.5)
BUN SERPL-MCNC: 12 MG/DL (ref 8–22)
CALCIUM SERPL-MCNC: 9.8 MG/DL (ref 8.5–10.5)
CHLORIDE SERPL-SCNC: 102 MMOL/L (ref 96–112)
CO2 SERPL-SCNC: 23 MMOL/L (ref 20–33)
CREAT SERPL-MCNC: 0.72 MG/DL (ref 0.5–1.4)
EOSINOPHIL # BLD AUTO: 0.22 K/UL (ref 0–0.51)
EOSINOPHIL NFR BLD: 4.4 % (ref 0–6.9)
ERYTHROCYTE [DISTWIDTH] IN BLOOD BY AUTOMATED COUNT: 46.5 FL (ref 35.9–50)
GLOBULIN SER CALC-MCNC: 2.9 G/DL (ref 1.9–3.5)
GLUCOSE SERPL-MCNC: 180 MG/DL (ref 65–99)
HCT VFR BLD AUTO: 43.5 % (ref 37–47)
HGB BLD-MCNC: 13.3 G/DL (ref 12–16)
IMM GRANULOCYTES # BLD AUTO: 0.01 K/UL (ref 0–0.11)
IMM GRANULOCYTES NFR BLD AUTO: 0.2 % (ref 0–0.9)
INR PPP: 1.08 (ref 0.87–1.13)
LYMPHOCYTES # BLD AUTO: 1.11 K/UL (ref 1–4.8)
LYMPHOCYTES NFR BLD: 22.2 % (ref 22–41)
MCH RBC QN AUTO: 26.2 PG (ref 27–33)
MCHC RBC AUTO-ENTMCNC: 30.6 G/DL (ref 33.6–35)
MCV RBC AUTO: 85.6 FL (ref 81.4–97.8)
MONOCYTES # BLD AUTO: 0.48 K/UL (ref 0–0.85)
MONOCYTES NFR BLD AUTO: 9.6 % (ref 0–13.4)
NEUTROPHILS # BLD AUTO: 3.11 K/UL (ref 2–7.15)
NEUTROPHILS NFR BLD: 62.4 % (ref 44–72)
NRBC # BLD AUTO: 0 K/UL
NRBC BLD-RTO: 0 /100 WBC
PLATELET # BLD AUTO: 134 K/UL (ref 164–446)
PMV BLD AUTO: 10.9 FL (ref 9–12.9)
POTASSIUM SERPL-SCNC: 4.2 MMOL/L (ref 3.6–5.5)
PROT SERPL-MCNC: 7.1 G/DL (ref 6–8.2)
PROTHROMBIN TIME: 14.3 SEC (ref 12–14.6)
RBC # BLD AUTO: 5.08 M/UL (ref 4.2–5.4)
SODIUM SERPL-SCNC: 141 MMOL/L (ref 135–145)
WBC # BLD AUTO: 5 K/UL (ref 4.8–10.8)

## 2021-02-17 PROCEDURE — 36415 COLL VENOUS BLD VENIPUNCTURE: CPT

## 2021-02-17 PROCEDURE — 80053 COMPREHEN METABOLIC PANEL: CPT

## 2021-02-17 PROCEDURE — 82107 ALPHA-FETOPROTEIN L3: CPT

## 2021-02-17 PROCEDURE — 85025 COMPLETE CBC W/AUTO DIFF WBC: CPT

## 2021-02-17 PROCEDURE — 85610 PROTHROMBIN TIME: CPT

## 2021-02-21 LAB
AFP L3 MFR SERPL: 9.2 % (ref 0–9.9)
AFP SERPL-MCNC: 5 NG/ML (ref 0–15)

## 2021-02-27 ENCOUNTER — IMMUNIZATION (OUTPATIENT)
Dept: FAMILY PLANNING/WOMEN'S HEALTH CLINIC | Facility: IMMUNIZATION CENTER | Age: 76
End: 2021-02-27
Payer: MEDICARE

## 2021-02-27 DIAGNOSIS — Z23 ENCOUNTER FOR VACCINATION: Primary | ICD-10-CM

## 2021-02-27 PROCEDURE — 0002A PFIZER SARS-COV-2 VACCINE: CPT

## 2021-02-27 PROCEDURE — 91300 PFIZER SARS-COV-2 VACCINE: CPT

## 2021-03-19 ENCOUNTER — HOSPITAL ENCOUNTER (OUTPATIENT)
Dept: LAB | Facility: MEDICAL CENTER | Age: 76
End: 2021-03-19
Attending: FAMILY MEDICINE
Payer: MEDICARE

## 2021-03-19 DIAGNOSIS — E03.9 HYPOTHYROIDISM, UNSPECIFIED TYPE: ICD-10-CM

## 2021-03-19 DIAGNOSIS — I10 ESSENTIAL HYPERTENSION: ICD-10-CM

## 2021-03-19 DIAGNOSIS — E78.5 DYSLIPIDEMIA: ICD-10-CM

## 2021-03-19 DIAGNOSIS — E11.9 DIABETES MELLITUS WITHOUT COMPLICATION (HCC): ICD-10-CM

## 2021-03-19 LAB
ALBUMIN SERPL BCP-MCNC: 4.2 G/DL (ref 3.2–4.9)
ALBUMIN/GLOB SERPL: 1.2 G/DL
ALP SERPL-CCNC: 114 U/L (ref 30–99)
ALT SERPL-CCNC: 43 U/L (ref 2–50)
ANION GAP SERPL CALC-SCNC: 14 MMOL/L (ref 7–16)
AST SERPL-CCNC: 45 U/L (ref 12–45)
BILIRUB SERPL-MCNC: 0.5 MG/DL (ref 0.1–1.5)
BUN SERPL-MCNC: 16 MG/DL (ref 8–22)
CALCIUM SERPL-MCNC: 10 MG/DL (ref 8.5–10.5)
CHLORIDE SERPL-SCNC: 103 MMOL/L (ref 96–112)
CHOLEST SERPL-MCNC: 196 MG/DL (ref 100–199)
CO2 SERPL-SCNC: 21 MMOL/L (ref 20–33)
CREAT SERPL-MCNC: 0.77 MG/DL (ref 0.5–1.4)
CREAT UR-MCNC: 63.82 MG/DL
EST. AVERAGE GLUCOSE BLD GHB EST-MCNC: 180 MG/DL
FASTING STATUS PATIENT QL REPORTED: NORMAL
GLOBULIN SER CALC-MCNC: 3.4 G/DL (ref 1.9–3.5)
GLUCOSE SERPL-MCNC: 193 MG/DL (ref 65–99)
HBA1C MFR BLD: 7.9 % (ref 4–5.6)
HDLC SERPL-MCNC: 56 MG/DL
LDLC SERPL CALC-MCNC: 121 MG/DL
MICROALBUMIN UR-MCNC: 3.5 MG/DL
MICROALBUMIN/CREAT UR: 55 MG/G (ref 0–30)
POTASSIUM SERPL-SCNC: 4.4 MMOL/L (ref 3.6–5.5)
PROT SERPL-MCNC: 7.6 G/DL (ref 6–8.2)
SODIUM SERPL-SCNC: 138 MMOL/L (ref 135–145)
TRIGL SERPL-MCNC: 93 MG/DL (ref 0–149)
TSH SERPL DL<=0.005 MIU/L-ACNC: 2 UIU/ML (ref 0.38–5.33)

## 2021-03-19 PROCEDURE — 80053 COMPREHEN METABOLIC PANEL: CPT

## 2021-03-19 PROCEDURE — 84443 ASSAY THYROID STIM HORMONE: CPT

## 2021-03-19 PROCEDURE — 83036 HEMOGLOBIN GLYCOSYLATED A1C: CPT

## 2021-03-19 PROCEDURE — 82570 ASSAY OF URINE CREATININE: CPT

## 2021-03-19 PROCEDURE — 82043 UR ALBUMIN QUANTITATIVE: CPT

## 2021-03-19 PROCEDURE — 36415 COLL VENOUS BLD VENIPUNCTURE: CPT

## 2021-03-19 PROCEDURE — 80061 LIPID PANEL: CPT

## 2021-03-24 ENCOUNTER — OFFICE VISIT (OUTPATIENT)
Dept: MEDICAL GROUP | Facility: PHYSICIAN GROUP | Age: 76
End: 2021-03-24
Payer: MEDICARE

## 2021-03-24 VITALS
OXYGEN SATURATION: 94 % | BODY MASS INDEX: 27.4 KG/M2 | DIASTOLIC BLOOD PRESSURE: 70 MMHG | HEIGHT: 68 IN | SYSTOLIC BLOOD PRESSURE: 134 MMHG | TEMPERATURE: 96.6 F | HEART RATE: 92 BPM | WEIGHT: 180.78 LBS

## 2021-03-24 DIAGNOSIS — Z17.0 MALIGNANT NEOPLASM OF UPPER-INNER QUADRANT OF RIGHT BREAST IN FEMALE, ESTROGEN RECEPTOR POSITIVE (HCC): ICD-10-CM

## 2021-03-24 DIAGNOSIS — K75.81 LIVER CIRRHOSIS SECONDARY TO NONALCOHOLIC STEATOHEPATITIS (NASH) (HCC): ICD-10-CM

## 2021-03-24 DIAGNOSIS — K74.60 LIVER CIRRHOSIS SECONDARY TO NONALCOHOLIC STEATOHEPATITIS (NASH) (HCC): ICD-10-CM

## 2021-03-24 DIAGNOSIS — D69.6 THROMBOCYTOPENIA (HCC): ICD-10-CM

## 2021-03-24 DIAGNOSIS — C50.211 MALIGNANT NEOPLASM OF UPPER-INNER QUADRANT OF RIGHT BREAST IN FEMALE, ESTROGEN RECEPTOR POSITIVE (HCC): ICD-10-CM

## 2021-03-24 DIAGNOSIS — E78.5 DYSLIPIDEMIA: ICD-10-CM

## 2021-03-24 DIAGNOSIS — I10 ESSENTIAL HYPERTENSION: ICD-10-CM

## 2021-03-24 DIAGNOSIS — E03.9 HYPOTHYROIDISM, UNSPECIFIED TYPE: ICD-10-CM

## 2021-03-24 DIAGNOSIS — E55.9 VITAMIN D DEFICIENCY: ICD-10-CM

## 2021-03-24 DIAGNOSIS — E11.65 UNCONTROLLED TYPE 2 DIABETES MELLITUS WITH HYPERGLYCEMIA (HCC): ICD-10-CM

## 2021-03-24 PROCEDURE — 8041 PR SCP AHA: Performed by: FAMILY MEDICINE

## 2021-03-24 PROCEDURE — 99214 OFFICE O/P EST MOD 30 MIN: CPT | Performed by: FAMILY MEDICINE

## 2021-03-24 ASSESSMENT — FIBROSIS 4 INDEX: FIB4 SCORE: 3.79

## 2021-03-24 ASSESSMENT — PATIENT HEALTH QUESTIONNAIRE - PHQ9: CLINICAL INTERPRETATION OF PHQ2 SCORE: 0

## 2021-03-24 NOTE — PROGRESS NOTES
Subjective:      Ally Doherty is a 76 y.o. female who presents with Diabetes            HPI     Patient returns for follow-up of her medical problems as well as for [facility].    In terms of her diabetes mellitus type 2, she continues to take Metformin 1000 mg twice a day and glimepiride 2 mg daily.  She has lost 4 pounds since last visit.  She said she changed her diet a little bit which included more protein.  She said she has not been able to eat fish as much as before due to the COVID-19 pandemic.  When she goes out to eat she always orders fish and she has not been eating out.  She is due for retinal exam.    Blood pressure is running better than last visit.  She said home blood pressure readings are even lower from 121-127/60-70.  She continues to take losartan.    She is managing her dyslipidemia with her own efforts only.  She has been able to consistently get the LDL down below 100.  As mentioned above her diet has changed in the last few months with more protein and less fish.  She has not been watching the fat content of the protein.    For her liver cirrhosis secondary to Hernandez, her GI specialist has been following her closely.  They do regular blood work to check for AFP tumor marker and ultrasound of the liver.  The last AFP tumor marker was in February 2021 that came back normal and the last ultrasound was in February 2021 with no hepatic mass identified and liver surface consistent with cirrhosis.  She was referred to [facility] for further evaluation but she said the referral has not been in place yet.  Denies any abdominal swelling or leg swelling.     She continues to take thyroid replacement for hypothyroidism.    For her right breast cancer diagnosed in 2009, she had bilateral mastectomy, chemotherapy and radiation treatment.  She had reconstructive surgery but the breast implants were subsequently removed due to discomfort.  She was on anastrozole for 10 years which was just recently discontinued in  "December 2020.  She has been cancer free.    We also follow her for mild thrombocytopenia since 2019 which is most likely from her liver cirrhosis.  This has been stable.    She continues to take vitamin D supplementation for vitamin D deficiency.  The last vitamin D level was in September 2020 that came back adequately replaced.    Past medical history, past surgical history, family history reviewed-no changes    Social history reviewed-no changes    Allergies reviewed-no changes    Medications reviewed-no changes      ROS     As per HPI, the rest are negative.    Annual Health Assessment Questions:    1.  Are you currently engaging in any exercise or physical activity? Yes    2.  How would you describe your mood or emotional well-being today? good    3.  Have you had any falls in the last year? No    4.  Have you noticed any problems with your balance or had difficulty walking? No    5.  In the last six months have you experienced any leakage of urine? No    6. DPA/Advanced Directive: Patient does not have an Advanced Directive.  A packet and workshop information was given on Advanced Directives.       Objective:     /70 (BP Location: Left arm, Patient Position: Sitting, BP Cuff Size: Adult)   Pulse 92   Temp 35.9 °C (96.6 °F) (Temporal)   Ht 1.715 m (5' 7.5\")   Wt 82 kg (180 lb 12.4 oz)   LMP 02/27/1997   SpO2 94%   BMI 27.90 kg/m²      Physical Exam     Examined alert, awake, oriented, not in distress    Neck-supple, no lymphadenopathy, no thyromegaly  Lungs-clear to auscultation, no rales, no wheezes  Heart-regular rate and rhythm, no murmur  Extremities-no edema, clubbing, cyanosis       Hospital Outpatient Visit on 03/19/2021   Component Date Value Ref Range Status   • TSH 03/19/2021 2.000  0.380 - 5.330 uIU/mL Final    Comment: Please note new reference ranges effective 12/14/2017 10:00 AM  Pregnant Females, 1st Trimester  0.050-3.700  Pregnant Females, 2nd Trimester  0.310-4.350  Pregnant " Females, 3rd Trimester  0.410-5.180     • Creatinine, Urine 03/19/2021 63.82  mg/dL Final   • Microalbumin, Urine Random 03/19/2021 3.5  mg/dL Final   • Micro Alb Creat Ratio 03/19/2021 55* 0 - 30 mg/g Final   • Glycohemoglobin 03/19/2021 7.9* 4.0 - 5.6 % Final    Comment: Increased risk for diabetes:  5.7 -6.4%  Diabetes:  >6.4%  Glycemic control for adults with diabetes:  <7.0%  The above interpretations are per ADA guidelines.  Diagnosis  of diabetes mellitus on the basis of elevated Hemoglobin A1c  should be confirmed by repeating the Hb A1c test.     • Est Avg Glucose 03/19/2021 180  mg/dL Final    Comment: The eAG calculation is based on the A1c-Derived Daily Glucose  (ADAG) study.  See the ADA's website for additional information.     • Sodium 03/19/2021 138  135 - 145 mmol/L Final   • Potassium 03/19/2021 4.4  3.6 - 5.5 mmol/L Final   • Chloride 03/19/2021 103  96 - 112 mmol/L Final   • Co2 03/19/2021 21  20 - 33 mmol/L Final   • Anion Gap 03/19/2021 14.0  7.0 - 16.0 Final   • Glucose 03/19/2021 193* 65 - 99 mg/dL Final   • Bun 03/19/2021 16  8 - 22 mg/dL Final   • Creatinine 03/19/2021 0.77  0.50 - 1.40 mg/dL Final   • Calcium 03/19/2021 10.0  8.5 - 10.5 mg/dL Final   • AST(SGOT) 03/19/2021 45  12 - 45 U/L Final   • ALT(SGPT) 03/19/2021 43  2 - 50 U/L Final   • Alkaline Phosphatase 03/19/2021 114* 30 - 99 U/L Final   • Total Bilirubin 03/19/2021 0.5  0.1 - 1.5 mg/dL Final   • Albumin 03/19/2021 4.2  3.2 - 4.9 g/dL Final   • Total Protein 03/19/2021 7.6  6.0 - 8.2 g/dL Final   • Globulin 03/19/2021 3.4  1.9 - 3.5 g/dL Final   • A-G Ratio 03/19/2021 1.2  g/dL Final   • Cholesterol,Tot 03/19/2021 196  100 - 199 mg/dL Final   • Triglycerides 03/19/2021 93  0 - 149 mg/dL Final   • HDL 03/19/2021 56  >=40 mg/dL Final   • LDL 03/19/2021 121* <100 mg/dL Final   • Fasting Status 03/19/2021 Fasting   Final   • GFR If  03/19/2021 >60  >60 mL/min/1.73 m 2 Final   • GFR If Non  03/19/2021  >60  >60 mL/min/1.73 m 2 Final          Assessment/Plan:        1. Uncontrolled type 2 diabetes mellitus with hyperglycemia (HCC)  Diabetes is now under control with hemoglobin A1c of 7.9.  She does not want me to increase the dose of the glimepiride at this time and she said she will work on getting this improved with diet, exercise and weight loss.  I gave her a diet sheet to follow.  We will keep her on the same medications at the same doses and recheck in 4 months.  We will increase the dose if not improved at that time.  - Lipid Profile; Future  - Comp Metabolic Panel; Future  - HEMOGLOBIN A1C; Future  - VITAMIN D,25 HYDROXY; Future    2. Essential hypertension  Under adequate control on losartan.  - Lipid Profile; Future  - Comp Metabolic Panel; Future  - HEMOGLOBIN A1C; Future  - VITAMIN D,25 HYDROXY; Future    3. Dyslipidemia  The LDL cholesterol has increased and this is now over 100 at 121.  This is probably from recent diet change.  She was given a diet sheet to follow.  Advised work hard on avoidance of animal fat, eating more vegetables, eating fish, regular exercise and weight loss.  She could not take statin because of elevation of liver enzymes in the past and liver cirrhosis.  - Lipid Profile; Future  - Comp Metabolic Panel; Future  - HEMOGLOBIN A1C; Future  - VITAMIN D,25 HYDROXY; Future    4. Liver cirrhosis secondary to nonalcoholic steatohepatitis (MEDINA) (HCC)  She is followed closely by her GI specialist.  She is waiting for the referral to [facility] to be in place.  She is compensated.  So far there is no evidence of liver cancer per surveillance testing with AFP tumor marker and ultrasound.  We will follow along.  - Lipid Profile; Future  - Comp Metabolic Panel; Future  - HEMOGLOBIN A1C; Future  - VITAMIN D,25 HYDROXY; Future    5. Hypothyroidism, unspecified type  Adequately replaced with continue the same dose of levothyroxine.    6. Malignant neoplasm of upper-inner quadrant of right breast in  female, estrogen receptor positive (HCC)  Currently cancer free.    7. Thrombocytopenia (HCC)  Most likely from liver cirrhosis.  Stable mild thrombocytopenia.  We will continue to follow.    8. Vitamin D deficiency  Continue vitamin D supplementation and we will check vitamin D level next visit.  - Lipid Profile; Future  - Comp Metabolic Panel; Future  - HEMOGLOBIN A1C; Future  - VITAMIN D,25 HYDROXY; Future    Follow-up in 4 months.      Please note that this dictation was created using voice recognition software. I have worked with consultants from the vendor as well as technical experts from [employer] to optimize the interface. I have made every reasonable attempt to correct obvious errors, but I expect that there are errors of grammar and possibly content I did not discover before finalizing the note.

## 2021-03-24 NOTE — PATIENT INSTRUCTIONS
4Cholesterol  Cholesterol is a fat. Your body needs a small amount of cholesterol. Cholesterol may build up in your blood vessels. This increases your chance of having a heart attack or stroke.  You cannot feel your cholesterol levels. The only way to know your cholesterol level is high is with a blood test. Keep your test results. Work with your doctor to keep your cholesterol at a good level.  WHAT DO THE TEST RESULTS MEAN?  · Total cholesterol is how much cholesterol is in your blood.  · LDL is bad cholesterol. This is the type that can build up. You want LDL to be low.  · HDL is good cholesterol. It cleans your blood vessels and carries LDL away. You want HDL to be high.  · Triglycerides are fat that the body can burn for energy or store.  WHAT ARE GOOD LEVELS OF CHOLESTEROL?  · Total cholesterol below 200.  · LDL below 100 for people at risk. Below 70 for those at very high risk.  · HDL above 50 is good. Above 60 is best.  · Triglycerides below 150.  HOW CAN I LOWER MY CHOLESTEROL?  · Diet. Follow your diet programs as told by your doctor.  ¨ Choose fish, white meat chicken, roasted turkey, or baked turkey. Try not to eat red meat, fried foods, or processed meats such as sausage and lunch meats.  ¨ Eat lots of fresh fruits and vegetables.  ¨ Choose whole grains, beans, pasta, potatoes, and cereals.  ¨ Use only small amounts of olive, corn, or canola oils.  ¨ Try not to eat butter, mayonnaise, shortening, or palm kernel oils.  ¨ Try not to eat foods with trans fats.  ¨ Drink skim or nonfat milk. Eat low-fat or nonfat yogurt and cheeses. Try not to drink whole milk or cream. Try not to eat ice cream, egg yolks, and full-fat cheeses.  ¨ Healthy desserts include mat food cake, jaky snaps, animal crackers, hard candy, popsicles, and low-fat or nonfat frozen yogurt. Try not to eat pastries, cakes, pies, and cookies.  · Exercise. Follow your exercise programs as told by your doctor.  ¨ Be more active. You can try  gardening, walking, or taking the stairs. Ask your doctor about how you can be more active.  · Medicine. Take medicine as told by your doctor.     This information is not intended to replace advice given to you by your health care provider. Make sure you discuss any questions you have with your health care provider.     Document Released: 03/16/2010 Document Revised: 01/08/2016 Document Reviewed: 10/01/2014  [employer] Patient Education ©2016 [employer]  Diabetes Mellitus and Nutrition, Adult  When you have diabetes (diabetes mellitus), it is very important to have healthy eating habits because your blood sugar (glucose) levels are greatly affected by what you eat and drink. Eating healthy foods in the appropriate amounts, at about the same times every day, can help you:  · Control your blood glucose.  · Lower your risk of heart disease.  · Improve your blood pressure.  · Reach or maintain a healthy weight.  Every person with diabetes is different, and each person has different needs for a meal plan. Your health care provider may recommend that you work with a diet and nutrition specialist (dietitian) to make a meal plan that is best for you. Your meal plan may vary depending on factors such as:  · The calories you need.  · The medicines you take.  · Your weight.  · Your blood glucose, blood pressure, and cholesterol levels.  · Your activity level.  · Other health conditions you have, such as heart or kidney disease.  How do carbohydrates affect me?  Carbohydrates, also called carbs, affect your blood glucose level more than any other type of food. Eating carbs naturally raises the amount of glucose in your blood. Carb counting is a method for keeping track of how many carbs you eat. Counting carbs is important to keep your blood glucose at a healthy level, especially if you use insulin or take certain oral diabetes medicines.  It is important to know how many carbs you can safely have in each meal.  "This is different for every person. Your dietitian can help you calculate how many carbs you should have at each meal and for each snack.  Foods that contain carbs include:  · Bread, cereal, rice, pasta, and crackers.  · Potatoes and corn.  · Peas, beans, and lentils.  · Milk and yogurt.  · Fruit and juice.  · Desserts, such as cakes, cookies, ice cream, and candy.  How does alcohol affect me?  Alcohol can cause a sudden decrease in blood glucose (hypoglycemia), especially if you use insulin or take certain oral diabetes medicines. Hypoglycemia can be a life-threatening condition. Symptoms of hypoglycemia (sleepiness, dizziness, and confusion) are similar to symptoms of having too much alcohol.  If your health care provider says that alcohol is safe for you, follow these guidelines:  · Limit alcohol intake to no more than 1 drink per day for nonpregnant women and 2 drinks per day for men. One drink equals 12 oz of beer, 5 oz of wine, or 1½ oz of hard liquor.  · Do not drink on an empty stomach.  · Keep yourself hydrated with water, diet soda, or unsweetened iced tea.  · Keep in mind that regular soda, juice, and other mixers may contain a lot of sugar and must be counted as carbs.  What are tips for following this plan?    Reading food labels  · Start by checking the serving size on the \"Nutrition Facts\" label of packaged foods and drinks. The amount of calories, carbs, fats, and other nutrients listed on the label is based on one serving of the item. Many items contain more than one serving per package.  · Check the total grams (g) of carbs in one serving. You can calculate the number of servings of carbs in one serving by dividing the total carbs by 15. For example, if a food has 30 g of total carbs, it would be equal to 2 servings of carbs.  · Check the number of grams (g) of saturated and trans fats in one serving. Choose foods that have low or no amount of these fats.  · Check the number of milligrams (mg) of " "salt (sodium) in one serving. Most people should limit total sodium intake to less than 2,300 mg per day.  · Always check the nutrition information of foods labeled as \"low-fat\" or \"nonfat\". These foods may be higher in added sugar or refined carbs and should be avoided.  · Talk to your dietitian to identify your daily goals for nutrients listed on the label.  Shopping  · Avoid buying canned, premade, or processed foods. These foods tend to be high in fat, sodium, and added sugar.  · Shop around the outside edge of the grocery store. This includes fresh fruits and vegetables, bulk grains, fresh meats, and fresh dairy.  Cooking  · Use low-heat cooking methods, such as baking, instead of high-heat cooking methods like deep frying.  · Cook using healthy oils, such as olive, canola, or sunflower oil.  · Avoid cooking with butter, cream, or high-fat meats.  Meal planning  · Eat meals and snacks regularly, preferably at the same times every day. Avoid going long periods of time without eating.  · Eat foods high in fiber, such as fresh fruits, vegetables, beans, and whole grains. Talk to your dietitian about how many servings of carbs you can eat at each meal.  · Eat 4-6 ounces (oz) of lean protein each day, such as lean meat, chicken, fish, eggs, or tofu. One oz of lean protein is equal to:  ? 1 oz of meat, chicken, or fish.  ? 1 egg.  ? ¼ cup of tofu.  · Eat some foods each day that contain healthy fats, such as avocado, nuts, seeds, and fish.  Lifestyle  · Check your blood glucose regularly.  · Exercise regularly as told by your health care provider. This may include:  ? 150 minutes of moderate-intensity or vigorous-intensity exercise each week. This could be brisk walking, biking, or water aerobics.  ? Stretching and doing strength exercises, such as yoga or weightlifting, at least 2 times a week.  · Take medicines as told by your health care provider.  · Do not use any products that contain nicotine or tobacco, such " as cigarettes and e-cigarettes. If you need help quitting, ask your health care provider.  · Work with a counselor or diabetes educator to identify strategies to manage stress and any emotional and social challenges.  Questions to ask a health care provider  · Do I need to meet with a diabetes educator?  · Do I need to meet with a dietitian?  · What number can I call if I have questions?  · When are the best times to check my blood glucose?  Where to find more information:  · American Diabetes Association: diabetes.org  · Academy of Nutrition and Dietetics: www.eatright.org  · National [place] of Diabetes and Digestive and Kidney Diseases (NIH): www.niddk.nih.gov  Summary  · A healthy meal plan will help you control your blood glucose and maintain a healthy lifestyle.  · Working with a diet and nutrition specialist (dietitian) can help you make a meal plan that is best for you.  · Keep in mind that carbohydrates (carbs) and alcohol have immediate effects on your blood glucose levels. It is important to count carbs and to use alcohol carefully.  This information is not intended to replace advice given to you by your health care provider. Make sure you discuss any questions you have with your health care provider.  Document Released: 09/14/2006 Document Revised: 11/30/2018 Document Reviewed: 01/22/2018  Elsevier Patient Education © 2020 [employer].

## 2021-04-15 ENCOUNTER — HOSPITAL ENCOUNTER (OUTPATIENT)
Dept: LAB | Facility: MEDICAL CENTER | Age: 76
End: 2021-04-15
Attending: INTERNAL MEDICINE
Payer: MEDICARE

## 2021-04-15 LAB
ALBUMIN SERPL BCP-MCNC: 4.1 G/DL (ref 3.2–4.9)
ALBUMIN/GLOB SERPL: 1.5 G/DL
ALP SERPL-CCNC: 88 U/L (ref 30–99)
ALT SERPL-CCNC: 29 U/L (ref 2–50)
ANION GAP SERPL CALC-SCNC: 10 MMOL/L (ref 7–16)
AST SERPL-CCNC: 30 U/L (ref 12–45)
BILIRUB SERPL-MCNC: 0.2 MG/DL (ref 0.1–1.5)
BUN SERPL-MCNC: 15 MG/DL (ref 8–22)
CALCIUM SERPL-MCNC: 9 MG/DL (ref 8.5–10.5)
CHLORIDE SERPL-SCNC: 106 MMOL/L (ref 96–112)
CO2 SERPL-SCNC: 22 MMOL/L (ref 20–33)
CREAT SERPL-MCNC: 0.8 MG/DL (ref 0.5–1.4)
FASTING STATUS PATIENT QL REPORTED: NORMAL
FERRITIN SERPL-MCNC: 6.9 NG/ML (ref 10–291)
GLOBULIN SER CALC-MCNC: 2.7 G/DL (ref 1.9–3.5)
GLUCOSE SERPL-MCNC: 213 MG/DL (ref 65–99)
IRON SATN MFR SERPL: 5 % (ref 15–55)
IRON SERPL-MCNC: 21 UG/DL (ref 40–170)
POTASSIUM SERPL-SCNC: 4.6 MMOL/L (ref 3.6–5.5)
PROT SERPL-MCNC: 6.8 G/DL (ref 6–8.2)
SODIUM SERPL-SCNC: 138 MMOL/L (ref 135–145)
TIBC SERPL-MCNC: 459 UG/DL (ref 250–450)
TRANSFERRIN SERPL-MCNC: 400 MG/DL (ref 200–370)
UIBC SERPL-MCNC: 438 UG/DL (ref 110–370)

## 2021-04-15 PROCEDURE — 36415 COLL VENOUS BLD VENIPUNCTURE: CPT

## 2021-04-15 PROCEDURE — 82728 ASSAY OF FERRITIN: CPT

## 2021-04-15 PROCEDURE — 83550 IRON BINDING TEST: CPT

## 2021-04-15 PROCEDURE — 84466 ASSAY OF TRANSFERRIN: CPT

## 2021-04-15 PROCEDURE — 83540 ASSAY OF IRON: CPT

## 2021-04-15 PROCEDURE — 80053 COMPREHEN METABOLIC PANEL: CPT

## 2021-04-20 RX ORDER — 0.9 % SODIUM CHLORIDE 0.9 %
3 VIAL (ML) INJECTION PRN
Status: CANCELLED | OUTPATIENT
Start: 2021-05-04

## 2021-04-20 RX ORDER — EPINEPHRINE 1 MG/ML(1)
0.5 AMPUL (ML) INJECTION PRN
Status: CANCELLED | OUTPATIENT
Start: 2021-05-04

## 2021-04-20 RX ORDER — HEPARIN SODIUM (PORCINE) LOCK FLUSH IV SOLN 100 UNIT/ML 100 UNIT/ML
500 SOLUTION INTRAVENOUS PRN
Status: CANCELLED | OUTPATIENT
Start: 2021-05-04

## 2021-04-20 RX ORDER — 0.9 % SODIUM CHLORIDE 0.9 %
10 VIAL (ML) INJECTION PRN
Status: CANCELLED | OUTPATIENT
Start: 2021-05-04

## 2021-04-20 RX ORDER — 0.9 % SODIUM CHLORIDE 0.9 %
VIAL (ML) INJECTION PRN
Status: CANCELLED | OUTPATIENT
Start: 2021-05-04

## 2021-04-20 RX ORDER — DIPHENHYDRAMINE HYDROCHLORIDE 50 MG/ML
50 INJECTION INTRAMUSCULAR; INTRAVENOUS PRN
Status: CANCELLED | OUTPATIENT
Start: 2021-05-04

## 2021-04-20 RX ORDER — ACETAMINOPHEN 325 MG/1
650 TABLET ORAL ONCE
Status: CANCELLED | OUTPATIENT
Start: 2021-05-04

## 2021-04-20 RX ORDER — METHYLPREDNISOLONE SODIUM SUCCINATE 125 MG/2ML
125 INJECTION, POWDER, LYOPHILIZED, FOR SOLUTION INTRAMUSCULAR; INTRAVENOUS PRN
Status: CANCELLED | OUTPATIENT
Start: 2021-05-04

## 2021-04-20 RX ORDER — SODIUM CHLORIDE 9 MG/ML
INJECTION, SOLUTION INTRAVENOUS CONTINUOUS
Status: CANCELLED | OUTPATIENT
Start: 2021-05-04

## 2021-04-24 ENCOUNTER — OUTPATIENT INFUSION SERVICES (OUTPATIENT)
Dept: ONCOLOGY | Facility: MEDICAL CENTER | Age: 76
End: 2021-04-24
Attending: INTERNAL MEDICINE
Payer: MEDICARE

## 2021-04-24 VITALS
SYSTOLIC BLOOD PRESSURE: 155 MMHG | OXYGEN SATURATION: 97 % | HEIGHT: 67 IN | DIASTOLIC BLOOD PRESSURE: 64 MMHG | WEIGHT: 187.39 LBS | BODY MASS INDEX: 29.41 KG/M2 | RESPIRATION RATE: 18 BRPM | HEART RATE: 93 BPM | TEMPERATURE: 98.5 F

## 2021-04-24 DIAGNOSIS — D50.8 OTHER IRON DEFICIENCY ANEMIA: ICD-10-CM

## 2021-04-24 PROCEDURE — 700111 HCHG RX REV CODE 636 W/ 250 OVERRIDE (IP): Mod: JG | Performed by: INTERNAL MEDICINE

## 2021-04-24 PROCEDURE — 96365 THER/PROPH/DIAG IV INF INIT: CPT

## 2021-04-24 PROCEDURE — 700105 HCHG RX REV CODE 258: Performed by: INTERNAL MEDICINE

## 2021-04-24 RX ORDER — METHYLPREDNISOLONE SODIUM SUCCINATE 125 MG/2ML
125 INJECTION, POWDER, LYOPHILIZED, FOR SOLUTION INTRAMUSCULAR; INTRAVENOUS PRN
Status: CANCELLED | OUTPATIENT
Start: 2021-04-24

## 2021-04-24 RX ORDER — DIPHENHYDRAMINE HYDROCHLORIDE 50 MG/ML
50 INJECTION INTRAMUSCULAR; INTRAVENOUS PRN
Status: CANCELLED | OUTPATIENT
Start: 2021-04-24

## 2021-04-24 RX ORDER — EPINEPHRINE 1 MG/ML(1)
0.5 AMPUL (ML) INJECTION PRN
Status: CANCELLED | OUTPATIENT
Start: 2021-04-24

## 2021-04-24 RX ORDER — SODIUM CHLORIDE 9 MG/ML
INJECTION, SOLUTION INTRAVENOUS CONTINUOUS
Status: CANCELLED | OUTPATIENT
Start: 2021-04-24

## 2021-04-24 RX ORDER — SODIUM CHLORIDE 9 MG/ML
INJECTION, SOLUTION INTRAVENOUS CONTINUOUS
Status: DISCONTINUED | OUTPATIENT
Start: 2021-04-24 | End: 2021-04-24 | Stop reason: HOSPADM

## 2021-04-24 RX ADMIN — SODIUM CHLORIDE: 9 INJECTION, SOLUTION INTRAVENOUS at 15:54

## 2021-04-24 RX ADMIN — SODIUM CHLORIDE 1000 MG: 9 INJECTION, SOLUTION INTRAVENOUS at 15:54

## 2021-04-24 ASSESSMENT — FIBROSIS 4 INDEX: FIB4 SCORE: 3.08

## 2021-04-25 NOTE — PROGRESS NOTES
Ally arrived ambulatory to [facility] for Iron Dextran infusion. POC discussed with pt and she agrees with plan. PIV attempted several times by infusion RN staff. Rapid response team RN placed US guided #20G PIV LFA, brisk blood return noted. Pt medicated per MAR. Pt tolerated infusion without s/s adverse reaction. PIV dc'd catheter tip intact, gauze and coban dressing applied. Ally discharged to self care, NAD.

## 2021-04-26 ENCOUNTER — HOSPITAL ENCOUNTER (OUTPATIENT)
Dept: RADIOLOGY | Facility: MEDICAL CENTER | Age: 76
End: 2021-04-26
Attending: INTERNAL MEDICINE
Payer: MEDICARE

## 2021-04-26 DIAGNOSIS — C50.211 MALIGNANT NEOPLASM OF UPPER-INNER QUADRANT OF RIGHT FEMALE BREAST, UNSPECIFIED ESTROGEN RECEPTOR STATUS (HCC): ICD-10-CM

## 2021-04-26 PROCEDURE — 700117 HCHG RX CONTRAST REV CODE 255: Performed by: INTERNAL MEDICINE

## 2021-04-26 PROCEDURE — 71260 CT THORAX DX C+: CPT | Mod: MH

## 2021-04-26 PROCEDURE — 73201 CT UPPER EXTREMITY W/DYE: CPT | Mod: RT,MH

## 2021-04-26 RX ADMIN — IOHEXOL 75 ML: 350 INJECTION, SOLUTION INTRAVENOUS at 15:46

## 2021-04-28 DIAGNOSIS — E03.9 HYPOTHYROIDISM, UNSPECIFIED TYPE: ICD-10-CM

## 2021-04-28 RX ORDER — LEVOTHYROXINE SODIUM 0.1 MG/1
100 TABLET ORAL
Qty: 100 TABLET | Refills: 1 | Status: SHIPPED | OUTPATIENT
Start: 2021-04-28 | End: 2021-10-21

## 2021-05-03 RX ORDER — GLIMEPIRIDE 2 MG/1
TABLET ORAL
Qty: 100 TABLET | Refills: 1 | Status: SHIPPED | OUTPATIENT
Start: 2021-05-03 | End: 2021-10-21

## 2021-06-22 ENCOUNTER — OFFICE VISIT (OUTPATIENT)
Dept: URGENT CARE | Facility: CLINIC | Age: 76
End: 2021-06-22
Payer: MEDICARE

## 2021-06-22 ENCOUNTER — HOSPITAL ENCOUNTER (OUTPATIENT)
Facility: MEDICAL CENTER | Age: 76
End: 2021-06-22
Attending: FAMILY MEDICINE
Payer: MEDICARE

## 2021-06-22 ENCOUNTER — APPOINTMENT (OUTPATIENT)
Dept: RADIOLOGY | Facility: IMAGING CENTER | Age: 76
End: 2021-06-22
Attending: FAMILY MEDICINE
Payer: MEDICARE

## 2021-06-22 VITALS
HEART RATE: 82 BPM | SYSTOLIC BLOOD PRESSURE: 128 MMHG | RESPIRATION RATE: 14 BRPM | TEMPERATURE: 98.2 F | DIASTOLIC BLOOD PRESSURE: 74 MMHG | OXYGEN SATURATION: 95 %

## 2021-06-22 DIAGNOSIS — R06.2 WHEEZE: ICD-10-CM

## 2021-06-22 DIAGNOSIS — J22 LRTI (LOWER RESPIRATORY TRACT INFECTION): ICD-10-CM

## 2021-06-22 DIAGNOSIS — R04.2 HEMOPTYSIS: ICD-10-CM

## 2021-06-22 PROCEDURE — 99214 OFFICE O/P EST MOD 30 MIN: CPT | Performed by: FAMILY MEDICINE

## 2021-06-22 PROCEDURE — 71046 X-RAY EXAM CHEST 2 VIEWS: CPT | Mod: TC | Performed by: FAMILY MEDICINE

## 2021-06-22 PROCEDURE — U0003 INFECTIOUS AGENT DETECTION BY NUCLEIC ACID (DNA OR RNA); SEVERE ACUTE RESPIRATORY SYNDROME CORONAVIRUS 2 (SARS-COV-2) (CORONAVIRUS DISEASE [COVID-19]), AMPLIFIED PROBE TECHNIQUE, MAKING USE OF HIGH THROUGHPUT TECHNOLOGIES AS DESCRIBED BY CMS-2020-01-R: HCPCS

## 2021-06-22 PROCEDURE — U0005 INFEC AGEN DETEC AMPLI PROBE: HCPCS

## 2021-06-22 RX ORDER — DOXYCYCLINE HYCLATE 100 MG
100 TABLET ORAL 2 TIMES DAILY
Qty: 14 TABLET | Refills: 0 | Status: SHIPPED
Start: 2021-06-22 | End: 2021-06-22

## 2021-06-22 RX ORDER — BENZONATATE 200 MG/1
200 CAPSULE ORAL 3 TIMES DAILY PRN
Qty: 30 CAPSULE | Refills: 0 | Status: SHIPPED
Start: 2021-06-22 | End: 2021-10-31

## 2021-06-22 RX ORDER — ALBUTEROL SULFATE 90 UG/1
2 AEROSOL, METERED RESPIRATORY (INHALATION) EVERY 4 HOURS PRN
Qty: 1 EACH | Refills: 0 | Status: SHIPPED
Start: 2021-06-22 | End: 2021-10-31

## 2021-06-22 RX ORDER — DOXYCYCLINE HYCLATE 100 MG
100 TABLET ORAL 2 TIMES DAILY
Qty: 14 TABLET | Refills: 0 | Status: SHIPPED | OUTPATIENT
Start: 2021-06-22 | End: 2021-06-29

## 2021-06-22 ASSESSMENT — ENCOUNTER SYMPTOMS
EYE REDNESS: 0
NAUSEA: 0
VOMITING: 0
WEIGHT LOSS: 0
EYE DISCHARGE: 0
HEADACHES: 1
MYALGIAS: 0

## 2021-06-22 NOTE — PROGRESS NOTES
Subjective:      Ally Doherty is a 76 y.o. female who presents with Cough (painful productive cough with some blood, chest congestion)            5 days nasal congestion, clear drainage. 2 days hemoptysis. No fever. No SOB. +wheeze. No PMH asthma/copd/pneumonia. No leg swelling. No loss of taste or smell. Vaccinated for c19. No other aggravating or alleviating factors.        Review of Systems   Constitutional: Negative for malaise/fatigue and weight loss.   Eyes: Negative for discharge and redness.   Gastrointestinal: Negative for nausea and vomiting.   Musculoskeletal: Negative for joint pain and myalgias.   Skin: Negative for itching and rash.   Neurological: Positive for headaches (with cough only).          Objective:     /74 (BP Location: Left arm, Patient Position: Sitting, BP Cuff Size: Adult)   Pulse 82   Temp 36.8 °C (98.2 °F) (Temporal)   Resp 14   LMP 02/27/1997   SpO2 95%      Physical Exam  Constitutional:       General: She is not in acute distress.     Appearance: She is well-developed.   HENT:      Head: Normocephalic and atraumatic.      Nose: Nose normal.      Mouth/Throat:      Mouth: Mucous membranes are moist.      Pharynx: No posterior oropharyngeal erythema.   Eyes:      Conjunctiva/sclera: Conjunctivae normal.   Cardiovascular:      Rate and Rhythm: Normal rate and regular rhythm.      Heart sounds: Normal heart sounds. No murmur heard.     Pulmonary:      Effort: Pulmonary effort is normal.      Breath sounds: Wheezing and rhonchi present.   Skin:     General: Skin is warm and dry.      Findings: No rash.   Neurological:      Mental Status: She is alert and oriented to person, place, and time.                        Assessment/Plan:   cxr per radiology:  1.  Mild linear left basilar scarring.  2.  Large hiatal hernia.    1. Hemoptysis  DX-CHEST-2 VIEWS    benzonatate (TESSALON) 200 MG capsule    COVID/SARS CoV-2 PCR    DISCONTINUED: doxycycline (VIBRAMYCIN) 100 MG Tab   2.  Wheeze  albuterol 108 (90 Base) MCG/ACT Aero Soln inhalation aerosol    COVID/SARS CoV-2 PCR   3. LRTI (lower respiratory tract infection)  COVID/SARS CoV-2 PCR    doxycycline (VIBRAMYCIN) 100 MG Tab    DISCONTINUED: doxycycline (VIBRAMYCIN) 100 MG Tab     Differential diagnosis, natural history, supportive care, and indications for immediate follow-up discussed at length.     Contingent antibiotic prescription given to patient to fill upon meeting criteria of guidelines discussed.

## 2021-06-23 LAB
COVID ORDER STATUS COVID19: NORMAL
SARS-COV-2 RNA RESP QL NAA+PROBE: NOTDETECTED
SPECIMEN SOURCE: NORMAL

## 2021-07-20 ENCOUNTER — HOSPITAL ENCOUNTER (OUTPATIENT)
Dept: LAB | Facility: MEDICAL CENTER | Age: 76
End: 2021-07-20
Attending: FAMILY MEDICINE
Payer: MEDICARE

## 2021-07-20 DIAGNOSIS — K75.81 LIVER CIRRHOSIS SECONDARY TO NONALCOHOLIC STEATOHEPATITIS (NASH) (HCC): ICD-10-CM

## 2021-07-20 DIAGNOSIS — E11.65 UNCONTROLLED TYPE 2 DIABETES MELLITUS WITH HYPERGLYCEMIA (HCC): ICD-10-CM

## 2021-07-20 DIAGNOSIS — E55.9 VITAMIN D DEFICIENCY: ICD-10-CM

## 2021-07-20 DIAGNOSIS — I10 ESSENTIAL HYPERTENSION: ICD-10-CM

## 2021-07-20 DIAGNOSIS — E78.5 DYSLIPIDEMIA: ICD-10-CM

## 2021-07-20 DIAGNOSIS — K74.60 LIVER CIRRHOSIS SECONDARY TO NONALCOHOLIC STEATOHEPATITIS (NASH) (HCC): ICD-10-CM

## 2021-07-20 LAB
25(OH)D3 SERPL-MCNC: 28 NG/ML (ref 30–100)
ALBUMIN SERPL BCP-MCNC: 4.1 G/DL (ref 3.2–4.9)
ALBUMIN/GLOB SERPL: 1.5 G/DL
ALP SERPL-CCNC: 93 U/L (ref 30–99)
ALT SERPL-CCNC: 33 U/L (ref 2–50)
ANION GAP SERPL CALC-SCNC: 13 MMOL/L (ref 7–16)
AST SERPL-CCNC: 39 U/L (ref 12–45)
BILIRUB SERPL-MCNC: 0.3 MG/DL (ref 0.1–1.5)
BUN SERPL-MCNC: 12 MG/DL (ref 8–22)
CALCIUM SERPL-MCNC: 9.2 MG/DL (ref 8.5–10.5)
CHLORIDE SERPL-SCNC: 108 MMOL/L (ref 96–112)
CHOLEST SERPL-MCNC: 183 MG/DL (ref 100–199)
CO2 SERPL-SCNC: 20 MMOL/L (ref 20–33)
CREAT SERPL-MCNC: 0.78 MG/DL (ref 0.5–1.4)
EST. AVERAGE GLUCOSE BLD GHB EST-MCNC: 189 MG/DL
FASTING STATUS PATIENT QL REPORTED: NORMAL
GLOBULIN SER CALC-MCNC: 2.7 G/DL (ref 1.9–3.5)
GLUCOSE SERPL-MCNC: 192 MG/DL (ref 65–99)
HBA1C MFR BLD: 8.2 % (ref 4–5.6)
HDLC SERPL-MCNC: 55 MG/DL
LDLC SERPL CALC-MCNC: 105 MG/DL
POTASSIUM SERPL-SCNC: 4.5 MMOL/L (ref 3.6–5.5)
PROT SERPL-MCNC: 6.8 G/DL (ref 6–8.2)
SODIUM SERPL-SCNC: 141 MMOL/L (ref 135–145)
TRIGL SERPL-MCNC: 117 MG/DL (ref 0–149)

## 2021-07-20 PROCEDURE — 80053 COMPREHEN METABOLIC PANEL: CPT

## 2021-07-20 PROCEDURE — 36415 COLL VENOUS BLD VENIPUNCTURE: CPT

## 2021-07-20 PROCEDURE — 80061 LIPID PANEL: CPT

## 2021-07-20 PROCEDURE — 83036 HEMOGLOBIN GLYCOSYLATED A1C: CPT

## 2021-07-20 PROCEDURE — 82306 VITAMIN D 25 HYDROXY: CPT

## 2021-07-27 ENCOUNTER — OFFICE VISIT (OUTPATIENT)
Dept: MEDICAL GROUP | Facility: PHYSICIAN GROUP | Age: 76
End: 2021-07-27
Payer: MEDICARE

## 2021-07-27 VITALS
HEIGHT: 66 IN | DIASTOLIC BLOOD PRESSURE: 60 MMHG | HEART RATE: 88 BPM | OXYGEN SATURATION: 97 % | SYSTOLIC BLOOD PRESSURE: 140 MMHG | TEMPERATURE: 97.8 F | WEIGHT: 189.82 LBS | BODY MASS INDEX: 30.51 KG/M2

## 2021-07-27 DIAGNOSIS — E78.5 DYSLIPIDEMIA: ICD-10-CM

## 2021-07-27 DIAGNOSIS — E55.9 VITAMIN D DEFICIENCY: ICD-10-CM

## 2021-07-27 DIAGNOSIS — E11.65 UNCONTROLLED TYPE 2 DIABETES MELLITUS WITH HYPERGLYCEMIA (HCC): ICD-10-CM

## 2021-07-27 DIAGNOSIS — I10 ESSENTIAL HYPERTENSION: ICD-10-CM

## 2021-07-27 DIAGNOSIS — E03.9 HYPOTHYROIDISM, UNSPECIFIED TYPE: ICD-10-CM

## 2021-07-27 PROCEDURE — 99214 OFFICE O/P EST MOD 30 MIN: CPT | Performed by: FAMILY MEDICINE

## 2021-07-27 ASSESSMENT — FIBROSIS 4 INDEX: FIB4 SCORE: 3.75

## 2021-07-27 NOTE — LETTER
[facility]  Su Randhawa M.D.  [address]  [place] [address]  Fax: [phone]   Authorization for Release/Disclosure of   Protected Health Information   Name: CHARU DOHERTY : 1945 SSN: [id number]   Address: [address] [place] [address]  [place] [address] Phone:    [phone] (home)    I authorize the entity listed below to release/disclose the PHI below to:   [facility]/Su Randhawa M.D. and Su Randhawa M.D.   Provider or Entity Name:    [facility]   Address   City, [facility], [facility] [place] Phone:      Fax:     Reason for request: continuity of care   Information to be released:    [  ] LAST COLONOSCOPY,  including any PATH REPORT and follow-up  [  ] LAST FIT/COLOGUARD RESULT [  ] LAST DEXA  [  ] LAST MAMMOGRAM  [  ] LAST PAP  [  ] LAST LABS [ x ] RETINA EXAM REPORT  [  ] IMMUNIZATION RECORDS  [  ] Release all info      [  ] Check here and initial the line next to each item to release ALL health information INCLUDING  _____ Care and treatment for drug and / or alcohol abuse  _____ HIV testing, infection status, or AIDS  _____ Genetic Testing    DATES OF SERVICE OR TIME PERIOD TO BE DISCLOSED: _____________  I understand and acknowledge that:  * This Authorization may be revoked at any time by you in writing, except if your health information has already been used or disclosed.  * Your health information that will be used or disclosed as a result of you signing this authorization could be re-disclosed by the recipient. If this occurs, your re-disclosed health information may no longer be protected by State or Federal laws.  * You may refuse to sign this Authorization. Your refusal will not affect your ability to obtain treatment.  * This Authorization becomes effective upon signing and will  on (date) __________.      If no date is indicated, this Authorization will  one (1) year from the signature date.    Name: Charu Doherty    Signature:   Date:      7/27/2021       PLEASE FAX REQUESTED RECORDS BACK TO: [phone]

## 2021-07-27 NOTE — PROGRESS NOTES
"Subjective:      Ally Doherty is a 76 y.o. female who presents with Diabetes            HPI     Patient returns for follow-up of her medical problems.    In terms of her diabetes mellitus type 2, her hemoglobin A1c increased to 7.9,4 months ago.  She has been already on maximum dose of Metformin and glimepiride 2 mg daily.  She did not want me to increase the dose of glimepiride at that time.  She wanted to take care of this with her efforts as well.  She states that she has been forgetting to take the second dose of the Metformin at night. She also gained 2 pounds since her last visit.  She already went to see her eye doctor but we do not have the report.    In terms of the hypertension, she continues to take losartan.  Today the systolic blood pressure is slightly high at 140.  At home they are consistently in the 120s over 60s.    For her dyslipidemia, she is managing this with her own efforts.  She could not tolerate statins because of elevation of her liver enzymes in the setting of liver cirrhosis.    She continues to take thyroid replacement for hypothyroidism    Continues to take vitamin D supplement D deficiency 2000 international units daily.    Past medical history, past surgical history, family history reviewed-no changes    Social history reviewed-no changes    Allergies reviewed-no changes    Medications reviewed-no changes    ROS     As per HPI, the rest are negative.       Objective:     /60 (BP Location: Left arm, Patient Position: Sitting, BP Cuff Size: Adult)   Pulse 88   Temp 36.6 °C (97.8 °F) (Temporal)   Ht 1.676 m (5' 6\")   Wt 86.1 kg (189 lb 13.1 oz)   LMP 02/27/1997   SpO2 97%   BMI 30.64 kg/m²      Physical Exam     Examined alert, awake, oriented, not in distress    Neck-supple, no lymphadenopathy, no thyromegaly  Lungs-clear to auscultation, no rales, no wheezes  Heart-regular rate and rhythm, no murmur  Extremities-no edema, clubbing, cyanosis            Hospital " Outpatient Visit on 07/20/2021   Component Date Value Ref Range Status   • 25-Hydroxy   Vitamin D 25 07/20/2021 28* 30 - 100 ng/mL Final    Comment: Adult Ranges:   <20 ng/mL - Deficiency  20-29 ng/mL - Insufficiency   ng/mL - Sufficiency  Effective 3/18/2020, this electrochemiluminescence binding assay is  performed using Roche clem e immunoassay analyzer.  The [employer] Vitamin D  total II assay is intended for the quantitative determination of total 25  hydroxyvitamin D in human serum and plasma. This assay is to be used as an  aid in the assessment of vitamin D sufficiency in adults.     • Cholesterol,Tot 07/20/2021 183  100 - 199 mg/dL Final   • Triglycerides 07/20/2021 117  0 - 149 mg/dL Final   • HDL 07/20/2021 55  >=40 mg/dL Final   • LDL 07/20/2021 105* <100 mg/dL Final   • Sodium 07/20/2021 141  135 - 145 mmol/L Final   • Potassium 07/20/2021 4.5  3.6 - 5.5 mmol/L Final   • Chloride 07/20/2021 108  96 - 112 mmol/L Final   • Co2 07/20/2021 20  20 - 33 mmol/L Final   • Anion Gap 07/20/2021 13.0  7.0 - 16.0 Final   • Glucose 07/20/2021 192* 65 - 99 mg/dL Final   • Bun 07/20/2021 12  8 - 22 mg/dL Final   • Creatinine 07/20/2021 0.78  0.50 - 1.40 mg/dL Final   • Calcium 07/20/2021 9.2  8.5 - 10.5 mg/dL Final   • AST(SGOT) 07/20/2021 39  12 - 45 U/L Final   • ALT(SGPT) 07/20/2021 33  2 - 50 U/L Final   • Alkaline Phosphatase 07/20/2021 93  30 - 99 U/L Final   • Total Bilirubin 07/20/2021 0.3  0.1 - 1.5 mg/dL Final   • Albumin 07/20/2021 4.1  3.2 - 4.9 g/dL Final   • Total Protein 07/20/2021 6.8  6.0 - 8.2 g/dL Final   • Globulin 07/20/2021 2.7  1.9 - 3.5 g/dL Final   • A-G Ratio 07/20/2021 1.5  g/dL Final   • Glycohemoglobin 07/20/2021 8.2* 4.0 - 5.6 % Final    Comment: Increased risk for diabetes:  5.7 -6.4%  Diabetes:  >6.4%  Glycemic control for adults with diabetes:  <7.0%    The above interpretations are per ADA guidelines.  Diagnosis  of diabetes mellitus on the basis of elevated Hemoglobin  A1c  should be confirmed by repeating the Hb A1c test.     • Est Avg Glucose 07/20/2021 189  mg/dL Final    Comment: The eAG calculation is based on the A1c-Derived Daily Glucose  (ADAG) study.  See the ADA's website for additional information.     • Fasting Status 07/20/2021 Fasting   Final   • GFR If  07/20/2021 >60  >60 mL/min/1.73 m 2 Final   • GFR If Non  07/20/2021 >60  >60 mL/min/1.73 m 2 Final              Assessment/Plan:        1. Uncontrolled type 2 diabetes mellitus with hyperglycemia (HCC)  More out of control with hemoglobin A1c of 8.2.  She however said that she has been forgetting to take the second dose of Metformin at night.  She wants to wait 4 more months and she will take all her meds regularly as directed.  Advised to work harder on diet, exercise and weight loss.  If there is no improvement next time we will increase glimepiride dose.  - TSH; Future  - Lipid Profile; Future  - Comp Metabolic Panel; Future  - HEMOGLOBIN A1C; Future  - VITAMIN D,25 HYDROXY; Future    2. Essential hypertension  Slightly high systolic blood pressure by home blood pressure readings are running good.  Advised to monitor blood pressure at home and if blood pressure starts to run high 140 or higher for systolic blood pressure she will let me know.  Continue losartan.  - TSH; Future  - Lipid Profile; Future  - Comp Metabolic Panel; Future  - HEMOGLOBIN A1C; Future  - VITAMIN D,25 HYDROXY; Future    3. Dyslipidemia  The LDL went down from 1 21-1 05.  The goal is still below 100.  Continue to manage this with her own efforts.  - TSH; Future  - Lipid Profile; Future  - Comp Metabolic Panel; Future  - HEMOGLOBIN A1C; Future  - VITAMIN D,25 HYDROXY; Future    4. Hypothyroidism, unspecified type  Adequately replaced per last blood work in March 2021.  Continue the same dose of levothyroxine.  - TSH; Future  - Lipid Profile; Future  - Comp Metabolic Panel; Future  - HEMOGLOBIN A1C; Future  -  VITAMIN D,25 HYDROXY; Future    5. Vitamin D deficiency  Is now below 30.  Advised to increase vitamin D supplementation to 3000 international units daily and recheck vitamin D level next visit.  - TSH; Future  - Lipid Profile; Future  - Comp Metabolic Panel; Future  - HEMOGLOBIN A1C; Future  - VITAMIN D,25 HYDROXY; Future    Return in 4 months.      Please note that this dictation was created using voice recognition software. I have worked with consultants from the vendor as well as technical experts from [employer] to optimize the interface. I have made every reasonable attempt to correct obvious errors, but I expect that there are errors of grammar and possibly content I did not discover before finalizing the note.

## 2021-08-03 ENCOUNTER — HOSPITAL ENCOUNTER (OUTPATIENT)
Dept: RADIOLOGY | Facility: MEDICAL CENTER | Age: 76
End: 2021-08-03
Attending: PHYSICIAN ASSISTANT
Payer: MEDICARE

## 2021-08-03 DIAGNOSIS — K74.60 HEPATIC CIRRHOSIS, UNSPECIFIED HEPATIC CIRRHOSIS TYPE, UNSPECIFIED WHETHER ASCITES PRESENT (HCC): ICD-10-CM

## 2021-08-03 DIAGNOSIS — R94.5 ABNORMAL LIVER FUNCTION: ICD-10-CM

## 2021-08-03 PROCEDURE — 76705 ECHO EXAM OF ABDOMEN: CPT

## 2021-08-28 ENCOUNTER — PATIENT MESSAGE (OUTPATIENT)
Dept: HEALTH INFORMATION MANAGEMENT | Facility: OTHER | Age: 76
End: 2021-08-28

## 2021-08-30 ENCOUNTER — HOSPITAL ENCOUNTER (OUTPATIENT)
Dept: LAB | Facility: MEDICAL CENTER | Age: 76
End: 2021-08-30
Attending: PHYSICIAN ASSISTANT
Payer: MEDICARE

## 2021-08-30 LAB
25(OH)D3 SERPL-MCNC: 25 NG/ML (ref 30–100)
ALBUMIN SERPL BCP-MCNC: 4.2 G/DL (ref 3.2–4.9)
ALBUMIN/GLOB SERPL: 1.4 G/DL
ALP SERPL-CCNC: 94 U/L (ref 30–99)
ALT SERPL-CCNC: 36 U/L (ref 2–50)
ANION GAP SERPL CALC-SCNC: 14 MMOL/L (ref 7–16)
ANISOCYTOSIS BLD QL SMEAR: ABNORMAL
AST SERPL-CCNC: 38 U/L (ref 12–45)
BASOPHILS # BLD AUTO: 1.8 % (ref 0–1.8)
BASOPHILS # BLD: 0.1 K/UL (ref 0–0.12)
BILIRUB SERPL-MCNC: 0.6 MG/DL (ref 0.1–1.5)
BUN SERPL-MCNC: 19 MG/DL (ref 8–22)
BURR CELLS BLD QL SMEAR: NORMAL
CALCIUM SERPL-MCNC: 9.5 MG/DL (ref 8.5–10.5)
CHLORIDE SERPL-SCNC: 107 MMOL/L (ref 96–112)
CO2 SERPL-SCNC: 20 MMOL/L (ref 20–33)
COMMENT 1642: NORMAL
CREAT SERPL-MCNC: 0.77 MG/DL (ref 0.5–1.4)
EOSINOPHIL # BLD AUTO: 0.36 K/UL (ref 0–0.51)
EOSINOPHIL NFR BLD: 6.4 % (ref 0–6.9)
ERYTHROCYTE [DISTWIDTH] IN BLOOD BY AUTOMATED COUNT: 47.1 FL (ref 35.9–50)
GLOBULIN SER CALC-MCNC: 2.9 G/DL (ref 1.9–3.5)
GLUCOSE SERPL-MCNC: 193 MG/DL (ref 65–99)
HCT VFR BLD AUTO: 35 % (ref 37–47)
HGB BLD-MCNC: 10.2 G/DL (ref 12–16)
HYPOCHROMIA BLD QL SMEAR: ABNORMAL
IMM GRANULOCYTES # BLD AUTO: 0.02 K/UL (ref 0–0.11)
IMM GRANULOCYTES NFR BLD AUTO: 0.4 % (ref 0–0.9)
INR PPP: 1.14 (ref 0.87–1.13)
LYMPHOCYTES # BLD AUTO: 1.12 K/UL (ref 1–4.8)
LYMPHOCYTES NFR BLD: 19.8 % (ref 22–41)
MCH RBC QN AUTO: 22.9 PG (ref 27–33)
MCHC RBC AUTO-ENTMCNC: 29.1 G/DL (ref 33.6–35)
MCV RBC AUTO: 78.5 FL (ref 81.4–97.8)
MICROCYTES BLD QL SMEAR: ABNORMAL
MONOCYTES # BLD AUTO: 0.48 K/UL (ref 0–0.85)
MONOCYTES NFR BLD AUTO: 8.5 % (ref 0–13.4)
MORPHOLOGY BLD-IMP: NORMAL
NEUTROPHILS # BLD AUTO: 3.58 K/UL (ref 2–7.15)
NEUTROPHILS NFR BLD: 63.1 % (ref 44–72)
NRBC # BLD AUTO: 0 K/UL
NRBC BLD-RTO: 0 /100 WBC
OVALOCYTES BLD QL SMEAR: NORMAL
PLATELET # BLD AUTO: 154 K/UL (ref 164–446)
PLATELET BLD QL SMEAR: NORMAL
PMV BLD AUTO: 10.7 FL (ref 9–12.9)
POIKILOCYTOSIS BLD QL SMEAR: NORMAL
POLYCHROMASIA BLD QL SMEAR: NORMAL
POTASSIUM SERPL-SCNC: 4.3 MMOL/L (ref 3.6–5.5)
PROT SERPL-MCNC: 7.1 G/DL (ref 6–8.2)
PROTHROMBIN TIME: 14.3 SEC (ref 12–14.6)
RBC # BLD AUTO: 4.46 M/UL (ref 4.2–5.4)
RBC BLD AUTO: PRESENT
SODIUM SERPL-SCNC: 141 MMOL/L (ref 135–145)
WBC # BLD AUTO: 5.7 K/UL (ref 4.8–10.8)

## 2021-08-30 PROCEDURE — 80053 COMPREHEN METABOLIC PANEL: CPT

## 2021-08-30 PROCEDURE — 36415 COLL VENOUS BLD VENIPUNCTURE: CPT

## 2021-08-30 PROCEDURE — 85025 COMPLETE CBC W/AUTO DIFF WBC: CPT

## 2021-08-30 PROCEDURE — 83516 IMMUNOASSAY NONANTIBODY: CPT

## 2021-08-30 PROCEDURE — 84590 ASSAY OF VITAMIN A: CPT

## 2021-08-30 PROCEDURE — 82105 ALPHA-FETOPROTEIN SERUM: CPT

## 2021-08-30 PROCEDURE — 82306 VITAMIN D 25 HYDROXY: CPT

## 2021-08-30 PROCEDURE — 84630 ASSAY OF ZINC: CPT

## 2021-08-30 PROCEDURE — 85610 PROTHROMBIN TIME: CPT

## 2021-09-01 LAB
AFP-TM SERPL-MCNC: 4 NG/ML (ref 0–9)
MITOCHONDRIA M2 IGG SER-ACNC: 7.2 UNITS (ref 0–24.9)

## 2021-09-02 LAB
ANNOTATION COMMENT IMP: NORMAL
RETINYL PALMITATE SERPL-MCNC: <0.02 MG/L (ref 0–0.1)
VIT A SERPL-MCNC: 0.33 MG/L (ref 0.3–1.2)
ZINC BLD-MCNC: 498.1 UG/DL (ref 440–860)

## 2021-09-07 ENCOUNTER — TELEPHONE (OUTPATIENT)
Dept: MEDICAL GROUP | Facility: PHYSICIAN GROUP | Age: 76
End: 2021-09-07

## 2021-09-07 VITALS — DIASTOLIC BLOOD PRESSURE: 70 MMHG | SYSTOLIC BLOOD PRESSURE: 111 MMHG

## 2021-09-07 NOTE — TELEPHONE ENCOUNTER
9/3/21 sent patient Adly message requesting home blood pressure reading, patient provided same & within range of desired pressure, see vitals.

## 2021-10-15 ENCOUNTER — HOSPITAL ENCOUNTER (OUTPATIENT)
Dept: LAB | Facility: MEDICAL CENTER | Age: 76
End: 2021-10-15
Attending: INTERNAL MEDICINE
Payer: MEDICARE

## 2021-10-15 LAB
ALBUMIN SERPL BCP-MCNC: 4 G/DL (ref 3.2–4.9)
ALBUMIN/GLOB SERPL: 1.5 G/DL
ALP SERPL-CCNC: 101 U/L (ref 30–99)
ALT SERPL-CCNC: 29 U/L (ref 2–50)
ANION GAP SERPL CALC-SCNC: 12 MMOL/L (ref 7–16)
ANISOCYTOSIS BLD QL SMEAR: ABNORMAL
AST SERPL-CCNC: 32 U/L (ref 12–45)
BASOPHILS # BLD AUTO: 1.1 % (ref 0–1.8)
BASOPHILS # BLD: 0.06 K/UL (ref 0–0.12)
BILIRUB SERPL-MCNC: 0.6 MG/DL (ref 0.1–1.5)
BUN SERPL-MCNC: 11 MG/DL (ref 8–22)
CALCIUM SERPL-MCNC: 8.7 MG/DL (ref 8.5–10.5)
CHLORIDE SERPL-SCNC: 106 MMOL/L (ref 96–112)
CO2 SERPL-SCNC: 23 MMOL/L (ref 20–33)
COMMENT 1642: NORMAL
CREAT SERPL-MCNC: 0.74 MG/DL (ref 0.5–1.4)
EOSINOPHIL # BLD AUTO: 0.28 K/UL (ref 0–0.51)
EOSINOPHIL NFR BLD: 5.4 % (ref 0–6.9)
ERYTHROCYTE [DISTWIDTH] IN BLOOD BY AUTOMATED COUNT: 45.6 FL (ref 35.9–50)
FERRITIN SERPL-MCNC: 5.7 NG/ML (ref 10–291)
GLOBULIN SER CALC-MCNC: 2.6 G/DL (ref 1.9–3.5)
GLUCOSE SERPL-MCNC: 202 MG/DL (ref 65–99)
HCT VFR BLD AUTO: 30.1 % (ref 37–47)
HGB BLD-MCNC: 8.6 G/DL (ref 12–16)
HYPOCHROMIA BLD QL SMEAR: ABNORMAL
IMM GRANULOCYTES # BLD AUTO: 0.01 K/UL (ref 0–0.11)
IMM GRANULOCYTES NFR BLD AUTO: 0.2 % (ref 0–0.9)
IRON SATN MFR SERPL: 6 % (ref 15–55)
IRON SERPL-MCNC: 25 UG/DL (ref 40–170)
LYMPHOCYTES # BLD AUTO: 0.48 K/UL (ref 1–4.8)
LYMPHOCYTES NFR BLD: 9.2 % (ref 22–41)
MCH RBC QN AUTO: 20.8 PG (ref 27–33)
MCHC RBC AUTO-ENTMCNC: 28.6 G/DL (ref 33.6–35)
MCV RBC AUTO: 72.7 FL (ref 81.4–97.8)
MICROCYTES BLD QL SMEAR: ABNORMAL
MONOCYTES # BLD AUTO: 0.43 K/UL (ref 0–0.85)
MONOCYTES NFR BLD AUTO: 8.2 % (ref 0–13.4)
MORPHOLOGY BLD-IMP: NORMAL
NEUTROPHILS # BLD AUTO: 3.97 K/UL (ref 2–7.15)
NEUTROPHILS NFR BLD: 75.9 % (ref 44–72)
NRBC # BLD AUTO: 0 K/UL
NRBC BLD-RTO: 0 /100 WBC
OVALOCYTES BLD QL SMEAR: NORMAL
PLATELET # BLD AUTO: 166 K/UL (ref 164–446)
PLATELET BLD QL SMEAR: NORMAL
PMV BLD AUTO: 9.9 FL (ref 9–12.9)
POIKILOCYTOSIS BLD QL SMEAR: NORMAL
POLYCHROMASIA BLD QL SMEAR: NORMAL
POTASSIUM SERPL-SCNC: 4.4 MMOL/L (ref 3.6–5.5)
PROT SERPL-MCNC: 6.6 G/DL (ref 6–8.2)
RBC # BLD AUTO: 4.14 M/UL (ref 4.2–5.4)
RBC BLD AUTO: PRESENT
SODIUM SERPL-SCNC: 141 MMOL/L (ref 135–145)
TIBC SERPL-MCNC: 430 UG/DL (ref 250–450)
TRANSFERRIN SERPL-MCNC: 390 MG/DL (ref 200–370)
UIBC SERPL-MCNC: 405 UG/DL (ref 110–370)
WBC # BLD AUTO: 5.2 K/UL (ref 4.8–10.8)

## 2021-10-15 PROCEDURE — 82728 ASSAY OF FERRITIN: CPT

## 2021-10-15 PROCEDURE — 36415 COLL VENOUS BLD VENIPUNCTURE: CPT

## 2021-10-15 PROCEDURE — 80053 COMPREHEN METABOLIC PANEL: CPT

## 2021-10-15 PROCEDURE — 83550 IRON BINDING TEST: CPT

## 2021-10-15 PROCEDURE — 83540 ASSAY OF IRON: CPT

## 2021-10-15 PROCEDURE — 85025 COMPLETE CBC W/AUTO DIFF WBC: CPT

## 2021-10-15 PROCEDURE — 84466 ASSAY OF TRANSFERRIN: CPT

## 2021-10-19 RX ORDER — 0.9 % SODIUM CHLORIDE 0.9 %
10 VIAL (ML) INJECTION PRN
Status: CANCELLED | OUTPATIENT
Start: 2021-10-28

## 2021-10-19 RX ORDER — SODIUM CHLORIDE 9 MG/ML
INJECTION, SOLUTION INTRAVENOUS CONTINUOUS
Status: CANCELLED | OUTPATIENT
Start: 2021-10-28

## 2021-10-19 RX ORDER — ACETAMINOPHEN 325 MG/1
650 TABLET ORAL ONCE
Status: CANCELLED | OUTPATIENT
Start: 2021-10-28

## 2021-10-19 RX ORDER — EPINEPHRINE 1 MG/ML(1)
0.5 AMPUL (ML) INJECTION PRN
Status: CANCELLED | OUTPATIENT
Start: 2021-10-28

## 2021-10-19 RX ORDER — 0.9 % SODIUM CHLORIDE 0.9 %
VIAL (ML) INJECTION PRN
Status: CANCELLED | OUTPATIENT
Start: 2021-10-28

## 2021-10-19 RX ORDER — DIPHENHYDRAMINE HYDROCHLORIDE 50 MG/ML
50 INJECTION INTRAMUSCULAR; INTRAVENOUS PRN
Status: CANCELLED | OUTPATIENT
Start: 2021-10-28

## 2021-10-19 RX ORDER — 0.9 % SODIUM CHLORIDE 0.9 %
3 VIAL (ML) INJECTION PRN
Status: CANCELLED | OUTPATIENT
Start: 2021-10-28

## 2021-10-19 RX ORDER — HEPARIN SODIUM (PORCINE) LOCK FLUSH IV SOLN 100 UNIT/ML 100 UNIT/ML
500 SOLUTION INTRAVENOUS PRN
Status: CANCELLED | OUTPATIENT
Start: 2021-10-28

## 2021-10-19 RX ORDER — METHYLPREDNISOLONE SODIUM SUCCINATE 125 MG/2ML
125 INJECTION, POWDER, LYOPHILIZED, FOR SOLUTION INTRAMUSCULAR; INTRAVENOUS PRN
Status: CANCELLED | OUTPATIENT
Start: 2021-10-28

## 2021-10-31 ENCOUNTER — OUTPATIENT INFUSION SERVICES (OUTPATIENT)
Dept: ONCOLOGY | Facility: MEDICAL CENTER | Age: 76
End: 2021-10-31
Attending: INTERNAL MEDICINE
Payer: MEDICARE

## 2021-10-31 VITALS
HEIGHT: 65 IN | DIASTOLIC BLOOD PRESSURE: 48 MMHG | TEMPERATURE: 97.9 F | SYSTOLIC BLOOD PRESSURE: 154 MMHG | RESPIRATION RATE: 19 BRPM | HEART RATE: 109 BPM | WEIGHT: 185.85 LBS | OXYGEN SATURATION: 98 % | BODY MASS INDEX: 30.96 KG/M2

## 2021-10-31 DIAGNOSIS — D50.8 OTHER IRON DEFICIENCY ANEMIA: ICD-10-CM

## 2021-10-31 DIAGNOSIS — D50.0 IRON DEFICIENCY ANEMIA DUE TO CHRONIC BLOOD LOSS: ICD-10-CM

## 2021-10-31 PROCEDURE — 700105 HCHG RX REV CODE 258: Performed by: INTERNAL MEDICINE

## 2021-10-31 PROCEDURE — 700111 HCHG RX REV CODE 636 W/ 250 OVERRIDE (IP): Mod: JG | Performed by: INTERNAL MEDICINE

## 2021-10-31 PROCEDURE — 96365 THER/PROPH/DIAG IV INF INIT: CPT

## 2021-10-31 RX ORDER — EPINEPHRINE 1 MG/ML(1)
0.5 AMPUL (ML) INJECTION PRN
Status: CANCELLED | OUTPATIENT
Start: 2021-10-31

## 2021-10-31 RX ORDER — SODIUM CHLORIDE 9 MG/ML
INJECTION, SOLUTION INTRAVENOUS CONTINUOUS
Status: CANCELLED | OUTPATIENT
Start: 2021-10-31

## 2021-10-31 RX ORDER — METHYLPREDNISOLONE SODIUM SUCCINATE 125 MG/2ML
125 INJECTION, POWDER, LYOPHILIZED, FOR SOLUTION INTRAMUSCULAR; INTRAVENOUS PRN
Status: CANCELLED | OUTPATIENT
Start: 2021-10-31

## 2021-10-31 RX ORDER — DIPHENHYDRAMINE HYDROCHLORIDE 50 MG/ML
50 INJECTION INTRAMUSCULAR; INTRAVENOUS PRN
Status: CANCELLED | OUTPATIENT
Start: 2021-10-31

## 2021-10-31 RX ADMIN — SODIUM CHLORIDE 1000 MG: 9 INJECTION, SOLUTION INTRAVENOUS at 08:09

## 2021-10-31 ASSESSMENT — FIBROSIS 4 INDEX: FIB4 SCORE: 2.65

## 2021-10-31 NOTE — PROGRESS NOTES
Ally arrives to [facility] for iron dextran infusion for iron deficiency anemia. Patient c/o fatigue, but otherwise feels well. She reports typically needing an iron infusion q 6 months. Labs were drawn on 10/15/21. 24g PIV placed to left dorsum x2 attempts. PIV flushes easily and has a positive blood return. Infed infused per MAR/rapid protocol. Patient tolerated treatment well without adverse s/s. Patient does not need further appointments at this time. Discharged home to self care in no apparent distress.

## 2021-11-29 ENCOUNTER — HOSPITAL ENCOUNTER (OUTPATIENT)
Dept: LAB | Facility: MEDICAL CENTER | Age: 76
End: 2021-11-29
Attending: FAMILY MEDICINE
Payer: MEDICARE

## 2021-11-29 DIAGNOSIS — E03.9 HYPOTHYROIDISM, UNSPECIFIED TYPE: ICD-10-CM

## 2021-11-29 DIAGNOSIS — E11.65 UNCONTROLLED TYPE 2 DIABETES MELLITUS WITH HYPERGLYCEMIA (HCC): ICD-10-CM

## 2021-11-29 DIAGNOSIS — I10 ESSENTIAL HYPERTENSION: ICD-10-CM

## 2021-11-29 DIAGNOSIS — E78.5 DYSLIPIDEMIA: ICD-10-CM

## 2021-11-29 DIAGNOSIS — E55.9 VITAMIN D DEFICIENCY: ICD-10-CM

## 2021-11-29 LAB
ALBUMIN SERPL BCP-MCNC: 3.9 G/DL (ref 3.2–4.9)
ALBUMIN/GLOB SERPL: 1.6 G/DL
ALP SERPL-CCNC: 100 U/L (ref 30–99)
ALT SERPL-CCNC: 54 U/L (ref 2–50)
ANION GAP SERPL CALC-SCNC: 13 MMOL/L (ref 7–16)
AST SERPL-CCNC: 60 U/L (ref 12–45)
BILIRUB SERPL-MCNC: 0.4 MG/DL (ref 0.1–1.5)
BUN SERPL-MCNC: 11 MG/DL (ref 8–22)
CALCIUM SERPL-MCNC: 9.2 MG/DL (ref 8.5–10.5)
CHLORIDE SERPL-SCNC: 107 MMOL/L (ref 96–112)
CHOLEST SERPL-MCNC: 186 MG/DL (ref 100–199)
CO2 SERPL-SCNC: 22 MMOL/L (ref 20–33)
CREAT SERPL-MCNC: 0.83 MG/DL (ref 0.5–1.4)
EST. AVERAGE GLUCOSE BLD GHB EST-MCNC: 137 MG/DL
FASTING STATUS PATIENT QL REPORTED: NORMAL
GLOBULIN SER CALC-MCNC: 2.5 G/DL (ref 1.9–3.5)
GLUCOSE SERPL-MCNC: 175 MG/DL (ref 65–99)
HBA1C MFR BLD: 6.4 % (ref 4–5.6)
HDLC SERPL-MCNC: 51 MG/DL
LDLC SERPL CALC-MCNC: 112 MG/DL
POTASSIUM SERPL-SCNC: 4.4 MMOL/L (ref 3.6–5.5)
PROT SERPL-MCNC: 6.4 G/DL (ref 6–8.2)
SODIUM SERPL-SCNC: 142 MMOL/L (ref 135–145)
TRIGL SERPL-MCNC: 114 MG/DL (ref 0–149)
TSH SERPL DL<=0.005 MIU/L-ACNC: 2.56 UIU/ML (ref 0.38–5.33)

## 2021-11-29 PROCEDURE — 36415 COLL VENOUS BLD VENIPUNCTURE: CPT

## 2021-11-29 PROCEDURE — 83036 HEMOGLOBIN GLYCOSYLATED A1C: CPT

## 2021-11-29 PROCEDURE — 80061 LIPID PANEL: CPT

## 2021-11-29 PROCEDURE — 80053 COMPREHEN METABOLIC PANEL: CPT

## 2021-11-29 PROCEDURE — 82306 VITAMIN D 25 HYDROXY: CPT

## 2021-11-29 PROCEDURE — 84443 ASSAY THYROID STIM HORMONE: CPT

## 2021-12-01 LAB — 25(OH)D3 SERPL-MCNC: 21 NG/ML (ref 30–80)

## 2021-12-02 ENCOUNTER — OFFICE VISIT (OUTPATIENT)
Dept: MEDICAL GROUP | Facility: PHYSICIAN GROUP | Age: 76
End: 2021-12-02
Payer: MEDICARE

## 2021-12-02 VITALS
WEIGHT: 185.2 LBS | BODY MASS INDEX: 29.07 KG/M2 | HEIGHT: 67 IN | SYSTOLIC BLOOD PRESSURE: 130 MMHG | HEART RATE: 83 BPM | TEMPERATURE: 97.7 F | DIASTOLIC BLOOD PRESSURE: 70 MMHG

## 2021-12-02 DIAGNOSIS — E11.8 DIABETES MELLITUS TYPE 2 WITH COMPLICATIONS (HCC): ICD-10-CM

## 2021-12-02 DIAGNOSIS — K92.1 MELENA: ICD-10-CM

## 2021-12-02 DIAGNOSIS — K74.60 LIVER CIRRHOSIS SECONDARY TO NONALCOHOLIC STEATOHEPATITIS (NASH) (HCC): ICD-10-CM

## 2021-12-02 DIAGNOSIS — E55.9 VITAMIN D DEFICIENCY: ICD-10-CM

## 2021-12-02 DIAGNOSIS — E03.9 HYPOTHYROIDISM, UNSPECIFIED TYPE: ICD-10-CM

## 2021-12-02 DIAGNOSIS — K75.81 LIVER CIRRHOSIS SECONDARY TO NONALCOHOLIC STEATOHEPATITIS (NASH) (HCC): ICD-10-CM

## 2021-12-02 DIAGNOSIS — I10 ESSENTIAL HYPERTENSION: ICD-10-CM

## 2021-12-02 DIAGNOSIS — E78.5 DYSLIPIDEMIA: ICD-10-CM

## 2021-12-02 PROCEDURE — 99214 OFFICE O/P EST MOD 30 MIN: CPT | Performed by: FAMILY MEDICINE

## 2021-12-02 ASSESSMENT — FIBROSIS 4 INDEX: FIB4 SCORE: 3.64

## 2021-12-02 NOTE — PROGRESS NOTES
"Subjective     Ally Doherty is a 76 y.o. female who presents with Diabetes            HPI     Epic was down during the visit and so handwritten notes are scanned in media.    ROS           Objective     /70 (BP Location: Left arm, Patient Position: Sitting, BP Cuff Size: Adult)   Pulse 83   Temp 36.5 °C (97.7 °F) (Temporal)   Ht 1.702 m (5' 7\")   Wt 84 kg (185 lb 3.2 oz)   LMP 02/27/1997   BMI 29.01 kg/m²      Physical Exam                        Assessment & Plan        1. Diabetes mellitus type 2 with complications (HCC)    - CBC WITH DIFFERENTIAL; Future  - HEMOGLOBIN A1C; Future  - Comp Metabolic Panel; Future  - Lipid Profile; Future  - TSH; Future  - MICROALBUMIN CREAT RATIO URINE; Future  - VITAMIN D,25 HYDROXY; Future    2. Dyslipidemia    - CBC WITH DIFFERENTIAL; Future  - HEMOGLOBIN A1C; Future  - Comp Metabolic Panel; Future  - Lipid Profile; Future  - TSH; Future  - MICROALBUMIN CREAT RATIO URINE; Future  - VITAMIN D,25 HYDROXY; Future    3. Essential hypertension    - CBC WITH DIFFERENTIAL; Future  - HEMOGLOBIN A1C; Future  - Comp Metabolic Panel; Future  - Lipid Profile; Future  - TSH; Future  - MICROALBUMIN CREAT RATIO URINE; Future  - VITAMIN D,25 HYDROXY; Future    4. Hypothyroidism, unspecified type    - CBC WITH DIFFERENTIAL; Future  - HEMOGLOBIN A1C; Future  - Comp Metabolic Panel; Future  - Lipid Profile; Future  - TSH; Future  - MICROALBUMIN CREAT RATIO URINE; Future  - VITAMIN D,25 HYDROXY; Future    5. 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 Vitamin D deficiency    - CBC WITH DIFFERENTIAL; Future  - HEMOGLOBIN A1C; Future  - Comp Metabolic Panel; Future  - Lipid Profile; Future  - TSH; Future  - MICROALBUMIN CREAT RATIO URINE; Future  - " VITAMIN D,25 HYDROXY; Future    7. Melena    - CBC WITH DIFFERENTIAL; Future  - HEMOGLOBIN A1C; Future  - Comp Metabolic Panel; Future  - Lipid Profile; Future  - TSH; Future  - MICROALBUMIN CREAT RATIO URINE; Future  - VITAMIN D,25 HYDROXY; Future

## 2022-01-03 DIAGNOSIS — I10 ESSENTIAL HYPERTENSION: ICD-10-CM

## 2022-01-03 RX ORDER — LOSARTAN POTASSIUM 100 MG/1
100 TABLET ORAL DAILY
Qty: 100 TABLET | Refills: 1 | Status: SHIPPED | OUTPATIENT
Start: 2022-01-03 | End: 2022-08-11 | Stop reason: SDUPTHER

## 2022-03-24 ENCOUNTER — PATIENT MESSAGE (OUTPATIENT)
Dept: HEALTH INFORMATION MANAGEMENT | Facility: OTHER | Age: 77
End: 2022-03-24

## 2022-03-29 ENCOUNTER — TELEPHONE (OUTPATIENT)
Dept: HEALTH INFORMATION MANAGEMENT | Facility: OTHER | Age: 77
End: 2022-03-29
Payer: MEDICARE

## 2022-03-30 ENCOUNTER — HOSPITAL ENCOUNTER (OUTPATIENT)
Dept: LAB | Facility: MEDICAL CENTER | Age: 77
End: 2022-03-30
Attending: FAMILY MEDICINE
Payer: MEDICARE

## 2022-03-30 DIAGNOSIS — E03.9 HYPOTHYROIDISM, UNSPECIFIED TYPE: ICD-10-CM

## 2022-03-30 DIAGNOSIS — I10 ESSENTIAL HYPERTENSION: ICD-10-CM

## 2022-03-30 DIAGNOSIS — K75.81 LIVER CIRRHOSIS SECONDARY TO NONALCOHOLIC STEATOHEPATITIS (NASH) (HCC): ICD-10-CM

## 2022-03-30 DIAGNOSIS — D50.9 IRON DEFICIENCY ANEMIA, UNSPECIFIED IRON DEFICIENCY ANEMIA TYPE: ICD-10-CM

## 2022-03-30 DIAGNOSIS — K92.1 MELENA: ICD-10-CM

## 2022-03-30 DIAGNOSIS — E11.8 DIABETES MELLITUS TYPE 2 WITH COMPLICATIONS (HCC): ICD-10-CM

## 2022-03-30 DIAGNOSIS — K74.60 LIVER CIRRHOSIS SECONDARY TO NONALCOHOLIC STEATOHEPATITIS (NASH) (HCC): ICD-10-CM

## 2022-03-30 DIAGNOSIS — E55.9 VITAMIN D DEFICIENCY: ICD-10-CM

## 2022-03-30 DIAGNOSIS — E78.5 DYSLIPIDEMIA: ICD-10-CM

## 2022-03-30 LAB
25(OH)D3 SERPL-MCNC: 37 NG/ML (ref 30–100)
ALBUMIN SERPL BCP-MCNC: 4.2 G/DL (ref 3.2–4.9)
ALBUMIN/GLOB SERPL: 1.7 G/DL
ALP SERPL-CCNC: 94 U/L (ref 30–99)
ALT SERPL-CCNC: 32 U/L (ref 2–50)
ANION GAP SERPL CALC-SCNC: 14 MMOL/L (ref 7–16)
ANISOCYTOSIS BLD QL SMEAR: ABNORMAL
AST SERPL-CCNC: 38 U/L (ref 12–45)
BASOPHILS # BLD AUTO: 3.6 % (ref 0–1.8)
BASOPHILS # BLD: 0.14 K/UL (ref 0–0.12)
BILIRUB SERPL-MCNC: 0.5 MG/DL (ref 0.1–1.5)
BUN SERPL-MCNC: 12 MG/DL (ref 8–22)
CALCIUM SERPL-MCNC: 9.3 MG/DL (ref 8.5–10.5)
CHLORIDE SERPL-SCNC: 107 MMOL/L (ref 96–112)
CHOLEST SERPL-MCNC: 175 MG/DL (ref 100–199)
CO2 SERPL-SCNC: 20 MMOL/L (ref 20–33)
CREAT SERPL-MCNC: 0.81 MG/DL (ref 0.5–1.4)
CREAT UR-MCNC: 36.44 MG/DL
EOSINOPHIL # BLD AUTO: 0.21 K/UL (ref 0–0.51)
EOSINOPHIL NFR BLD: 5.5 % (ref 0–6.9)
ERYTHROCYTE [DISTWIDTH] IN BLOOD BY AUTOMATED COUNT: 51 FL (ref 35.9–50)
EST. AVERAGE GLUCOSE BLD GHB EST-MCNC: 174 MG/DL
FASTING STATUS PATIENT QL REPORTED: NORMAL
GFR SERPLBLD CREATININE-BSD FMLA CKD-EPI: 76 ML/MIN/1.73 M 2
GLOBULIN SER CALC-MCNC: 2.5 G/DL (ref 1.9–3.5)
GLUCOSE SERPL-MCNC: 178 MG/DL (ref 65–99)
HBA1C MFR BLD: 7.7 % (ref 4–5.6)
HCT VFR BLD AUTO: 30 % (ref 37–47)
HDLC SERPL-MCNC: 56 MG/DL
HGB BLD-MCNC: 8 G/DL (ref 12–16)
HYPOCHROMIA BLD QL SMEAR: ABNORMAL
LDLC SERPL CALC-MCNC: 100 MG/DL
LYMPHOCYTES # BLD AUTO: 0.62 K/UL (ref 1–4.8)
LYMPHOCYTES NFR BLD: 16.4 % (ref 22–41)
MANUAL DIFF BLD: NORMAL
MCH RBC QN AUTO: 18.8 PG (ref 27–33)
MCHC RBC AUTO-ENTMCNC: 26.7 G/DL (ref 33.6–35)
MCV RBC AUTO: 70.4 FL (ref 81.4–97.8)
MICROALBUMIN UR-MCNC: <1.2 MG/DL
MICROALBUMIN/CREAT UR: NORMAL MG/G (ref 0–30)
MICROCYTES BLD QL SMEAR: ABNORMAL
MONOCYTES # BLD AUTO: 0.14 K/UL (ref 0–0.85)
MONOCYTES NFR BLD AUTO: 3.6 % (ref 0–13.4)
MORPHOLOGY BLD-IMP: NORMAL
NEUTROPHILS # BLD AUTO: 2.69 K/UL (ref 2–7.15)
NEUTROPHILS NFR BLD: 70 % (ref 44–72)
NEUTS BAND NFR BLD MANUAL: 0.9 % (ref 0–10)
NRBC # BLD AUTO: 0 K/UL
NRBC BLD-RTO: 0 /100 WBC
OVALOCYTES BLD QL SMEAR: NORMAL
PLATELET # BLD AUTO: 121 K/UL (ref 164–446)
PLATELET BLD QL SMEAR: NORMAL
PMV BLD AUTO: 10.6 FL (ref 9–12.9)
POIKILOCYTOSIS BLD QL SMEAR: NORMAL
POTASSIUM SERPL-SCNC: 4.2 MMOL/L (ref 3.6–5.5)
PROT SERPL-MCNC: 6.7 G/DL (ref 6–8.2)
RBC # BLD AUTO: 4.26 M/UL (ref 4.2–5.4)
RBC BLD AUTO: PRESENT
SODIUM SERPL-SCNC: 141 MMOL/L (ref 135–145)
TRIGL SERPL-MCNC: 93 MG/DL (ref 0–149)
TSH SERPL DL<=0.005 MIU/L-ACNC: 1.73 UIU/ML (ref 0.38–5.33)
WBC # BLD AUTO: 3.8 K/UL (ref 4.8–10.8)

## 2022-03-30 PROCEDURE — 82043 UR ALBUMIN QUANTITATIVE: CPT

## 2022-03-30 PROCEDURE — 82570 ASSAY OF URINE CREATININE: CPT

## 2022-03-30 PROCEDURE — 36415 COLL VENOUS BLD VENIPUNCTURE: CPT

## 2022-03-30 PROCEDURE — 80061 LIPID PANEL: CPT

## 2022-03-30 PROCEDURE — 82306 VITAMIN D 25 HYDROXY: CPT

## 2022-03-30 PROCEDURE — 85027 COMPLETE CBC AUTOMATED: CPT

## 2022-03-30 PROCEDURE — 80053 COMPREHEN METABOLIC PANEL: CPT

## 2022-03-30 PROCEDURE — 84443 ASSAY THYROID STIM HORMONE: CPT

## 2022-03-30 PROCEDURE — 85007 BL SMEAR W/DIFF WBC COUNT: CPT

## 2022-03-30 PROCEDURE — 83036 HEMOGLOBIN GLYCOSYLATED A1C: CPT

## 2022-04-11 ENCOUNTER — HOSPITAL ENCOUNTER (OUTPATIENT)
Dept: LAB | Facility: MEDICAL CENTER | Age: 77
End: 2022-04-11
Attending: FAMILY MEDICINE
Payer: MEDICARE

## 2022-04-11 DIAGNOSIS — D50.9 IRON DEFICIENCY ANEMIA, UNSPECIFIED IRON DEFICIENCY ANEMIA TYPE: ICD-10-CM

## 2022-04-11 PROBLEM — Z86.39 HISTORY OF VITAMIN D DEFICIENCY: Status: ACTIVE | Noted: 2022-04-11

## 2022-04-11 PROBLEM — Z85.3 HISTORY OF BREAST CANCER: Status: ACTIVE | Noted: 2022-04-11

## 2022-04-11 PROBLEM — Z17.0 MALIGNANT NEOPLASM OF UPPER-INNER QUADRANT OF RIGHT BREAST IN FEMALE, ESTROGEN RECEPTOR POSITIVE (HCC): Status: RESOLVED | Noted: 2018-03-13 | Resolved: 2022-04-11

## 2022-04-11 PROBLEM — D61.818 PANCYTOPENIA (HCC): Status: ACTIVE | Noted: 2022-04-11

## 2022-04-11 PROBLEM — C50.211 MALIGNANT NEOPLASM OF UPPER-INNER QUADRANT OF RIGHT BREAST IN FEMALE, ESTROGEN RECEPTOR POSITIVE (HCC): Status: RESOLVED | Noted: 2018-03-13 | Resolved: 2022-04-11

## 2022-04-11 PROBLEM — D69.6 THROMBOCYTOPENIA (HCC): Status: ACTIVE | Noted: 2022-04-11

## 2022-04-11 LAB
ANISOCYTOSIS BLD QL SMEAR: ABNORMAL
BASOPHILS # BLD AUTO: 1.5 % (ref 0–1.8)
BASOPHILS # BLD: 0.07 K/UL (ref 0–0.12)
COMMENT 1642: NORMAL
EOSINOPHIL # BLD AUTO: 0.28 K/UL (ref 0–0.51)
EOSINOPHIL NFR BLD: 6.2 % (ref 0–6.9)
ERYTHROCYTE [DISTWIDTH] IN BLOOD BY AUTOMATED COUNT: 56.1 FL (ref 35.9–50)
HCT VFR BLD AUTO: 30.8 % (ref 37–47)
HGB BLD-MCNC: 8 G/DL (ref 12–16)
HYPOCHROMIA BLD QL SMEAR: ABNORMAL
IMM GRANULOCYTES # BLD AUTO: 0.02 K/UL (ref 0–0.11)
IMM GRANULOCYTES NFR BLD AUTO: 0.4 % (ref 0–0.9)
LYMPHOCYTES # BLD AUTO: 0.71 K/UL (ref 1–4.8)
LYMPHOCYTES NFR BLD: 15.7 % (ref 22–41)
MCH RBC QN AUTO: 19 PG (ref 27–33)
MCHC RBC AUTO-ENTMCNC: 26 G/DL (ref 33.6–35)
MCV RBC AUTO: 73.2 FL (ref 81.4–97.8)
MICROCYTES BLD QL SMEAR: ABNORMAL
MONOCYTES # BLD AUTO: 0.51 K/UL (ref 0–0.85)
MONOCYTES NFR BLD AUTO: 11.3 % (ref 0–13.4)
MORPHOLOGY BLD-IMP: NORMAL
NEUTROPHILS # BLD AUTO: 2.93 K/UL (ref 2–7.15)
NEUTROPHILS NFR BLD: 64.9 % (ref 44–72)
NRBC # BLD AUTO: 0 K/UL
NRBC BLD-RTO: 0 /100 WBC
OVALOCYTES BLD QL SMEAR: NORMAL
PLATELET # BLD AUTO: 125 K/UL (ref 164–446)
PLATELET BLD QL SMEAR: NORMAL
PMV BLD AUTO: 10.4 FL (ref 9–12.9)
POIKILOCYTOSIS BLD QL SMEAR: NORMAL
POLYCHROMASIA BLD QL SMEAR: NORMAL
RBC # BLD AUTO: 4.21 M/UL (ref 4.2–5.4)
RBC BLD AUTO: PRESENT
WBC # BLD AUTO: 4.5 K/UL (ref 4.8–10.8)

## 2022-04-11 PROCEDURE — 36415 COLL VENOUS BLD VENIPUNCTURE: CPT

## 2022-04-11 PROCEDURE — 85025 COMPLETE CBC W/AUTO DIFF WBC: CPT

## 2022-04-12 ENCOUNTER — OFFICE VISIT (OUTPATIENT)
Dept: MEDICAL GROUP | Facility: PHYSICIAN GROUP | Age: 77
End: 2022-04-12
Payer: MEDICARE

## 2022-04-12 VITALS
SYSTOLIC BLOOD PRESSURE: 120 MMHG | OXYGEN SATURATION: 97 % | WEIGHT: 189.38 LBS | HEART RATE: 79 BPM | TEMPERATURE: 97.1 F | HEIGHT: 66 IN | BODY MASS INDEX: 30.44 KG/M2 | DIASTOLIC BLOOD PRESSURE: 60 MMHG

## 2022-04-12 DIAGNOSIS — E11.8 DIABETES MELLITUS TYPE 2 WITH COMPLICATIONS (HCC): ICD-10-CM

## 2022-04-12 DIAGNOSIS — E55.9 VITAMIN D DEFICIENCY: ICD-10-CM

## 2022-04-12 DIAGNOSIS — D50.9 IRON DEFICIENCY ANEMIA, UNSPECIFIED IRON DEFICIENCY ANEMIA TYPE: ICD-10-CM

## 2022-04-12 DIAGNOSIS — E78.5 DYSLIPIDEMIA: ICD-10-CM

## 2022-04-12 DIAGNOSIS — I10 ESSENTIAL HYPERTENSION: ICD-10-CM

## 2022-04-12 DIAGNOSIS — E03.9 HYPOTHYROIDISM, UNSPECIFIED TYPE: ICD-10-CM

## 2022-04-12 PROCEDURE — 99214 OFFICE O/P EST MOD 30 MIN: CPT | Performed by: FAMILY MEDICINE

## 2022-04-12 RX ORDER — GLIPIZIDE 5 MG/1
5 TABLET, FILM COATED, EXTENDED RELEASE ORAL DAILY
Qty: 100 TABLET | Refills: 1 | Status: SHIPPED
Start: 2022-04-12 | End: 2022-06-30 | Stop reason: CLARIF

## 2022-04-12 ASSESSMENT — FIBROSIS 4 INDEX: FIB4 SCORE: 4.03

## 2022-04-12 NOTE — PROGRESS NOTES
Subjective     Ally Doherty is a 77 y.o. female who presents with Diabetes            HPI     Patient returns for follow-up of her medical problems.    We did her regular blood work 2 weeks ago and her CBC came back the hemoglobin was low at 8.0 and hematocrit of 30.0.  Patient with history of iron deficiency anemia and this is managed by her hematologist/oncologist Dr. Oliveira.  She gets iron infusion periodically when her hemoglobin and hematocrit are low which happens about once or twice a year.  Patient could not tolerate oral iron because it makes her have upset stomach.  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  Patient with MEDINA which is probably the cause of her esophageal varices.  She said she has another upper endoscopy scheduled this month.  She said she has noticed bloody stools 2 times last week which has already resolved.  Her hematologist/oncologist is aware of her recent drop of her hemoglobin and hematocrit and she is waiting for them to schedule her iron infusion.  I had her try over-the-counter liquid iron which she has been tolerating but lately she said she started to make her have upset stomach.  She is still taking it.    In terms of her diabetes mellitus type 2, she continues to take Metformin 1000 mg twice a day and glimepiride 2 mg daily.    For her hypertension, she continues to take losartan with good control of her blood pressure.    For the dyslipidemia, she is managing this with her own efforts only.  We did not her on statin because of elevated liver enzymes due to Medina.    She continues take vitamin D supplementation for vitamin D deficiency.    She continues to take thyroid replacement for hypothyroidism.    Past medical history, past surgical history, family history reviewed-no changes    Social history reviewed-no changes    Allergies  "reviewed-no changes    Medications reviewed-no changes    ROS     As per HPI, the rest are negative.           Objective     /60 (BP Location: Left arm, Patient Position: Sitting, BP Cuff Size: Adult)   Pulse 79   Temp 36.2 °C (97.1 °F) (Temporal)   Ht 1.664 m (5' 5.5\")   Wt 85.9 kg (189 lb 6 oz)   LMP 02/27/1997   SpO2 97%   BMI 31.03 kg/m²      Physical Exam     Examined alert, awake, oriented, not in distress    Neck-supple, no lymphadenopathy, no thyromegaly  Lungs-clear to auscultation, no rales, no wheezes  Heart-regular rate and rhythm, no murmur  Extremities-no edema, clubbing, cyanosis  Skin-no pallor noted of the palms and the palpebral conjunctivae    Monofilament testing with a 10 gram force: sensation intact: intact bilaterally  Visual Inspection: Feet without maceration, ulcers, fissures.  Pedal pulses: intact bilaterally          Hospital Outpatient Visit on 04/11/2022   Component Date Value Ref Range Status   • WBC 04/11/2022 4.5 (A) 4.8 - 10.8 K/uL Final   • RBC 04/11/2022 4.21  4.20 - 5.40 M/uL Final   • Hemoglobin 04/11/2022 8.0 (A) 12.0 - 16.0 g/dL Final   • Hematocrit 04/11/2022 30.8 (A) 37.0 - 47.0 % Final   • MCV 04/11/2022 73.2 (A) 81.4 - 97.8 fL Final   • MCH 04/11/2022 19.0 (A) 27.0 - 33.0 pg Final   • MCHC 04/11/2022 26.0 (A) 33.6 - 35.0 g/dL Final   • RDW 04/11/2022 56.1 (A) 35.9 - 50.0 fL Final   • Platelet Count 04/11/2022 125 (A) 164 - 446 K/uL Final   • MPV 04/11/2022 10.4  9.0 - 12.9 fL Final   • Neutrophils-Polys 04/11/2022 64.90  44.00 - 72.00 % Final   • Lymphocytes 04/11/2022 15.70 (A) 22.00 - 41.00 % Final   • Monocytes 04/11/2022 11.30  0.00 - 13.40 % Final   • Eosinophils 04/11/2022 6.20  0.00 - 6.90 % Final   • Basophils 04/11/2022 1.50  0.00 - 1.80 % Final   • Immature Granulocytes 04/11/2022 0.40  0.00 - 0.90 % Final   • Nucleated RBC 04/11/2022 0.00  /100 WBC Final   • Neutrophils (Absolute) 04/11/2022 2.93  2.00 - 7.15 K/uL Final    Includes immature " neutrophils, if present.   • Lymphs (Absolute) 04/11/2022 0.71 (A) 1.00 - 4.80 K/uL Final   • Monos (Absolute) 04/11/2022 0.51  0.00 - 0.85 K/uL Final   • Eos (Absolute) 04/11/2022 0.28  0.00 - 0.51 K/uL Final   • Baso (Absolute) 04/11/2022 0.07  0.00 - 0.12 K/uL Final   • Immature Granulocytes (abs) 04/11/2022 0.02  0.00 - 0.11 K/uL Final   • NRBC (Absolute) 04/11/2022 0.00  K/uL Final   • Hypochromia 04/11/2022 1+   Final   • Anisocytosis 04/11/2022 3+ (A)  Final   • Microcytosis 04/11/2022 3+ (A)  Final   • Peripheral Smear Review 04/11/2022 see below   Final    Comment: Due to instrument suspect flags, further review of peripheral smear is  indicated on this patient sample. This review may or may not result in  abnormal findings.     • Plt Estimation 04/11/2022 Decreased   Final   • RBC Morphology 04/11/2022 Present   Final   • Polychromia 04/11/2022 1+   Final   • Poikilocytosis 04/11/2022 1+   Final   • Ovalocytes 04/11/2022 1+   Final   • Comments-Diff 04/11/2022 see below   Final    Results have been verified by peripheral blood smear review.       Results for CHARU BAEZ (MRN [id number]) as of 4/12/2022 10:05   Ref. Range 3/30/2022 09:13   WBC Latest Ref Range: 4.8 - 10.8 K/uL 3.8 (L)   RBC Latest Ref Range: 4.20 - 5.40 M/uL 4.26   Hemoglobin Latest Ref Range: 12.0 - 16.0 g/dL 8.0 (L)   Hematocrit Latest Ref Range: 37.0 - 47.0 % 30.0 (L)   MCV Latest Ref Range: 81.4 - 97.8 fL 70.4 (L)   MCH Latest Ref Range: 27.0 - 33.0 pg 18.8 (L)   MCHC Latest Ref Range: 33.6 - 35.0 g/dL 26.7 (L)   RDW Latest Ref Range: 35.9 - 50.0 fL 51.0 (H)   Platelet Count Latest Ref Range: 164 - 446 K/uL 121 (L)   MPV Latest Ref Range: 9.0 - 12.9 fL 10.6   Neutrophils-Polys Latest Ref Range: 44.00 - 72.00 % 70.00   Neutrophils (Absolute) Latest Ref Range: 2.00 - 7.15 K/uL 2.69   Bands-Stabs Latest Ref Range: 0.00 - 10.00 % 0.90   Lymphocytes Latest Ref Range: 22.00 - 41.00 % 16.40 (L)   Lymphs (Absolute) Latest Ref Range: 1.00 -  4.80 K/uL 0.62 (L)   Monocytes Latest Ref Range: 0.00 - 13.40 % 3.60   Monos (Absolute) Latest Ref Range: 0.00 - 0.85 K/uL 0.14   Eosinophils Latest Ref Range: 0.00 - 6.90 % 5.50   Eos (Absolute) Latest Ref Range: 0.00 - 0.51 K/uL 0.21   Basophils Latest Ref Range: 0.00 - 1.80 % 3.60 (H)   Baso (Absolute) Latest Ref Range: 0.00 - 0.12 K/uL 0.14 (H)   Nucleated RBC Latest Units: /100 WBC 0.00   NRBC (Absolute) Latest Units: K/uL 0.00   Plt Estimation Unknown Decreased   RBC Morphology Unknown Present   Hypochromia Unknown 2+ (A)   Anisocytosis Unknown 2+ (A)   Microcytosis Unknown 2+ (A)   Poikilocytosis Unknown 1+   Ovalocytes Unknown 1+   Peripheral Smear Review Unknown see below   Manual Diff Status Unknown PERFORMED     Results for CHARU BAEZ (MRN [id number]) as of 4/12/2022 10:05   Ref. Range 3/30/2022 09:13   Sodium Latest Ref Range: 135 - 145 mmol/L 141   Potassium Latest Ref Range: 3.6 - 5.5 mmol/L 4.2   Chloride Latest Ref Range: 96 - 112 mmol/L 107   Co2 Latest Ref Range: 20 - 33 mmol/L 20   Anion Gap Latest Ref Range: 7.0 - 16.0  14.0   Glucose Latest Ref Range: 65 - 99 mg/dL 178 (H)   Bun Latest Ref Range: 8 - 22 mg/dL 12   Creatinine Latest Ref Range: 0.50 - 1.40 mg/dL 0.81   GFR (CKD-EPI) Latest Ref Range: >60 mL/min/1.73 m 2 76   Calcium Latest Ref Range: 8.5 - 10.5 mg/dL 9.3   AST(SGOT) Latest Ref Range: 12 - 45 U/L 38   ALT(SGPT) Latest Ref Range: 2 - 50 U/L 32   Alkaline Phosphatase Latest Ref Range: 30 - 99 U/L 94   Total Bilirubin Latest Ref Range: 0.1 - 1.5 mg/dL 0.5   Albumin Latest Ref Range: 3.2 - 4.9 g/dL 4.2   Total Protein Latest Ref Range: 6.0 - 8.2 g/dL 6.7   Globulin Latest Ref Range: 1.9 - 3.5 g/dL 2.5   A-G Ratio Latest Units: g/dL 1.7   Glycohemoglobin Latest Ref Range: 4.0 - 5.6 % 7.7 (H)   Estim. Avg Glu Latest Units: mg/dL 174   Fasting Status Unknown Fasting   Cholesterol,Tot Latest Ref Range: 100 - 199 mg/dL 175   Triglycerides Latest Ref Range: 0 - 149 mg/dL 93   HDL Latest  Ref Range: >=40 mg/dL 56   LDL Latest Ref Range: <100 mg/dL 100 (H)   Micro Alb Creat Ratio Latest Ref Range: 0 - 30 mg/g see below   Creatinine, Urine Latest Units: mg/dL 36.44   Microalbumin, Urine Random Latest Units: mg/dL <1.2   Results for CHARU BAEZ (MRN [id number]) as of 4/12/2022 10:05   Ref. Range 3/30/2022 09:13   25-Hydroxy   Vitamin D 25 Latest Ref Range: 30 - 100 ng/mL 37   TSH Latest Ref Range: 0.380 - 5.330 uIU/mL 1.730            Assessment & Plan        1. Iron deficiency anemia, unspecified iron deficiency anemia type  Repeat CBC is stable and H&H have not dropped from the previous numbers.  Continue iron supplement.  She is waiting for the appointment to get the iron infusion.  This is most likely combination of esophageal varices and angiectasia of the cecum.    2. Diabetes mellitus type 2 with complications (HCC)  This is now out of control.  I will switch her to glipizide 5 mg daily as glimepiride has more potential for hypoglycemia in 65 years old and over.  Continue Metformin 1000 mg twice a day but advised work hard on watching the diet, increase activity and lose weight.  I will recheck blood work in 4 months.  - glipiZIDE SR (GLUCOTROL) 5 MG TABLET SR 24 HR; Take 1 Tablet by mouth every day.  Dispense: 100 Tablet; Refill: 1  - Lipid Profile; Future  - Comp Metabolic Panel; Future  - HEMOGLOBIN A1C; Future  - Diabetic Monofilament Lower Extremity Exam    3. Essential hypertension  Controlled on losartan.  - Lipid Profile; Future  - Comp Metabolic Panel; Future  - HEMOGLOBIN A1C; Future    4. Dyslipidemia  LDL improved.  Our goal is for this to be below 100.  Continue managing this with her own efforts only.  - Lipid Profile; Future  - Comp Metabolic Panel; Future  - HEMOGLOBIN A1C; Future    5. Vitamin D deficiency  Adequately replaced.  Continue same dose of vitamin D supplementation.    6. Hypothyroidism, unspecified type  Adequately replaced.  Continue same dose of  levothyroxine.    Follow-up in 4 months or sooner if needed.      Please note that this dictation was created using voice recognition software. I have worked with consultants from the vendor as well as technical experts from [facility]  [employer] to optimize the interface. I have made every reasonable attempt to correct obvious errors, but I expect that there are errors of grammar and possibly content I did not discover before finalizing the note.

## 2022-04-12 NOTE — LETTER
[facility]  Su Randhawa M.D.  [address]  [place] [address]  Fax: [phone]   Authorization for Release/Disclosure of   Protected Health Information   Name: CHARU BAEZ : 1945 SSN: [id number]   Address: [address] [place] [address]  [place] [address] Phone:    [phone] (home)    I authorize the entity listed below to release/disclose the PHI below to:   [facility]/Su Randhawa M.D. and Su Randhawa M.D.   Provider or Entity Name    [facility]   Address   City, [facility] [place], [place] Phone:      Fax:     Reason for request: continuity of care   Information to be released:    [  ] LAST COLONOSCOPY,  including any PATH REPORT and follow-up  [  ] LAST FIT/COLOGUARD RESULT [  ] LAST DEXA  [  ] LAST MAMMOGRAM  [  ] LAST PAP  [  ] LAST LABS [x  ] RETINA EXAM REPORT  [  ] IMMUNIZATION RECORDS  [  ] Release all info      [  ] Check here and initial the line next to each item to release ALL health information INCLUDING  _____ Care and treatment for drug and / or alcohol abuse  _____ HIV testing, infection status, or AIDS  _____ Genetic Testing    DATES OF SERVICE OR TIME PERIOD TO BE DISCLOSED: _____________  I understand and acknowledge that:  * This Authorization may be revoked at any time by you in writing, except if your health information has already been used or disclosed.  * Your health information that will be used or disclosed as a result of you signing this authorization could be re-disclosed by the recipient. If this occurs, your re-disclosed health information may no longer be protected by State or Federal laws.  * You may refuse to sign this Authorization. Your refusal will not affect your ability to obtain treatment.  * This Authorization becomes effective upon signing and will  on (date) __________.      If no date is indicated, this Authorization will  one (1) year from the signature date.    Name: Charu Baez    Signature:   Date:      4/12/2022       PLEASE FAX REQUESTED RECORDS BACK TO: [phone]

## 2022-04-13 ENCOUNTER — HOSPITAL ENCOUNTER (OUTPATIENT)
Dept: LAB | Facility: MEDICAL CENTER | Age: 77
End: 2022-04-13
Attending: INTERNAL MEDICINE
Payer: MEDICARE

## 2022-04-13 LAB
IRON SATN MFR SERPL: 5 % (ref 15–55)
IRON SERPL-MCNC: 23 UG/DL (ref 40–170)
TIBC SERPL-MCNC: 440 UG/DL (ref 250–450)
UIBC SERPL-MCNC: 417 UG/DL (ref 110–370)

## 2022-04-13 PROCEDURE — 83550 IRON BINDING TEST: CPT

## 2022-04-13 PROCEDURE — 36415 COLL VENOUS BLD VENIPUNCTURE: CPT

## 2022-04-13 PROCEDURE — 82728 ASSAY OF FERRITIN: CPT

## 2022-04-13 PROCEDURE — 83540 ASSAY OF IRON: CPT

## 2022-04-14 LAB — FERRITIN SERPL-MCNC: 6.4 NG/ML (ref 10–291)

## 2022-04-19 RX ORDER — DIPHENHYDRAMINE HYDROCHLORIDE 50 MG/ML
50 INJECTION INTRAMUSCULAR; INTRAVENOUS PRN
Status: CANCELLED | OUTPATIENT
Start: 2022-04-19

## 2022-04-19 RX ORDER — METHYLPREDNISOLONE SODIUM SUCCINATE 125 MG/2ML
125 INJECTION, POWDER, LYOPHILIZED, FOR SOLUTION INTRAMUSCULAR; INTRAVENOUS PRN
Status: CANCELLED | OUTPATIENT
Start: 2022-04-19

## 2022-04-19 RX ORDER — EPINEPHRINE 1 MG/ML(1)
0.5 AMPUL (ML) INJECTION PRN
Status: CANCELLED | OUTPATIENT
Start: 2022-04-19

## 2022-04-19 RX ORDER — SODIUM CHLORIDE 9 MG/ML
INJECTION, SOLUTION INTRAVENOUS CONTINUOUS
Status: CANCELLED | OUTPATIENT
Start: 2022-04-19

## 2022-04-25 RX ORDER — METHYLPREDNISOLONE SODIUM SUCCINATE 125 MG/2ML
125 INJECTION, POWDER, LYOPHILIZED, FOR SOLUTION INTRAMUSCULAR; INTRAVENOUS PRN
Status: CANCELLED | OUTPATIENT
Start: 2022-04-29

## 2022-04-25 RX ORDER — DIPHENHYDRAMINE HYDROCHLORIDE 50 MG/ML
50 INJECTION INTRAMUSCULAR; INTRAVENOUS PRN
Status: CANCELLED | OUTPATIENT
Start: 2022-04-29

## 2022-04-25 RX ORDER — SODIUM CHLORIDE 9 MG/ML
INJECTION, SOLUTION INTRAVENOUS CONTINUOUS
Status: CANCELLED | OUTPATIENT
Start: 2022-04-29

## 2022-04-25 RX ORDER — EPINEPHRINE 1 MG/ML(1)
0.5 AMPUL (ML) INJECTION PRN
Status: CANCELLED | OUTPATIENT
Start: 2022-04-29

## 2022-04-29 ENCOUNTER — OUTPATIENT INFUSION SERVICES (OUTPATIENT)
Dept: ONCOLOGY | Facility: MEDICAL CENTER | Age: 77
End: 2022-04-29
Attending: INTERNAL MEDICINE
Payer: MEDICARE

## 2022-04-29 VITALS
HEIGHT: 65 IN | SYSTOLIC BLOOD PRESSURE: 164 MMHG | DIASTOLIC BLOOD PRESSURE: 68 MMHG | TEMPERATURE: 98 F | WEIGHT: 186.29 LBS | RESPIRATION RATE: 18 BRPM | BODY MASS INDEX: 31.04 KG/M2 | OXYGEN SATURATION: 98 % | HEART RATE: 60 BPM

## 2022-04-29 DIAGNOSIS — D50.8 OTHER IRON DEFICIENCY ANEMIA: ICD-10-CM

## 2022-04-29 PROCEDURE — 700111 HCHG RX REV CODE 636 W/ 250 OVERRIDE (IP): Performed by: INTERNAL MEDICINE

## 2022-04-29 PROCEDURE — 700105 HCHG RX REV CODE 258: Performed by: INTERNAL MEDICINE

## 2022-04-29 PROCEDURE — 96365 THER/PROPH/DIAG IV INF INIT: CPT

## 2022-04-29 RX ORDER — SODIUM CHLORIDE 9 MG/ML
INJECTION, SOLUTION INTRAVENOUS CONTINUOUS
Status: CANCELLED | OUTPATIENT
Start: 2022-10-26

## 2022-04-29 RX ORDER — DIPHENHYDRAMINE HYDROCHLORIDE 50 MG/ML
50 INJECTION INTRAMUSCULAR; INTRAVENOUS PRN
Status: CANCELLED | OUTPATIENT
Start: 2022-10-26

## 2022-04-29 RX ORDER — METHYLPREDNISOLONE SODIUM SUCCINATE 125 MG/2ML
125 INJECTION, POWDER, LYOPHILIZED, FOR SOLUTION INTRAMUSCULAR; INTRAVENOUS PRN
Status: CANCELLED | OUTPATIENT
Start: 2022-10-26

## 2022-04-29 RX ORDER — EPINEPHRINE 1 MG/ML(1)
0.5 AMPUL (ML) INJECTION PRN
Status: CANCELLED | OUTPATIENT
Start: 2022-10-26

## 2022-04-29 RX ORDER — METHYLPREDNISOLONE SODIUM SUCCINATE 125 MG/2ML
125 INJECTION, POWDER, LYOPHILIZED, FOR SOLUTION INTRAMUSCULAR; INTRAVENOUS PRN
Status: DISCONTINUED | OUTPATIENT
Start: 2022-04-29 | End: 2022-04-29 | Stop reason: HOSPADM

## 2022-04-29 RX ADMIN — SODIUM CHLORIDE 1000 MG: 9 INJECTION, SOLUTION INTRAVENOUS at 15:01

## 2022-04-29 ASSESSMENT — FIBROSIS 4 INDEX: FIB4 SCORE: 4.03

## 2022-04-29 NOTE — PROGRESS NOTES
Pt presented to infusion center for iron dextran. POC discussed, including time of treatment, pt agreeable. PIV started, brisk blood return observed. Iron Dextran started at 75 ml/hr per orders for 5 mins, paused for 10 mins with no s/s of adverse reaction. Remainder of infusion run over remainder of approx 1 hour with no s/s of adverse reaction. PIV flushed and removed, gauze dressing placed. Next appointment scheduled, Ally discharged home to self care.

## 2022-06-17 ENCOUNTER — HOSPITAL ENCOUNTER (INPATIENT)
Facility: MEDICAL CENTER | Age: 77
LOS: 3 days | DRG: 369 | End: 2022-06-20
Attending: EMERGENCY MEDICINE | Admitting: STUDENT IN AN ORGANIZED HEALTH CARE EDUCATION/TRAINING PROGRAM
Payer: MEDICARE

## 2022-06-17 ENCOUNTER — APPOINTMENT (OUTPATIENT)
Dept: RADIOLOGY | Facility: MEDICAL CENTER | Age: 77
DRG: 369 | End: 2022-06-17
Attending: EMERGENCY MEDICINE
Payer: MEDICARE

## 2022-06-17 DIAGNOSIS — K75.81 NASH (NONALCOHOLIC STEATOHEPATITIS): ICD-10-CM

## 2022-06-17 DIAGNOSIS — R73.9 HYPERGLYCEMIA: ICD-10-CM

## 2022-06-17 DIAGNOSIS — K92.2 GASTROINTESTINAL HEMORRHAGE, UNSPECIFIED GASTROINTESTINAL HEMORRHAGE TYPE: ICD-10-CM

## 2022-06-17 DIAGNOSIS — R10.31 RIGHT LOWER QUADRANT ABDOMINAL PAIN: ICD-10-CM

## 2022-06-17 PROBLEM — Z71.89 ADVANCED CARE PLANNING/COUNSELING DISCUSSION: Status: ACTIVE | Noted: 2022-06-17

## 2022-06-17 LAB
ABO GROUP BLD: NORMAL
ALBUMIN SERPL BCP-MCNC: 3.9 G/DL (ref 3.2–4.9)
ALBUMIN/GLOB SERPL: 1.4 G/DL
ALP SERPL-CCNC: 97 U/L (ref 30–99)
ALT SERPL-CCNC: 30 U/L (ref 2–50)
ANION GAP SERPL CALC-SCNC: 17 MMOL/L (ref 7–16)
ANISOCYTOSIS BLD QL SMEAR: ABNORMAL
APPEARANCE UR: CLEAR
APTT PPP: 31.2 SEC (ref 24.7–36)
AST SERPL-CCNC: 40 U/L (ref 12–45)
B-OH-BUTYR SERPL-MCNC: 0.23 MMOL/L (ref 0.02–0.27)
BACTERIA #/AREA URNS HPF: NEGATIVE /HPF
BASOPHILS # BLD AUTO: 0.9 % (ref 0–1.8)
BASOPHILS # BLD: 0.05 K/UL (ref 0–0.12)
BILIRUB SERPL-MCNC: 0.5 MG/DL (ref 0.1–1.5)
BILIRUB UR QL STRIP.AUTO: NEGATIVE
BLD GP AB SCN SERPL QL: NORMAL
BUN SERPL-MCNC: 21 MG/DL (ref 8–22)
CALCIUM SERPL-MCNC: 9.2 MG/DL (ref 8.5–10.5)
CHLORIDE SERPL-SCNC: 108 MMOL/L (ref 96–112)
CO2 SERPL-SCNC: 17 MMOL/L (ref 20–33)
COLOR UR: YELLOW
CREAT SERPL-MCNC: 0.83 MG/DL (ref 0.5–1.4)
DACRYOCYTES BLD QL SMEAR: NORMAL
EOSINOPHIL # BLD AUTO: 0.09 K/UL (ref 0–0.51)
EOSINOPHIL NFR BLD: 1.7 % (ref 0–6.9)
EPI CELLS #/AREA URNS HPF: NEGATIVE /HPF
GFR SERPLBLD CREATININE-BSD FMLA CKD-EPI: 73 ML/MIN/1.73 M 2
GLOBULIN SER CALC-MCNC: 2.8 G/DL (ref 1.9–3.5)
GLUCOSE BLD STRIP.AUTO-MCNC: 133 MG/DL (ref 65–99)
GLUCOSE SERPL-MCNC: 245 MG/DL (ref 65–99)
GLUCOSE UR STRIP.AUTO-MCNC: 100 MG/DL
HCT VFR BLD AUTO: 32.6 % (ref 37–47)
HCT VFR BLD AUTO: 37.7 % (ref 37–47)
HGB BLD-MCNC: 11.2 G/DL (ref 12–16)
HGB BLD-MCNC: 9.9 G/DL (ref 12–16)
HYALINE CASTS #/AREA URNS LPF: NORMAL /LPF
HYPOCHROMIA BLD QL SMEAR: ABNORMAL
INR PPP: 1.16 (ref 0.87–1.13)
KETONES UR STRIP.AUTO-MCNC: NEGATIVE MG/DL
LEUKOCYTE ESTERASE UR QL STRIP.AUTO: ABNORMAL
LG PLATELETS BLD QL SMEAR: NORMAL
LIPASE SERPL-CCNC: 53 U/L (ref 11–82)
LYMPHOCYTES # BLD AUTO: 0.93 K/UL (ref 1–4.8)
LYMPHOCYTES NFR BLD: 17.5 % (ref 22–41)
MANUAL DIFF BLD: NORMAL
MCH RBC QN AUTO: 25 PG (ref 27–33)
MCH RBC QN AUTO: 25.3 PG (ref 27–33)
MCHC RBC AUTO-ENTMCNC: 29.7 G/DL (ref 33.6–35)
MCHC RBC AUTO-ENTMCNC: 30.4 G/DL (ref 33.6–35)
MCV RBC AUTO: 83.2 FL (ref 81.4–97.8)
MCV RBC AUTO: 84.2 FL (ref 81.4–97.8)
MICRO URNS: ABNORMAL
MICROCYTES BLD QL SMEAR: ABNORMAL
MONOCYTES # BLD AUTO: 0.1 K/UL (ref 0–0.85)
MONOCYTES NFR BLD AUTO: 1.8 % (ref 0–13.4)
MORPHOLOGY BLD-IMP: NORMAL
NEUTROPHILS # BLD AUTO: 4.14 K/UL (ref 2–7.15)
NEUTROPHILS NFR BLD: 78.1 % (ref 44–72)
NITRITE UR QL STRIP.AUTO: NEGATIVE
NRBC # BLD AUTO: 0 K/UL
NRBC BLD-RTO: 0 /100 WBC
OVALOCYTES BLD QL SMEAR: NORMAL
PH UR STRIP.AUTO: 5 [PH] (ref 5–8)
PLATELET # BLD AUTO: 119 K/UL (ref 164–446)
PLATELET # BLD AUTO: 95 K/UL (ref 164–446)
PLATELET BLD QL SMEAR: NORMAL
PMV BLD AUTO: 10.1 FL (ref 9–12.9)
PMV BLD AUTO: 9.3 FL (ref 9–12.9)
POIKILOCYTOSIS BLD QL SMEAR: NORMAL
POLYCHROMASIA BLD QL SMEAR: NORMAL
POTASSIUM SERPL-SCNC: 4.3 MMOL/L (ref 3.6–5.5)
PROT SERPL-MCNC: 6.7 G/DL (ref 6–8.2)
PROT UR QL STRIP: NEGATIVE MG/DL
PROTHROMBIN TIME: 14.4 SEC (ref 12–14.6)
RBC # BLD AUTO: 3.92 M/UL (ref 4.2–5.4)
RBC # BLD AUTO: 4.48 M/UL (ref 4.2–5.4)
RBC # URNS HPF: NORMAL /HPF
RBC BLD AUTO: PRESENT
RBC UR QL AUTO: NEGATIVE
RH BLD: NORMAL
SODIUM SERPL-SCNC: 142 MMOL/L (ref 135–145)
SP GR UR STRIP.AUTO: 1.02
UROBILINOGEN UR STRIP.AUTO-MCNC: 0.2 MG/DL
WBC # BLD AUTO: 3.8 K/UL (ref 4.8–10.8)
WBC # BLD AUTO: 5.3 K/UL (ref 4.8–10.8)
WBC #/AREA URNS HPF: NORMAL /HPF

## 2022-06-17 PROCEDURE — 99223 1ST HOSP IP/OBS HIGH 75: CPT | Mod: AI | Performed by: STUDENT IN AN ORGANIZED HEALTH CARE EDUCATION/TRAINING PROGRAM

## 2022-06-17 PROCEDURE — 81001 URINALYSIS AUTO W/SCOPE: CPT

## 2022-06-17 PROCEDURE — 80053 COMPREHEN METABOLIC PANEL: CPT

## 2022-06-17 PROCEDURE — C9113 INJ PANTOPRAZOLE SODIUM, VIA: HCPCS | Performed by: STUDENT IN AN ORGANIZED HEALTH CARE EDUCATION/TRAINING PROGRAM

## 2022-06-17 PROCEDURE — 85025 COMPLETE CBC W/AUTO DIFF WBC: CPT

## 2022-06-17 PROCEDURE — 74176 CT ABD & PELVIS W/O CONTRAST: CPT

## 2022-06-17 PROCEDURE — 700105 HCHG RX REV CODE 258: Performed by: EMERGENCY MEDICINE

## 2022-06-17 PROCEDURE — 82105 ALPHA-FETOPROTEIN SERUM: CPT

## 2022-06-17 PROCEDURE — 85730 THROMBOPLASTIN TIME PARTIAL: CPT

## 2022-06-17 PROCEDURE — 83690 ASSAY OF LIPASE: CPT

## 2022-06-17 PROCEDURE — 86850 RBC ANTIBODY SCREEN: CPT

## 2022-06-17 PROCEDURE — 700111 HCHG RX REV CODE 636 W/ 250 OVERRIDE (IP): Performed by: EMERGENCY MEDICINE

## 2022-06-17 PROCEDURE — C9113 INJ PANTOPRAZOLE SODIUM, VIA: HCPCS | Performed by: EMERGENCY MEDICINE

## 2022-06-17 PROCEDURE — 82010 KETONE BODYS QUAN: CPT

## 2022-06-17 PROCEDURE — 96365 THER/PROPH/DIAG IV INF INIT: CPT

## 2022-06-17 PROCEDURE — 82962 GLUCOSE BLOOD TEST: CPT

## 2022-06-17 PROCEDURE — 770020 HCHG ROOM/CARE - TELE (206)

## 2022-06-17 PROCEDURE — 86900 BLOOD TYPING SEROLOGIC ABO: CPT

## 2022-06-17 PROCEDURE — 85007 BL SMEAR W/DIFF WBC COUNT: CPT

## 2022-06-17 PROCEDURE — 36415 COLL VENOUS BLD VENIPUNCTURE: CPT

## 2022-06-17 PROCEDURE — 700102 HCHG RX REV CODE 250 W/ 637 OVERRIDE(OP): Performed by: STUDENT IN AN ORGANIZED HEALTH CARE EDUCATION/TRAINING PROGRAM

## 2022-06-17 PROCEDURE — 700111 HCHG RX REV CODE 636 W/ 250 OVERRIDE (IP): Performed by: STUDENT IN AN ORGANIZED HEALTH CARE EDUCATION/TRAINING PROGRAM

## 2022-06-17 PROCEDURE — 99285 EMERGENCY DEPT VISIT HI MDM: CPT

## 2022-06-17 PROCEDURE — 85027 COMPLETE CBC AUTOMATED: CPT

## 2022-06-17 PROCEDURE — 85610 PROTHROMBIN TIME: CPT

## 2022-06-17 PROCEDURE — A9270 NON-COVERED ITEM OR SERVICE: HCPCS | Performed by: STUDENT IN AN ORGANIZED HEALTH CARE EDUCATION/TRAINING PROGRAM

## 2022-06-17 PROCEDURE — 86901 BLOOD TYPING SEROLOGIC RH(D): CPT

## 2022-06-17 PROCEDURE — 96367 TX/PROPH/DG ADDL SEQ IV INF: CPT

## 2022-06-17 RX ORDER — OMEPRAZOLE 20 MG/1
20 CAPSULE, DELAYED RELEASE ORAL
Status: ON HOLD | COMMUNITY
End: 2022-06-20

## 2022-06-17 RX ORDER — SODIUM CHLORIDE 9 MG/ML
INJECTION, SOLUTION INTRAVENOUS CONTINUOUS
Status: DISCONTINUED | OUTPATIENT
Start: 2022-06-17 | End: 2022-06-20 | Stop reason: HOSPADM

## 2022-06-17 RX ORDER — DEXTROSE MONOHYDRATE 25 G/50ML
25 INJECTION, SOLUTION INTRAVENOUS
Status: DISCONTINUED | OUTPATIENT
Start: 2022-06-17 | End: 2022-06-18

## 2022-06-17 RX ORDER — PANTOPRAZOLE SODIUM 40 MG/10ML
40 INJECTION, POWDER, LYOPHILIZED, FOR SOLUTION INTRAVENOUS 2 TIMES DAILY
Status: DISCONTINUED | OUTPATIENT
Start: 2022-06-17 | End: 2022-06-19

## 2022-06-17 RX ORDER — LEVOTHYROXINE SODIUM 0.1 MG/1
100 TABLET ORAL
COMMUNITY
End: 2022-09-12

## 2022-06-17 RX ORDER — LEVOTHYROXINE SODIUM 0.1 MG/1
100 TABLET ORAL
Status: DISCONTINUED | OUTPATIENT
Start: 2022-06-17 | End: 2022-06-20 | Stop reason: HOSPADM

## 2022-06-17 RX ORDER — CALCIUM CARBONATE 750 MG/1
750 TABLET, CHEWABLE ORAL 3 TIMES DAILY PRN
COMMUNITY
End: 2023-07-10

## 2022-06-17 RX ADMIN — OCTREOTIDE ACETATE 50 MCG/HR: 200 INJECTION, SOLUTION INTRAVENOUS; SUBCUTANEOUS at 16:26

## 2022-06-17 RX ADMIN — PANTOPRAZOLE SODIUM 40 MG: 40 INJECTION, POWDER, FOR SOLUTION INTRAVENOUS at 17:53

## 2022-06-17 RX ADMIN — SODIUM CHLORIDE 80 MG: 9 INJECTION, SOLUTION INTRAVENOUS at 14:37

## 2022-06-17 RX ADMIN — SODIUM CHLORIDE: 9 INJECTION, SOLUTION INTRAVENOUS at 14:37

## 2022-06-17 ASSESSMENT — ENCOUNTER SYMPTOMS
NEUROLOGICAL NEGATIVE: 1
PSYCHIATRIC NEGATIVE: 1
MUSCULOSKELETAL NEGATIVE: 1
CARDIOVASCULAR NEGATIVE: 1
BLOOD IN STOOL: 1
RESPIRATORY NEGATIVE: 1
EYES NEGATIVE: 1

## 2022-06-17 ASSESSMENT — COGNITIVE AND FUNCTIONAL STATUS - GENERAL
MOBILITY SCORE: 24
DAILY ACTIVITIY SCORE: 24
SUGGESTED CMS G CODE MODIFIER DAILY ACTIVITY: CH
SUGGESTED CMS G CODE MODIFIER MOBILITY: CH

## 2022-06-17 ASSESSMENT — FIBROSIS 4 INDEX
FIB4 SCORE: 4.6
FIB4 SCORE: 4.03

## 2022-06-17 ASSESSMENT — PAIN DESCRIPTION - DESCRIPTORS: DESCRIPTORS: ACHING

## 2022-06-17 ASSESSMENT — PATIENT HEALTH QUESTIONNAIRE - PHQ9
2. FEELING DOWN, DEPRESSED, IRRITABLE, OR HOPELESS: NOT AT ALL
SUM OF ALL RESPONSES TO PHQ9 QUESTIONS 1 AND 2: 0
1. LITTLE INTEREST OR PLEASURE IN DOING THINGS: NOT AT ALL

## 2022-06-17 NOTE — ASSESSMENT & PLAN NOTE
Goals of care discussed with patient and  at bedside.  She would like to be full code and everything to be done for her.  She states that if her condition becomes irreversible and prognosis is very poor while being intubated, she would like to be DNR/DNI.

## 2022-06-17 NOTE — ED TRIAGE NOTES
"Ally Doherty  77 y.o.    Chief Complaint   Patient presents with   • Bloody Stools   • Abdominal Pain     Pt has been having lower right abdominal pain with black stools for approximately 3 days. Pt reports this has happened to her before when she was receiving treatment for her cancer. Pt is currently not on treatment for breast cancer and denies any blood thinners.        Blood Pressure : (!) 153/74, Pulse: 94, Respiration: 18, Temperature: 36.5 °C (97.7 °F), Height: 170.2 cm (5' 7\"), Weight: 81.6 kg (180 lb), BMI (Calculated): 28.19, BSA (Calculated): 2, Pulse Oximetry: 96 %      Protocol placed, pt verbalizes understanding to alert staff of any changes or concerns.   "

## 2022-06-17 NOTE — ASSESSMENT & PLAN NOTE
Admit patient to medical floor, history of MEDINA cirrhosis with portal HTN and esophageal varices  Protonix and octreotide  Fluids  Serial CBC  GI on board  6/18: Esophageal varices noted on EGD.  GI banded 3 varices.  Continuing Protonix twice daily IV, octreotide.  Started on Cipro and Flagyl.  6/19: Advancing diet.  Continue octreotide until tomorrow morning.  We will switch the IV Protonix to p.o. omeprazole.  If no further bleeding will likely discharge tomorrow afternoon.

## 2022-06-17 NOTE — H&P
Hospital Medicine History & Physical Note    Date of Service  6/17/2022    Primary Care Physician  Su Randhawa M.D.    Consultants  Gastroenterology    Code Status  Full Code    Chief Complaint  Chief Complaint   Patient presents with   • Bloody Stools   • Abdominal Pain     Pt has been having lower right abdominal pain with black stools for approximately 3 days. Pt reports this has happened to her before when she was receiving treatment for her cancer. Pt is currently not on treatment for breast cancer and denies any blood thinners.        History of Presenting Illness  Ally Doherty is a 77 y.o. female who presented 6/17/2022 with intermittent black stools for the last week and a half.  Has a history of [place] complicated by portal hypertension and esophageal varices.  Last had endoscopy in April 2022.  Patient denies chest pain shortness of breath dizziness headache syncope.  She tried to see her primary care doctor today, however was unable so she decided to come to the ED for evaluation.    In the ED, patient found to have normal vital signs.  Hemoglobin 11.2.  Sugar 245.  Anion gap 17.  CT abdomen showing no acute abdominal pathology.  Hepatic cirrhosis and splenomegaly noted.  GI consulted, recommended Protonix, octreotide, and will see.    I discussed the plan of care with patient.    Review of Systems  Review of Systems   Constitutional: Positive for malaise/fatigue.   HENT: Negative.    Eyes: Negative.    Respiratory: Negative.    Cardiovascular: Negative.    Gastrointestinal: Positive for blood in stool and melena.   Genitourinary: Negative.    Musculoskeletal: Negative.    Skin: Negative.    Neurological: Negative.    Endo/Heme/Allergies: Negative.    Psychiatric/Behavioral: Negative.        Past Medical History   has a past medical history of Anemia, Breast cancer (HCC) (2/27/2012), Cancer (HCC) (2010), DM hyperosmolarity type II (HCC) (2/27/2012), DM II (diabetes mellitus, type II), controlled  (HCC) (6/21/2012), Hiatus hernia syndrome, High cholesterol (02/10/15), HTN (hypertension) (02/12/15), Hyperlipidemia (2/27/2012), Hypothyroidism (2/27/2012), Liver cirrhosis (HCC), Malignant neoplasm of upper-inner quadrant of right breast in female, estrogen receptor positive (HCC) (3/13/2018), and Vitamin D deficiency (5/27/2014).    Surgical History   has a past surgical history that includes mastectomy; pr chemotherapy, unspecified procedure; pr radiation therapy plan simple; lucila by laparoscopy (2008); breast reconstruction (6/24/2013); tissue expander place/remove (6/24/2013); tubal ligation (1974); tonsillectomy (1953); capsulectomy (3/6/2014); breast reconstruction (2/12/2015); mammoplasty reduction (2/12/2015); mole excision (2/12/2015); mastectomy (Left, 12/9/2019); and breast implant removal (Right, 12/9/2019).     Family History  family history includes Other in an other family member.   Family history reviewed with patient. There is no family history that is pertinent to the chief complaint.     Social History   reports that she has never smoked. She has never used smokeless tobacco. She reports current alcohol use. She reports that she does not use drugs.    Allergies  Allergies   Allergen Reactions   • Byetta Unspecified     Pancreatitis   • Penicillins Rash     All over   • Statins [Hmg-Coa-R Inhibitors] Unspecified     Elevated liver enzymes       Medications  Prior to Admission Medications   Prescriptions Last Dose Informant Patient Reported? Taking?   Calcium Carbonate-Vitamin D (CALTRATE 600+D PO) 6/17/2022 at 1000 Patient Yes No   Sig: Take 1 Tablet by mouth every day.   calcium carbonate (TUMS SMOOTHIES) 750 MG chewable tablet 6/15/2022 at PRN Patient Yes Yes   Sig: Chew 750 mg 3 times a day as needed (Indigestion).   glipiZIDE SR (GLUCOTROL) 5 MG TABLET SR 24 HR 6/17/2022 at 1000 Patient No No   Sig: Take 1 Tablet by mouth every day.   levothyroxine (SYNTHROID) 100 MCG Tab 6/16/2022 at 2100  Patient Yes Yes   Sig: Take 100 mcg by mouth at bedtime.   losartan (COZAAR) 100 MG Tab 6/17/2022 at 1000 Patient No No   Sig: Take 1 Tablet by mouth every day.   metformin (GLUCOPHAGE) 1000 MG tablet 6/17/2022 at 1000 Patient Yes Yes   Sig: Take 1,000 mg by mouth 2 times a day with meals.   omeprazole (PRILOSEC) 20 MG delayed-release capsule 6/14/2022 at PRN Patient Yes Yes   Sig: Take 20 mg by mouth 1 time a day as needed (Heartburn).   therapeutic multivitamin-minerals (THERAGRAN-M) Tab 6/14/2022 at AM Patient Yes No   Sig: Take 1 Tab by mouth every day.      Facility-Administered Medications: None       Physical Exam  Temp:  [36.5 °C (97.7 °F)] 36.5 °C (97.7 °F)  Pulse:  [81-94] 81  Resp:  [16-18] 18  BP: (129-153)/(62-74) 129/62  SpO2:  [93 %-96 %] 93 %  Blood Pressure : 129/62   Temperature: 36.5 °C (97.7 °F)   Pulse: 81   Respiration: 18   Pulse Oximetry: 93 %       Physical Exam  Constitutional:       Appearance: Normal appearance. She is normal weight.   HENT:      Head: Normocephalic.      Nose: Nose normal.      Mouth/Throat:      Mouth: Mucous membranes are moist.   Eyes:      Pupils: Pupils are equal, round, and reactive to light.   Cardiovascular:      Rate and Rhythm: Normal rate and regular rhythm.   Pulmonary:      Effort: Pulmonary effort is normal.      Breath sounds: Normal breath sounds.   Abdominal:      General: Abdomen is flat. Bowel sounds are normal.      Palpations: Abdomen is soft.   Musculoskeletal:         General: Normal range of motion.      Cervical back: Normal range of motion.   Skin:     General: Skin is warm.   Neurological:      General: No focal deficit present.      Mental Status: She is alert and oriented to person, place, and time. Mental status is at baseline.   Psychiatric:         Mood and Affect: Mood normal.         Behavior: Behavior normal.         Thought Content: Thought content normal.         Judgment: Judgment normal.         Laboratory:  Recent Labs      06/17/22  1309   WBC 5.3   RBC 4.48   HEMOGLOBIN 11.2*   HEMATOCRIT 37.7   MCV 84.2   MCH 25.0*   MCHC 29.7*   PLATELETCT 119*   MPV 9.3     Recent Labs     06/17/22  1309   SODIUM 142   POTASSIUM 4.3   CHLORIDE 108   CO2 17*   GLUCOSE 245*   BUN 21   CREATININE 0.83   CALCIUM 9.2     Recent Labs     06/17/22  1309   ALTSGPT 30   ASTSGOT 40   ALKPHOSPHAT 97   TBILIRUBIN 0.5   LIPASE 53   GLUCOSE 245*     Recent Labs     06/17/22  1309   APTT 31.2   INR 1.16*     No results for input(s): NTPROBNP in the last 72 hours.      No results for input(s): TROPONINT in the last 72 hours.    Imaging:  CT-ABDOMEN-PELVIS W/O   Final Result      1. The intraabdominal gastrointestinal tract is within normal limits. Specifically, no bowel obstruction.   2. Normal appendix.   3. Small amount of ascites in the abdomen and pelvis.   4. Soft tissue nodule in the pelvic ascites measuring 1.5 x 2 cm in diameter is stable when compared with the prior study.   5. Nonspecific mesenteric fat stranding.   6. Large hiatal hernia containing the majority of the stomach, mesenteric fat and fluid. No gastric volvulus.   7. Hepatic cirrhosis and splenomegaly.              Assessment/Plan:  Justification for Admission Status  I anticipate this patient will require at least two midnights for appropriate medical management, necessitating inpatient admission because inpt    * UGIB (upper gastrointestinal bleed)  Assessment & Plan  Admit patient to medical floor, history of MEDINA cirrhosis with portal HTN and esophageal varices  Protonix and octreotide  Fluids  Serial CBC  GI on board, follow note    Advanced care planning/counseling discussion  Assessment & Plan  Goals of care discussed with patient and  at bedside.  She would like to be full code and everything to be done for her.  She states that if her condition becomes irreversible and prognosis is very poor while being intubated, she would like to be DNR/DNI.    Cirrhosis of liver with  ascites (HCC)- (present on admission)  Assessment & Plan  Chronic, send AFP     Uncontrolled diabetes mellitus with hyperglycemia (HCC)  Assessment & Plan  Sliding scale    Hypothyroid  Assessment & Plan  Continue synthroid     Hypertension  Assessment & Plan  Restart losartan as needed      VTE prophylaxis: pharmacologic prophylaxis contraindicated due to gib

## 2022-06-17 NOTE — ED NOTES
Med rec completed per patient at bedside.  Allergies reviewed with patient.  No outpatient antibiotics in the last 30 days.  Patient's preferred pharmacy: [facility] on [address] [employer].

## 2022-06-17 NOTE — CONSULTS
Gastroenterology Consult Note:    Jose R Fraire M.D.  Date & Time note created:    6/17/2022   3:14 PM     Referring MD:  Dr. Houser    Patient ID:   Name:             Ally Doherty   YOB: 1945  Age:                 77 y.o.  female   MRN:               [id number]                                                             Reason for Consult:      melena    History of Present Illness:    77-year-old female with a history of type 2 diabetes and Hernandez cirrhosis complicated by portal hypertension with esophageal varices who presents with black stools and right lower quadrant pain.    1 and half weeks before presentation, she noticed she was having black and loose stools.  The symptoms were associated with right lower quadrant pain.  She initially states the pain was painful and was just there.  She endorses it being sharp and nonradiating.  She has had mild intermittent bouts of nausea.  She denies vomiting.  She endorses mild amount of difficulty swallowing.  She denies fevers and chills.  She denies night sweats and weight loss.  She does endorse that the symptoms are reminiscent of when she had a colonoscopy in 2016 and had a angio at [facility].  She endorses also that her varices were detected on screening but notes she was having black stools at that time as well.  She denies prior paracenteses when it was described in layman's terms and prior treatment for hepatic encephalopathy.  She denies using iron NSAIDs and Pepto-Bismol.  She denies being placed on beta-blockade for secondary prophylaxis of varices      I reviewed her outpatient records from [facility].      April 20, 2022 she had grade 1 esophageal varices.  Her preoperative H&P in October 2021 states that she had cirrhosis secondary to nonalcoholic fatty liver disease no other significant lesions were seen.  Small hiatal hernia was identified.  No bands were placed because by definition these varices were not banded.  January 6,  2022 EGD found 2 cords of grade 2 varices.  3 bands were placed.  August 2021 was her last ultrasound which showed a nodular appearing liver with the main portal vein of 16 mm and a recanalized umbilical vein.  Her AFP August 30, 2021 was 4.    Discussed with the ER team.  Patient expressed concern that her varices come back.  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  Her last clinic visit appeared to be in August 2020 and she is described as needing iron transfusions every 9 to 12 months she is also describes having had a negative autoimmune and infectious work-up and evaluation of her cirrhosis      Review of Systems:      Constitutional: Denies fevers, Denies weight changes  Eyes: Denies changes in vision, no eye pain  Ears/Nose/Throat/Mouth: Denies nasal congestion or sore throat   Cardiovascular: Denies chest pain or palpitations.  Respiratory: Denies shortness of breath, cough, and wheezing.  Gastrointestinal/Hepatic: Denies abdominal pain, nausea, vomiting, diarrhea, constipation or GI bleeding   Genitourinary: Denies dysuria or frequency  Musculoskeletal/Rheum: Denies  joint pain and swelling, no edema  Skin: Denies rash  Neurological: Denies headache, confusion, memory loss or focal weakness/parasthesias  Psychiatric: denies mood disorder   Endocrine: Brandie thyroid problems  Heme/Oncology/Lymph Nodes: Denies enlarged lymph nodes, denies brusing or known bleeding disorder  All other systems were reviewed and are negative (AMA/CMS criteria)                Past Medical History:   Past Medical History:   Diagnosis Date   • Anemia    • Breast cancer (HCC) 2/27/2012   • Cancer (HCC) 2010    right breast   • DM hyperosmolarity type II (HCC) 2/27/2012    oral meds   • DM II (diabetes mellitus, type II), controlled (HCC) 6/21/2012   • Hiatus hernia syndrome    • High  "cholesterol 02/10/15    Pt denies   • HTN (hypertension) 02/12/15    more \"preventative\" for DM-per her DR   • Hyperlipidemia 2/27/2012   • Hypothyroidism 2/27/2012   • Liver cirrhosis (HCC)     couldn't take statins   • Malignant neoplasm of upper-inner quadrant of right breast in female, estrogen receptor positive (HCC) 3/13/2018   • Vitamin D deficiency 5/27/2014    ICD-10 transition         Past Surgical History:  Past Surgical History:   Procedure Laterality Date   • MASTECTOMY Left 12/9/2019    Procedure: MASTECTOMY;  Surgeon: Edward Boo M.D.;  Location: SURGERY SAME DAY [facility];  Service: Plastics   • BREAST IMPLANT REMOVAL Right 12/9/2019    Procedure: REMOVAL, IMPLANT, BREAST;  Surgeon: Edward Boo M.D.;  Location: SURGERY SAME DAY [facility];  Service: Plastics   • BREAST RECONSTRUCTION  2/12/2015    Performed by Edward Boo M.D. at SURGERY [facility]   • MAMMOPLASTY REDUCTION  2/12/2015    Performed by Edward Boo M.D. at SURGERY [facility]   • MOLE EXCISION  2/12/2015    Performed by Edward Boo M.D. at SURGERY [facility]   • CAPSULECTOMY  3/6/2014    Performed by Edward Boo M.D. at SURGERY [facility]   • BREAST RECONSTRUCTION  6/24/2013    Performed by Edward Boo M.D. at SURGERY [facility]   • TISSUE EXPANDER PLACE/REMOVE  6/24/2013    Performed by Edward Boo M.D. at SURGERY [facility]   • CAYETANO BY LAPAROSCOPY  2008   • TUBAL LIGATION  1974   • TONSILLECTOMY  1953   • MASTECTOMY      right breast   • UT CHEMOTHERAPY, UNSPECIFIED PROCEDURE     • UT RADIATION THERAPY PLAN SIMPLE         Hospital Medications:    Current Facility-Administered Medications:   •  NS infusion, , Intravenous, Continuous, Mac Houser M.D., Continue to Floor at 06/17/22 1500    Current Outpatient Medications:   •  levothyroxine (SYNTHROID) 100 MCG Tab, Take 100 mcg by mouth at bedtime., Disp: , Rfl:   •  metformin (GLUCOPHAGE) 1000 MG tablet, Take 1,000 mg by mouth 2 " times a day with meals., Disp: , Rfl:   •  omeprazole (PRILOSEC) 20 MG delayed-release capsule, Take 20 mg by mouth 1 time a day as needed (Heartburn)., Disp: , Rfl:   •  calcium carbonate (TUMS SMOOTHIES) 750 MG chewable tablet, Chew 750 mg 3 times a day as needed (Indigestion)., Disp: , Rfl:   •  glipiZIDE SR (GLUCOTROL) 5 MG TABLET SR 24 HR, Take 1 Tablet by mouth every day., Disp: 100 Tablet, Rfl: 1  •  losartan (COZAAR) 100 MG Tab, Take 1 Tablet by mouth every day., Disp: 100 Tablet, Rfl: 1  •  therapeutic multivitamin-minerals (THERAGRAN-M) Tab, Take 1 Tab by mouth every day., Disp: , Rfl:   •  Calcium Carbonate-Vitamin D (CALTRATE 600+D PO), Take 1 Tablet by mouth every day., Disp: , Rfl:     Current Outpatient Medications:  Current Facility-Administered Medications   Medication Dose Route Frequency Provider Last Rate Last Admin   • NS infusion   Intravenous Continuous Mac Houser M.D.   Continue to Floor at 06/17/22 1500     Current Outpatient Medications   Medication Sig Dispense Refill   • levothyroxine (SYNTHROID) 100 MCG Tab Take 100 mcg by mouth at bedtime.     • metformin (GLUCOPHAGE) 1000 MG tablet Take 1,000 mg by mouth 2 times a day with meals.     • omeprazole (PRILOSEC) 20 MG delayed-release capsule Take 20 mg by mouth 1 time a day as needed (Heartburn).     • calcium carbonate (TUMS SMOOTHIES) 750 MG chewable tablet Chew 750 mg 3 times a day as needed (Indigestion).     • glipiZIDE SR (GLUCOTROL) 5 MG TABLET SR 24 HR Take 1 Tablet by mouth every day. 100 Tablet 1   • losartan (COZAAR) 100 MG Tab Take 1 Tablet by mouth every day. 100 Tablet 1   • therapeutic multivitamin-minerals (THERAGRAN-M) Tab Take 1 Tab by mouth every day.     • Calcium Carbonate-Vitamin D (CALTRATE 600+D PO) Take 1 Tablet by mouth every day.         Medication Allergy:  Allergies   Allergen Reactions   • Byetta Unspecified     Pancreatitis   • Penicillins Rash     All over   • Statins [Hmg-Coa-R Inhibitors] Unspecified      "Elevated liver enzymes       Family History:  Family History   Problem Relation Age of Onset   • Other Other         adopted       Social History:  Social History     Socioeconomic History   • Marital status:      Spouse name: Not on file   • Number of children: Not on file   • Years of education: Not on file   • Highest education level: Not on file   Occupational History   • Not on file   Tobacco Use   • Smoking status: Never Smoker   • Smokeless tobacco: Never Used   Vaping Use   • Vaping Use: Never used   Substance and Sexual Activity   • Alcohol use: Yes     Alcohol/week: 0.0 oz     Comment: < 1 per week   • Drug use: No   • Sexual activity: Never   Other Topics Concern   • Not on file   Social History Narrative   • Not on file     Social Determinants of Health     Financial Resource Strain: Not on file   Food Insecurity: Not on file   Transportation Needs: Not on file   Physical Activity: Not on file   Stress: Not on file   Social Connections: Not on file   Intimate Partner Violence: Not on file   Housing Stability: Not on file         Physical Exam:  Vitals/ General Appearance:   Weight/BMI: Body mass index is 28.19 kg/m².  /62   Pulse 81   Temp 36.5 °C (97.7 °F) (Temporal)   Resp 18   Ht 1.702 m (5' 7\")   Wt 81.6 kg (180 lb)   SpO2 93%   Vitals:    06/17/22 1250 06/17/22 1400 06/17/22 1430 06/17/22 1431   BP:  134/64 129/62    Pulse:  84 81    Resp:  16  18   Temp:       TempSrc:       SpO2:  95% 93%    Weight: 81.6 kg (180 lb)      Height: 1.702 m (5' 7\")        Oxygen Therapy:  Pulse Oximetry: 93 %    Constitutional: Overweight, Well nourished, No acute distress  HENMT:  Normocephalic, Atraumatic, Oropharynx moist mucous membranes, No oral exudates, Nose normal.  No thyromegaly.  Eyes:  EOMI, Conjunctiva normal, No discharge.  Neck:  Normal range of motion, No cervical tenderness,    Cardiovascular:  Normal heart rate, Normal rhythm, No murmurs, No rubs, No gallops.   Extremitites with " intact distal pulses, no cyanosis, or edema.  Lungs:  Normal breath sounds, breath sounds clear to auscultation bilaterally,  no crackles, no wheezing.   Abdomen: Bowel sounds normal, Soft, tender to palpation in the right upper quadrant and right lower quadrant, No guarding, No rebound, No masses,   Skin: Warm, Dry, No erythema, No rash, no induration.  Neurologic: Alert & oriented x 3, No focal deficits noted, cranial nerves II through X are grossly intact.  Psychiatric: Affect normal, Judgment normal, Mood normal.      MDM (Data Review):     Records reviewed and summarized in current documentation    Lab Data Review:  Recent Results (from the past 24 hour(s))   CBC with Differential    Collection Time: 06/17/22  1:09 PM   Result Value Ref Range    WBC 5.3 4.8 - 10.8 K/uL    RBC 4.48 4.20 - 5.40 M/uL    Hemoglobin 11.2 (L) 12.0 - 16.0 g/dL    Hematocrit 37.7 37.0 - 47.0 %    MCV 84.2 81.4 - 97.8 fL    MCH 25.0 (L) 27.0 - 33.0 pg    MCHC 29.7 (L) 33.6 - 35.0 g/dL    Platelet Count 119 (L) 164 - 446 K/uL    MPV 9.3 9.0 - 12.9 fL    Neutrophils-Polys 78.10 (H) 44.00 - 72.00 %    Lymphocytes 17.50 (L) 22.00 - 41.00 %    Monocytes 1.80 0.00 - 13.40 %    Eosinophils 1.70 0.00 - 6.90 %    Basophils 0.90 0.00 - 1.80 %    Nucleated RBC 0.00 /100 WBC    Neutrophils (Absolute) 4.14 2.00 - 7.15 K/uL    Lymphs (Absolute) 0.93 (L) 1.00 - 4.80 K/uL    Monos (Absolute) 0.10 0.00 - 0.85 K/uL    Eos (Absolute) 0.09 0.00 - 0.51 K/uL    Baso (Absolute) 0.05 0.00 - 0.12 K/uL    NRBC (Absolute) 0.00 K/uL    Hypochromia 1+     Anisocytosis 2+ (A)     Microcytosis 2+ (A)    Complete Metabolic Panel    Collection Time: 06/17/22  1:09 PM   Result Value Ref Range    Sodium 142 135 - 145 mmol/L    Potassium 4.3 3.6 - 5.5 mmol/L    Chloride 108 96 - 112 mmol/L    Co2 17 (L) 20 - 33 mmol/L    Anion Gap 17.0 (H) 7.0 - 16.0    Glucose 245 (H) 65 - 99 mg/dL    Bun 21 8 - 22 mg/dL    Creatinine 0.83 0.50 - 1.40 mg/dL    Calcium 9.2 8.5 - 10.5  mg/dL    AST(SGOT) 40 12 - 45 U/L    ALT(SGPT) 30 2 - 50 U/L    Alkaline Phosphatase 97 30 - 99 U/L    Total Bilirubin 0.5 0.1 - 1.5 mg/dL    Albumin 3.9 3.2 - 4.9 g/dL    Total Protein 6.7 6.0 - 8.2 g/dL    Globulin 2.8 1.9 - 3.5 g/dL    A-G Ratio 1.4 g/dL   Lipase    Collection Time: 06/17/22  1:09 PM   Result Value Ref Range    Lipase 53 11 - 82 U/L   PROTHROMBIN TIME (INR)    Collection Time: 06/17/22  1:09 PM   Result Value Ref Range    PT 14.4 12.0 - 14.6 sec    INR 1.16 (H) 0.87 - 1.13   APTT    Collection Time: 06/17/22  1:09 PM   Result Value Ref Range    APTT 31.2 24.7 - 36.0 sec   COD (ADULT)    Collection Time: 06/17/22  1:09 PM   Result Value Ref Range    ABO Grouping Only O     Rh Grouping Only POS     Antibody Screen-Cod NEG    ESTIMATED GFR    Collection Time: 06/17/22  1:09 PM   Result Value Ref Range    GFR (CKD-EPI) 73 >60 mL/min/1.73 m 2   BETA-HYDROXYBUTYRIC ACID    Collection Time: 06/17/22  1:09 PM   Result Value Ref Range    beta-Hydroxybutyric Acid 0.23 0.02 - 0.27 mmol/L   DIFFERENTIAL MANUAL    Collection Time: 06/17/22  1:09 PM   Result Value Ref Range    Manual Diff Status PERFORMED    PERIPHERAL SMEAR REVIEW    Collection Time: 06/17/22  1:09 PM   Result Value Ref Range    Peripheral Smear Review see below    PLATELET ESTIMATE    Collection Time: 06/17/22  1:09 PM   Result Value Ref Range    Plt Estimation Decreased    MORPHOLOGY    Collection Time: 06/17/22  1:09 PM   Result Value Ref Range    RBC Morphology Present     Large Platelets 2+     Polychromia 1+     Poikilocytosis 1+     Ovalocytes 1+     Tear Drop Cells 1+        Imaging/Procedures Review:    CT abdomen pelvis noncontrast shows an absent gallbladder nondilated biliary ducts.  Small ascites is seen in the abdomen pelvis.  Nonspecific mesenteric fat stranding is seen at the root of the mesentery.  Duodenal diverticulum is seen.  Large hiatal hernia features much of the stomach in the chest      MDM (Assessment and Plan):      Patient Active Problem List    Diagnosis Date Noted   • Pancytopenia (HCC) 04/11/2022   • Thrombocytopenia (HCC) 04/11/2022   • BMI 30.0-30.9,adult 04/11/2022   • History of breast cancer 04/11/2022   • History of vitamin D deficiency 04/11/2022   • Cirrhosis of liver with ascites (HCC) 07/09/2019   • Iron deficiency anemia 10/12/2018   • Dyslipidemia 07/17/2018   • Obesity (BMI 30-39.9) 02/27/2018   • Type 2 diabetes mellitus without complication, without long-term current use of insulin (HCC) 12/22/2016   • Fatty liver disease, nonalcoholic 09/10/2013   • S/P breast reconstruction 06/24/2013   • Essential hypertension 02/27/2012   • Hypothyroidism 02/27/2012     #Cirrhosis: MELD of 8.  Debated bleed previously decompensated and that when she has had melena in the past she had varices banded although she is also had melena with identification of Avinash's erosions and by history angioectasias in the cecum which can bleed slowly and oxidized.  Has had prior outpatient work-up which concluded that she had cirrhosis from Hernandez    -Recommend tight glycemic control    #Portal HTN:  #Varices  #Anemia: As noted in the HPI EGD in April 2022 found small varices not amenable to banding.  Has had prior putative Avinash lesions which are notorious for causing iron deficiency anemia which was reconfirmed on her April 2022 studies.  Colonoscopy in 2016 also identified a nonbleeding angiectasia which by the natural history progress and regress.  We discussed potentially doing a colonoscopy with the EGD or waiting until we see what the EGD results produced.  She elected for the latter option.  Her BUN ratio is slightly elevated      -NPO after midnight except for sips with meds  -Plan for EGD with possible hemostasis and biopsy tomorrow morning at 11 AM with Dr. Chirag Hitchcock  -Can consider Coreg or nonselective beta blocker titrated to HR >55 for previous episodes of melena with variceal banding constitute secondary prophylaxis  in contrast to primary prophylaxis which would involve either banding alone (a pathway which she has been committed to at the [facility] [place]) or upfront beta-blockade    #Ascites: Scant on CT scan.    -2 g sodium diet post procedure  -Could consider Lasix 20 mg p.o. once a day and Aldactone 50 mg once a day    #HE: Grade 0    -Cannot for bear from using lactulose and rifaximin    #Coagulopathy: Mildly elevated    -recommend trial of IV Vitamin K x3 days to rule out deficiency    #HCC Screening: Overdue    -recommend AFP, abd US q 6months which technically would be now but could defer to outpatient    My total time spent caring for the patient on the day of the encounter was 74 minutes.   This does not include time spent on separately billable procedures/tests.    Jose R Fraire MD, [facility]  [facility]    Thank your for the opportunity to assist in the care of your patient.  Please call for any questions or concerns.    Jose R Fraire M.D.

## 2022-06-18 ENCOUNTER — ANESTHESIA EVENT (OUTPATIENT)
Dept: SURGERY | Facility: MEDICAL CENTER | Age: 77
DRG: 369 | End: 2022-06-18
Payer: MEDICARE

## 2022-06-18 ENCOUNTER — ANESTHESIA (OUTPATIENT)
Dept: SURGERY | Facility: MEDICAL CENTER | Age: 77
DRG: 369 | End: 2022-06-18
Payer: MEDICARE

## 2022-06-18 PROBLEM — I85.11 SECONDARY ESOPHAGEAL VARICES WITH BLEEDING (HCC): Status: ACTIVE | Noted: 2022-06-18

## 2022-06-18 LAB
ANION GAP SERPL CALC-SCNC: 13 MMOL/L (ref 7–16)
BUN SERPL-MCNC: 15 MG/DL (ref 8–22)
CALCIUM SERPL-MCNC: 7.7 MG/DL (ref 8.5–10.5)
CHLORIDE SERPL-SCNC: 108 MMOL/L (ref 96–112)
CO2 SERPL-SCNC: 16 MMOL/L (ref 20–33)
CREAT SERPL-MCNC: 0.76 MG/DL (ref 0.5–1.4)
EKG IMPRESSION: NORMAL
ERYTHROCYTE [DISTWIDTH] IN BLOOD BY AUTOMATED COUNT: 76 FL (ref 35.9–50)
GFR SERPLBLD CREATININE-BSD FMLA CKD-EPI: 81 ML/MIN/1.73 M 2
GLUCOSE BLD STRIP.AUTO-MCNC: 129 MG/DL (ref 65–99)
GLUCOSE BLD STRIP.AUTO-MCNC: 191 MG/DL (ref 65–99)
GLUCOSE BLD STRIP.AUTO-MCNC: 216 MG/DL (ref 65–99)
GLUCOSE BLD STRIP.AUTO-MCNC: 223 MG/DL (ref 65–99)
GLUCOSE BLD STRIP.AUTO-MCNC: 250 MG/DL (ref 65–99)
GLUCOSE SERPL-MCNC: 218 MG/DL (ref 65–99)
HCT VFR BLD AUTO: 32.1 % (ref 37–47)
HCT VFR BLD AUTO: 34 % (ref 37–47)
HCT VFR BLD AUTO: 38.7 % (ref 37–47)
HGB BLD-MCNC: 10.3 G/DL (ref 12–16)
HGB BLD-MCNC: 11.7 G/DL (ref 12–16)
HGB BLD-MCNC: 9.7 G/DL (ref 12–16)
MCH RBC QN AUTO: 25.3 PG (ref 27–33)
MCH RBC QN AUTO: 25.3 PG (ref 27–33)
MCH RBC QN AUTO: 25.4 PG (ref 27–33)
MCHC RBC AUTO-ENTMCNC: 30.2 G/DL (ref 33.6–35)
MCHC RBC AUTO-ENTMCNC: 30.2 G/DL (ref 33.6–35)
MCHC RBC AUTO-ENTMCNC: 30.3 G/DL (ref 33.6–35)
MCV RBC AUTO: 83.5 FL (ref 81.4–97.8)
MCV RBC AUTO: 83.6 FL (ref 81.4–97.8)
MCV RBC AUTO: 84 FL (ref 81.4–97.8)
PLATELET # BLD AUTO: 107 K/UL (ref 164–446)
PLATELET # BLD AUTO: 81 K/UL (ref 164–446)
PLATELET # BLD AUTO: 84 K/UL (ref 164–446)
PMV BLD AUTO: 9.2 FL (ref 9–12.9)
PMV BLD AUTO: 9.5 FL (ref 9–12.9)
PMV BLD AUTO: 9.6 FL (ref 9–12.9)
POTASSIUM SERPL-SCNC: 4.2 MMOL/L (ref 3.6–5.5)
RBC # BLD AUTO: 3.82 M/UL (ref 4.2–5.4)
RBC # BLD AUTO: 4.07 M/UL (ref 4.2–5.4)
RBC # BLD AUTO: 4.63 M/UL (ref 4.2–5.4)
SODIUM SERPL-SCNC: 137 MMOL/L (ref 135–145)
WBC # BLD AUTO: 3.2 K/UL (ref 4.8–10.8)
WBC # BLD AUTO: 3.5 K/UL (ref 4.8–10.8)
WBC # BLD AUTO: 4.6 K/UL (ref 4.8–10.8)

## 2022-06-18 PROCEDURE — 93005 ELECTROCARDIOGRAM TRACING: CPT | Performed by: INTERNAL MEDICINE

## 2022-06-18 PROCEDURE — 770020 HCHG ROOM/CARE - TELE (206)

## 2022-06-18 PROCEDURE — 700111 HCHG RX REV CODE 636 W/ 250 OVERRIDE (IP): Performed by: STUDENT IN AN ORGANIZED HEALTH CARE EDUCATION/TRAINING PROGRAM

## 2022-06-18 PROCEDURE — 160009 HCHG ANES TIME/MIN: Performed by: INTERNAL MEDICINE

## 2022-06-18 PROCEDURE — 99233 SBSQ HOSP IP/OBS HIGH 50: CPT | Performed by: STUDENT IN AN ORGANIZED HEALTH CARE EDUCATION/TRAINING PROGRAM

## 2022-06-18 PROCEDURE — C9113 INJ PANTOPRAZOLE SODIUM, VIA: HCPCS | Performed by: STUDENT IN AN ORGANIZED HEALTH CARE EDUCATION/TRAINING PROGRAM

## 2022-06-18 PROCEDURE — 85027 COMPLETE CBC AUTOMATED: CPT

## 2022-06-18 PROCEDURE — 700105 HCHG RX REV CODE 258: Performed by: EMERGENCY MEDICINE

## 2022-06-18 PROCEDURE — 160201 HCHG ENDO MINUTES - 1ST 30 MINS LEVEL 2: Performed by: INTERNAL MEDICINE

## 2022-06-18 PROCEDURE — 06L38CZ OCCLUSION OF ESOPHAGEAL VEIN WITH EXTRALUMINAL DEVICE, VIA NATURAL OR ARTIFICIAL OPENING ENDOSCOPIC: ICD-10-PCS | Performed by: INTERNAL MEDICINE

## 2022-06-18 PROCEDURE — 700111 HCHG RX REV CODE 636 W/ 250 OVERRIDE (IP): Performed by: EMERGENCY MEDICINE

## 2022-06-18 PROCEDURE — 160048 HCHG OR STATISTICAL LEVEL 1-5: Performed by: INTERNAL MEDICINE

## 2022-06-18 PROCEDURE — 700105 HCHG RX REV CODE 258: Performed by: STUDENT IN AN ORGANIZED HEALTH CARE EDUCATION/TRAINING PROGRAM

## 2022-06-18 PROCEDURE — 160002 HCHG RECOVERY MINUTES (STAT): Performed by: INTERNAL MEDICINE

## 2022-06-18 PROCEDURE — 99100 ANES PT EXTEME AGE<1 YR&>70: CPT | Performed by: STUDENT IN AN ORGANIZED HEALTH CARE EDUCATION/TRAINING PROGRAM

## 2022-06-18 PROCEDURE — 700102 HCHG RX REV CODE 250 W/ 637 OVERRIDE(OP)

## 2022-06-18 PROCEDURE — 00731 ANES UPR GI NDSC PX NOS: CPT | Performed by: STUDENT IN AN ORGANIZED HEALTH CARE EDUCATION/TRAINING PROGRAM

## 2022-06-18 PROCEDURE — 700111 HCHG RX REV CODE 636 W/ 250 OVERRIDE (IP)

## 2022-06-18 PROCEDURE — 700102 HCHG RX REV CODE 250 W/ 637 OVERRIDE(OP): Performed by: STUDENT IN AN ORGANIZED HEALTH CARE EDUCATION/TRAINING PROGRAM

## 2022-06-18 PROCEDURE — 700102 HCHG RX REV CODE 250 W/ 637 OVERRIDE(OP): Performed by: INTERNAL MEDICINE

## 2022-06-18 PROCEDURE — 160035 HCHG PACU - 1ST 60 MINS PHASE I: Performed by: INTERNAL MEDICINE

## 2022-06-18 PROCEDURE — 93010 ELECTROCARDIOGRAM REPORT: CPT | Performed by: INTERNAL MEDICINE

## 2022-06-18 PROCEDURE — 80048 BASIC METABOLIC PNL TOTAL CA: CPT

## 2022-06-18 PROCEDURE — A9270 NON-COVERED ITEM OR SERVICE: HCPCS | Performed by: INTERNAL MEDICINE

## 2022-06-18 PROCEDURE — 36415 COLL VENOUS BLD VENIPUNCTURE: CPT

## 2022-06-18 PROCEDURE — A9270 NON-COVERED ITEM OR SERVICE: HCPCS | Performed by: STUDENT IN AN ORGANIZED HEALTH CARE EDUCATION/TRAINING PROGRAM

## 2022-06-18 PROCEDURE — 82962 GLUCOSE BLOOD TEST: CPT | Mod: 91

## 2022-06-18 PROCEDURE — A9270 NON-COVERED ITEM OR SERVICE: HCPCS

## 2022-06-18 RX ORDER — HYDROMORPHONE HYDROCHLORIDE 1 MG/ML
0.1 INJECTION, SOLUTION INTRAMUSCULAR; INTRAVENOUS; SUBCUTANEOUS
Status: DISCONTINUED | OUTPATIENT
Start: 2022-06-18 | End: 2022-06-18 | Stop reason: HOSPADM

## 2022-06-18 RX ORDER — ONDANSETRON 2 MG/ML
4 INJECTION INTRAMUSCULAR; INTRAVENOUS
Status: DISCONTINUED | OUTPATIENT
Start: 2022-06-18 | End: 2022-06-18 | Stop reason: HOSPADM

## 2022-06-18 RX ORDER — HALOPERIDOL 5 MG/ML
1 INJECTION INTRAMUSCULAR
Status: DISCONTINUED | OUTPATIENT
Start: 2022-06-18 | End: 2022-06-18 | Stop reason: HOSPADM

## 2022-06-18 RX ORDER — SODIUM CHLORIDE, SODIUM LACTATE, POTASSIUM CHLORIDE, CALCIUM CHLORIDE 600; 310; 30; 20 MG/100ML; MG/100ML; MG/100ML; MG/100ML
INJECTION, SOLUTION INTRAVENOUS
Status: DISCONTINUED | OUTPATIENT
Start: 2022-06-18 | End: 2022-06-18 | Stop reason: SURG

## 2022-06-18 RX ORDER — SODIUM CHLORIDE, SODIUM LACTATE, POTASSIUM CHLORIDE, CALCIUM CHLORIDE 600; 310; 30; 20 MG/100ML; MG/100ML; MG/100ML; MG/100ML
INJECTION, SOLUTION INTRAVENOUS CONTINUOUS
Status: DISCONTINUED | OUTPATIENT
Start: 2022-06-18 | End: 2022-06-18 | Stop reason: HOSPADM

## 2022-06-18 RX ORDER — LOSARTAN POTASSIUM 50 MG/1
50 TABLET ORAL NIGHTLY
Status: DISCONTINUED | OUTPATIENT
Start: 2022-06-18 | End: 2022-06-20 | Stop reason: HOSPADM

## 2022-06-18 RX ORDER — HYDROMORPHONE HYDROCHLORIDE 1 MG/ML
0.2 INJECTION, SOLUTION INTRAMUSCULAR; INTRAVENOUS; SUBCUTANEOUS
Status: DISCONTINUED | OUTPATIENT
Start: 2022-06-18 | End: 2022-06-18 | Stop reason: HOSPADM

## 2022-06-18 RX ORDER — DEXTROSE MONOHYDRATE 25 G/50ML
25 INJECTION, SOLUTION INTRAVENOUS
Status: DISCONTINUED | OUTPATIENT
Start: 2022-06-18 | End: 2022-06-20 | Stop reason: HOSPADM

## 2022-06-18 RX ORDER — HYDROMORPHONE HYDROCHLORIDE 1 MG/ML
0.4 INJECTION, SOLUTION INTRAMUSCULAR; INTRAVENOUS; SUBCUTANEOUS
Status: DISCONTINUED | OUTPATIENT
Start: 2022-06-18 | End: 2022-06-18 | Stop reason: HOSPADM

## 2022-06-18 RX ORDER — CIPROFLOXACIN 500 MG/1
500 TABLET, FILM COATED ORAL EVERY 12 HOURS
Status: DISCONTINUED | OUTPATIENT
Start: 2022-06-18 | End: 2022-06-18

## 2022-06-18 RX ORDER — SULFAMETHOXAZOLE AND TRIMETHOPRIM 800; 160 MG/1; MG/1
1 TABLET ORAL EVERY 12 HOURS
Status: DISCONTINUED | OUTPATIENT
Start: 2022-06-18 | End: 2022-06-20 | Stop reason: HOSPADM

## 2022-06-18 RX ORDER — METRONIDAZOLE 250 MG/1
250 TABLET ORAL EVERY 8 HOURS
Status: DISCONTINUED | OUTPATIENT
Start: 2022-06-18 | End: 2022-06-18

## 2022-06-18 RX ADMIN — SODIUM CHLORIDE: 9 INJECTION, SOLUTION INTRAVENOUS at 23:43

## 2022-06-18 RX ADMIN — LEVOTHYROXINE SODIUM 100 MCG: 0.1 TABLET ORAL at 05:25

## 2022-06-18 RX ADMIN — OCTREOTIDE ACETATE 50 MCG/HR: 200 INJECTION, SOLUTION INTRAVENOUS; SUBCUTANEOUS at 19:46

## 2022-06-18 RX ADMIN — FENTANYL CITRATE 25 MCG: 50 INJECTION, SOLUTION INTRAMUSCULAR; INTRAVENOUS at 11:40

## 2022-06-18 RX ADMIN — PROPOFOL 50 MG: 10 INJECTION, EMULSION INTRAVENOUS at 10:54

## 2022-06-18 RX ADMIN — SODIUM CHLORIDE: 9 INJECTION, SOLUTION INTRAVENOUS at 14:00

## 2022-06-18 RX ADMIN — INSULIN HUMAN 3 UNITS: 100 INJECTION, SOLUTION PARENTERAL at 17:37

## 2022-06-18 RX ADMIN — INSULIN HUMAN 3 UNITS: 100 INJECTION, SOLUTION PARENTERAL at 22:36

## 2022-06-18 RX ADMIN — FENTANYL CITRATE 25 MCG: 50 INJECTION, SOLUTION INTRAMUSCULAR; INTRAVENOUS at 11:21

## 2022-06-18 RX ADMIN — FENTANYL CITRATE 25 MCG: 50 INJECTION, SOLUTION INTRAMUSCULAR; INTRAVENOUS at 11:26

## 2022-06-18 RX ADMIN — PANTOPRAZOLE SODIUM 40 MG: 40 INJECTION, POWDER, FOR SOLUTION INTRAVENOUS at 05:23

## 2022-06-18 RX ADMIN — SODIUM CHLORIDE, POTASSIUM CHLORIDE, SODIUM LACTATE AND CALCIUM CHLORIDE: 600; 310; 30; 20 INJECTION, SOLUTION INTRAVENOUS at 10:45

## 2022-06-18 RX ADMIN — PANTOPRAZOLE SODIUM 40 MG: 40 INJECTION, POWDER, FOR SOLUTION INTRAVENOUS at 17:32

## 2022-06-18 RX ADMIN — SULFAMETHOXAZOLE AND TRIMETHOPRIM 1 TABLET: 800; 160 TABLET ORAL at 17:32

## 2022-06-18 RX ADMIN — LOSARTAN POTASSIUM 50 MG: 50 TABLET, FILM COATED ORAL at 22:32

## 2022-06-18 RX ADMIN — PROPOFOL 100 MG: 10 INJECTION, EMULSION INTRAVENOUS at 10:50

## 2022-06-18 RX ADMIN — SODIUM CHLORIDE: 9 INJECTION, SOLUTION INTRAVENOUS at 19:45

## 2022-06-18 RX ADMIN — PROPOFOL 50 MG: 10 INJECTION, EMULSION INTRAVENOUS at 10:52

## 2022-06-18 ASSESSMENT — ENCOUNTER SYMPTOMS
SHORTNESS OF BREATH: 0
VOMITING: 0
NERVOUS/ANXIOUS: 0
FEVER: 0
ABDOMINAL PAIN: 0
NAUSEA: 0
CHILLS: 0
BLOOD IN STOOL: 1

## 2022-06-18 ASSESSMENT — PAIN DESCRIPTION - PAIN TYPE
TYPE: ACUTE PAIN

## 2022-06-18 ASSESSMENT — FIBROSIS 4 INDEX: FIB4 SCORE: 6.52

## 2022-06-18 NOTE — PROGRESS NOTES
Received bedside report from RN, pt care assumed, VSS, pt assessment complete. Pt AAOx4, with no of pain at this time. No signs of acute distress noted at this time. Plan of care discussed with pt and verbalizes no questions. Pt denies any additional needs at this time. Bed locked/in lowest position, bed alarm on, pt educated on fall risk and verbalized understanding, call light within reach, hourly rounding initiated.

## 2022-06-18 NOTE — ED PROVIDER NOTES
"ED Provider Note    CHIEF COMPLAINT  Chief Complaint   Patient presents with   • Bloody Stools   • Abdominal Pain     Pt has been having lower right abdominal pain with black stools for approximately 3 days. Pt reports this has happened to her before when she was receiving treatment for her cancer. Pt is currently not on treatment for breast cancer and denies any blood thinners.        HPI  Ally Doherty is a 77 y.o. female who presents to the emergency department complaining of black stool and right lower quadrant abdominal pain.  The patient says for the last week and a half she has been having black loose bowel movements, she does not take oral iron supplementation and she has not been taking Pepto-Bismol or Imodium.  The patient says that she has a history of bleeding from her esophagus but she is not sure about the precise diagnosis.  She says that she occasionally will have a black bowel movement but it typically resolves after a day or 2 but this has been going on for a week and a half so she is concerned and has come to the ER.  She is also been having right lower quadrant abdominal discomfort over the same period of time.  The patient says that she does not abuse alcohol she does have a history of fatty liver disease.    REVIEW OF SYSTEMS no fever or chills no black or bloody emesis no chest pain or shortness of breath.  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, estrogen receptor positive (HCC) 3/13/2018   • Vitamin D deficiency 5/27/2014    ICD-10 transition       FAMILY HISTORY  Family History   Problem Relation Age of Onset   • Other Other         adopted       SOCIAL HISTORY  Social History     Socioeconomic History   • Marital status:    Tobacco Use   • Smoking status: Never Smoker   • Smokeless tobacco: Never Used   Vaping Use   • Vaping Use: Never used   Substance and Sexual Activity   • Alcohol use: Yes     Alcohol/week: 0.0 oz     Comment: < 1 per week   • Drug use: No   • Sexual activity: Never       SURGICAL HISTORY  Past Surgical History:   Procedure Laterality Date   • MASTECTOMY Left 12/9/2019    Procedure: MASTECTOMY;  Surgeon: Edward Boo M.D.;  Location: SURGERY SAME DAY [facility];  Service: Plastics   • BREAST IMPLANT REMOVAL Right 12/9/2019    Procedure: REMOVAL, IMPLANT, BREAST;  Surgeon: Edward Boo M.D.;  Location: SURGERY SAME DAY [facility];  Service: Plastics   • BREAST RECONSTRUCTION  2/12/2015    Performed by Edward Boo M.D. at SURGERY [facility]   • MAMMOPLASTY REDUCTION  2/12/2015    Performed by Edward Boo M.D. at SURGERY [facility]   • MOLE EXCISION  2/12/2015    Performed by Edward Boo M.D. at SURGERY [facility]   • CAPSULECTOMY  3/6/2014    Performed by Edward Boo M.D. at [facility]   • BREAST RECONSTRUCTION  6/24/2013    Performed by Edward Boo M.D. at SURGERY [facility]   • TISSUE EXPANDER PLACE/REMOVE  6/24/2013    Performed by Edward Boo M.D. at [facility]   • CAYETANO BY LAPAROSCOPY  2008   • TUBAL LIGATION  1974   • TONSILLECTOMY  1953   • MASTECTOMY      right breast   • ME CHEMOTHERAPY, UNSPECIFIED PROCEDURE     • ME RADIATION THERAPY PLAN SIMPLE         CURRENT MEDICATIONS  Home Medications     Reviewed by Cristy Mahan (Pharmacy Tech) on 06/17/22 at 1514  Med List Status: Complete   Medication Last Dose Status  "  calcium carbonate (TUMS SMOOTHIES) 750 MG chewable tablet 6/15/2022 Active   Calcium Carbonate-Vitamin D (CALTRATE 600+D PO) 6/17/2022 Active   glipiZIDE SR (GLUCOTROL) 5 MG TABLET SR 24 HR 6/17/2022 Active   levothyroxine (SYNTHROID) 100 MCG Tab 6/16/2022 Active   losartan (COZAAR) 100 MG Tab 6/17/2022 Active   metformin (GLUCOPHAGE) 1000 MG tablet 6/17/2022 Active   omeprazole (PRILOSEC) 20 MG delayed-release capsule 6/14/2022 Active   therapeutic multivitamin-minerals (THERAGRAN-M) Tab 6/14/2022 Active                ALLERGIES  Allergies   Allergen Reactions   • Byetta Unspecified     Pancreatitis   • Penicillins Rash     All over   • Statins [Hmg-Coa-R Inhibitors] Unspecified     Elevated liver enzymes       PHYSICAL EXAM  VITAL SIGNS: /63   Pulse 82   Temp 36.5 °C (97.7 °F) (Temporal)   Resp 16   Ht 1.702 m (5' 7\")   Wt 81.6 kg (180 lb)   LMP 02/27/1997   SpO2 89%   BMI 28.19 kg/m²    Oxygen saturation is interpreted as adequate  Constitutional: Awake lucid verbal she does not appear toxic or distressed  Eyes: No erythema discharge or jaundice  Neck: Trachea midline no JVD  Cardiovascular: Regular rate and rhythm  Lungs: Clear bilaterally with no apparent difficulty breathing  Abdomen/Back: Soft and nondistended no rebound guarding or peritoneal findings the patient does not appear to have exquisite tenderness over McBurney's point.  Bowel sounds are normally active.  A rectal examination was done with nurse chaperone at the bedside there is a small amount of black stool in the rectal vault which did test heme positive on the guaiac card  Skin: Warm and dry  Musculoskeletal: No acute bony deformity  Neurologic: Awake verbal moving all extremities    Laboratory  CBC shows white blood cell count of 5.3 hemoglobin is adequate 11.2 basic metabolic panel is abnormal with an elevated glucose of 245 bicarb is low at 17 anion gap is elevated at 17 the serum ketone beta hydroxybutyric acid level however " is normal at 0.23 LFTs and lipase are unremarkable urinalysis shows trace leukocyte Estrace however there are 0-2 white blood cells and no bacteria seen in the microscopic exam    Radiology  CT-ABDOMEN-PELVIS W/O   Final Result      1. The intraabdominal gastrointestinal tract is within normal limits. Specifically, no bowel obstruction.   2. Normal appendix.   3. Small amount of ascites in the abdomen and pelvis.   4. Soft tissue nodule in the pelvic ascites measuring 1.5 x 2 cm in diameter is stable when compared with the prior study.   5. Nonspecific mesenteric fat stranding.   6. Large hiatal hernia containing the majority of the stomach, mesenteric fat and fluid. No gastric volvulus.   7. Hepatic cirrhosis and splenomegaly.        MEDICAL DECISION MAKING and DISPOSITION  In the emergency department an IV was established and the patient was placed on the cardiac monitor.  I started her on intravenous Protonix.  Because of the abnormal blood sugar and elevated bicarb and anion gap she was started on intravenous fluids but this does not appear to be full-blown diabetic ketoacidosis at this point in time.  I reviewed the case with Dr. Fraire who will provide GI consultation and because of our concern about possible esophageal varices the patient will be started on intravenous octreotide.  I have reviewed the case with the hospitalist and the patient is referred to the hospitalist service for further evaluation and treatment    IMPRESSION  1.  GI bleeding  2.  Diabetic patient with hyperglycemia and dehydration  3.  Abdominal pain of unclear etiology         Electronically signed by: Mac Houser M.D., 6/17/2022 5:28 PM

## 2022-06-18 NOTE — CARE PLAN
The patient is Watcher - Medium risk of patient condition declining or worsening    Shift Goals  Clinical Goals: monitor VS,  Patient Goals: rest    Progress made toward(s) clinical / shift goals:      Patient is not progressing towards the following goals:

## 2022-06-18 NOTE — OR SURGEON
Immediate Post OP Note    PreOp Diagnosis: melena, anemia, portal htn, varices      PostOp Diagnosis: esophageal varices, portal hypertensive gastropathy, melena, anemia, portal htn, varices      Procedure(s):  UPPER ENDOSCOPY    Surgeon(s):  Venkat Hitchcock M.D.    Anesthesiologist/Type of Anesthesia:  Anesthesiologist: Zheng Viera M.D./* No anesthesia type entered *    Surgical Staff:  Circulator: (Unknown)  Endoscopy Technician: (Unknown)  Endoscopy Nurse: (Unknown)    Specimens removed if any:  * No specimens in log *    Estimated Blood Loss: < 5 cc    Findings: no immediate    Complications: no immediate  IMPRESSION(S):1) es varices- status post three bands  2) Portal hypertensive gastropathy    Plan/Recommendations:  1) CLD, no red  2) Octreotide gtt  3) Pantop 40 mg IV BID  4) Prophylactic atx          6/18/2022 9:00 AM Venkat Hithccock M.D.

## 2022-06-18 NOTE — ANESTHESIA POSTPROCEDURE EVALUATION
Patient: Ally Doherty    Procedure Summary     Date: 06/18/22 Room / Location: [facility] 06 / SURGERY [facility]    Anesthesia Start: 1045 Anesthesia Stop: 1103    Procedure: UPPER ENDOSCOPY (N/A Mouth) Diagnosis: (esophageal varices )    Surgeons: Venkat Hitchcock M.D. Responsible Provider: Zheng Viera M.D.    Anesthesia Type: MAC ASA Status: 2          Final Anesthesia Type: MAC  Last vitals  BP   Blood Pressure : (!) 155/73    Temp   37.9 °C (100.2 °F)    Pulse   75   Resp   18    SpO2   92 %      Anesthesia Post Evaluation    Patient location during evaluation: PACU  Patient participation: complete - patient participated  Level of consciousness: awake and alert    Airway patency: patent  Anesthetic complications: no  Cardiovascular status: hemodynamically stable  Respiratory status: acceptable  Hydration status: euvolemic    PONV: none          No complications documented.     Nurse Pain Score: 3 (NPRS)

## 2022-06-18 NOTE — DISCHARGE PLANNING
HTH/SCP TCN chart review completed. Collaborated with bedside RN prior to meeting with the pt. Pt was actively being transported to EGD at time of visit, though TCN was able to discuss dc planning with pt prior to transport for procedure.The most current review of medical record, knowledge of pt's PLOF and social support, LACE+ score of 64, 6 clicks scores of 24 mobility were considered.      Pt seen at bedside. Introduced TCN program. Provided education regarding differences in post acute resources including IRF, SNF and HH. Discussed HTH/SCP plan benefits including Meds to Beds and [facility] transitional care services. Pt verbalizes understanding. She reports no functional concerns with dc to home once medically cleared and reports has been ambulatory with no AD without issue. She reports no concerns with transportation at time of dc or for outpatient follow ups as well.  Given aforementioned no additional provider requests and no choice needed at this time. She is hopeful for dc to home dependent on results of EGD and medical POC post. TCN will continue to follow and collaborate with discharge planning team as additional post acute needs arise. Thank you.

## 2022-06-18 NOTE — PROGRESS NOTES
[facility] Medicine Daily Progress Note    Date of Service  6/18/2022    Chief Complaint  Ally Doherty is a 77 y.o. female admitted 6/17/2022 with melanotic stools    Hospital Course  Ally Doherty is a 77 y.o. female who presented 6/17/2022 with intermittent black stools for the last week and a half.  Has a history of Hernandez complicated by portal hypertension and esophageal varices.  Last had endoscopy in April 2022.  Patient denies chest pain shortness of breath dizziness headache syncope.  She tried to see her primary care doctor today, however was unable so she decided to come to the ED for evaluation.     In the ED, patient found to have normal vital signs.  Hemoglobin 11.2.  Sugar 245.  Anion gap 17.  CT abdomen showing no acute abdominal pathology.  Hepatic cirrhosis and splenomegaly noted.  GI consulted, recommended Protonix, octreotide, and will see.  Once restarted on diet    Interval Problem Update  Patient continues to endorse melanotic stools.  He was n.p.o. awaiting EGD, EGD showed gastric varices 3 bands were placed.  Patient continuing Protonix and octreotide.  Started on Cipro and Flagyl for antibiotic prophylaxis.  Vitals are otherwise stable patient feeling well denies abdominal pain.  Monitor overnight.  Repeat BMP to monitor for improvement of anion gap.    I have discussed this patient's plan of care and discharge plan at IDT rounds today with Case Management, Nursing, Nursing leadership, and other members of the IDT team.    Consultants/Specialty  GI    Code Status  Full Code    Disposition  Patient is not medically cleared for discharge.   Anticipate discharge to to home with close outpatient follow-up.  I have placed the appropriate orders for post-discharge needs.    Review of Systems  Review of Systems   Constitutional: Negative for chills and fever.   Respiratory: Negative for shortness of breath.    Cardiovascular: Negative for chest pain.   Gastrointestinal: Positive for blood in  stool. Negative for abdominal pain, nausea and vomiting.   Genitourinary: Negative for dysuria.   Psychiatric/Behavioral: The patient is not nervous/anxious.    All other systems reviewed and are negative.       Physical Exam  Temp:  [36.1 °C (97 °F)-37.9 °C (100.2 °F)] 37.9 °C (100.2 °F)  Pulse:  [67-94] 71  Resp:  [15-20] 15  BP: (129-177)/(62-76) 149/70  SpO2:  [89 %-97 %] 92 %    Physical Exam  Vitals and nursing note reviewed.   Constitutional:       General: She is not in acute distress.     Appearance: Normal appearance. She is not toxic-appearing.   HENT:      Head: Normocephalic and atraumatic.   Eyes:      General: No scleral icterus.        Right eye: No discharge.         Left eye: No discharge.   Cardiovascular:      Rate and Rhythm: Normal rate and regular rhythm.      Heart sounds: Normal heart sounds.   Pulmonary:      Effort: No respiratory distress.      Breath sounds: No wheezing or rales.   Abdominal:      General: Abdomen is flat. Bowel sounds are normal. There is no distension.      Tenderness: There is no abdominal tenderness. There is no guarding.   Musculoskeletal:         General: Normal range of motion.      Right lower leg: No edema.      Left lower leg: No edema.   Skin:     General: Skin is warm.      Coloration: Skin is pale. Skin is not jaundiced.   Neurological:      General: No focal deficit present.      Mental Status: She is alert and oriented to person, place, and time.      Cranial Nerves: No cranial nerve deficit.   Psychiatric:         Mood and Affect: Mood normal.         Behavior: Behavior normal.         Judgment: Judgment normal.         Fluids    Intake/Output Summary (Last 24 hours) at 6/18/2022 1206  Last data filed at 6/17/2022 1945  Gross per 24 hour   Intake 120 ml   Output --   Net 120 ml       Laboratory  Recent Labs     06/17/22 2007 06/18/22  0245 06/18/22  0905   WBC 3.8* 3.2* 3.5*   RBC 3.92* 3.82* 4.07*   HEMOGLOBIN 9.9* 9.7* 10.3*   HEMATOCRIT 32.6* 32.1*  34.0*   MCV 83.2 84.0 83.5   MCH 25.3* 25.4* 25.3*   MCHC 30.4* 30.2* 30.3*   PLATELETCT 95* 84* 81*   MPV 10.1 9.5 9.2     Recent Labs     06/17/22  1309   SODIUM 142   POTASSIUM 4.3   CHLORIDE 108   CO2 17*   GLUCOSE 245*   BUN 21   CREATININE 0.83   CALCIUM 9.2     Recent Labs     06/17/22  1309   APTT 31.2   INR 1.16*               Imaging  CT-ABDOMEN-PELVIS W/O   Final Result      1. The intraabdominal gastrointestinal tract is within normal limits. Specifically, no bowel obstruction.   2. Normal appendix.   3. Small amount of ascites in the abdomen and pelvis.   4. Soft tissue nodule in the pelvic ascites measuring 1.5 x 2 cm in diameter is stable when compared with the prior study.   5. Nonspecific mesenteric fat stranding.   6. Large hiatal hernia containing the majority of the stomach, mesenteric fat and fluid. No gastric volvulus.   7. Hepatic cirrhosis and splenomegaly.           Assessment/Plan  * UGIB (upper gastrointestinal bleed)  Assessment & Plan  Admit patient to medical floor, history of MEDINA cirrhosis with portal HTN and esophageal varices  Protonix and octreotide  Fluids  Serial CBC  GI on board  6/18: Esophageal varices noted on EGD.  GI banded 3 varices.  Continuing Protonix twice daily IV, octreotide.  Started on Cipro and Flagyl.    Secondary esophageal varices with bleeding (HCC)  Assessment & Plan  Esophageal varices noted on EGD.  Received 3 bands  GI following    Advanced care planning/counseling discussion  Assessment & Plan  Goals of care discussed with patient and  at bedside.  She would like to be full code and everything to be done for her.  She states that if her condition becomes irreversible and prognosis is very poor while being intubated, she would like to be DNR/DNI.    Cirrhosis of liver with ascites (HCC)- (present on admission)  Assessment & Plan  Chronic, send AFP     Uncontrolled diabetes mellitus with hyperglycemia (HCC)  Assessment & Plan  Sliding  scale    Hypothyroid  Assessment & Plan  Continue synthroid     Hypertension  Assessment & Plan  Restart losartan as needed       VTE prophylaxis: SCDs/TEDs and pharmacologic prophylaxis contraindicated due to GI bleed    I have performed a physical exam and reviewed and updated ROS and Plan today (6/18/2022). In review of yesterday's note (6/17/2022), there are no changes except as documented above.

## 2022-06-18 NOTE — ANESTHESIA PREPROCEDURE EVALUATION
Case: [id number] Date/Time: 06/18/22 1107    Procedure: UPPER ENDOSCOPY    Location: [facility] OR 06 / SURGERY [facility]    Surgeons: Venkat Hitchcock M.D.        76 yo with upper GIB, h/o esophageal varices    No anesthesia problems.    Relevant Problems   NEURO   (positive) History of breast cancer   (positive) History of vitamin D deficiency      CARDIAC   (positive) Hypertension         (positive) Cirrhosis of liver with ascites (HCC)   (positive) Fatty liver disease, nonalcoholic      ENDO   (positive) Hypothyroid   (positive) Type 2 diabetes mellitus without complication, without long-term current use of insulin (HCC)       Physical Exam    Airway   Mallampati: II  TM distance: >3 FB  Neck ROM: full       Cardiovascular - normal exam  Rhythm: regular  Rate: normal     Dental - normal exam           Pulmonary - normal exam     Abdominal    Neurological - normal exam                 Anesthesia Plan    ASA 2       Plan - MAC               Induction: intravenous    Postoperative Plan: Postoperative administration of opioids is intended.    Pertinent diagnostic labs and testing reviewed    Informed Consent:    Anesthetic plan and risks discussed with patient.    Use of blood products discussed with: patient whom consented to blood products.

## 2022-06-18 NOTE — DISCHARGE PLANNING
Case Management Discharge Planning    Pt lives with her  in a two story home with their bedroom on the first floor.  There are 3 steps to the door.  Pt uses [facility] on [address] in [place].   will tranport patient home and provide help at home if needed.  Pt does not have any DME at home.  Pt was independent with ADL's/IADLS's and still drives.      Admission Date: 6/17/2022  GMLOS: 3.3  ALOS: 1    6-Clicks ADL Score: 24  6-Clicks Mobility Score: 24      Anticipated Discharge Dispo: Discharge Disposition: Discharged to home/self care (01)  Discharge Address: [address] [place] [address] [place] [address]  Discharge Contact Phone Number: [phone]    DME Needed: TBD    Action(s) Taken: DC Assessment Complete (See below)    Escalations Completed: None    Medically Clear: No    Next Steps: Monitor for emerging needs    Barriers to Discharge:  Medical Clearance    Is the patient up for discharge tomorrow: No     Care Transition Team Assessment    Information Source  Orientation Level: Oriented X4  Information Given By: Patient  Who is responsible for making decisions for patient? : Patient    Elopement Risk  Legal Hold: No  Ambulatory or Self Mobile in Wheelchair: No-Not an Elopement Risk  Elopement Risk: Not at Risk for Elopement    Interdisciplinary Discharge Planning  Does Admitting Nurse Feel This Could be a Complex Discharge?: No  Primary Care Physician: Su Randhawa  Lives with - Patient's Self Care Capacity: Significant Other  Patient or legal guardian wants to designate a caregiver: No  Support Systems: Family Member(s), Spouse / Significant Other  Housing / Facility: 2 Story House  Prior Services: None  Assistance Needed: No    Discharge Preparedness  What is your plan after discharge?: Home with help  What are your discharge supports?: Spouse  Prior Functional Level: Independent with Activities of Daily Living, Ambulatory, Drives Self, Independent with Medication Management  Difficulity with  ADLs: None  Difficulity with IADLs: None    Functional Assesment  Prior Functional Level: Independent with Activities of Daily Living, Ambulatory, Drives Self, Independent with Medication Management    Finances  Financial Barriers to Discharge: No  Prescription Coverage: No  Prescription Coverage Comments: Uses [facility] in [place]    Vision / Hearing Impairment  Right Eye Vision: Wears Glasses  Left Eye Vision: Wears Glasses    Advance Directive  Advance Directive?: None    Domestic Abuse  Have you ever been the victim of abuse or violence?: No  Physical Abuse or Sexual Abuse: No  Verbal Abuse or Emotional Abuse: No  Possible Abuse/Neglect Reported to:: Not Applicable    Anticipated Discharge Information  Discharge Disposition: Discharged to home/self care (01)  Discharge Address: [address] [place] [id number]  Discharge Contact Phone Number: [phone]

## 2022-06-19 LAB
AFP-TM SERPL-MCNC: 6 NG/ML (ref 0–9)
ANION GAP SERPL CALC-SCNC: 10 MMOL/L (ref 7–16)
ANISOCYTOSIS BLD QL SMEAR: ABNORMAL
BASOPHILS # BLD AUTO: 2.6 % (ref 0–1.8)
BASOPHILS # BLD: 0.08 K/UL (ref 0–0.12)
BUN SERPL-MCNC: 11 MG/DL (ref 8–22)
CALCIUM SERPL-MCNC: 7.3 MG/DL (ref 8.5–10.5)
CHLORIDE SERPL-SCNC: 115 MMOL/L (ref 96–112)
CO2 SERPL-SCNC: 18 MMOL/L (ref 20–33)
CREAT SERPL-MCNC: 0.7 MG/DL (ref 0.5–1.4)
EOSINOPHIL # BLD AUTO: 0.06 K/UL (ref 0–0.51)
EOSINOPHIL NFR BLD: 1.8 % (ref 0–6.9)
GFR SERPLBLD CREATININE-BSD FMLA CKD-EPI: 90 ML/MIN/1.73 M 2
GLUCOSE BLD STRIP.AUTO-MCNC: 170 MG/DL (ref 65–99)
GLUCOSE BLD STRIP.AUTO-MCNC: 201 MG/DL (ref 65–99)
GLUCOSE BLD STRIP.AUTO-MCNC: 222 MG/DL (ref 65–99)
GLUCOSE BLD STRIP.AUTO-MCNC: 236 MG/DL (ref 65–99)
GLUCOSE SERPL-MCNC: 151 MG/DL (ref 65–99)
HCT VFR BLD AUTO: 31.4 % (ref 37–47)
HGB BLD-MCNC: 9.4 G/DL (ref 12–16)
LYMPHOCYTES # BLD AUTO: 0.41 K/UL (ref 1–4.8)
LYMPHOCYTES NFR BLD: 13.3 % (ref 22–41)
MACROCYTES BLD QL SMEAR: ABNORMAL
MANUAL DIFF BLD: ABNORMAL
MCH RBC QN AUTO: 25.3 PG (ref 27–33)
MCHC RBC AUTO-ENTMCNC: 29.9 G/DL (ref 33.6–35)
MCV RBC AUTO: 84.4 FL (ref 81.4–97.8)
MICROCYTES BLD QL SMEAR: ABNORMAL
MONOCYTES # BLD AUTO: 0.16 K/UL (ref 0–0.85)
MONOCYTES NFR BLD AUTO: 5.3 % (ref 0–13.4)
MORPHOLOGY BLD-IMP: NORMAL
NEUTROPHILS # BLD AUTO: 2.39 K/UL (ref 2–7.15)
NEUTROPHILS NFR BLD: 77 % (ref 44–72)
OVALOCYTES BLD QL SMEAR: ABNORMAL
PLATELET # BLD AUTO: 82 K/UL (ref 164–446)
PLATELET BLD QL SMEAR: NORMAL
PMV BLD AUTO: 10 FL (ref 9–12.9)
POIKILOCYTOSIS BLD QL SMEAR: ABNORMAL
POTASSIUM SERPL-SCNC: 3.6 MMOL/L (ref 3.6–5.5)
RBC # BLD AUTO: 3.72 M/UL (ref 4.2–5.4)
RBC BLD AUTO: PRESENT
SODIUM SERPL-SCNC: 143 MMOL/L (ref 135–145)
WBC # BLD AUTO: 3.1 K/UL (ref 4.8–10.8)

## 2022-06-19 PROCEDURE — 700102 HCHG RX REV CODE 250 W/ 637 OVERRIDE(OP): Performed by: STUDENT IN AN ORGANIZED HEALTH CARE EDUCATION/TRAINING PROGRAM

## 2022-06-19 PROCEDURE — 82962 GLUCOSE BLOOD TEST: CPT

## 2022-06-19 PROCEDURE — 700111 HCHG RX REV CODE 636 W/ 250 OVERRIDE (IP): Performed by: STUDENT IN AN ORGANIZED HEALTH CARE EDUCATION/TRAINING PROGRAM

## 2022-06-19 PROCEDURE — 80048 BASIC METABOLIC PNL TOTAL CA: CPT

## 2022-06-19 PROCEDURE — 700102 HCHG RX REV CODE 250 W/ 637 OVERRIDE(OP): Performed by: INTERNAL MEDICINE

## 2022-06-19 PROCEDURE — A9270 NON-COVERED ITEM OR SERVICE: HCPCS | Performed by: STUDENT IN AN ORGANIZED HEALTH CARE EDUCATION/TRAINING PROGRAM

## 2022-06-19 PROCEDURE — 36415 COLL VENOUS BLD VENIPUNCTURE: CPT

## 2022-06-19 PROCEDURE — 85027 COMPLETE CBC AUTOMATED: CPT

## 2022-06-19 PROCEDURE — 99233 SBSQ HOSP IP/OBS HIGH 50: CPT | Performed by: STUDENT IN AN ORGANIZED HEALTH CARE EDUCATION/TRAINING PROGRAM

## 2022-06-19 PROCEDURE — 85007 BL SMEAR W/DIFF WBC COUNT: CPT

## 2022-06-19 PROCEDURE — 700111 HCHG RX REV CODE 636 W/ 250 OVERRIDE (IP): Performed by: EMERGENCY MEDICINE

## 2022-06-19 PROCEDURE — A9270 NON-COVERED ITEM OR SERVICE: HCPCS

## 2022-06-19 PROCEDURE — 700105 HCHG RX REV CODE 258: Performed by: EMERGENCY MEDICINE

## 2022-06-19 PROCEDURE — C9113 INJ PANTOPRAZOLE SODIUM, VIA: HCPCS | Performed by: STUDENT IN AN ORGANIZED HEALTH CARE EDUCATION/TRAINING PROGRAM

## 2022-06-19 PROCEDURE — 770001 HCHG ROOM/CARE - MED/SURG/GYN PRIV*

## 2022-06-19 PROCEDURE — 700102 HCHG RX REV CODE 250 W/ 637 OVERRIDE(OP)

## 2022-06-19 PROCEDURE — A9270 NON-COVERED ITEM OR SERVICE: HCPCS | Performed by: INTERNAL MEDICINE

## 2022-06-19 RX ORDER — PROPRANOLOL HYDROCHLORIDE 10 MG/1
10 TABLET ORAL EVERY 8 HOURS
Status: DISCONTINUED | OUTPATIENT
Start: 2022-06-19 | End: 2022-06-20 | Stop reason: HOSPADM

## 2022-06-19 RX ORDER — OMEPRAZOLE 20 MG/1
20 CAPSULE, DELAYED RELEASE ORAL 2 TIMES DAILY
Status: DISCONTINUED | OUTPATIENT
Start: 2022-06-19 | End: 2022-06-20 | Stop reason: HOSPADM

## 2022-06-19 RX ADMIN — PROPRANOLOL HYDROCHLORIDE 10 MG: 10 TABLET ORAL at 15:21

## 2022-06-19 RX ADMIN — INSULIN HUMAN 3 UNITS: 100 INJECTION, SOLUTION PARENTERAL at 11:36

## 2022-06-19 RX ADMIN — INSULIN HUMAN 2 UNITS: 100 INJECTION, SOLUTION PARENTERAL at 08:13

## 2022-06-19 RX ADMIN — OCTREOTIDE ACETATE 50 MCG/HR: 200 INJECTION, SOLUTION INTRAVENOUS; SUBCUTANEOUS at 18:52

## 2022-06-19 RX ADMIN — SULFAMETHOXAZOLE AND TRIMETHOPRIM 1 TABLET: 800; 160 TABLET ORAL at 05:31

## 2022-06-19 RX ADMIN — INSULIN HUMAN 3 UNITS: 100 INJECTION, SOLUTION PARENTERAL at 17:29

## 2022-06-19 RX ADMIN — LOSARTAN POTASSIUM 50 MG: 50 TABLET, FILM COATED ORAL at 21:44

## 2022-06-19 RX ADMIN — PROPRANOLOL HYDROCHLORIDE 10 MG: 10 TABLET ORAL at 21:44

## 2022-06-19 RX ADMIN — PANTOPRAZOLE SODIUM 40 MG: 40 INJECTION, POWDER, FOR SOLUTION INTRAVENOUS at 05:32

## 2022-06-19 RX ADMIN — SULFAMETHOXAZOLE AND TRIMETHOPRIM 1 TABLET: 800; 160 TABLET ORAL at 17:26

## 2022-06-19 RX ADMIN — OMEPRAZOLE 20 MG: 20 CAPSULE, DELAYED RELEASE ORAL at 17:26

## 2022-06-19 RX ADMIN — INSULIN HUMAN 3 UNITS: 100 INJECTION, SOLUTION PARENTERAL at 21:48

## 2022-06-19 RX ADMIN — LEVOTHYROXINE SODIUM 100 MCG: 0.1 TABLET ORAL at 05:31

## 2022-06-19 ASSESSMENT — ENCOUNTER SYMPTOMS
BLOOD IN STOOL: 1
FEVER: 0
CHILLS: 0
NAUSEA: 0
VOMITING: 0
NERVOUS/ANXIOUS: 0
ABDOMINAL PAIN: 0
SHORTNESS OF BREATH: 0

## 2022-06-19 ASSESSMENT — PATIENT HEALTH QUESTIONNAIRE - PHQ9
SUM OF ALL RESPONSES TO PHQ9 QUESTIONS 1 AND 2: 0
1. LITTLE INTEREST OR PLEASURE IN DOING THINGS: NOT AT ALL
2. FEELING DOWN, DEPRESSED, IRRITABLE, OR HOPELESS: NOT AT ALL

## 2022-06-19 ASSESSMENT — PAIN DESCRIPTION - PAIN TYPE
TYPE: ACUTE PAIN

## 2022-06-19 ASSESSMENT — FIBROSIS 4 INDEX: FIB4 SCORE: 6.68

## 2022-06-19 NOTE — PROGRESS NOTES
Discussed with Linsey Wegener earlier about patient 250 ml NS continuous. Currently on hold. Sent laboratory results. hgb 11.7 to 9.4, ca 7.7 to 7.3. No further orders

## 2022-06-19 NOTE — CARE PLAN
The patient is Watcher - Medium risk of patient condition declining or worsening    Shift Goals  Clinical Goals: monitor labs, monitor vs, sandostatin  Patient Goals: rest, comfort  Family Goals: n/a    Progress made toward(s) clinical / shift goals:    Problem: Pain - Standard  Goal: Alleviation of pain or a reduction in pain to the patient’s comfort goal  Outcome: Progressing     Problem: Knowledge Deficit - Standard  Goal: Patient and family/care givers will demonstrate understanding of plan of care, disease process/condition, diagnostic tests and medications  Outcome: Progressing       Patient is not progressing towards the following goals:

## 2022-06-19 NOTE — PROGRESS NOTES
[facility] Medicine Daily Progress Note    Date of Service  6/19/2022    Chief Complaint  Ally Doherty is a 77 y.o. female admitted 6/17/2022 with melanotic stools    Hospital Course  Ally Doherty is a 77 y.o. female who presented 6/17/2022 with intermittent black stools for the last week and a half.  Has a history of Hernandez complicated by portal hypertension and esophageal varices.  Last had endoscopy in April 2022.  Patient denies chest pain shortness of breath dizziness headache syncope.  She tried to see her primary care doctor today, however was unable so she decided to come to the ED for evaluation.     In the ED, patient found to have normal vital signs.  Hemoglobin 11.2.  Sugar 245.  Anion gap 17.  CT abdomen showing no acute abdominal pathology.  Hepatic cirrhosis and splenomegaly noted.  GI consulted, recommended Protonix, octreotide, and will see.  Once restarted on diet    Interval Problem Update  Patient continues to endorse melanotic stools.  He was n.p.o. awaiting EGD, EGD showed gastric varices 3 bands were placed.  Patient continuing Protonix and octreotide.  Started on Cipro and Flagyl for antibiotic prophylaxis.  Vitals are otherwise stable patient feeling well denies abdominal pain.  Monitor overnight.  Repeat BMP to monitor for improvement of anion gap.  6/19: No overnight events patient denies any further episodes of bleeding.  Diet being advanced.  Continue octreotide till tomorrow morning if no further episodes of bleeding patient will likely discharge tomorrow.  Initiating propanolol due to the history of cirrhosis.  Vitals are stable.    I have discussed this patient's plan of care and discharge plan at IDT rounds today with Case Management, Nursing, Nursing leadership, and other members of the IDT team.    Consultants/Specialty  GI    Code Status  Full Code    Disposition  Patient is not medically cleared for discharge.   Anticipate discharge to to home with close outpatient  follow-up.  I have placed the appropriate orders for post-discharge needs.    Review of Systems  Review of Systems   Constitutional: Negative for chills and fever.   Respiratory: Negative for shortness of breath.    Cardiovascular: Negative for chest pain and leg swelling.   Gastrointestinal: Positive for blood in stool. Negative for abdominal pain, nausea and vomiting.   Genitourinary: Negative for dysuria.   Psychiatric/Behavioral: The patient is not nervous/anxious.    All other systems reviewed and are negative.       Physical Exam  Temp:  [36.2 °C (97.1 °F)-36.7 °C (98 °F)] 36.2 °C (97.1 °F)  Pulse:  [63-81] 68  Resp:  [16-17] 16  BP: (130-168)/(57-96) 131/57  SpO2:  [91 %-95 %] 91 %    Physical Exam  Vitals and nursing note reviewed.   Constitutional:       General: She is not in acute distress.     Appearance: Normal appearance. She is not ill-appearing or toxic-appearing.   HENT:      Head: Normocephalic and atraumatic.   Eyes:      General: No scleral icterus.        Right eye: No discharge.         Left eye: No discharge.   Cardiovascular:      Rate and Rhythm: Normal rate and regular rhythm.      Heart sounds: Normal heart sounds.   Pulmonary:      Effort: No respiratory distress.      Breath sounds: No wheezing or rales.   Abdominal:      General: Abdomen is flat. Bowel sounds are normal. There is no distension.      Tenderness: There is no abdominal tenderness. There is no guarding.   Musculoskeletal:         General: Normal range of motion.      Right lower leg: No edema.      Left lower leg: No edema.   Skin:     General: Skin is warm.      Coloration: Skin is not jaundiced or pale.   Neurological:      General: No focal deficit present.      Mental Status: She is alert and oriented to person, place, and time.      Cranial Nerves: No cranial nerve deficit.   Psychiatric:         Mood and Affect: Mood normal.         Behavior: Behavior normal.         Judgment: Judgment normal.          Fluids    Intake/Output Summary (Last 24 hours) at 6/19/2022 1302  Last data filed at 6/19/2022 0800  Gross per 24 hour   Intake 5621.5 ml   Output 800 ml   Net 4821.5 ml       Laboratory  Recent Labs     06/18/22  0905 06/18/22  1402 06/19/22  0259   WBC 3.5* 4.6* 3.1*   RBC 4.07* 4.63 3.72*   HEMOGLOBIN 10.3* 11.7* 9.4*   HEMATOCRIT 34.0* 38.7 31.4*   MCV 83.5 83.6 84.4   MCH 25.3* 25.3* 25.3*   MCHC 30.3* 30.2* 29.9*   RDW  --  76.0*  --    PLATELETCT 81* 107* 82*   MPV 9.2 9.6 10.0     Recent Labs     06/17/22  1309 06/18/22  1423 06/19/22  0259   SODIUM 142 137 143   POTASSIUM 4.3 4.2 3.6   CHLORIDE 108 108 115*   CO2 17* 16* 18*   GLUCOSE 245* 218* 151*   BUN 21 15 11   CREATININE 0.83 0.76 0.70   CALCIUM 9.2 7.7* 7.3*     Recent Labs     06/17/22  1309   APTT 31.2   INR 1.16*               Imaging  CT-ABDOMEN-PELVIS W/O   Final Result      1. The intraabdominal gastrointestinal tract is within normal limits. Specifically, no bowel obstruction.   2. Normal appendix.   3. Small amount of ascites in the abdomen and pelvis.   4. Soft tissue nodule in the pelvic ascites measuring 1.5 x 2 cm in diameter is stable when compared with the prior study.   5. Nonspecific mesenteric fat stranding.   6. Large hiatal hernia containing the majority of the stomach, mesenteric fat and fluid. No gastric volvulus.   7. Hepatic cirrhosis and splenomegaly.           Assessment/Plan  * UGIB (upper gastrointestinal bleed)  Assessment & Plan  Admit patient to medical floor, history of MEDINA cirrhosis with portal HTN and esophageal varices  Protonix and octreotide  Fluids  Serial CBC  GI on board  6/18: Esophageal varices noted on EGD.  GI banded 3 varices.  Continuing Protonix twice daily IV, octreotide.  Started on Cipro and Flagyl.  6/19: Advancing diet.  Continue octreotide until tomorrow morning.  We will switch the IV Protonix to p.o. omeprazole.  If no further bleeding will likely discharge tomorrow  afternoon.    Secondary esophageal varices with bleeding (HCC)  Assessment & Plan  Esophageal varices noted on EGD.  Received 3 bands  GI following    Advanced care planning/counseling discussion  Assessment & Plan  Goals of care discussed with patient and  at bedside.  She would like to be full code and everything to be done for her.  She states that if her condition becomes irreversible and prognosis is very poor while being intubated, she would like to be DNR/DNI.    Cirrhosis of liver with ascites (HCC)- (present on admission)  Assessment & Plan  Chronic, send AFP   Secondary to Hernandez  Propanolol started  GI following    Uncontrolled diabetes mellitus with hyperglycemia (HCC)  Assessment & Plan  Sliding scale    Hypothyroid  Assessment & Plan  Continue synthroid     Hypertension  Assessment & Plan  Restart losartan as needed       VTE prophylaxis: SCDs/TEDs and pharmacologic prophylaxis contraindicated due to GI bleed    I have performed a physical exam and reviewed and updated ROS and Plan today (6/19/2022). In review of yesterday's note (6/18/2022), there are no changes except as documented above.

## 2022-06-19 NOTE — PROGRESS NOTES
Gastroenterology Consult Note:    Shira TERRELL  Date & Time note created:    6/19/2022   10:54 AM     Referring MD:  Dr. Houser    Patient ID:   Name:             Ally Doherty   YOB: 1945  Age:                 77 y.o.  female   MRN:               [id number]                                                             Reason for Consult:      melena    History of Present Illness:    77-year-old female with a history of type 2 diabetes and Hernandez cirrhosis complicated by portal hypertension with esophageal varices who presents with black stools and right lower quadrant pain.    1 and half weeks before presentation, she noticed she was having black and loose stools.  The symptoms were associated with right lower quadrant pain.  She initially states the pain was painful and was just there.  She endorses it being sharp and nonradiating.  She has had mild intermittent bouts of nausea.  She denies vomiting.  She endorses mild amount of difficulty swallowing.  She denies fevers and chills.  She denies night sweats and weight loss.  She does endorse that the symptoms are reminiscent of when she had a colonoscopy in 2016 and had a angio at [facility].  She endorses also that her varices were detected on screening but notes she was having black stools at that time as well.  She denies prior paracenteses when it was described in layman's terms and prior treatment for hepatic encephalopathy.  She denies using iron NSAIDs and Pepto-Bismol.  She denies being placed on beta-blockade for secondary prophylaxis of varices  I reviewed her outpatient records from [facility].      April 20, 2022 she had grade 1 esophageal varices.  Her preoperative H&P in October 2021 states that she had cirrhosis secondary to nonalcoholic fatty liver disease no other significant lesions were seen.  Small hiatal hernia was identified.  No bands were placed because by definition these varices were not banded.  January 6,  2022 EGD found 2 cords of grade 2 varices.  3 bands were placed.  August 2021 was her last ultrasound which showed a nodular appearing liver with the main portal vein of 16 mm and a recanalized umbilical vein.  Her AFP August 30, 2021 was 4.    Discussed with the ER team.  Patient expressed concern that her varices come back.  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  Her last clinic visit appeared to be in August 2020 and she is described as needing iron transfusions every 9 to 12 months she is also describes having had a negative autoimmune and infectious work-up and evaluation of her cirrhosis    INTERVAL HISTORY:    6/19/22: Stable. No acute overnight events. No further melenic stool. Hgb trended down: 11.7-->9.4. Tolerating PO intake    EGD: Esophageal varices x 3 bands. Portal hypertensive gastropathy. Recommendations to repeat EGD in 1-3 weeks.      Review of Systems:      Constitutional: Denies fevers, Denies weight changes  Eyes: Denies changes in vision, no eye pain  Ears/Nose/Throat/Mouth: Denies nasal congestion or sore throat   Cardiovascular: Denies chest pain or palpitations.  Respiratory: Denies shortness of breath, cough, and wheezing.  Gastrointestinal/Hepatic: Denies abdominal pain, nausea, vomiting, diarrhea, constipation or GI bleeding   Genitourinary: Denies dysuria or frequency  Musculoskeletal/Rheum: Denies  joint pain and swelling, no edema  Skin: Denies rash  Neurological: Denies headache, confusion, memory loss or focal weakness/parasthesias  Psychiatric: denies mood disorder   Endocrine: Brandie thyroid problems  Heme/Oncology/Lymph Nodes: Denies enlarged lymph nodes, denies brusing or known bleeding disorder  All other systems were reviewed and are negative (AMA/CMS criteria)                Past Medical History:   Past Medical History:  "  Diagnosis Date   • Anemia    • Breast cancer (HCC) 2/27/2012   • Cancer (HCC) 2010    right breast   • DM hyperosmolarity type II (HCC) 2/27/2012    oral meds   • DM II (diabetes mellitus, type II), controlled (HCC) 6/21/2012   • Hiatus hernia syndrome    • High cholesterol 02/10/15    Pt denies   • HTN (hypertension) 02/12/15    more \"preventative\" for DM-per her DR   • Hyperlipidemia 2/27/2012   • Hypothyroidism 2/27/2012   • Liver cirrhosis (HCC)     couldn't take statins   • Malignant neoplasm of upper-inner quadrant of right breast in female, estrogen receptor positive (HCC) 3/13/2018   • Vitamin D deficiency 5/27/2014    ICD-10 transition         Past Surgical History:  Past Surgical History:   Procedure Laterality Date   • MASTECTOMY Left 12/9/2019    Procedure: MASTECTOMY;  Surgeon: Edward Boo M.D.;  Location: SURGERY SAME DAY [facility];  Service: Plastics   • BREAST IMPLANT REMOVAL Right 12/9/2019    Procedure: REMOVAL, IMPLANT, BREAST;  Surgeon: Edward Boo M.D.;  Location: SURGERY SAME DAY [facility];  Service: Plastics   • BREAST RECONSTRUCTION  2/12/2015    Performed by Edward Boo M.D. at [facility]   • MAMMOPLASTY REDUCTION  2/12/2015    Performed by Edward Boo M.D. at [facility]   • MOLE EXCISION  2/12/2015    Performed by Edward Boo M.D. at [facility]   • CAPSULECTOMY  3/6/2014    Performed by Edward Boo M.D. at [facility]   • BREAST RECONSTRUCTION  6/24/2013    Performed by Edward Boo M.D. at SURGERY [facility]   • TISSUE EXPANDER PLACE/REMOVE  6/24/2013    Performed by Edward Boo M.D. at [facility]   • CAYETANO BY LAPAROSCOPY  2008   • TUBAL LIGATION  1974   • TONSILLECTOMY  1953   • MASTECTOMY      right breast   • OK CHEMOTHERAPY, UNSPECIFIED PROCEDURE     • OK RADIATION THERAPY PLAN SIMPLE         Hospital Medications:    Current Facility-Administered Medications:   •  insulin regular (HumuLIN " R,NovoLIN R) injection, 2-9 Units, Subcutaneous, 4X/DAY ACHS, 2 Units at 06/19/22 0813 **AND** POC blood glucose manual result, , , Q AC AND BEDTIME(S) **AND** NOTIFY MD and PharmD, , , Once **AND** Administer 20 grams of glucose (approximately 8 ounces of fruit juice) every 15 minutes PRN FSBG less than 70 mg/dL, , , PRN **AND** dextrose 50% (D50W) injection 25 g, 25 g, Intravenous, Q15 MIN PRN, KAILEY WeissO.  •  sulfamethoxazole-trimethoprim (BACTRIM DS) 800-160 MG tablet 1 Tablet, 1 Tablet, Oral, Q12HRS, Venkat Hitchcock M.D., 1 Tablet at 06/19/22 0531  •  losartan (COZAAR) tablet 50 mg, 50 mg, Oral, Nightly, Kirsten Paulson, A.P.R.N., 50 mg at 06/18/22 2232  •  [Held by provider] NS infusion, , Intravenous, Continuous, Julio Cesar Holguin D.O., Last Rate: 250 mL/hr at 06/18/22 2343, New Bag at 06/18/22 2343  •  octreotide (SANDOSTATIN) 1,250 mcg in  mL Infusion, 50 mcg/hr, Intravenous, Continuous, Mac Houser M.D., Last Rate: 10 mL/hr at 06/18/22 1946, 50 mcg/hr at 06/18/22 1946  •  levothyroxine (SYNTHROID) tablet 100 mcg, 100 mcg, Oral, QHS, Jj Mccarty M.D., 100 mcg at 06/19/22 0531  •  pantoprazole (Protonix) injection 40 mg, 40 mg, Intravenous, BID, Jj Mccarty M.D., 40 mg at 06/19/22 0532    Current Outpatient Medications:  Current Facility-Administered Medications   Medication Dose Route Frequency Provider Last Rate Last Admin   • insulin regular (HumuLIN R,NovoLIN R) injection  2-9 Units Subcutaneous 4X/DAY ACHS KAILEY WeissO.   2 Units at 06/19/22 0813    And   • dextrose 50% (D50W) injection 25 g  25 g Intravenous Q15 MIN PRN Julio Cesar Holguin D.O.       • sulfamethoxazole-trimethoprim (BACTRIM DS) 800-160 MG tablet 1 Tablet  1 Tablet Oral Q12HRS Venkat Hitchcock M.D.   1 Tablet at 06/19/22 0531   • losartan (COZAAR) tablet 50 mg  50 mg Oral Nightly ASAEL Mccallum.P.R.N.   50 mg at 06/18/22 3652   • [Held by provider] NS infusion   Intravenous Continuous Julio Cesar MARIE  "FERNANDO Holguin 250 mL/hr at 06/18/22 2343 New Bag at 06/18/22 2343   • octreotide (SANDOSTATIN) 1,250 mcg in  mL Infusion  50 mcg/hr Intravenous Continuous Mac Houser M.D. 10 mL/hr at 06/18/22 1946 50 mcg/hr at 06/18/22 1946   • levothyroxine (SYNTHROID) tablet 100 mcg  100 mcg Oral QHS Jj Mccarty M.D.   100 mcg at 06/19/22 0531   • pantoprazole (Protonix) injection 40 mg  40 mg Intravenous BID Jj Mccarty M.D.   40 mg at 06/19/22 0532       Medication Allergy:  Allergies   Allergen Reactions   • Byetta Unspecified     Pancreatitis   • Penicillins Rash     All over   • Statins [Hmg-Coa-R Inhibitors] Unspecified     Elevated liver enzymes       Family History:  Family History   Problem Relation Age of Onset   • Other Other         adopted       Social History:  Social History     Socioeconomic History   • Marital status:      Spouse name: Not on file   • Number of children: Not on file   • Years of education: Not on file   • Highest education level: Not on file   Occupational History   • Not on file   Tobacco Use   • Smoking status: Never Smoker   • Smokeless tobacco: Never Used   Vaping Use   • Vaping Use: Never used   Substance and Sexual Activity   • Alcohol use: Yes     Alcohol/week: 0.0 oz     Comment: < 1 per week   • Drug use: No   • Sexual activity: Never   Other Topics Concern   • Not on file   Social History Narrative   • Not on file     Social Determinants of Health     Financial Resource Strain: Not on file   Food Insecurity: Not on file   Transportation Needs: Not on file   Physical Activity: Not on file   Stress: Not on file   Social Connections: Not on file   Intimate Partner Violence: Not on file   Housing Stability: Not on file         Physical Exam:  Vitals/ General Appearance:   Weight/BMI: Body mass index is 29.35 kg/m².  /57   Pulse 68   Temp 36.2 °C (97.1 °F) (Temporal)   Resp 16   Ht 1.702 m (5' 7\")   Wt 85 kg (187 lb 6.3 oz)   SpO2 91%   Vitals:    " 06/19/22 0000 06/19/22 0400 06/19/22 0706 06/19/22 0900   BP: 130/64 137/63 131/57    Pulse: 63 66 68    Resp: 16 16 16    Temp: 36.4 °C (97.6 °F) 36.3 °C (97.3 °F) 36.2 °C (97.1 °F)    TempSrc: Temporal Temporal Temporal    SpO2: 92% 95% 94% 91%   Weight:       Height:         Oxygen Therapy:  Pulse Oximetry: 91 %, O2 (LPM): 0, O2 Delivery Device: None - Room Air    Constitutional: Overweight, Well nourished, No acute distress  HENMT:  Normocephalic, Atraumatic, Oropharynx moist mucous membranes, No oral exudates, Nose normal.  No thyromegaly.  Eyes:  EOMI, Conjunctiva normal, No discharge.  Neck:  Normal range of motion, No cervical tenderness,    Cardiovascular:  Normal heart rate, Normal rhythm, No murmurs, No rubs, No gallops.   Extremitites with intact distal pulses, no cyanosis, or edema.  Lungs:  Normal breath sounds, breath sounds clear to auscultation bilaterally,  no crackles, no wheezing.   Abdomen: Bowel sounds normal, Soft, obese, nontender to palpation in the right upper quadrant and right lower quadrant, No guarding, No rebound, No masses,   Skin: Warm, Dry, No erythema, No rash, no induration.  Neurologic: Alert & oriented x 3, No focal deficits noted, cranial nerves II through X are grossly intact.  Psychiatric: Affect normal, Judgment normal, Mood normal.      MDM (Data Review):     Records reviewed and summarized in current documentation    Lab Data Review:  Recent Results (from the past 24 hour(s))   CBC WITHOUT DIFFERENTIAL    Collection Time: 06/18/22  2:02 PM   Result Value Ref Range    WBC 4.6 (L) 4.8 - 10.8 K/uL    RBC 4.63 4.20 - 5.40 M/uL    Hemoglobin 11.7 (L) 12.0 - 16.0 g/dL    Hematocrit 38.7 37.0 - 47.0 %    MCV 83.6 81.4 - 97.8 fL    MCH 25.3 (L) 27.0 - 33.0 pg    MCHC 30.2 (L) 33.6 - 35.0 g/dL    RDW 76.0 (H) 35.9 - 50.0 fL    Platelet Count 107 (L) 164 - 446 K/uL    MPV 9.6 9.0 - 12.9 fL   POCT glucose device results    Collection Time: 06/18/22  2:04 PM   Result Value Ref Range     POC Glucose, Blood 191 (H) 65 - 99 mg/dL   Basic Metabolic Panel    Collection Time: 06/18/22  2:23 PM   Result Value Ref Range    Sodium 137 135 - 145 mmol/L    Potassium 4.2 3.6 - 5.5 mmol/L    Chloride 108 96 - 112 mmol/L    Co2 16 (L) 20 - 33 mmol/L    Glucose 218 (H) 65 - 99 mg/dL    Bun 15 8 - 22 mg/dL    Creatinine 0.76 0.50 - 1.40 mg/dL    Calcium 7.7 (L) 8.5 - 10.5 mg/dL    Anion Gap 13.0 7.0 - 16.0   ESTIMATED GFR    Collection Time: 06/18/22  2:23 PM   Result Value Ref Range    GFR (CKD-EPI) 81 >60 mL/min/1.73 m 2   POCT glucose device results    Collection Time: 06/18/22  5:33 PM   Result Value Ref Range    POC Glucose, Blood 250 (H) 65 - 99 mg/dL   POCT glucose device results    Collection Time: 06/18/22 10:34 PM   Result Value Ref Range    POC Glucose, Blood 223 (H) 65 - 99 mg/dL   CBC WITHOUT DIFFERENTIAL    Collection Time: 06/19/22  2:59 AM   Result Value Ref Range    WBC 3.1 (L) 4.8 - 10.8 K/uL    RBC 3.72 (L) 4.20 - 5.40 M/uL    Hemoglobin 9.4 (L) 12.0 - 16.0 g/dL    Hematocrit 31.4 (L) 37.0 - 47.0 %    MCV 84.4 81.4 - 97.8 fL    MCH 25.3 (L) 27.0 - 33.0 pg    MCHC 29.9 (L) 33.6 - 35.0 g/dL    Platelet Count 82 (L) 164 - 446 K/uL    MPV 10.0 9.0 - 12.9 fL   Basic Metabolic Panel    Collection Time: 06/19/22  2:59 AM   Result Value Ref Range    Sodium 143 135 - 145 mmol/L    Potassium 3.6 3.6 - 5.5 mmol/L    Chloride 115 (H) 96 - 112 mmol/L    Co2 18 (L) 20 - 33 mmol/L    Glucose 151 (H) 65 - 99 mg/dL    Bun 11 8 - 22 mg/dL    Creatinine 0.70 0.50 - 1.40 mg/dL    Calcium 7.3 (L) 8.5 - 10.5 mg/dL    Anion Gap 10.0 7.0 - 16.0   ESTIMATED GFR    Collection Time: 06/19/22  2:59 AM   Result Value Ref Range    GFR (CKD-EPI) 90 >60 mL/min/1.73 m 2   DIFFERENTIAL MANUAL    Collection Time: 06/19/22  2:59 AM   Result Value Ref Range    Neutrophils-Polys 77.00 (H) 44.00 - 72.00 %    Lymphocytes 13.30 (L) 22.00 - 41.00 %    Monocytes 5.30 0.00 - 13.40 %    Eosinophils 1.80 0.00 - 6.90 %    Basophils 2.60  (H) 0.00 - 1.80 %    Neutrophils (Absolute) 2.39 2.00 - 7.15 K/uL    Lymphs (Absolute) 0.41 (L) 1.00 - 4.80 K/uL    Monos (Absolute) 0.16 0.00 - 0.85 K/uL    Eos (Absolute) 0.06 0.00 - 0.51 K/uL    Baso (Absolute) 0.08 0.00 - 0.12 K/uL    Manual Diff Status PERFORMED    PERIPHERAL SMEAR REVIEW    Collection Time: 06/19/22  2:59 AM   Result Value Ref Range    Peripheral Smear Review see below    PLATELET ESTIMATE    Collection Time: 06/19/22  2:59 AM   Result Value Ref Range    Plt Estimation Decreased    MORPHOLOGY    Collection Time: 06/19/22  2:59 AM   Result Value Ref Range    RBC Morphology Present     Anisocytosis 2+ (A)     Macrocytosis 2+ (A)     Microcytosis 2+ (A)     Poikilocytosis 1+     Ovalocytes 1+    POCT glucose device results    Collection Time: 06/19/22  8:09 AM   Result Value Ref Range    POC Glucose, Blood 170 (H) 65 - 99 mg/dL       Imaging/Procedures Review:    CT abdomen pelvis noncontrast shows an absent gallbladder nondilated biliary ducts.  Small ascites is seen in the abdomen pelvis.  Nonspecific mesenteric fat stranding is seen at the root of the mesentery.  Duodenal diverticulum is seen.  Large hiatal hernia features much of the stomach in the chest      MDM (Assessment and Plan):     Patient Active Problem List    Diagnosis Date Noted   • Secondary esophageal varices with bleeding (HCC) 06/18/2022   • UGIB (upper gastrointestinal bleed) 06/17/2022   • Advanced care planning/counseling discussion 06/17/2022   • Pancytopenia (HCC) 04/11/2022   • Thrombocytopenia (HCC) 04/11/2022   • BMI 30.0-30.9,adult 04/11/2022   • History of breast cancer 04/11/2022   • History of vitamin D deficiency 04/11/2022   • Cirrhosis of liver with ascites (HCC) 07/09/2019   • Iron deficiency anemia 10/12/2018   • Dyslipidemia 07/17/2018   • Obesity (BMI 30-39.9) 02/27/2018   • Type 2 diabetes mellitus without complication, without long-term current use of insulin (HCC) 12/22/2016   • Fatty liver disease,  nonalcoholic 09/10/2013   • S/P breast reconstruction 06/24/2013   • Uncontrolled diabetes mellitus with hyperglycemia (HCC) 06/21/2012   • Hypertension 02/27/2012   • Hypothyroid 02/27/2012     #Cirrhosis: MELD of 8.  Debated bleed previously decompensated and that when she has had melena in the past she had varices banded although she is also had melena with identification of Avinash's erosions and by history angioectasias in the cecum which can bleed slowly and oxidized.  Has had prior outpatient work-up which concluded that she had cirrhosis from Hernandez    -Recommend tight glycemic control    #Portal HTN:  #Varices  #Anemia: As noted in the HPI EGD in April 2022 found small varices not amenable to banding.  Has had prior putative Avinash lesions which are notorious for causing iron deficiency anemia which was reconfirmed on her April 2022 studies.  Colonoscopy in 2016 also identified a nonbleeding angiectasia which by the natural history progress and regress.  We discussed potentially doing a colonoscopy with the EGD or waiting until we see what the EGD results produced.  She elected for the latter option.  Her BUN ratio is slightly elevated    EGD revealed esophageal varices x 3 banding. PHG.    -Continue Octreotide 50mcg/hr IV x 24 hours then stop  -Continue PPI IV BID then switch to oral daily     -Can consider Coreg or nonselective beta blocker titrated to HR >55 for previous episodes of melena with variceal banding constitute secondary prophylaxis in contrast to primary prophylaxis which would involve either banding alone (a pathway which she has been committed to at the [facility] [place]) or upfront beta-blockade    -Repeat EGD in 1-3 weeks..GI will need to schedule tomorrow when clinic is open     #Ascites: Scant on CT scan.    -2 g sodium diet post procedure  -Could consider Lasix 20 mg p.o. once a day and Aldactone 50 mg once a day    #HE: Grade 0    -Cannot for bear from using  lactulose and rifaximin    #Coagulopathy: Mildly elevated      #HCC Screening: Overdue    -recommend AFP, abd US q 6months which technically would be now but could defer to outpatient    Thank your for the opportunity to assist in the care of your patient.  Please call for any questions or concerns.    TRACEY Lee.

## 2022-06-19 NOTE — PROCEDURES
DATE OF PROCEDURE:  06/18/2022     PROCEDURE PERFORMED:  Esophagogastroduodenoscopy, diagnostic, with hemostasis   (esophageal variceal band ligation).     INSTRUMENT UTILIZED:  Olympus flexible forward-viewing gastroscope.     INDICATIONS:  A 77-year-old female with portal hypertension/fatty liver   disease with cirrhosis.  The patient presented with melena and anemia.     CONSENT:  Full RBA discussion held prior to the procedure and a signed and   witnessed consent form placed on the chart.  The risks including but not   limited to bleeding, complications of sedation, and perforation were   discussed.     ANESTHESIA:  Sedation/anesthesia.     ANESTHESIOLOGIST:  Zheng Viera MD of anesthesiology.     TOLERANCE:  Excellent.     PATHOLOGY SPECIMENS:  None.     PROCEDURAL DETAIL:  After adequate sedation, the Olympus flexible   forward-viewing gastroscope was advanced per the oral route into the   esophagus.  The esophageal mucosa was carefully inspected.  There were noted   to be 3 columns of medium-sized esophageal varices extending from   approximately 28 cm distal to the incisors down to the level of the   gastroesophageal junction at approximately 38 cm from the incisors.  There was   no active bleeding and no clot.  There were noted to be several red spots   (red kaila signs) on the varices in the distal esophagus.  The instrument was   advanced into the stomach where air was insufflated and the gastric mucosa   inspected including a retroflexed view of the gastric cardia.  There were no   gastric varices.  There was noted to be a diffuse mosaic mucosal pattern with   edema and erythema in the stomach diffusely consistent with probable portal   hypertensive gastropathy, mild to moderate.  The instrument was passed through   the patent pyloric channel into the duodenum.  The first and second portions   of the duodenum were inspected and were unremarkable.  The instrument was then   withdrawn from the patient and  the banding device was applied to the tip. The   esophagus was then reintubated beginning immediately proximal to the   gastroesophageal junction and moving proximally in a circumferential fashion,   we fired 4 bands and successfully placed 3 bands on the esophageal varices.    The final band was placed 31 cm distal to the incisors.     No immediate complications.     IMPRESSIONS AND FINDINGS:   1.  Esophageal varices -- likely source of the patient's recent melena.  2.  Portal hypertensive gastropathy.     PLAN AND RECOMMENDATIONS:    1.  Observe for any adverse events from this procedure.  2.  Octreotide, continue for another 24-48 hours.  3.  Pantoprazole 40 mg IV b.i.d.  4.  Serial hemoglobin and hematocrit with transfusions as needed.  5.  Clear liquid diet now, avoiding red liquids.  6.  Anticipate possible repeat EGD with banding as indicated in 1-3 weeks.        ______________________________  MD BRENDA STEVENSON/MOR/[place]    DD:  06/18/2022 13:43  DT:  06/18/2022 14:18    Job#:  [id number]    CC:Julio Cesar Holguin DO(User)  Su Randhawa MD(User)  BRIDGER TIPTON MD

## 2022-06-20 VITALS
OXYGEN SATURATION: 90 % | SYSTOLIC BLOOD PRESSURE: 134 MMHG | WEIGHT: 198.19 LBS | BODY MASS INDEX: 31.11 KG/M2 | HEIGHT: 67 IN | HEART RATE: 62 BPM | RESPIRATION RATE: 16 BRPM | DIASTOLIC BLOOD PRESSURE: 65 MMHG | TEMPERATURE: 97.7 F

## 2022-06-20 PROBLEM — K75.81 NASH (NONALCOHOLIC STEATOHEPATITIS): Status: ACTIVE | Noted: 2022-06-20

## 2022-06-20 LAB
ANION GAP SERPL CALC-SCNC: 8 MMOL/L (ref 7–16)
BUN SERPL-MCNC: 10 MG/DL (ref 8–22)
CALCIUM SERPL-MCNC: 7.5 MG/DL (ref 8.5–10.5)
CHLORIDE SERPL-SCNC: 114 MMOL/L (ref 96–112)
CO2 SERPL-SCNC: 19 MMOL/L (ref 20–33)
CREAT SERPL-MCNC: 0.91 MG/DL (ref 0.5–1.4)
ERYTHROCYTE [DISTWIDTH] IN BLOOD BY AUTOMATED COUNT: 75 FL (ref 35.9–50)
GFR SERPLBLD CREATININE-BSD FMLA CKD-EPI: 66 ML/MIN/1.73 M 2
GLUCOSE BLD STRIP.AUTO-MCNC: 190 MG/DL (ref 65–99)
GLUCOSE SERPL-MCNC: 152 MG/DL (ref 65–99)
HCT VFR BLD AUTO: 30.6 % (ref 37–47)
HGB BLD-MCNC: 9.4 G/DL (ref 12–16)
MCH RBC QN AUTO: 25.5 PG (ref 27–33)
MCHC RBC AUTO-ENTMCNC: 30.7 G/DL (ref 33.6–35)
MCV RBC AUTO: 83.2 FL (ref 81.4–97.8)
PLATELET # BLD AUTO: 92 K/UL (ref 164–446)
PMV BLD AUTO: 9.7 FL (ref 9–12.9)
POTASSIUM SERPL-SCNC: 3.8 MMOL/L (ref 3.6–5.5)
RBC # BLD AUTO: 3.68 M/UL (ref 4.2–5.4)
SODIUM SERPL-SCNC: 141 MMOL/L (ref 135–145)
WBC # BLD AUTO: 4 K/UL (ref 4.8–10.8)

## 2022-06-20 PROCEDURE — A9270 NON-COVERED ITEM OR SERVICE: HCPCS | Performed by: INTERNAL MEDICINE

## 2022-06-20 PROCEDURE — 36415 COLL VENOUS BLD VENIPUNCTURE: CPT

## 2022-06-20 PROCEDURE — 99239 HOSP IP/OBS DSCHRG MGMT >30: CPT | Performed by: STUDENT IN AN ORGANIZED HEALTH CARE EDUCATION/TRAINING PROGRAM

## 2022-06-20 PROCEDURE — 700102 HCHG RX REV CODE 250 W/ 637 OVERRIDE(OP): Performed by: INTERNAL MEDICINE

## 2022-06-20 PROCEDURE — 700102 HCHG RX REV CODE 250 W/ 637 OVERRIDE(OP): Performed by: STUDENT IN AN ORGANIZED HEALTH CARE EDUCATION/TRAINING PROGRAM

## 2022-06-20 PROCEDURE — 85027 COMPLETE CBC AUTOMATED: CPT

## 2022-06-20 PROCEDURE — 82962 GLUCOSE BLOOD TEST: CPT

## 2022-06-20 PROCEDURE — A9270 NON-COVERED ITEM OR SERVICE: HCPCS | Performed by: STUDENT IN AN ORGANIZED HEALTH CARE EDUCATION/TRAINING PROGRAM

## 2022-06-20 PROCEDURE — 80048 BASIC METABOLIC PNL TOTAL CA: CPT

## 2022-06-20 RX ORDER — OMEPRAZOLE 20 MG/1
20 CAPSULE, DELAYED RELEASE ORAL 2 TIMES DAILY
Qty: 30 CAPSULE | Refills: 2 | Status: SHIPPED
Start: 2022-06-20 | End: 2022-09-12

## 2022-06-20 RX ORDER — SULFAMETHOXAZOLE AND TRIMETHOPRIM 800; 160 MG/1; MG/1
1 TABLET ORAL EVERY 12 HOURS
Qty: 14 TABLET | Refills: 0 | Status: SHIPPED | OUTPATIENT
Start: 2022-06-20 | End: 2022-06-27

## 2022-06-20 RX ORDER — PROPRANOLOL HYDROCHLORIDE 10 MG/1
10 TABLET ORAL EVERY 8 HOURS
Qty: 90 TABLET | Refills: 2 | Status: SHIPPED | OUTPATIENT
Start: 2022-06-20 | End: 2023-01-18 | Stop reason: SDUPTHER

## 2022-06-20 RX ADMIN — LEVOTHYROXINE SODIUM 100 MCG: 0.1 TABLET ORAL at 05:12

## 2022-06-20 RX ADMIN — OMEPRAZOLE 20 MG: 20 CAPSULE, DELAYED RELEASE ORAL at 05:13

## 2022-06-20 RX ADMIN — INSULIN HUMAN 2 UNITS: 100 INJECTION, SOLUTION PARENTERAL at 08:01

## 2022-06-20 RX ADMIN — SULFAMETHOXAZOLE AND TRIMETHOPRIM 1 TABLET: 800; 160 TABLET ORAL at 05:12

## 2022-06-20 RX ADMIN — PROPRANOLOL HYDROCHLORIDE 10 MG: 10 TABLET ORAL at 05:12

## 2022-06-20 ASSESSMENT — LIFESTYLE VARIABLES
CONSUMPTION TOTAL: NEGATIVE
ON A TYPICAL DAY WHEN YOU DRINK ALCOHOL HOW MANY DRINKS DO YOU HAVE: 0
TOTAL SCORE: 0
HAVE YOU EVER FELT YOU SHOULD CUT DOWN ON YOUR DRINKING: NO
HOW MANY TIMES IN THE PAST YEAR HAVE YOU HAD 5 OR MORE DRINKS IN A DAY: 0
TOTAL SCORE: 0
HAVE PEOPLE ANNOYED YOU BY CRITICIZING YOUR DRINKING: NO
ALCOHOL_USE: NO
EVER FELT BAD OR GUILTY ABOUT YOUR DRINKING: NO
EVER HAD A DRINK FIRST THING IN THE MORNING TO STEADY YOUR NERVES TO GET RID OF A HANGOVER: NO
DOES PATIENT WANT TO STOP DRINKING: NO
AVERAGE NUMBER OF DAYS PER WEEK YOU HAVE A DRINK CONTAINING ALCOHOL: 0
TOTAL SCORE: 0

## 2022-06-20 ASSESSMENT — COGNITIVE AND FUNCTIONAL STATUS - GENERAL
SUGGESTED CMS G CODE MODIFIER DAILY ACTIVITY: CH
MOBILITY SCORE: 23
DAILY ACTIVITIY SCORE: 24
CLIMB 3 TO 5 STEPS WITH RAILING: A LITTLE
SUGGESTED CMS G CODE MODIFIER MOBILITY: CI

## 2022-06-20 ASSESSMENT — PAIN DESCRIPTION - PAIN TYPE: TYPE: ACUTE PAIN

## 2022-06-20 NOTE — PROGRESS NOTES
Report received from noc shift RN, assumed patient care. Pt on room air. Pt is medical. Patient has call light within reach, fall precautions in place.

## 2022-06-20 NOTE — DISCHARGE SUMMARY
Discharge Summary    CHIEF COMPLAINT ON ADMISSION  Chief Complaint   Patient presents with   • Bloody Stools   • Abdominal Pain     Pt has been having lower right abdominal pain with black stools for approximately 3 days. Pt reports this has happened to her before when she was receiving treatment for her cancer. Pt is currently not on treatment for breast cancer and denies any blood thinners.        Reason for Admission  Abdominal Pain, Blood Stool     Admission Date  6/17/2022    CODE STATUS  Full Code    HPI & HOSPITAL COURSE  Ally Doherty is a 77 y.o. female who presented 6/17/2022 with intermittent black stools for the last week and a half.  Has a history of Hernandez complicated by portal hypertension and esophageal varices.  Last had endoscopy in April 2022.  Patient denies chest pain shortness of breath dizziness headache syncope.  She tried to see her primary care doctor today, however was unable so she decided to come to the ED for evaluation.     In the ED, patient found to have normal vital signs.  Hemoglobin 11.2.  Sugar 245.  Anion gap 17.  CT abdomen showing no acute abdominal pathology.  Hepatic cirrhosis and splenomegaly noted.  GI consulted, recommended Protonix, octreotide, and will see.  Once restarted on diet    EGD performed during the hospitalization stay showed gastric varices and 3 bands were placed.  Patient's hemoglobin stabilized.  She was on octreotide with no further bleeding which was discontinued this morning.  She will discharge on omeprazole p.o. twice daily and propanolol 10 mg 3 times daily.  Patient understands if she has any further bleeding to go back to the ED to be evaluated.  She also reports she understands she needs to follow-up with GI as an outpatient.    Therefore, she is discharged in good and stable condition to home with close outpatient follow-up.    The patient met 2-midnight criteria for an inpatient stay at the time of discharge.    Discharge  Date  6/20/2022    FOLLOW UP ITEMS POST DISCHARGE  MEDINA-follow with GI, take PPI and propanolol  Upper GI [id number]-[id number]-[id number] with GI    DISCHARGE DIAGNOSES  Principal Problem:    UGIB (upper gastrointestinal bleed) POA: Unknown  Active Problems:    Hypertension POA: Unknown    Hypothyroid POA: Unknown    Uncontrolled diabetes mellitus with hyperglycemia (HCC) POA: Unknown    Cirrhosis of liver with ascites (HCC) POA: Yes    Advanced care planning/counseling discussion POA: Unknown    Secondary esophageal varices with bleeding (HCC) POA: Unknown    MEDINA (nonalcoholic steatohepatitis) POA: Unknown  Resolved Problems:    * No resolved hospital problems. *      FOLLOW UP  Future Appointments   Date Time Provider Department Center   7/12/2022  9:45 AM T[facility]  [address]   8/11/2022  9:00 AM Su Randhawa M.D. [facility] [address]   10/29/2022  1:45 [facility]     Micky Breen M.D.  [address]  [address]  [place] [address]  [phone]    Schedule an appointment as soon as possible for a visit      Su Randhawa M.D.  [address]  [place] [address]  [phone]    Schedule an appointment as soon as possible for a visit  Call to make an appointment for post hospitalization follow up. Discuss stay, medications, and plan of care.      MEDICATIONS ON DISCHARGE     Medication List      START taking these medications      Instructions   propranolol 10 MG Tabs  Commonly known as: INDERAL   Take 1 Tablet by mouth every 8 hours.  Dose: 10 mg     sulfamethoxazole-trimethoprim 800-160 MG tablet  Commonly known as: BACTRIM DS   Take 1 Tablet by mouth every 12 hours for 7 days.  Dose: 1 Tablet        CHANGE how you take these medications      Instructions   losartan 100 MG Tabs  What changed:   · how much to take  · when to take this  Commonly known as: COZAAR   Take 1 Tablet by mouth every day.  Dose: 100 mg     omeprazole 20 MG delayed-release capsule  What changed:   · when to take this  · reasons  to take this  Commonly known as: PRILOSEC   Take 1 Capsule by mouth 2 times a day.  Dose: 20 mg        CONTINUE taking these medications      Instructions   CALTRATE 600+D PO   Take 1 Tablet by mouth every day.  Dose: 1 Tablet     glipiZIDE SR 5 MG Tb24  Commonly known as: GLUCOTROL   Take 1 Tablet by mouth every day.  Dose: 5 mg     levothyroxine 100 MCG Tabs  Commonly known as: SYNTHROID   Take 100 mcg by mouth at bedtime.  Dose: 100 mcg     metformin 1000 MG tablet  Commonly known as: GLUCOPHAGE   Take 1,000 mg by mouth 2 times a day with meals.  Dose: 1,000 mg     therapeutic multivitamin-minerals Tabs   Take 1 Tab by mouth every day.  Dose: 1 Tablet     Tums Smoothies 750 MG chewable tablet  Generic drug: calcium carbonate   Chew 750 mg 3 times a day as needed (Indigestion).  Dose: 750 mg            Allergies  Allergies   Allergen Reactions   • Byetta Unspecified     Pancreatitis   • Penicillins Rash     All over   • Statins [Hmg-Coa-R Inhibitors] Unspecified     Elevated liver enzymes       DIET  Orders Placed This Encounter   Procedures   • Diet Order Diet: Low Fiber(GI Soft); Second Modifier: (optional): 2 Gram Sodium     Standing Status:   Standing     Number of Occurrences:   1     Order Specific Question:   Diet:     Answer:   Low Fiber(GI Soft) [2]     Order Specific Question:   Second Modifier: (optional)     Answer:   2 Gram Sodium [7]       ACTIVITY  As tolerated.  Weight bearing as tolerated    CONSULTATIONS  GI-Dr Fraire    PROCEDURES  6/18: EGD with esophageal variceal band ligation x3    LABORATORY  Lab Results   Component Value Date    SODIUM 141 06/20/2022    POTASSIUM 3.8 06/20/2022    CHLORIDE 114 (H) 06/20/2022    CO2 19 (L) 06/20/2022    GLUCOSE 152 (H) 06/20/2022    BUN 10 06/20/2022    CREATININE 0.91 06/20/2022        Lab Results   Component Value Date    WBC 4.0 (L) 06/20/2022    HEMOGLOBIN 9.4 (L) 06/20/2022    HEMATOCRIT 30.6 (L) 06/20/2022    PLATELETCT 92 (L) 06/20/2022        Total  time of the discharge process exceeds 34 minutes.

## 2022-06-20 NOTE — PROGRESS NOTES
Pt A&Ox4, on room air, discharged home to self care with . PIVs removed, tele box removed prior to this RN shift. Pt educated on plan of care, medications, and AVS, vocalized understanding, all questions answered at this time. Pt send with all belongings. Pt discharged.

## 2022-06-20 NOTE — CARE PLAN
The patient is Stable - Low risk of patient condition declining or worsening    Shift Goals  Clinical Goals: octreotide gtt, bp mgmt  Patient Goals: safety  Family Goals: safety    Progress made toward(s) clinical / shift goals:    Problem: Knowledge Deficit - Standard  Goal: Patient and family/care givers will demonstrate understanding of plan of care, disease process/condition, diagnostic tests and medications  Outcome: Progressing     Problem: Pain - Standard  Goal: Alleviation of pain or a reduction in pain to the patient’s comfort goal  Outcome: Progressing     Problem: Hemodynamics  Goal: Patient's hemodynamics, fluid balance and neurologic status will be stable or improve  Outcome: Progressing       Patient is not progressing towards the following goals:

## 2022-06-20 NOTE — DISCHARGE INSTRUCTIONS
Discharge Instructions    Discharged to home by car with relative. Discharged via wheelchair, hospital escort: Yes.  Special equipment needed: Not Applicable    Be sure to schedule a follow-up appointment with your primary care doctor or any specialists as instructed.     Discharge Plan:        I understand that a diet low in cholesterol, fat, and sodium is recommended for good health. Unless I have been given specific instructions below for another diet, I accept this instruction as my diet prescription.   Other diet: regular     Special Instructions: None    Is patient discharged on Warfarin / Coumadin?   No     Depression / Suicide Risk    As you are discharged from this [facility] facility, it is important to learn how to keep safe from harming yourself.    Recognize the warning signs:  Abrupt changes in personality, positive or negative- including increase in energy   Giving away possessions  Change in eating patterns- significant weight changes-  positive or negative  Change in sleeping patterns- unable to sleep or sleeping all the time   Unwillingness or inability to communicate  Depression  Unusual sadness, discouragement and loneliness  Talk of wanting to die  Neglect of personal appearance   Rebelliousness- reckless behavior  Withdrawal from people/activities they love  Confusion- inability to concentrate     If you or a loved one observes any of these behaviors or has concerns about self-harm, here's what you can do:  Talk about it- your feelings and reasons for harming yourself  Remove any means that you might use to hurt yourself (examples: pills, rope, extension cords, firearm)  Get professional help from the community (Mental Health, Substance Abuse, psychological counseling)  Do not be alone:Call your Safe Contact- someone whom you trust who will be there for you.  Call your local CRISIS HOTLINE 722-7175 or 444-858-7482  Call your local Children's Mobile Crisis Response Team [facility] (135)  097-9643 or [employer]  Call the toll free National Suicide Prevention Hotlines   National Suicide Prevention Lifeline 166-304-GTAL (0404)  [employer] [facility] 800-SUICIDE (617-8323)      Upper Gastrointestinal Bleeding    Upper gastrointestinal (GI) bleeding is bleeding from the swallowing tube (esophagus), stomach, or the first part of the small intestine (duodenum). If you have upper GI bleeding, you may vomit blood or have bloody or black stools.  Bleeding can range from mild to serious or even life-threatening. If there is a lot of bleeding, you may need to stay in the hospital.  What are the causes?  This condition may be caused by:  Ulcer disease of the stomach (peptic ulcer) or duodenum. This is the most common cause of GI bleeding.  Inflammation, irritation, or swelling of the esophagus (esophagitis).  A tear in the esophagus.  Cancer of the esophagus, stomach, or duodenum.  An abnormal or weakened blood vessel in one of the upper GI structures.  A bleeding disorder that impairs the formation of blood clots and causes easy bleeding (coagulopathy).  What increases the risk?  The following factors may make you more likely to develop this condition:  Being older than 60 years of age.  Being male.  Having another long-term disease, especially liver or kidney disease.  Having a stomach infection caused by Helicobacter pylori bacteria.  Having frequent or severe vomiting.  Abusing alcohol.  Taking certain medicines for a long time, such as:  NSAIDs.  Anticoagulants.  What are the signs or symptoms?  Symptoms of this condition include:  Vomiting blood.  Black or maroon-colored stools.  Bloody stools.  Weakness or dizziness.  Heartburn.  Abdominal pain.  Difficulty swallowing.  Weight loss.  Yellow eyes or skin (jaundice).  Racing heartbeat.  How is this diagnosed?  This condition may be diagnosed based on:  Your symptoms and medical history.  A physical exam. During the exam, your health care  provider will check for signs of blood loss, such as low blood pressure and a rapid pulse.  Tests, such as:  Blood tests to measure your blood cell count and to check for other signs of blood loss and clotting ability.  Blood tests to check your liver and kidney function.  A chest X-ray to look for a tear in the esophagus.  Endoscopy. In this procedure, a flexible scope is put down your esophagus and into your stomach or duodenum to look for the source of bleeding.  Angiogram. This may be done if the source of bleeding is not found during endoscopy. For an angiogram, X-rays are taken after a dye is injected into your bloodstream.  Nasogastric tube insertion. This is a tube passed through your nose and down into your stomach. It may be connected to a source of gentle suction to see if any blood comes out.  How is this treated?  Treatment for this condition depends on the cause of the bleeding. Active bleeding is treated at the hospital. Treatment may include:  Getting fluids through an IV tube inserted into one of your veins.  Getting blood through an IV tube (blood transfusion).  Getting high doses of medicine through the IV to lower stomach acid. This may be done to treat ulcer disease.  Having endoscopy to treat an area of bleeding with high heat (coagulation), injections, or surgical clips.  Having a procedure that involves first doing an angiogram and then blocking blood flow to the bleeding site (embolization).  Stopping or changing some of your regular medicines for a certain amount of time.  Having other surgical procedures if initial treatments do not control bleeding.  Follow these instructions at home:  Take over-the-counter and prescription medicines only as told by your health care provider. You may need to avoid NSAIDs or other medicines that increase bleeding.  Do not drink alcohol.  Drink enough fluid to keep your urine clear or pale yellow.  Follow instructions from your health care provider about  eating or drinking restrictions.  Return to your normal activities as told by your health care provider. Ask your health care provider what activities are safe for you.  Do not use any tobacco products, such as cigarettes, chewing tobacco, and e-cigarettes. If you need help quitting, ask your health care provider.  Keep all follow-up visits as told by your health care provider. This is important.  Contact a health care provider if:  You have abdominal pain or heartburn.  You have unexplained weight loss.  You have trouble swallowing.  You have frequent vomiting.  You develop jaundice.  You feel weak or dizzy.  You need help to stop smoking or drinking alcohol.  Get help right away if:  You have vomiting with blood.  You have blood in your stools.  You have severe cramps in your back or abdomen.  Your symptoms of upper GI bleeding come back after treatment.  This information is not intended to replace advice given to you by your health care provider. Make sure you discuss any questions you have with your health care provider.  Document Released: 05/04/2017 Document Revised: 11/30/2018 Document Reviewed: 05/04/2017  [employer] Patient Education © 2020 [employer]        Propranolol tablets  What is this medicine?  PROPRANOLOL (proe PRAN oh lole) is a beta-blocker. Beta-blockers reduce the workload on the heart and help it to beat more regularly. This medicine is used to treat high blood pressure, to control irregular heart rhythms (arrhythmias) and to relieve chest pain caused by angina. It may also be helpful after a heart attack. This medicine is also used to prevent migraine headaches, relieve uncontrollable shaking (tremors), and help certain problems related to the thyroid gland and adrenal gland.  This medicine may be used for other purposes; ask your health care provider or pharmacist if you have questions.  COMMON BRAND NAME(S): Inderal  What should I tell my health care provider before I take this  medicine?  They need to know if you have any of these conditions:  circulation problems or blood vessel disease  diabetes  history of heart attack or heart disease, vasospastic angina  kidney disease  liver disease  lung or breathing disease, like asthma or emphysema  pheochromocytoma  slow heart rate  thyroid disease  an unusual or allergic reaction to propranolol, other beta-blockers, medicines, foods, dyes, or preservatives  pregnant or trying to get pregnant  breast-feeding  How should I use this medicine?  Take this medicine by mouth with a glass of water. Follow the directions on the prescription label. Take your doses at regular intervals. Do not take your medicine more often than directed. Do not stop taking except on your the advice of your doctor or health care professional.  Talk to your pediatrician regarding the use of this medicine in children. Special care may be needed.  Overdosage: If you think you have taken too much of this medicine contact a poison control center or emergency room at once.  NOTE: This medicine is only for you. Do not share this medicine with others.  What if I miss a dose?  If you miss a dose, take it as soon as you can. If it is almost time for your next dose, take only that dose. Do not take double or extra doses.  What may interact with this medicine?  Do not take this medicine with any of the following medications:  feverfew  phenothiazines like chlorpromazine, mesoridazine, prochlorperazine, thioridazine  This medicine may also interact with the following medications:  aluminum hydroxide gel  antipyrine  antiviral medicines for HIV or AIDS  barbiturates like phenobarbital  certain medicines for blood pressure, heart disease, irregular heart beat  cimetidine  ciprofloxacin  diazepam  fluconazole  haloperidol  isoniazid  medicines for cholesterol like cholestyramine or colestipol  medicines for mental depression  medicines for migraine headache like almotriptan, eletriptan,  frovatriptan, naratriptan, rizatriptan, sumatriptan, zolmitriptan  NSAIDs, medicines for pain and inflammation, like ibuprofen or naproxen  phenytoin  rifampin  teniposide  theophylline  thyroid medicines  tolbutamide  warfarin  zileuton  This list may not describe all possible interactions. Give your health care provider a list of all the medicines, herbs, non-prescription drugs, or dietary supplements you use. Also tell them if you smoke, drink alcohol, or use illegal drugs. Some items may interact with your medicine.  What should I watch for while using this medicine?  Visit your doctor or health care professional for regular check ups. Check your blood pressure and pulse rate regularly. Ask your health care professional what your blood pressure and pulse rate should be, and when you should contact them.  You may get drowsy or dizzy. Do not drive, use machinery, or do anything that needs mental alertness until you know how this drug affects you. Do not stand or sit up quickly, especially if you are an older patient. This reduces the risk of dizzy or fainting spells. Alcohol can make you more drowsy and dizzy. Avoid alcoholic drinks.  This medicine may increase blood sugar. Ask your healthcare provider if changes in diet or medicines are needed if you have diabetes.  Do not treat yourself for coughs, colds, or pain while you are taking this medicine without asking your doctor or health care professional for advice. Some ingredients may increase your blood pressure.  What side effects may I notice from receiving this medicine?  Side effects that you should report to your doctor or health care professional as soon as possible:  allergic reactions like skin rash, itching or hives, swelling of the face, lips, or tongue  breathing problems  cold hands or feet  difficulty sleeping, nightmares  dry peeling skin  hallucinations  muscle cramps or weakness    signs and symptoms of high blood sugar such as being more thirsty  or hungry or having to urinate more than normal. You may also feel very tired or have blurry vision.  slow heart rate  swelling of the legs and ankles  vomiting  Side effects that usually do not require medical attention (report to your doctor or health care professional if they continue or are bothersome):  change in sex drive or performance  diarrhea  dry sore eyes  hair loss  nausea  weak or tired  This list may not describe all possible side effects. Call your doctor for medical advice about side effects. You may report side effects to FDA at 2-615-WMF-9497.  Where should I keep my medicine?  Keep out of the reach of children.  Store at room temperature between 15 and 30 degrees C (59 and 86 degrees F). Protect from light. Throw away any unused medicine after the expiration date.  NOTE: This sheet is a summary. It may not cover all possible information. If you have questions about this medicine, talk to your doctor, pharmacist, or health care provider.  © 2020 [employer] (2019-10-09 08:41:59)        Sulfamethoxazole; Trimethoprim, SMX-TMP tablets  What is this medicine?  SULFAMETHOXAZOLE; TRIMETHOPRIM or SMX-TMP (suhl fuh meth OK lew zohl; trye METH oh prim) is a combination of a sulfonamide antibiotic and a second antibiotic, trimethoprim. It is used to treat or prevent certain kinds of bacterial infections. It will not work for colds, flu, or other viral infections.  This medicine may be used for other purposes; ask your health care provider or pharmacist if you have questions.  COMMON BRAND NAME(S): Bacter-Aid DS, Bactrim, Bactrim DS, Septra, Septra DS  What should I tell my health care provider before I take this medicine?  They need to know if you have any of these conditions:  anemia  asthma  being treated with anticonvulsants  if you frequently drink alcohol containing drinks  kidney disease  liver disease  low level of folic acid or glucose-6-phosphate dehydrogenase  poor nutrition or  malabsorption  porphyria  severe allergies  thyroid disorder  an unusual or allergic reaction to sulfamethoxazole, trimethoprim, sulfa drugs, other medicines, foods, dyes, or preservatives  pregnant or trying to get pregnant  breast-feeding  How should I use this medicine?  Take this medicine by mouth with a full glass of water. Follow the directions on the prescription label. Take your medicine at regular intervals. Do not take it more often than directed. Do not skip doses or stop your medicine early.  Talk to your pediatrician regarding the use of this medicine in children. Special care may be needed. This medicine has been used in children as young as 2 months of age.  Overdosage: If you think you have taken too much of this medicine contact a poison control center or emergency room at once.  NOTE: This medicine is only for you. Do not share this medicine with others.  What if I miss a dose?  If you miss a dose, take it as soon as you can. If it is almost time for your next dose, take only that dose. Do not take double or extra doses.  What may interact with this medicine?  Do not take this medicine with any of the following medications:  aminobenzoate potassium  dofetilide  metronidazole  This medicine may also interact with the following medications:  ACE inhibitors like benazepril, enalapril, lisinopril, and ramipril  birth control pills  cyclosporine  digoxin  diuretics  indomethacin  medicines for diabetes  methenamine  methotrexate  phenytoin  potassium supplements  pyrimethamine  sulfinpyrazone  tricyclic antidepressants  warfarin  This list may not describe all possible interactions. Give your health care provider a list of all the medicines, herbs, non-prescription drugs, or dietary supplements you use. Also tell them if you smoke, drink alcohol, or use illegal drugs. Some items may interact with your medicine.  What should I watch for while using this medicine?  Tell your doctor or health care  professional if your symptoms do not improve. Drink several glasses of water a day to reduce the risk of kidney problems.  Do not treat diarrhea with over the counter products. Contact your doctor if you have diarrhea that lasts more than 2 days or if it is severe and watery.  This medicine can make you more sensitive to the sun. Keep out of the sun. If you cannot avoid being in the sun, wear protective clothing and use a sunscreen. Do not use sun lamps or tanning beds/booths.  What side effects may I notice from receiving this medicine?  Side effects that you should report to your doctor or health care professional as soon as possible:  allergic reactions like skin rash or hives, swelling of the face, lips, or tongue  breathing problems  fever or chills, sore throat  irregular heartbeat, chest pain  joint or muscle pain  pain or difficulty passing urine  red pinpoint spots on skin  redness, blistering, peeling or loosening of the skin, including inside the mouth  unusual bleeding or bruising  unusually weak or tired  yellowing of the eyes or skin  Side effects that usually do not require medical attention (report to your doctor or health care professional if they continue or are bothersome):  diarrhea  dizziness  headache  loss of appetite  nausea, vomiting  nervousness  This list may not describe all possible side effects. Call your doctor for medical advice about side effects. You may report side effects to FDA at 0-958-FDA-0729.  Where should I keep my medicine?  Keep out of the reach of children.  Store at room temperature between 20 to 25 degrees C (68 to 77 degrees F). Protect from light. Throw away any unused medicine after the expiration date.  NOTE: This sheet is a summary. It may not cover all possible information. If you have questions about this medicine, talk to your doctor, pharmacist, or health care provider.  © 2020 [employer] (2014-07-25 14:38:26)      Omeprazole tablets (OTC)  What is  this medicine?  OMEPRAZOLE (oh ME pray zol) prevents the production of acid in the stomach. It is used to treat the symptoms of heartburn. You can buy this medicine without a prescription. This product is not for long-term use, unless otherwise directed by your doctor or health care professional.  This medicine may be used for other purposes; ask your health care provider or pharmacist if you have questions.  COMMON BRAND NAME(S): Prilosec OTC  What should I tell my health care provider before I take this medicine?  They need to know if you have any of these conditions:  black or bloody stools  chest pain  difficulty swallowing  have had heartburn for over 3 months  have heartburn with dizziness, lightheadedness or sweating  liver disease  lupus  stomach pain  unexplained weight loss  vomiting with blood  wheezing  an unusual or allergic reaction to omeprazole, other medicines, foods, dyes, or preservatives  pregnant or trying to get pregnant  breast-feeding  How should I use this medicine?  Take this medicine by mouth with a glass of water. Follow the directions on the product label. Do not cut, crush or chew this medicine. Swallow the tablets whole. Take this medicine on an empty stomach, at least 30 minutes before breakfast. Take your medicine at regular intervals. Do not take it more often than directed.  Talk to your pediatrician regarding the use of this medicine in children. Special care may be needed.  Overdosage: If you think you have taken too much of this medicine contact a poison control center or emergency room at once.  NOTE: This medicine is only for you. Do not share this medicine with others.  What if I miss a dose?  If you miss a dose, take it as soon as you can. If it is almost time for your next dose, take only that dose. Do not take double or extra doses.  What may interact with this medicine?  Do not take this medicine with any of the following  medications:  atazanavir  clopidogrel  nelfinavir  rilpivirine  This medicine may also interact with the following medications:  antifungals like itraconazole, ketoconazole, and voriconazole  certain antivirals for HIV or hepatitis  certain medicines that treat or prevent blood clots like warfarin  cilostazol  citalopram  cyclosporine  dasatinib  digoxin  disulfiram  diuretics  erlotinib  iron supplements  medicines for anxiety, panic, and sleep like diazepam  medicines for seizures like carbamazepine, phenobarbital, phenytoin  methotrexate  mycophenolate mofetil  nilotinib  rifampin  Yaakov's wort  tacrolimus  vitamin B12  This list may not describe all possible interactions. Give your health care provider a list of all the medicines, herbs, non-prescription drugs, or dietary supplements you use. Also tell them if you smoke, drink alcohol, or use illegal drugs. Some items may interact with your medicine.  What should I watch for while using this medicine?  It can take several days before your heartburn gets better. Tell your healthcare professional if your symptoms do not start to get better or if they get worse. If you need to take this medicine for more than 14 days, talk to your healthcare professional. Heartburn may sometimes be caused by a more serious condition.  This medicine may cause a decrease in vitamin B12. You should make sure that you get enough vitamin B12 while you are taking this medicine. Discuss the foods you eat and the vitamins you take with your health care professional.  What side effects may I notice from receiving this medicine?  Side effects that you should report to your doctor or health care professional as soon as possible:  allergic reactions like skin rash, itching or hives, swelling of the face, lips, or tongue  bone pain  breathing problems  fever or sore throat  joint pain  rash on cheeks or arms that gets worse in the sun  redness, blistering, peeling, or loosening of the skin,  including inside the mouth  severe diarrhea  signs and symptoms of kidney injury like trouble passing urine or change in the amount of urine  signs and symptoms of low magnesium like muscle cramps; muscle pain; muscle weakness; tremors; seizures; or fast, irregular heartbeat  stomach polyps  unusual bleeding or bruising  Side effects that usually do not require medical attention (report to your doctor or health care professional if they continue or are bothersome):  diarrhea  dry mouth  gas  headache  nausea  stomach pain  This list may not describe all possible side effects. Call your doctor for medical advice about side effects. You may report side effects to FDA at 9-720-DUS-5885.  Where should I keep my medicine?  Keep out of the reach of children.  Store at room temperature between 20 and 25 degrees C (68 and 77 degrees F). Protect from light and moisture. Throw away any unused medicine after the expiration date.  NOTE: This sheet is a summary. It may not cover all possible information. If you have questions about this medicine, talk to your doctor, pharmacist, or health care provider.  © 2020 [employer] (2019-10-09 13:06:30)

## 2022-06-20 NOTE — PROGRESS NOTES
I have received report from day shift RN. I have assumed care of this patient. She is A&Ox4, VSS, denies any dark stools today and reports stool has returned to normal. Patient has been assessed (see flow sheet) and plan of care has been discussed (octreotide gtt, bp mgmt). Patient verbalizes understanding of plan and denies any further needs at this time. Patient is now resting in bed, bed is in the lowest position and locked, and the call light is within reach.

## 2022-06-21 ENCOUNTER — PATIENT OUTREACH (OUTPATIENT)
Dept: MEDICAL GROUP | Facility: PHYSICIAN GROUP | Age: 77
End: 2022-06-21
Payer: MEDICARE

## 2022-06-21 NOTE — PROGRESS NOTES
Subjective:     Ally Doherty is a 77 y.o. female who presents for Hospital Follow-up.    POST DISCHARGE CALL:  Discharge Date:6/20/2022   Date of Outreach Call: 6/21/2022  9:43 AM  Now that you're home, how are you doing? Very Good  Do you have questions about your medications? Yes  Did you fill your medications? Yes  Do you have a follow-up appointment scheduled?Yes  Discharging Department: Telemetry 7  Number of Attempts: 1  Current or previous attempts completed within two business days of discharge? Yes  Provided education regarding treatment plan, medication, self-management, ADLs? No  Has patient completed Advance Directive? If yes, advise them to bring to appointment. Yes  Care Manager phone number provided? Yes  Is there anything else I can help you with? Yes    HPI:   Recently hospitalized for abdominal pain and upper GI bleed with dark stools.  GI was consulted and patient had EGD that showed varices and these were banded x3.  Hemoglobin dropped from baseline of 11.2-9.4 on discharge.  Patient already has history of recurrent anemia due to iron deficiency.  Her hematologist/oncologist sent her for iron infusion every 6 months.  Patient cannot tolerate oral iron supplement.  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  On discharge she was put on omeprazole 20 mg twice a day and propranolol 10 mg 3 times a day.  [facility]  At this time she denies any more abdominal pain.  She said she was still having black stools to 2 days after discharge but this has already resolved since then.  Denies any fresh blood in the stools.    Patient with known history of liver cirrhosis secondary to Hernandez.  She already was found to have esophageal varices when she had her EGD in April 2022 but these were not bleeding and they were not banded at that time.        Current medicines (including  reconciliation performed today)  Current Outpatient Medications   Medication Sig Dispense Refill   • omeprazole (PRILOSEC) 20 MG delayed-release capsule Take 1 Capsule by mouth 2 times a day. 30 Capsule 2   • sulfamethoxazole-trimethoprim (BACTRIM DS) 800-160 MG tablet Take 1 Tablet by mouth every 12 hours for 7 days. 14 Tablet 0   • propranolol (INDERAL) 10 MG Tab Take 1 Tablet by mouth every 8 hours. 90 Tablet 2   • levothyroxine (SYNTHROID) 100 MCG Tab Take 100 mcg by mouth at bedtime.     • metformin (GLUCOPHAGE) 1000 MG tablet Take 1,000 mg by mouth 2 times a day with meals.     • calcium carbonate (TUMS SMOOTHIES) 750 MG chewable tablet Chew 750 mg 3 times a day as needed (Indigestion).     • glipiZIDE SR (GLUCOTROL) 5 MG TABLET SR 24 HR Take 1 Tablet by mouth every day. 100 Tablet 1   • losartan (COZAAR) 100 MG Tab Take 1 Tablet by mouth every day. (Patient taking differently: Take 50 mg by mouth one time.) 100 Tablet 1   • therapeutic multivitamin-minerals (THERAGRAN-M) Tab Take 1 Tab by mouth every day.     • Calcium Carbonate-Vitamin D (CALTRATE 600+D PO) Take 1 Tablet by mouth every day.       No current facility-administered medications for this visit.       Allergies:   Byetta, Penicillins, and Statins [hmg-coa-r inhibitors]    Social History     Tobacco Use   • Smoking status: Never Smoker   • Smokeless tobacco: Never Used   Vaping Use   • Vaping Use: Never used   Substance Use Topics   • Alcohol use: Yes     Alcohol/week: 0.0 oz     Comment: < 1 per week   • Drug use: No       ROS:  As per HPI, the rest are negative.    Objective:     There were no vitals filed for this visit.  There is no height or weight on file to calculate BMI.    Physical Exam:  Examined alert, awake, oriented, not in distress    Eyes-pinkish palpebral conjunctivae  Neck-supple, no lymphadenopathy, no thyromegaly  Lungs-clear to auscultation, no rales, no wheezes  Heart-regular rate and rhythm, no murmur  Abdomen-protuberant,  good bowel sounds, soft, nontender, no hepatosplenomegaly, no masses, no CVA tenderness  Extremities-no edema, clubbing, cyanosis  Skin- pinkish nailbeds and palms with no pallor    Assessment and Plan:   1. Hospital discharge follow-up     2. UGI bleed  CBC WITH DIFFERENTIAL   3. Esophageal varices in cirrhosis (HCC)     4. 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 Iron deficiency anemia due to chronic blood loss  CBC WITH DIFFERENTIAL     Continue omeprazole 20 mg twice a day and propranolol 10 mg 3 times a day.  Keep appointment with GI specialist.  Her upper GI bleed has already resolved post banding of 3 esophageal varices.  We will do updated CBC to follow-up on the anemia.  We will await GI recommendation.    - Chart and discharge summary were reviewed.   - Hospitalization and results reviewed with patient.   - Medications reviewed including instructions regarding high risk medications, dosing and side effects.  - Recommended Services: No services needed at this time  - Advance directive/POLST on file?  No     Follow-up:No follow-ups on file.    Face-to-face transitional care management services with MODERATE (today's visit is within 14 days post discharge & LACE+ score of 28-58) medical decision complexity were provided.     LACE+ Historical Score Over Time (0-28: Low, 29-58: Medium, 59+: High): 74

## 2022-06-21 NOTE — PROGRESS NOTES
RN Transitional Care Management discharge follow-up call completed. Answered patient's questions regarding medications and follow-up care. Discharge follow-up appointment scheduled with PCP on 6/29/22. Provided TCM RN contact number for any additional questions/concerns. Please route chart back to RN if additional follow-up is needed/requested.     MAGDA Brink Care Coordinator  Transitional Care Management [phone]

## 2022-06-29 ENCOUNTER — OFFICE VISIT (OUTPATIENT)
Dept: MEDICAL GROUP | Facility: PHYSICIAN GROUP | Age: 77
End: 2022-06-29
Payer: MEDICARE

## 2022-06-29 ENCOUNTER — HOSPITAL ENCOUNTER (OUTPATIENT)
Dept: LAB | Facility: MEDICAL CENTER | Age: 77
End: 2022-06-29
Attending: FAMILY MEDICINE
Payer: MEDICARE

## 2022-06-29 ENCOUNTER — PATIENT OUTREACH (OUTPATIENT)
Dept: HEALTH INFORMATION MANAGEMENT | Facility: OTHER | Age: 77
End: 2022-06-29

## 2022-06-29 VITALS
SYSTOLIC BLOOD PRESSURE: 118 MMHG | OXYGEN SATURATION: 96 % | WEIGHT: 182.76 LBS | HEART RATE: 69 BPM | TEMPERATURE: 97 F | DIASTOLIC BLOOD PRESSURE: 60 MMHG | HEIGHT: 65 IN | BODY MASS INDEX: 30.45 KG/M2

## 2022-06-29 DIAGNOSIS — D50.0 IRON DEFICIENCY ANEMIA DUE TO CHRONIC BLOOD LOSS: ICD-10-CM

## 2022-06-29 DIAGNOSIS — K92.2 UGI BLEED: ICD-10-CM

## 2022-06-29 DIAGNOSIS — E78.5 DYSLIPIDEMIA: ICD-10-CM

## 2022-06-29 DIAGNOSIS — I85.10 ESOPHAGEAL VARICES IN CIRRHOSIS (HCC): ICD-10-CM

## 2022-06-29 DIAGNOSIS — K74.60 ESOPHAGEAL VARICES IN CIRRHOSIS (HCC): ICD-10-CM

## 2022-06-29 DIAGNOSIS — K74.60 LIVER CIRRHOSIS SECONDARY TO NONALCOHOLIC STEATOHEPATITIS (NASH) (HCC): ICD-10-CM

## 2022-06-29 DIAGNOSIS — E11.9 TYPE 2 DIABETES MELLITUS WITHOUT COMPLICATION, WITHOUT LONG-TERM CURRENT USE OF INSULIN (HCC): ICD-10-CM

## 2022-06-29 DIAGNOSIS — K75.81 LIVER CIRRHOSIS SECONDARY TO NONALCOHOLIC STEATOHEPATITIS (NASH) (HCC): ICD-10-CM

## 2022-06-29 DIAGNOSIS — Z09 HOSPITAL DISCHARGE FOLLOW-UP: Primary | ICD-10-CM

## 2022-06-29 DIAGNOSIS — I10 ESSENTIAL HYPERTENSION: ICD-10-CM

## 2022-06-29 DIAGNOSIS — E11.8 DIABETES MELLITUS TYPE 2 WITH COMPLICATIONS (HCC): ICD-10-CM

## 2022-06-29 LAB
ANISOCYTOSIS BLD QL SMEAR: ABNORMAL
BASOPHILS # BLD AUTO: 2.6 % (ref 0–1.8)
BASOPHILS # BLD: 0.11 K/UL (ref 0–0.12)
BURR CELLS BLD QL SMEAR: NORMAL
DACRYOCYTES BLD QL SMEAR: NORMAL
EOSINOPHIL # BLD AUTO: 0.37 K/UL (ref 0–0.51)
EOSINOPHIL NFR BLD: 8.7 % (ref 0–6.9)
ERYTHROCYTE [DISTWIDTH] IN BLOOD BY AUTOMATED COUNT: 74.2 FL (ref 35.9–50)
HCT VFR BLD AUTO: 40.1 % (ref 37–47)
HGB BLD-MCNC: 12 G/DL (ref 12–16)
LG PLATELETS BLD QL SMEAR: NORMAL
LYMPHOCYTES # BLD AUTO: 0.55 K/UL (ref 1–4.8)
LYMPHOCYTES NFR BLD: 13 % (ref 22–41)
MANUAL DIFF BLD: NORMAL
MCH RBC QN AUTO: 25.4 PG (ref 27–33)
MCHC RBC AUTO-ENTMCNC: 29.9 G/DL (ref 33.6–35)
MCV RBC AUTO: 84.8 FL (ref 81.4–97.8)
MICROCYTES BLD QL SMEAR: ABNORMAL
MONOCYTES # BLD AUTO: 0.29 K/UL (ref 0–0.85)
MONOCYTES NFR BLD AUTO: 7 % (ref 0–13.4)
MORPHOLOGY BLD-IMP: NORMAL
NEUTROPHILS # BLD AUTO: 2.89 K/UL (ref 2–7.15)
NEUTROPHILS NFR BLD: 68.7 % (ref 44–72)
NRBC # BLD AUTO: 0 K/UL
NRBC BLD-RTO: 0 /100 WBC
OVALOCYTES BLD QL SMEAR: NORMAL
PLATELET # BLD AUTO: 130 K/UL (ref 164–446)
PLATELET BLD QL SMEAR: NORMAL
POIKILOCYTOSIS BLD QL SMEAR: NORMAL
POLYCHROMASIA BLD QL SMEAR: NORMAL
RBC # BLD AUTO: 4.73 M/UL (ref 4.2–5.4)
RBC BLD AUTO: PRESENT
WBC # BLD AUTO: 4.2 K/UL (ref 4.8–10.8)

## 2022-06-29 PROCEDURE — 36415 COLL VENOUS BLD VENIPUNCTURE: CPT

## 2022-06-29 PROCEDURE — 99495 TRANSJ CARE MGMT MOD F2F 14D: CPT | Performed by: FAMILY MEDICINE

## 2022-06-29 PROCEDURE — 85007 BL SMEAR W/DIFF WBC COUNT: CPT

## 2022-06-29 PROCEDURE — 85025 COMPLETE CBC W/AUTO DIFF WBC: CPT

## 2022-06-29 ASSESSMENT — PATIENT HEALTH QUESTIONNAIRE - PHQ9
SUM OF ALL RESPONSES TO PHQ QUESTIONS 1-9: 5
5. POOR APPETITE OR OVEREATING: 1 - SEVERAL DAYS
CLINICAL INTERPRETATION OF PHQ2 SCORE: 2

## 2022-06-29 ASSESSMENT — FIBROSIS 4 INDEX: FIB4 SCORE: 5.95

## 2022-06-29 NOTE — PROGRESS NOTES
INITIAL CARE MANAGEMENT CARE PLAN/ASSESEMENT     Ally is a pleasant 77-year-old woman.  CCM program introduced to her following her PCP OV on 6/29/22.  She agreed to enroll in the program & provided her verbal consent.  Her primary support system is her .  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 She receives support by networking with other individuals involved in the business, most of whom are women.  She is also active with a [employer] group, she is a Rastafari.  She lives in a private home in [facility] where she has lived for 13 years.  She is independent in her ADLs, does not require any supportive equipment (for example a cane or walker, etc), Her  does all the cooking except for the holidays when she cooks.  She is self sufficient, drives, and goes out with friends.  She had three childen, a daughter who lives in [facility] and two sons who live in [place], four grandchildren, and three great grandchildren.      She does not exercise but she walks a lot in her everyday life.  She used to take cira chi and is interested in doing that again.      She has had her A1c below 7 in the past but on 3/30/22 is was 7.7.  She is not sure what she did to get it below 7 in the past.       Her goals for the CCM program are to lose some more weight, several years ago she weighed 230 lbs, currently she is at 182 lbs, her goal is to get to 140 lbs.  She lost the weight by watching what she ate.  She has been at 180 lbs for some time.      We scheduled her an appointment with the diabetes nurse on 6/30/22 and I will ask the CHW (Katlin) to reach out to the patient about cira chi classes in the [place] area and weight reduction classes at [facility].      Medication Self-Management Goals:     Reviewed medications listed below with patient.      Current Outpatient Medications:   •  omeprazole (PRILOSEC) 20 MG delayed-release capsule, Take 1 Capsule by mouth 2 times a  day., Disp: 30 Capsule, Rfl: 2  •  propranolol (INDERAL) 10 MG Tab, Take 1 Tablet by mouth every 8 hours., Disp: 90 Tablet, Rfl: 2  •  levothyroxine (SYNTHROID) 100 MCG Tab, Take 100 mcg by mouth at bedtime., Disp: , Rfl:   •  metformin (GLUCOPHAGE) 1000 MG tablet, Take 1,000 mg by mouth 2 times a day with meals., Disp: , Rfl:   •  calcium carbonate (TUMS SMOOTHIES) 750 MG chewable tablet, Chew 750 mg 3 times a day as needed (Indigestion)., Disp: , Rfl:   •  glipiZIDE SR (GLUCOTROL) 5 MG TABLET SR 24 HR, Take 1 Tablet by mouth every day., Disp: 100 Tablet, Rfl: 1  •  losartan (COZAAR) 100 MG Tab, Take 1 Tablet by mouth every day. (Patient taking differently: Take 50 mg by mouth one time.), Disp: 100 Tablet, Rfl: 1  •  therapeutic multivitamin-minerals (THERAGRAN-M) Tab, Take 1 Tab by mouth every day., Disp: , Rfl:   •  Calcium Carbonate-Vitamin D (CALTRATE 600+D PO), Take 1 Tablet by mouth every day., Disp: , Rfl:          Goal: continue to take meds as prescribed       Physical/Functional/Environmental Status:        One or more falls in the last year: No  Advised to use a cane or walker to get around safely: No  Feels unsteady when walking: No  Steadies self on furniture while walking at home: No  Worried about falling: No  Needs to push with hands when rising from a chair: No  Has trouble stepping up onto a curb / using stairs: No  Often has to rush to the toilet: Yes (recently, started 3 weeks ago, improving)  Has lost some feeling in feet: No  Takes medicine that makes him/her feel lightheaded or more tired than usual: Yes (new med, see above)  Takes medicine to sleep or improve mood: No  Often feels sad or depressed: No  STEADI Risk for Falling Score: 2       Goal: No Assistive devices needed.  Able to perform all ADLs and IADLs independently, goal is to maintain current status     Financial Status:     Denies any issues with finances     Goal: Maintain status quo     Transportation Status:    Active   with own car    Goal: Maintain status quo    Mental/Behavioral/Psychosocial Status:    Depression Screen (PHQ-2/PHQ-9) 6/17/2022 6/19/2022 6/29/2022   PHQ-2 Total Score 0 0 -   PHQ-2 Total Score - - -   PHQ-2 Total Score - - 2   PHQ-9 Total Score - - 5       Interpretation of PHQ-9 Total Score   Score Severity   1-4 No Depression   5-9 Mild Depression   10-14 Moderate Depression   15-19 Moderately Severe Depression   20-27 Severe Depression       Goal:  Improve level of health to improve mild depression      Chronic Care Management Care Plan      Goal: Reduced body habitus   Barriers:  Knowledge deficit, increased caloric intake, reduced level of activity  Interventions: Scheduled to meet with diabetes nurse, create a diet & exercise plan, get PCP input about exercise program at 8/11/22 OV    Start Date: 6/29/22  End Date:      Goal:  Get A1c below 7  Barriers:  BMI 30.5, Nutrition  Interventions: Educate on proper nutrition, work on eating more vegetables & incorporate exercise into daily routine, schedule appointment with diabetes nurse    Start Date: 6/29/22  End Date:         Discussion: See Care Plan    Goals: Created      Next Scheduled patient outreach: 7/13/22

## 2022-06-30 ENCOUNTER — NON-PROVIDER VISIT (OUTPATIENT)
Dept: MEDICAL GROUP | Facility: PHYSICIAN GROUP | Age: 77
End: 2022-06-30

## 2022-06-30 ENCOUNTER — APPOINTMENT (OUTPATIENT)
Dept: MEDICAL GROUP | Facility: PHYSICIAN GROUP | Age: 77
End: 2022-06-30
Payer: MEDICARE

## 2022-06-30 VITALS
DIASTOLIC BLOOD PRESSURE: 68 MMHG | SYSTOLIC BLOOD PRESSURE: 124 MMHG | HEIGHT: 65 IN | WEIGHT: 182 LBS | BODY MASS INDEX: 30.32 KG/M2

## 2022-06-30 DIAGNOSIS — B37.31 VAGINAL YEAST INFECTION: ICD-10-CM

## 2022-06-30 DIAGNOSIS — E11.8 DIABETES MELLITUS TYPE 2 WITH COMPLICATIONS (HCC): ICD-10-CM

## 2022-06-30 LAB
HBA1C MFR BLD: 6.6 % (ref 0–5.6)
INT CON NEG: ABNORMAL
INT CON POS: ABNORMAL

## 2022-06-30 PROCEDURE — 99211 OFF/OP EST MAY X REQ PHY/QHP: CPT | Performed by: FAMILY MEDICINE

## 2022-06-30 PROCEDURE — 83036 HEMOGLOBIN GLYCOSYLATED A1C: CPT | Performed by: FAMILY MEDICINE

## 2022-06-30 RX ORDER — EMPAGLIFLOZIN 25 MG/1
1 TABLET, FILM COATED ORAL DAILY
Qty: 30 TABLET | Refills: 6 | Status: SHIPPED | OUTPATIENT
Start: 2022-06-30 | End: 2022-07-05 | Stop reason: SDUPTHER

## 2022-06-30 RX ORDER — FLUCONAZOLE 100 MG/1
TABLET ORAL
Qty: 3 TABLET | Refills: 1 | Status: SHIPPED | OUTPATIENT
Start: 2022-06-30 | End: 2023-07-10

## 2022-06-30 ASSESSMENT — FIBROSIS 4 INDEX: FIB4 SCORE: 4.21

## 2022-06-30 NOTE — PROGRESS NOTES
RN-CDE Note    Subjective:     HPI:  Ally Doherty is a 77 y.o. old patient who is seen by the Diabetes Nurse Specialist today for review of her type 2 diabetes.  Having a difficult time losing weight.      Diabetes Medications:   Metformin 1000 mg bid  Glipizide 5 mg daily  Taking above medications as prescribed: yes  Taking daily ASA: No    Exercise: not much  Diet: 2 meals per day.    Patient's body mass index is 30.29 kg/m². Exercise and nutrition counseling were performed at this visit.      Health Maintenance:   There are no preventive care reminders to display for this patient.      DM:   Last A1c:   Lab Results   Component Value Date/Time    HBA1C 6.6 (A) 06/30/2022 04:13 PM      Previous A1c was 7.7 on 3/3/22  A1C GOAL: < 7    Glucose monitoring frequency: not testing     Hypoglycemic episodes: no    Last Retinal Exam: on file and up-to-date  Daily Foot Exam: Yes  Denies problems.     Exam:  Monofilament: current as of 4/12/22    Lab Results   Component Value Date/Time    MALBCRT see below 03/30/2022 09:13 AM    MICROALBUR <1.2 03/30/2022 09:13 AM        ACR Albumin/Creatinine Ratio goal <30     HTN:   Blood pressure goal <140/<80 at goal.   Currently Rx ACE/ARB: Yes  Losartan 100 mg daily    Dyslipidemia:    Lab Results   Component Value Date/Time    CHOLSTRLTOT 175 03/30/2022 09:13 AM     (H) 03/30/2022 09:13 AM    HDL 56 03/30/2022 09:13 AM    TRIGLYCERIDE 93 03/30/2022 09:13 AM         Currently Rx Statin: No     She  reports that she has never smoked. She has never used smokeless tobacco.      Plan:     Discussed and educated on:   - All medications, side effects and compliance (discussed carefully)  - Annual eye examinations at [facility]  - HbA1C: target  - Weight control and daily exercise    Recommended medication changes: DC Glipizide and start Jardiance 25 mg daily to help with diabetes and weight loss.

## 2022-07-01 ENCOUNTER — HOSPITAL ENCOUNTER (OUTPATIENT)
Dept: LAB | Facility: MEDICAL CENTER | Age: 77
End: 2022-07-01
Attending: INTERNAL MEDICINE
Payer: MEDICARE

## 2022-07-01 LAB
ALBUMIN SERPL BCP-MCNC: 3.8 G/DL (ref 3.2–4.9)
ALBUMIN/GLOB SERPL: 1.4 G/DL
ALP SERPL-CCNC: 94 U/L (ref 30–99)
ALT SERPL-CCNC: 26 U/L (ref 2–50)
AMMONIA PLAS-SCNC: 37 UMOL/L (ref 11–45)
ANION GAP SERPL CALC-SCNC: 11 MMOL/L (ref 7–16)
AST SERPL-CCNC: 36 U/L (ref 12–45)
BASOPHILS # BLD AUTO: 1 % (ref 0–1.8)
BASOPHILS # BLD: 0.03 K/UL (ref 0–0.12)
BILIRUB SERPL-MCNC: 0.3 MG/DL (ref 0.1–1.5)
BUN SERPL-MCNC: 12 MG/DL (ref 8–22)
BURR CELLS BLD QL SMEAR: NORMAL
CALCIUM SERPL-MCNC: 9.3 MG/DL (ref 8.5–10.5)
CHLORIDE SERPL-SCNC: 106 MMOL/L (ref 96–112)
CO2 SERPL-SCNC: 22 MMOL/L (ref 20–33)
CREAT SERPL-MCNC: 0.87 MG/DL (ref 0.5–1.4)
EOSINOPHIL # BLD AUTO: 0.1 K/UL (ref 0–0.51)
EOSINOPHIL NFR BLD: 3 % (ref 0–6.9)
ERYTHROCYTE [DISTWIDTH] IN BLOOD BY AUTOMATED COUNT: 72 FL (ref 35.9–50)
GFR SERPLBLD CREATININE-BSD FMLA CKD-EPI: 69 ML/MIN/1.73 M 2
GLOBULIN SER CALC-MCNC: 2.7 G/DL (ref 1.9–3.5)
GLUCOSE SERPL-MCNC: 150 MG/DL (ref 65–99)
HCT VFR BLD AUTO: 35.2 % (ref 37–47)
HGB BLD-MCNC: 10.5 G/DL (ref 12–16)
INR PPP: 1.1 (ref 0.87–1.13)
LYMPHOCYTES # BLD AUTO: 1.09 K/UL (ref 1–4.8)
LYMPHOCYTES NFR BLD: 33 % (ref 22–41)
MANUAL DIFF BLD: NORMAL
MCH RBC QN AUTO: 25.1 PG (ref 27–33)
MCHC RBC AUTO-ENTMCNC: 29.8 G/DL (ref 33.6–35)
MCV RBC AUTO: 84 FL (ref 81.4–97.8)
MONOCYTES # BLD AUTO: 0.13 K/UL (ref 0–0.85)
MONOCYTES NFR BLD AUTO: 4 % (ref 0–13.4)
MORPHOLOGY BLD-IMP: NORMAL
NEUTROPHILS # BLD AUTO: 1.95 K/UL (ref 2–7.15)
NEUTROPHILS NFR BLD: 57 % (ref 44–72)
NEUTS BAND NFR BLD MANUAL: 2 % (ref 0–10)
NRBC # BLD AUTO: 0 K/UL
NRBC BLD-RTO: 0 /100 WBC
OVALOCYTES BLD QL SMEAR: NORMAL
PLATELET # BLD AUTO: 107 K/UL (ref 164–446)
PLATELET BLD QL SMEAR: NORMAL
PMV BLD AUTO: 9.8 FL (ref 9–12.9)
POIKILOCYTOSIS BLD QL SMEAR: NORMAL
POTASSIUM SERPL-SCNC: 4.6 MMOL/L (ref 3.6–5.5)
PROT SERPL-MCNC: 6.5 G/DL (ref 6–8.2)
PROTHROMBIN TIME: 14.1 SEC (ref 12–14.6)
RBC # BLD AUTO: 4.19 M/UL (ref 4.2–5.4)
RBC BLD AUTO: PRESENT
SMUDGE CELLS BLD QL SMEAR: NORMAL
SODIUM SERPL-SCNC: 139 MMOL/L (ref 135–145)
WBC # BLD AUTO: 3.3 K/UL (ref 4.8–10.8)

## 2022-07-01 PROCEDURE — 80053 COMPREHEN METABOLIC PANEL: CPT

## 2022-07-01 PROCEDURE — 82105 ALPHA-FETOPROTEIN SERUM: CPT

## 2022-07-01 PROCEDURE — 85025 COMPLETE CBC W/AUTO DIFF WBC: CPT

## 2022-07-01 PROCEDURE — 85007 BL SMEAR W/DIFF WBC COUNT: CPT

## 2022-07-01 PROCEDURE — 36415 COLL VENOUS BLD VENIPUNCTURE: CPT

## 2022-07-01 PROCEDURE — 82140 ASSAY OF AMMONIA: CPT

## 2022-07-01 PROCEDURE — 85610 PROTHROMBIN TIME: CPT

## 2022-07-03 LAB — AFP-TM SERPL-MCNC: 4 NG/ML (ref 0–9)

## 2022-07-05 DIAGNOSIS — E11.8 DIABETES MELLITUS TYPE 2 WITH COMPLICATIONS (HCC): ICD-10-CM

## 2022-07-05 RX ORDER — EMPAGLIFLOZIN 25 MG/1
1 TABLET, FILM COATED ORAL DAILY
Qty: 100 TABLET | Refills: 1 | Status: SHIPPED | OUTPATIENT
Start: 2022-07-05 | End: 2023-01-18 | Stop reason: SDUPTHER

## 2022-07-06 ENCOUNTER — PATIENT OUTREACH (OUTPATIENT)
Dept: HEALTH INFORMATION MANAGEMENT | Facility: OTHER | Age: 77
End: 2022-07-06
Payer: MEDICARE

## 2022-07-06 NOTE — PROGRESS NOTES
7/6/22  CHW received referral from MAGDA Cherry to help with resources for Nathen [employer] classes and weight loss programs. CHW called patient and left a voice message with contact info for CCM.     7/7/22    CCM Community Health Worker Intake    • Social determinates of health intake complete.   • Identified barriers to exercise.  • Contact information provided to Ally Doherty.  • Has PCP appointment scheduled for 8/11/22 at 9:00am with Dr. Randhawa.   • Scheduled CHW Follow-Up: N/A  • Case Management General Assessment in [facility] completed: by MAGDA Cherry  • Review of care plan goals: Reviewed exercise goal plans  • Internal Referral to SW: N/A    CHW completed SDOH screening. CHW spoke with patient about different Nathen Chi classes available in [place]. Patient has access to internet and will look at [employer] gris for different locations and costs. Patient wants to go once a week for about 40 minutes. Patient is in need of refill for Metformin. CHW will update MAGDA Cherry.       Action Plan:  Patient will reach out to MAGDA Cherry with any needs.

## 2022-07-07 ENCOUNTER — PATIENT OUTREACH (OUTPATIENT)
Dept: HEALTH INFORMATION MANAGEMENT | Facility: OTHER | Age: 77
End: 2022-07-07
Payer: MEDICARE

## 2022-07-07 SDOH — HEALTH STABILITY: PHYSICAL HEALTH: ON AVERAGE, HOW MANY DAYS PER WEEK DO YOU ENGAGE IN MODERATE TO STRENUOUS EXERCISE (LIKE A BRISK WALK)?: 1 DAY

## 2022-07-07 SDOH — ECONOMIC STABILITY: FOOD INSECURITY: WITHIN THE PAST 12 MONTHS, THE FOOD YOU BOUGHT JUST DIDN'T LAST AND YOU DIDN'T HAVE MONEY TO GET MORE.: NEVER TRUE

## 2022-07-07 SDOH — ECONOMIC STABILITY: HOUSING INSECURITY
IN THE LAST 12 MONTHS, WAS THERE A TIME WHEN YOU DID NOT HAVE A STEADY PLACE TO SLEEP OR SLEPT IN A SHELTER (INCLUDING NOW)?: NO

## 2022-07-07 SDOH — ECONOMIC STABILITY: TRANSPORTATION INSECURITY
IN THE PAST 12 MONTHS, HAS THE LACK OF TRANSPORTATION KEPT YOU FROM MEDICAL APPOINTMENTS OR FROM GETTING MEDICATIONS?: NO

## 2022-07-07 SDOH — ECONOMIC STABILITY: FOOD INSECURITY: WITHIN THE PAST 12 MONTHS, YOU WORRIED THAT YOUR FOOD WOULD RUN OUT BEFORE YOU GOT MONEY TO BUY MORE.: NEVER TRUE

## 2022-07-07 SDOH — ECONOMIC STABILITY: INCOME INSECURITY: IN THE LAST 12 MONTHS, WAS THERE A TIME WHEN YOU WERE NOT ABLE TO PAY THE MORTGAGE OR RENT ON TIME?: NO

## 2022-07-07 SDOH — HEALTH STABILITY: PHYSICAL HEALTH: ON AVERAGE, HOW MANY MINUTES DO YOU ENGAGE IN EXERCISE AT THIS LEVEL?: 40 MIN

## 2022-07-07 SDOH — ECONOMIC STABILITY: HOUSING INSECURITY: IN THE LAST 12 MONTHS, HOW MANY PLACES HAVE YOU LIVED?: 1

## 2022-07-07 SDOH — ECONOMIC STABILITY: TRANSPORTATION INSECURITY
IN THE PAST 12 MONTHS, HAS LACK OF TRANSPORTATION KEPT YOU FROM MEETINGS, WORK, OR FROM GETTING THINGS NEEDED FOR DAILY LIVING?: NO

## 2022-07-07 ASSESSMENT — SOCIAL DETERMINANTS OF HEALTH (SDOH)
HOW HARD IS IT FOR YOU TO PAY FOR THE VERY BASICS LIKE FOOD, HOUSING, MEDICAL CARE, AND HEATING?: NOT HARD AT ALL
DO YOU BELONG TO ANY CLUBS OR ORGANIZATIONS SUCH AS CHURCH GROUPS UNIONS, FRATERNAL OR ATHLETIC GROUPS, OR SCHOOL GROUPS?: YES
IN A TYPICAL WEEK, HOW MANY TIMES DO YOU TALK ON THE PHONE WITH FAMILY, FRIENDS, OR NEIGHBORS?: MORE THAN THREE TIMES A WEEK
HOW OFTEN DO YOU ATTENT MEETINGS OF THE CLUB OR ORGANIZATION YOU BELONG TO?: MORE THAN 4 TIMES PER YEAR
HOW OFTEN DO YOU ATTEND CHURCH OR RELIGIOUS SERVICES?: MORE THAN 4 TIMES PER YEAR
HOW OFTEN DO YOU GET TOGETHER WITH FRIENDS OR RELATIVES?: MORE THAN THREE TIMES A WEEK

## 2022-07-07 ASSESSMENT — LIFESTYLE VARIABLES
HOW OFTEN DO YOU HAVE A DRINK CONTAINING ALCOHOL: NEVER
SKIP TO QUESTIONS 9-10: 1
HOW OFTEN DO YOU HAVE SIX OR MORE DRINKS ON ONE OCCASION: NEVER
AUDIT-C TOTAL SCORE: 0
HOW MANY STANDARD DRINKS CONTAINING ALCOHOL DO YOU HAVE ON A TYPICAL DAY: PATIENT DOES NOT DRINK

## 2022-07-07 NOTE — PROGRESS NOTES
CHW spoke with PEREZ Mueller who will coordinate with Dr. Randhawa to send Metformin prescription to [facility]. CHW called patient and updated her. CHW informed patient to call [facility] later today or tomorrow morning to confirm.

## 2022-07-12 ENCOUNTER — HOSPITAL ENCOUNTER (OUTPATIENT)
Dept: RADIOLOGY | Facility: MEDICAL CENTER | Age: 77
End: 2022-07-12
Attending: PHYSICIAN ASSISTANT
Payer: MEDICARE

## 2022-07-12 DIAGNOSIS — R94.5 NONSPECIFIC ABNORMAL RESULTS OF LIVER FUNCTION STUDY: ICD-10-CM

## 2022-07-12 DIAGNOSIS — K74.4 SECONDARY BILIARY CIRRHOSIS (HCC): ICD-10-CM

## 2022-07-12 PROCEDURE — 76700 US EXAM ABDOM COMPLETE: CPT

## 2022-07-14 ENCOUNTER — PATIENT OUTREACH (OUTPATIENT)
Dept: HEALTH INFORMATION MANAGEMENT | Facility: OTHER | Age: 77
End: 2022-07-14
Payer: MEDICARE

## 2022-07-14 DIAGNOSIS — E11.9 TYPE 2 DIABETES MELLITUS WITHOUT COMPLICATION, WITHOUT LONG-TERM CURRENT USE OF INSULIN (HCC): ICD-10-CM

## 2022-07-14 NOTE — PROGRESS NOTES
"Assessment:      Patient outreach for PCM follow-up.  Ally is doing well.  After seeing diabetes nurse on 6/30/22, she started a new diabetes medication (stopped Glipizide started Jardiance), has already lost about six pounds.  Not experiencing any adverse effects from the new medication.  The diabetes nurse talked about eating less salt.  She is interested in going to the cira chi class at the [facility], she will follow-up on this.  She is happy about her A1c coming down to 6.6 (6/30/22).  Underwent ultrasound (GI ordered) of liver, stomach, and pancreas two days ago.  GI doctor will discuss US results with her.      Education:    Mailed \"Knowledge is power: Conquering Type 2 Diabetes\" booklet and \"Planning healthy meals\" guide to patient.      Care Plan:    Continue with plan of care    Progress:    Progressing    Next Outreach:    Following OV w/PCP on 8/11/22 @ 0900  "

## 2022-07-15 ENCOUNTER — PATIENT OUTREACH (OUTPATIENT)
Dept: HEALTH INFORMATION MANAGEMENT | Facility: OTHER | Age: 77
End: 2022-07-15
Payer: MEDICARE

## 2022-08-03 DIAGNOSIS — E11.9 TYPE 2 DIABETES MELLITUS WITHOUT COMPLICATION, WITHOUT LONG-TERM CURRENT USE OF INSULIN (HCC): ICD-10-CM

## 2022-08-05 ENCOUNTER — HOSPITAL ENCOUNTER (OUTPATIENT)
Dept: LAB | Facility: MEDICAL CENTER | Age: 77
End: 2022-08-05
Attending: FAMILY MEDICINE
Payer: MEDICARE

## 2022-08-05 LAB
ALBUMIN SERPL BCP-MCNC: 4.5 G/DL (ref 3.2–4.9)
ALBUMIN/GLOB SERPL: 1.6 G/DL
ALP SERPL-CCNC: 87 U/L (ref 30–99)
ALT SERPL-CCNC: 31 U/L (ref 2–50)
ANION GAP SERPL CALC-SCNC: 16 MMOL/L (ref 7–16)
AST SERPL-CCNC: 36 U/L (ref 12–45)
BILIRUB SERPL-MCNC: 0.5 MG/DL (ref 0.1–1.5)
BUN SERPL-MCNC: 22 MG/DL (ref 8–22)
CALCIUM SERPL-MCNC: 9.8 MG/DL (ref 8.5–10.5)
CHLORIDE SERPL-SCNC: 103 MMOL/L (ref 96–112)
CHOLEST SERPL-MCNC: 194 MG/DL (ref 100–199)
CO2 SERPL-SCNC: 19 MMOL/L (ref 20–33)
CREAT SERPL-MCNC: 1 MG/DL (ref 0.5–1.4)
EST. AVERAGE GLUCOSE BLD GHB EST-MCNC: 189 MG/DL
FASTING STATUS PATIENT QL REPORTED: NORMAL
GFR SERPLBLD CREATININE-BSD FMLA CKD-EPI: 59 ML/MIN/1.73 M 2
GLOBULIN SER CALC-MCNC: 2.8 G/DL (ref 1.9–3.5)
GLUCOSE SERPL-MCNC: 181 MG/DL (ref 65–99)
HBA1C MFR BLD: 8.2 % (ref 4–5.6)
HDLC SERPL-MCNC: 46 MG/DL
LDLC SERPL CALC-MCNC: 125 MG/DL
POTASSIUM SERPL-SCNC: 4.5 MMOL/L (ref 3.6–5.5)
PROT SERPL-MCNC: 7.3 G/DL (ref 6–8.2)
SODIUM SERPL-SCNC: 138 MMOL/L (ref 135–145)
TRIGL SERPL-MCNC: 113 MG/DL (ref 0–149)

## 2022-08-05 PROCEDURE — 83036 HEMOGLOBIN GLYCOSYLATED A1C: CPT

## 2022-08-05 PROCEDURE — 80061 LIPID PANEL: CPT

## 2022-08-05 PROCEDURE — 36415 COLL VENOUS BLD VENIPUNCTURE: CPT

## 2022-08-05 PROCEDURE — 80053 COMPREHEN METABOLIC PANEL: CPT

## 2022-08-10 ENCOUNTER — PATIENT OUTREACH (OUTPATIENT)
Dept: HEALTH INFORMATION MANAGEMENT | Facility: OTHER | Age: 77
End: 2022-08-10
Payer: MEDICARE

## 2022-08-10 DIAGNOSIS — I10 PRIMARY HYPERTENSION: ICD-10-CM

## 2022-08-10 DIAGNOSIS — E11.9 TYPE 2 DIABETES MELLITUS WITHOUT COMPLICATION, WITHOUT LONG-TERM CURRENT USE OF INSULIN (HCC): ICD-10-CM

## 2022-08-10 NOTE — PROGRESS NOTES
Called patient & rescheduled our PCM follow-up from 8/11/22 to 8/12/22 between 3:30 & 4p via phone call.

## 2022-08-11 ENCOUNTER — OFFICE VISIT (OUTPATIENT)
Dept: MEDICAL GROUP | Facility: PHYSICIAN GROUP | Age: 77
End: 2022-08-11
Payer: MEDICARE

## 2022-08-11 VITALS
SYSTOLIC BLOOD PRESSURE: 110 MMHG | DIASTOLIC BLOOD PRESSURE: 60 MMHG | OXYGEN SATURATION: 95 % | TEMPERATURE: 97.8 F | BODY MASS INDEX: 29.35 KG/M2 | HEART RATE: 62 BPM | HEIGHT: 65 IN | WEIGHT: 176.15 LBS

## 2022-08-11 DIAGNOSIS — E03.9 HYPOTHYROIDISM, UNSPECIFIED TYPE: ICD-10-CM

## 2022-08-11 DIAGNOSIS — E11.65 UNCONTROLLED TYPE 2 DIABETES MELLITUS WITH HYPERGLYCEMIA (HCC): ICD-10-CM

## 2022-08-11 DIAGNOSIS — D50.0 IRON DEFICIENCY ANEMIA DUE TO CHRONIC BLOOD LOSS: ICD-10-CM

## 2022-08-11 DIAGNOSIS — I10 ESSENTIAL HYPERTENSION: ICD-10-CM

## 2022-08-11 DIAGNOSIS — K92.2 UGI BLEED: ICD-10-CM

## 2022-08-11 DIAGNOSIS — E78.5 DYSLIPIDEMIA: ICD-10-CM

## 2022-08-11 PROCEDURE — 99214 OFFICE O/P EST MOD 30 MIN: CPT | Performed by: FAMILY MEDICINE

## 2022-08-11 RX ORDER — LOSARTAN POTASSIUM 100 MG/1
100 TABLET ORAL DAILY
Qty: 100 TABLET | Refills: 1 | Status: SHIPPED | OUTPATIENT
Start: 2022-08-11 | End: 2022-12-06 | Stop reason: SDUPTHER

## 2022-08-11 RX ORDER — LEVOTHYROXINE SODIUM 0.1 MG/1
100 TABLET ORAL
Qty: 100 TABLET | Refills: 1 | Status: SHIPPED | OUTPATIENT
Start: 2022-08-11 | End: 2023-01-18 | Stop reason: SDUPTHER

## 2022-08-11 ASSESSMENT — FIBROSIS 4 INDEX: FIB4 SCORE: 4.53

## 2022-08-12 ENCOUNTER — PATIENT OUTREACH (OUTPATIENT)
Dept: HEALTH INFORMATION MANAGEMENT | Facility: OTHER | Age: 77
End: 2022-08-12
Payer: MEDICARE

## 2022-08-12 DIAGNOSIS — E11.9 TYPE 2 DIABETES MELLITUS WITHOUT COMPLICATION, WITHOUT LONG-TERM CURRENT USE OF INSULIN (HCC): ICD-10-CM

## 2022-08-12 NOTE — PROGRESS NOTES
Subjective     Ally Doherty is a 77 y.o. female who presents with Diabetes            HPI    Patient is here for follow-up of her medical problems.    I saw her about 1 and half months ago for follow-up after recent hospitalization for melena due to upper GI bleed which on EGD was found to have varices which were banded.  Patient already has history of recurrent anemia for which she gets iron infusion every 6 months through her hematologist.  In the past she was found to have angiectasia of the cecum as well as esophageal varices as a source of bleeding causing the anemia.  Patient could not tolerate iron supplement and so she gets the iron infusion instead as mentioned above.  Patient has been on propranolol 10 mg twice daily to manage the esophageal varices.  Patient was seen for follow-up by GI specialist and she is is going to have an EGD tomorrow.  The most recent H&H was 10.5 and 35.2 a month ago.  She said her next iron infusion is in October.    In terms of diabetes mellitus type 2, patient continues to take metformin 1000 mg twice daily.  Glipizide was switched to Jardiance by our certified diabetes RN about 1-1/2 months ago.  She is tolerating the Jardiance without side effects.  She lost 6 pounds since last visit.    In terms of the dyslipidemia, patient is managing this with her own efforts only.  She could not tolerate statins because of elevated liver enzymes.    She continues to take thyroid replacement for hypothyroidism.    Blood pressure is under control on losartan and recently propranolol was started for esophageal varices which also helps manage the hypertension.    Past medical history, past surgical history, family history reviewed-no changes    Social history reviewed-no changes    Allergies reviewed-no changes    Medications reviewed-no changes          ROS    As per HPI, the rest are negative.           Objective     /60 (BP Location: Left arm, Patient Position: Sitting, BP Cuff  "Size: Adult)   Pulse 62   Temp 36.6 °C (97.8 °F) (Temporal)   Ht 1.651 m (5' 5\")   Wt 79.9 kg (176 lb 2.4 oz)   LMP 02/27/1997   SpO2 95%   BMI 29.31 kg/m²      Physical Exam          Examined alert, awake, oriented, not in distress    Neck-supple, no lymphadenopathy, no thyromegaly  Lungs-clear to auscultation, no rales, no wheezes  Heart-regular rate and rhythm, no murmur  Extremities-no edema, clubbing, cyanosis         Hospital Outpatient Visit on 08/05/2022   Component Date Value Ref Range Status    Sodium 08/05/2022 138  135 - 145 mmol/L Final    Potassium 08/05/2022 4.5  3.6 - 5.5 mmol/L Final    Chloride 08/05/2022 103  96 - 112 mmol/L Final    Co2 08/05/2022 19 (A) 20 - 33 mmol/L Final    Anion Gap 08/05/2022 16.0  7.0 - 16.0 Final    Glucose 08/05/2022 181 (A) 65 - 99 mg/dL Final    Bun 08/05/2022 22  8 - 22 mg/dL Final    Creatinine 08/05/2022 1.00  0.50 - 1.40 mg/dL Final    Calcium 08/05/2022 9.8  8.5 - 10.5 mg/dL Final    AST(SGOT) 08/05/2022 36  12 - 45 U/L Final    ALT(SGPT) 08/05/2022 31  2 - 50 U/L Final    Alkaline Phosphatase 08/05/2022 87  30 - 99 U/L Final    Total Bilirubin 08/05/2022 0.5  0.1 - 1.5 mg/dL Final    Albumin 08/05/2022 4.5  3.2 - 4.9 g/dL Final    Total Protein 08/05/2022 7.3  6.0 - 8.2 g/dL Final    Globulin 08/05/2022 2.8  1.9 - 3.5 g/dL Final    A-G Ratio 08/05/2022 1.6  g/dL Final    Cholesterol,Tot 08/05/2022 194  100 - 199 mg/dL Final    Triglycerides 08/05/2022 113  0 - 149 mg/dL Final    HDL 08/05/2022 46  >=40 mg/dL Final    LDL 08/05/2022 125 (A) <100 mg/dL Final    Glycohemoglobin 08/05/2022 8.2 (A) 4.0 - 5.6 % Final    Comment: Increased risk for diabetes:  5.7 -6.4%  Diabetes:  >6.4%  Glycemic control for adults with diabetes:  <7.0%    The above interpretations are per ADA guidelines.  Diagnosis  of diabetes mellitus on the basis of elevated Hemoglobin A1c  should be confirmed by repeating the Hb A1c test.      Est Avg Glucose 08/05/2022 189  mg/dL Final    " Comment: The eAG calculation is based on the A1c-Derived Daily Glucose  (ADAG) study.  See the ADA's website for additional information.      Fasting Status 08/05/2022 Fasting   Final    GFR (CKD-EPI) 08/05/2022 59 (A) >60 mL/min/1.73 m 2 Final    Comment: Effective 3/15/2022, estimated Glomerular Filtration Rate  is calculated using race neutral CKD-EPI 2021 equation  per NKF-ASN recommendations.           Assessment & Plan        1. Iron deficiency anemia due to chronic blood loss  This is due to bleeding esophageal varices and angiectasia in the cecum.  Patient will have follow-up EGD tomorrow.  Patient will get iron infusion in October.  She gets the infusion twice a year through her hematologist.  - Lipid Profile; Future  - Comp Metabolic Panel; Future  - HEMOGLOBIN A1C; Future  - TSH; Future  - CBC WITH DIFFERENTIAL; Future    2. UGI bleed  This is due to bleeding esophageal varices status post banding 1 and half months ago.  The esophageal varices is due to liver stenosis secondary to Hernandez.  She will go for follow-up endoscopy tomorrow.  She is followed by GI specialist.  - Lipid Profile; Future  - Comp Metabolic Panel; Future  - HEMOGLOBIN A1C; Future  - TSH; Future  - CBC WITH DIFFERENTIAL; Future    3. Uncontrolled type 2 diabetes mellitus with hyperglycemia (HCC)  This is more out of control than before.  She does not want to add another agent at this time and she will wait and hold off until she gets her next follow-up blood work in about 3 to 4 months time on her Jardiance and metformin.  She will work harder on avoidance of sweets, decreasing carbs, regular exercises and weight loss.  - Lipid Profile; Future  - Comp Metabolic Panel; Future  - HEMOGLOBIN A1C; Future  - TSH; Future  - CBC WITH DIFFERENTIAL; Future    4. Dyslipidemia  This is managed with her own efforts.  She cannot take statin because of elevated liver enzymes.  Advised to work hard on avoidance of fatty foods.  - Lipid Profile;  Future  - Comp Metabolic Panel; Future  - HEMOGLOBIN A1C; Future  - TSH; Future  - CBC WITH DIFFERENTIAL; Future    5. Hypothyroidism, unspecified type  Adequately replaced per last TSH in March 2022.  She will do updated TSH next visit.  Continue levothyroxine.  - Lipid Profile; Future  - Comp Metabolic Panel; Future  - HEMOGLOBIN A1C; Future  - TSH; Future  - CBC WITH DIFFERENTIAL; Future  - levothyroxine (SYNTHROID) 100 MCG Tab; Take 1 Tablet by mouth every morning on an empty stomach.  Dispense: 100 Tablet; Refill: 1    6. Essential hypertension  Controlled on her meds.  - Lipid Profile; Future  - Comp Metabolic Panel; Future  - HEMOGLOBIN A1C; Future  - TSH; Future  - CBC WITH DIFFERENTIAL; Future  - losartan (COZAAR) 100 MG Tab; Take 1 Tablet by mouth every day.  Dispense: 100 Tablet; Refill: 1    Made aware I am leaving the practice to relocate to [place].  She will establish care with another PCP.      Please note that this dictation was created using voice recognition software. I have worked with consultants from the vendor as well as technical experts from [facility] to optimize the interface. I have made every reasonable attempt to correct obvious errors, but I expect that there are errors of grammar and possibly content I did not discover before finalizing the note.

## 2022-08-26 ENCOUNTER — TELEPHONE (OUTPATIENT)
Dept: HEALTH INFORMATION MANAGEMENT | Facility: OTHER | Age: 77
End: 2022-08-26
Payer: MEDICARE

## 2022-08-26 ENCOUNTER — DOCUMENTATION (OUTPATIENT)
Dept: HEALTH INFORMATION MANAGEMENT | Facility: OTHER | Age: 77
End: 2022-08-26
Payer: MEDICARE

## 2022-09-06 ENCOUNTER — TELEPHONE (OUTPATIENT)
Dept: MEDICAL GROUP | Facility: LAB | Age: 77
End: 2022-09-06
Payer: MEDICARE

## 2022-09-06 NOTE — TELEPHONE ENCOUNTER
NEW PATIENT VISIT PRE-VISIT PLANNING    1.  [employer] Patient is checked in Patient Demographics?Yes    2.  Immunizations were updated in Epic using Reconcile Outside Information activity? Yes         3.  Is this appointment scheduled as a Hospital Follow-Up? No    4.  Patient is due for the following Health Maintenance Topics:   Health Maintenance Due   Topic Date Due    IMM INFLUENZA (1) 09/01/2022     5.  Reviewed/Updated the following with patient:          Preferred Pharmacy? No          Preferred Lab? No          Preferred Communication? No          Allergies? No          Medications? NO     6.  Updated Care Team?          DME Company (gait device, O2, CPAP, etc.) NO          Other Specialists (eye doctor, derm, GYN, cardiology, endo, etc): NO    7.  AHA (Puls[employer]) form printed for Provider? N/A

## 2022-09-06 NOTE — TELEPHONE ENCOUNTER
Left message for patient to call back regarding pre-visit planning. Please transfer call to [phone].

## 2022-09-09 ENCOUNTER — APPOINTMENT (OUTPATIENT)
Dept: MEDICAL GROUP | Facility: LAB | Age: 77
End: 2022-09-09
Payer: MEDICARE

## 2022-09-12 ENCOUNTER — PATIENT OUTREACH (OUTPATIENT)
Dept: HEALTH INFORMATION MANAGEMENT | Facility: OTHER | Age: 77
End: 2022-09-12

## 2022-09-12 ENCOUNTER — HOSPITAL ENCOUNTER (OUTPATIENT)
Dept: LAB | Facility: MEDICAL CENTER | Age: 77
End: 2022-09-12
Attending: INTERNAL MEDICINE
Payer: MEDICARE

## 2022-09-12 ENCOUNTER — OFFICE VISIT (OUTPATIENT)
Dept: MEDICAL GROUP | Facility: LAB | Age: 77
End: 2022-09-12
Payer: MEDICARE

## 2022-09-12 VITALS
TEMPERATURE: 97.1 F | OXYGEN SATURATION: 97 % | DIASTOLIC BLOOD PRESSURE: 70 MMHG | HEIGHT: 65 IN | BODY MASS INDEX: 28.82 KG/M2 | RESPIRATION RATE: 12 BRPM | HEART RATE: 64 BPM | WEIGHT: 173 LBS | SYSTOLIC BLOOD PRESSURE: 120 MMHG

## 2022-09-12 DIAGNOSIS — I10 PRIMARY HYPERTENSION: ICD-10-CM

## 2022-09-12 DIAGNOSIS — E11.9 TYPE 2 DIABETES MELLITUS WITHOUT COMPLICATION, WITHOUT LONG-TERM CURRENT USE OF INSULIN (HCC): ICD-10-CM

## 2022-09-12 DIAGNOSIS — E03.9 HYPOTHYROIDISM, UNSPECIFIED TYPE: ICD-10-CM

## 2022-09-12 DIAGNOSIS — E11.65 UNCONTROLLED TYPE 2 DIABETES MELLITUS WITH HYPERGLYCEMIA (HCC): ICD-10-CM

## 2022-09-12 DIAGNOSIS — I85.11 SECONDARY ESOPHAGEAL VARICES WITH BLEEDING (HCC): ICD-10-CM

## 2022-09-12 LAB
ALBUMIN SERPL BCP-MCNC: 4.1 G/DL (ref 3.2–4.9)
ALBUMIN/GLOB SERPL: 1.5 G/DL
ALP SERPL-CCNC: 92 U/L (ref 30–99)
ALT SERPL-CCNC: 34 U/L (ref 2–50)
ANION GAP SERPL CALC-SCNC: 20 MMOL/L (ref 7–16)
ANISOCYTOSIS BLD QL SMEAR: ABNORMAL
AST SERPL-CCNC: 44 U/L (ref 12–45)
BASOPHILS # BLD AUTO: 1.3 % (ref 0–1.8)
BASOPHILS # BLD: 0.05 K/UL (ref 0–0.12)
BILIRUB SERPL-MCNC: 0.4 MG/DL (ref 0.1–1.5)
BUN SERPL-MCNC: 28 MG/DL (ref 8–22)
CALCIUM SERPL-MCNC: 10.2 MG/DL (ref 8.5–10.5)
CHLORIDE SERPL-SCNC: 105 MMOL/L (ref 96–112)
CO2 SERPL-SCNC: 15 MMOL/L (ref 20–33)
COMMENT 1642: NORMAL
CREAT SERPL-MCNC: 1.07 MG/DL (ref 0.5–1.4)
EOSINOPHIL # BLD AUTO: 0.17 K/UL (ref 0–0.51)
EOSINOPHIL NFR BLD: 4.5 % (ref 0–6.9)
ERYTHROCYTE [DISTWIDTH] IN BLOOD BY AUTOMATED COUNT: 53.5 FL (ref 35.9–50)
FERRITIN SERPL-MCNC: 21 NG/ML (ref 10–291)
GFR SERPLBLD CREATININE-BSD FMLA CKD-EPI: 54 ML/MIN/1.73 M 2
GLOBULIN SER CALC-MCNC: 2.8 G/DL (ref 1.9–3.5)
GLUCOSE SERPL-MCNC: 228 MG/DL (ref 65–99)
HCT VFR BLD AUTO: 41.4 % (ref 37–47)
HGB BLD-MCNC: 12.3 G/DL (ref 12–16)
HYPOCHROMIA BLD QL SMEAR: ABNORMAL
IMM GRANULOCYTES # BLD AUTO: 0.01 K/UL (ref 0–0.11)
IMM GRANULOCYTES NFR BLD AUTO: 0.3 % (ref 0–0.9)
IRON SATN MFR SERPL: 9 % (ref 15–55)
IRON SERPL-MCNC: 38 UG/DL (ref 40–170)
LYMPHOCYTES # BLD AUTO: 0.89 K/UL (ref 1–4.8)
LYMPHOCYTES NFR BLD: 23.8 % (ref 22–41)
MACROCYTES BLD QL SMEAR: ABNORMAL
MCH RBC QN AUTO: 24.6 PG (ref 27–33)
MCHC RBC AUTO-ENTMCNC: 29.7 G/DL (ref 33.6–35)
MCV RBC AUTO: 82.8 FL (ref 81.4–97.8)
MONOCYTES # BLD AUTO: 0.43 K/UL (ref 0–0.85)
MONOCYTES NFR BLD AUTO: 11.5 % (ref 0–13.4)
MORPHOLOGY BLD-IMP: NORMAL
NEUTROPHILS # BLD AUTO: 2.19 K/UL (ref 2–7.15)
NEUTROPHILS NFR BLD: 58.6 % (ref 44–72)
NRBC # BLD AUTO: 0 K/UL
NRBC BLD-RTO: 0 /100 WBC
PLATELET # BLD AUTO: 126 K/UL (ref 164–446)
PLATELET BLD QL SMEAR: NORMAL
PMV BLD AUTO: 10.3 FL (ref 9–12.9)
POTASSIUM SERPL-SCNC: 4.4 MMOL/L (ref 3.6–5.5)
PROT SERPL-MCNC: 6.9 G/DL (ref 6–8.2)
RBC # BLD AUTO: 5 M/UL (ref 4.2–5.4)
RBC BLD AUTO: PRESENT
SODIUM SERPL-SCNC: 140 MMOL/L (ref 135–145)
TIBC SERPL-MCNC: 426 UG/DL (ref 250–450)
UIBC SERPL-MCNC: 388 UG/DL (ref 110–370)
WBC # BLD AUTO: 3.7 K/UL (ref 4.8–10.8)

## 2022-09-12 PROCEDURE — 85025 COMPLETE CBC W/AUTO DIFF WBC: CPT

## 2022-09-12 PROCEDURE — 83550 IRON BINDING TEST: CPT

## 2022-09-12 PROCEDURE — 99214 OFFICE O/P EST MOD 30 MIN: CPT | Performed by: FAMILY MEDICINE

## 2022-09-12 PROCEDURE — 82728 ASSAY OF FERRITIN: CPT

## 2022-09-12 PROCEDURE — 36415 COLL VENOUS BLD VENIPUNCTURE: CPT

## 2022-09-12 PROCEDURE — 80053 COMPREHEN METABOLIC PANEL: CPT

## 2022-09-12 PROCEDURE — 83540 ASSAY OF IRON: CPT

## 2022-09-12 SDOH — HEALTH STABILITY: MENTAL HEALTH
STRESS IS WHEN SOMEONE FEELS TENSE, NERVOUS, ANXIOUS, OR CAN'T SLEEP AT NIGHT BECAUSE THEIR MIND IS TROUBLED. HOW STRESSED ARE YOU?: NOT AT ALL

## 2022-09-12 SDOH — ECONOMIC STABILITY: FOOD INSECURITY: WITHIN THE PAST 12 MONTHS, YOU WORRIED THAT YOUR FOOD WOULD RUN OUT BEFORE YOU GOT MONEY TO BUY MORE.: NEVER TRUE

## 2022-09-12 SDOH — ECONOMIC STABILITY: HOUSING INSECURITY: IN THE LAST 12 MONTHS, HOW MANY PLACES HAVE YOU LIVED?: 1

## 2022-09-12 SDOH — HEALTH STABILITY: PHYSICAL HEALTH: ON AVERAGE, HOW MANY MINUTES DO YOU ENGAGE IN EXERCISE AT THIS LEVEL?: 30 MIN

## 2022-09-12 SDOH — ECONOMIC STABILITY: FOOD INSECURITY: WITHIN THE PAST 12 MONTHS, THE FOOD YOU BOUGHT JUST DIDN'T LAST AND YOU DIDN'T HAVE MONEY TO GET MORE.: NEVER TRUE

## 2022-09-12 SDOH — ECONOMIC STABILITY: TRANSPORTATION INSECURITY
IN THE PAST 12 MONTHS, HAS LACK OF RELIABLE TRANSPORTATION KEPT YOU FROM MEDICAL APPOINTMENTS, MEETINGS, WORK OR FROM GETTING THINGS NEEDED FOR DAILY LIVING?: NO

## 2022-09-12 SDOH — HEALTH STABILITY: PHYSICAL HEALTH: ON AVERAGE, HOW MANY DAYS PER WEEK DO YOU ENGAGE IN MODERATE TO STRENUOUS EXERCISE (LIKE A BRISK WALK)?: 4 DAYS

## 2022-09-12 SDOH — ECONOMIC STABILITY: INCOME INSECURITY: IN THE LAST 12 MONTHS, WAS THERE A TIME WHEN YOU WERE NOT ABLE TO PAY THE MORTGAGE OR RENT ON TIME?: NO

## 2022-09-12 SDOH — ECONOMIC STABILITY: INCOME INSECURITY: HOW HARD IS IT FOR YOU TO PAY FOR THE VERY BASICS LIKE FOOD, HOUSING, MEDICAL CARE, AND HEATING?: NOT HARD AT ALL

## 2022-09-12 ASSESSMENT — SOCIAL DETERMINANTS OF HEALTH (SDOH)
IN A TYPICAL WEEK, HOW MANY TIMES DO YOU TALK ON THE PHONE WITH FAMILY, FRIENDS, OR NEIGHBORS?: MORE THAN THREE TIMES A WEEK
DO YOU BELONG TO ANY CLUBS OR ORGANIZATIONS SUCH AS CHURCH GROUPS UNIONS, FRATERNAL OR ATHLETIC GROUPS, OR SCHOOL GROUPS?: YES
HOW OFTEN DO YOU ATTEND CHURCH OR RELIGIOUS SERVICES?: MORE THAN 4 TIMES PER YEAR
HOW OFTEN DO YOU ATTEND CHURCH OR RELIGIOUS SERVICES?: MORE THAN 4 TIMES PER YEAR
HOW OFTEN DO YOU HAVE SIX OR MORE DRINKS ON ONE OCCASION: NEVER
IN A TYPICAL WEEK, HOW MANY TIMES DO YOU TALK ON THE PHONE WITH FAMILY, FRIENDS, OR NEIGHBORS?: MORE THAN THREE TIMES A WEEK
HOW OFTEN DO YOU ATTENT MEETINGS OF THE CLUB OR ORGANIZATION YOU BELONG TO?: MORE THAN 4 TIMES PER YEAR
WITHIN THE PAST 12 MONTHS, YOU WORRIED THAT YOUR FOOD WOULD RUN OUT BEFORE YOU GOT THE MONEY TO BUY MORE: NEVER TRUE
DO YOU BELONG TO ANY CLUBS OR ORGANIZATIONS SUCH AS CHURCH GROUPS UNIONS, FRATERNAL OR ATHLETIC GROUPS, OR SCHOOL GROUPS?: YES
HOW OFTEN DO YOU GET TOGETHER WITH FRIENDS OR RELATIVES?: MORE THAN THREE TIMES A WEEK
HOW OFTEN DO YOU HAVE A DRINK CONTAINING ALCOHOL: NEVER
HOW HARD IS IT FOR YOU TO PAY FOR THE VERY BASICS LIKE FOOD, HOUSING, MEDICAL CARE, AND HEATING?: NOT HARD AT ALL
HOW MANY DRINKS CONTAINING ALCOHOL DO YOU HAVE ON A TYPICAL DAY WHEN YOU ARE DRINKING: PATIENT DOES NOT DRINK
HOW OFTEN DO YOU ATTENT MEETINGS OF THE CLUB OR ORGANIZATION YOU BELONG TO?: MORE THAN 4 TIMES PER YEAR
HOW OFTEN DO YOU GET TOGETHER WITH FRIENDS OR RELATIVES?: MORE THAN THREE TIMES A WEEK

## 2022-09-12 ASSESSMENT — LIFESTYLE VARIABLES
HOW MANY STANDARD DRINKS CONTAINING ALCOHOL DO YOU HAVE ON A TYPICAL DAY: PATIENT DOES NOT DRINK
AUDIT-C TOTAL SCORE: 0
SKIP TO QUESTIONS 9-10: 1
HOW OFTEN DO YOU HAVE A DRINK CONTAINING ALCOHOL: NEVER
HOW OFTEN DO YOU HAVE SIX OR MORE DRINKS ON ONE OCCASION: NEVER

## 2022-09-12 ASSESSMENT — FIBROSIS 4 INDEX: FIB4 SCORE: 4.53

## 2022-09-12 NOTE — PROGRESS NOTES
Subjective:     Chief Complaint   Patient presents with    New Patient         HPI:   Ally presents today to establish care. Previously seen by Dr Randhawa at [facility] .     Did have her blood sugars jump up recently, A1C 8.2 without much change in diet. Did have 2 episodes of esophageal varices and banding done from then.     Sees hematology for iron infusions as well. Recently labs done.     History of breast cancer as well.  Regularly following with hematology and oncology.      Current Outpatient Medications Ordered in Epic   Medication Sig Dispense Refill    losartan (COZAAR) 100 MG Tab Take 1 Tablet by mouth every day. 100 Tablet 1    levothyroxine (SYNTHROID) 100 MCG Tab Take 1 Tablet by mouth every morning on an empty stomach. 100 Tablet 1    metformin (GLUCOPHAGE) 1000 MG tablet Take 1 Tablet by mouth 2 times a day with meals. 200 Tablet 1    Empagliflozin (JARDIANCE) 25 MG Tab Take 1 Tablet by mouth every day. 100 Tablet 1    fluconazole (DIFLUCAN) 100 MG Tab Take 1 tab daily prn vaginal yeast infection 3 Tablet 1    propranolol (INDERAL) 10 MG Tab Take 1 Tablet by mouth every 8 hours. 90 Tablet 2    calcium carbonate (TUMS SMOOTHIES) 750 MG chewable tablet Chew 750 mg 3 times a day as needed (Indigestion).      therapeutic multivitamin-minerals (THERAGRAN-M) Tab Take 1 Tab by mouth every day.      Calcium Carbonate-Vitamin D (CALTRATE 600+D PO) Take 1 Tablet by mouth every day.       No current [facility]-ordered facility-administered medications on file.         ROS:  Gen: no fevers/chills, no changes in weight  Eyes: no changes in vision  ENT: no sore throat, no hearing loss, no bloody nose  Pulm: no sob, no cough  CV: no chest pain, no palpitations  GI: no nausea/vomiting, no diarrhea  : no dysuria  MSk: no myalgias  Skin: no rash  Neuro: no headaches, no numbness/tingling  Heme/Lymph: no easy bruising      Objective:     Exam:  /70   Pulse 64   Temp 36.2 °C (97.1 °F)   Resp 12   Ht 1.651  "m (5' 5\")   Wt 78.5 kg (173 lb)   LMP 02/27/1997   SpO2 97%   BMI 28.79 kg/m²  Body mass index is 28.79 kg/m².    Gen: Alert and oriented, No apparent distress.  Neck: Neck is supple without lymphadenopathy.  Lungs: Normal effort, CTA bilaterally, no wheezes, rhonchi, or rales  CV: Regular rate and rhythm. No murmurs, rubs, or gallops.  Ext: No clubbing, cyanosis, edema.      Assessment & Plan:     77 y.o. female with the following -     1. Uncontrolled type 2 diabetes mellitus with hyperglycemia (HCC)  Discussed recent increase in her A1c without seemingly a lot of changes on her part.  We did discuss her recent procedures with esophageal varices banding which likely happened with infusion of some dextrose during procedure so this could be the source of her increased sugar.  She does have a visit upcoming with the diabetes educator and is compliant with her medications as is.  No refills needed.  She is compliant with her Jardiance and her metformin.    2. Hypothyroidism, unspecified type  Labs reviewed.  Dosing appropriate.  Continue as is.    3. Primary hypertension  Chronic, stable.  Continue current medications.    4. Secondary esophageal varices with bleeding (HCC)  Chronic, stable at this point.  Follows with GI and hematology.  Does get iron infusions as well.          No follow-ups on file.    Please note that this dictation was created using voice recognition software. I have made every reasonable attempt to correct obvious errors, but I expect that there are errors of grammar and possibly content that I did not discover before finalizing the note.        "

## 2022-09-13 ENCOUNTER — PATIENT MESSAGE (OUTPATIENT)
Dept: HEALTH INFORMATION MANAGEMENT | Facility: OTHER | Age: 77
End: 2022-09-13
Payer: MEDICARE

## 2022-09-27 ENCOUNTER — NON-PROVIDER VISIT (OUTPATIENT)
Dept: INTERNAL MEDICINE | Facility: OTHER | Age: 77
End: 2022-09-27
Payer: MEDICARE

## 2022-09-27 VITALS — HEIGHT: 65 IN | BODY MASS INDEX: 28.99 KG/M2 | WEIGHT: 174 LBS

## 2022-09-27 DIAGNOSIS — Z85.3 HISTORY OF BREAST CANCER: ICD-10-CM

## 2022-09-27 DIAGNOSIS — E11.9 TYPE 2 DIABETES MELLITUS WITHOUT COMPLICATION, WITHOUT LONG-TERM CURRENT USE OF INSULIN (HCC): ICD-10-CM

## 2022-09-27 DIAGNOSIS — Z86.39 HISTORY OF VITAMIN D DEFICIENCY: ICD-10-CM

## 2022-09-27 DIAGNOSIS — K74.60 CIRRHOSIS OF LIVER WITH ASCITES, UNSPECIFIED HEPATIC CIRRHOSIS TYPE (HCC): ICD-10-CM

## 2022-09-27 DIAGNOSIS — K75.81 NASH (NONALCOHOLIC STEATOHEPATITIS): ICD-10-CM

## 2022-09-27 DIAGNOSIS — D50.0 IRON DEFICIENCY ANEMIA DUE TO CHRONIC BLOOD LOSS: ICD-10-CM

## 2022-09-27 DIAGNOSIS — R18.8 CIRRHOSIS OF LIVER WITH ASCITES, UNSPECIFIED HEPATIC CIRRHOSIS TYPE (HCC): ICD-10-CM

## 2022-09-27 PROCEDURE — 97802 MEDICAL NUTRITION INDIV IN: CPT | Performed by: DIETITIAN, REGISTERED

## 2022-09-27 ASSESSMENT — FIBROSIS 4 INDEX: FIB4 SCORE: 4.49

## 2022-09-27 NOTE — PROGRESS NOTES
"Ally Doherty is a 77 y.o. 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    ROS: Breast CA w/chemo/RT w/subsequent cirrhosis of liver and esophageal varices requiring infusions of iron every 6 months, fatty liver  Hypothyroid, well managed HTN    Diabetes meds: metformin 2000 mg, Jardiance begun in July - has had weight loss  No statin d/t elevated liver enzymes    HABW: 230#  Preferred BW: 145#    Wellness Vision:  I want to learn how to eat better, doesn't like vegetables, lose weight around waist so that I can be the best I can be.    My Health Profile:    PHYSICAL ASSESSMENT  Current Height:  65\"  Ht Readings from Last 1 Encounters:   09/12/22 1.651 m (5' 5\")     Current Weight:  174#  Wt Readings from Last 1 Encounters:   09/12/22 78.5 kg (173 lb)     BMI:  body mass index is unknown because there is no height or weight on file. (Range:18.5 - 24.9)    FLUID INTAKE:  water, iced tea  Typical Beverages: 80 oz water and lots of tea w/caffeine- 80 oz.  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     Patient Behaviors (indicate frequency)  Meal/Snack Pattern:   Eats twice a day, 1000, 1400-snack; 1700- Dinner    B: raisen bran+milk  S: cheese  D: seafood- shrimp, shellfish, trout, or spaghetti, pork chops, not much steak  HS: used to have popcorn, no longer d/t weight loss    VF: doesn't like green vegetables except peas; likes corn, potatoes; ok w/salads, cabbage, raw cauliflower and others mostly raw  4-servings fruit/day  Grains: white rice more than brown rice, rare quinoa    Does not feel hungry; " eat to live  Portions small, fills up quickly    Dines away from Home: 2 x/week  Locations:  [facility] in [place]  Who lives in home: , self    Additional Patient Behavior Information: shops together,  cooking    PES: Intentional weight loss, post breast cancer, recent uncontrolled T2DM r/t recent procedure, secondary cirrhosis w/ascites as evidenced by A1c 8.2%    NUTRITION COMMENTS:  Ally has managed her health with dx of T2DM and breast cancer recovery, hard to lift right arm but not able with post masectomy    Rarely test BG- does have a glucometer but no test strips  Increased A1c may be outlier with endoscopic procedure    GI follow up for cirrhosis/ascites regularly    Ally has become more aware of what she is eating, portions less, being more mindful and making best choices without going crazy. More natural self-management.    Elevated A1c may be outlier, Ally is focusing on abdominal weight loss- discussion on ascites and watching sodium intake and fluids. Advised to decrease water/iced tea with 160+ oz taken in.    Reinforced enjoyment of food, greater variety and DSM class per [facility] that may help reinforce her current practices for overall health.    RTC x one month    Time Spent: 60 minutes

## 2022-09-27 NOTE — PATIENT INSTRUCTIONS
I will look at list of foods and increase my options in my eating plan  Add in variety for enjoyment- practice live to eat once in awhile  Visit [employer] ([email or web address]) for another exhaustive list  Aim for 30-45 grams carbs/meal; Snacks: 15-30 grams  Check for next DSM class per [facility]  Get back to Nathen Chi

## 2022-10-11 ENCOUNTER — TELEPHONE (OUTPATIENT)
Dept: MEDICAL GROUP | Facility: LAB | Age: 77
End: 2022-10-11
Payer: MEDICARE

## 2022-10-11 NOTE — TELEPHONE ENCOUNTER
ESTABLISHED PATIENT PRE-VISIT PLANNING     Patient was NOT contacted to complete PVP.     Note: Patient will not be contacted if there is no indication to call.     1.  Reviewed notes from the last few office visits within the medical group: Yes    2.  If any orders were placed at last visit or intended to be done for this visit (i.e. 6 mos follow-up), do we have Results/Consult Notes?           Labs - Labs ordered, completed on 9/12/22 and results are in chart.  Note: If patient appointment is for lab review and patient did not complete labs, check with provider if OK to reschedule patient until labs completed.         Imaging - Imaging was not ordered at last office visit.         Referrals - No referrals were ordered at last office visit.    3. Is this appointment scheduled as a Hospital Follow-Up? No    4.  Immunizations were updated in Epic using Reconcile Outside Information activity? Yes    5.  Patient is due for the following Health Maintenance Topics:   Health Maintenance Due   Topic Date Due    IMM DTaP/Tdap/Td Vaccine (2 - Td or Tdap) 10/04/2022   6.  AHA (Pulse8) form printed for Provider? N/A

## 2022-10-14 ENCOUNTER — PATIENT OUTREACH (OUTPATIENT)
Dept: HEALTH INFORMATION MANAGEMENT | Facility: OTHER | Age: 77
End: 2022-10-14
Payer: MEDICARE

## 2022-10-14 DIAGNOSIS — E11.9 TYPE 2 DIABETES MELLITUS WITHOUT COMPLICATION, WITHOUT LONG-TERM CURRENT USE OF INSULIN (HCC): ICD-10-CM

## 2022-10-14 DIAGNOSIS — I10 PRIMARY HYPERTENSION: ICD-10-CM

## 2022-10-14 DIAGNOSIS — E78.5 DYSLIPIDEMIA: ICD-10-CM

## 2022-10-14 PROCEDURE — 99490 CHRNC CARE MGMT STAFF 1ST 20: CPT | Performed by: FAMILY MEDICINE

## 2022-10-26 NOTE — PROGRESS NOTES
Assessment: Spoke to patient for monthly outreach. Pt reports she is doing well. Has had a total of 10lbs of weight loss. No changes to medications. Feels good on the jardiance. Assisted to schedule follow up with pcp. No other needs at this time.        Education: Medication management. Weight loss          Care Plan: continue current plan of care          Progress: progressing          Next Outreach: 1 month

## 2022-10-29 ENCOUNTER — OUTPATIENT INFUSION SERVICES (OUTPATIENT)
Dept: ONCOLOGY | Facility: MEDICAL CENTER | Age: 77
End: 2022-10-29
Attending: INTERNAL MEDICINE
Payer: MEDICARE

## 2022-10-29 VITALS
TEMPERATURE: 96.7 F | OXYGEN SATURATION: 96 % | WEIGHT: 177.69 LBS | HEIGHT: 66 IN | SYSTOLIC BLOOD PRESSURE: 144 MMHG | HEART RATE: 72 BPM | BODY MASS INDEX: 28.56 KG/M2 | RESPIRATION RATE: 18 BRPM | DIASTOLIC BLOOD PRESSURE: 70 MMHG

## 2022-10-29 DIAGNOSIS — D50.8 OTHER IRON DEFICIENCY ANEMIA: ICD-10-CM

## 2022-10-29 PROCEDURE — 700111 HCHG RX REV CODE 636 W/ 250 OVERRIDE (IP): Performed by: INTERNAL MEDICINE

## 2022-10-29 PROCEDURE — 700105 HCHG RX REV CODE 258: Performed by: INTERNAL MEDICINE

## 2022-10-29 RX ORDER — METHYLPREDNISOLONE SODIUM SUCCINATE 125 MG/2ML
125 INJECTION, POWDER, LYOPHILIZED, FOR SOLUTION INTRAMUSCULAR; INTRAVENOUS PRN
OUTPATIENT
Start: 2023-04-24

## 2022-10-29 RX ORDER — EPINEPHRINE 1 MG/ML(1)
0.5 AMPUL (ML) INJECTION PRN
OUTPATIENT
Start: 2023-04-24

## 2022-10-29 RX ORDER — DIPHENHYDRAMINE HYDROCHLORIDE 50 MG/ML
50 INJECTION INTRAMUSCULAR; INTRAVENOUS PRN
OUTPATIENT
Start: 2023-04-24

## 2022-10-29 RX ORDER — SODIUM CHLORIDE 9 MG/ML
INJECTION, SOLUTION INTRAVENOUS CONTINUOUS
OUTPATIENT
Start: 2023-04-24

## 2022-10-29 RX ADMIN — SODIUM CHLORIDE 1000 MG: 9 INJECTION, SOLUTION INTRAVENOUS at 14:13

## 2022-10-29 ASSESSMENT — FIBROSIS 4 INDEX: FIB4 SCORE: 4.49

## 2022-10-29 NOTE — PROGRESS NOTES
Patient to [facility] for Iron Dextran infusion. PIV inserted into left wrist, flushed with + blood return. Iron Dextran infused at 75ml/hr for 5 min, paused for 10min. No s/s of infusion reaction. Restarted at 333ml/hr for remainder of infusion. NO s/s of infusion reaction. PIV flushed with + blood return and removed. Gauze and coban applied as a dressing. Patient to home in care of self.

## 2022-11-15 ENCOUNTER — DOCUMENTATION (OUTPATIENT)
Dept: HEALTH INFORMATION MANAGEMENT | Facility: OTHER | Age: 77
End: 2022-11-15
Payer: MEDICARE

## 2022-11-28 ENCOUNTER — HOSPITAL ENCOUNTER (OUTPATIENT)
Dept: LAB | Facility: MEDICAL CENTER | Age: 77
End: 2022-11-28
Attending: FAMILY MEDICINE
Payer: MEDICARE

## 2022-11-28 DIAGNOSIS — I10 ESSENTIAL HYPERTENSION: ICD-10-CM

## 2022-11-28 DIAGNOSIS — E11.65 UNCONTROLLED TYPE 2 DIABETES MELLITUS WITH HYPERGLYCEMIA (HCC): ICD-10-CM

## 2022-11-28 DIAGNOSIS — D50.0 IRON DEFICIENCY ANEMIA DUE TO CHRONIC BLOOD LOSS: ICD-10-CM

## 2022-11-28 DIAGNOSIS — K92.2 UGI BLEED: ICD-10-CM

## 2022-11-28 DIAGNOSIS — E03.9 HYPOTHYROIDISM, UNSPECIFIED TYPE: ICD-10-CM

## 2022-11-28 DIAGNOSIS — E78.5 DYSLIPIDEMIA: ICD-10-CM

## 2022-11-28 LAB
ALBUMIN SERPL BCP-MCNC: 4.3 G/DL (ref 3.2–4.9)
ALBUMIN/GLOB SERPL: 1.4 G/DL
ALP SERPL-CCNC: 91 U/L (ref 30–99)
ALT SERPL-CCNC: 40 U/L (ref 2–50)
ANION GAP SERPL CALC-SCNC: 11 MMOL/L (ref 7–16)
ANISOCYTOSIS BLD QL SMEAR: ABNORMAL
AST SERPL-CCNC: 40 U/L (ref 12–45)
BASOPHILS # BLD AUTO: 2 % (ref 0–1.8)
BASOPHILS # BLD: 0.09 K/UL (ref 0–0.12)
BILIRUB SERPL-MCNC: 0.6 MG/DL (ref 0.1–1.5)
BUN SERPL-MCNC: 21 MG/DL (ref 8–22)
BURR CELLS BLD QL SMEAR: NORMAL
CALCIUM SERPL-MCNC: 9.6 MG/DL (ref 8.5–10.5)
CHLORIDE SERPL-SCNC: 105 MMOL/L (ref 96–112)
CHOLEST SERPL-MCNC: 208 MG/DL (ref 100–199)
CO2 SERPL-SCNC: 22 MMOL/L (ref 20–33)
COMMENT 1642: NORMAL
CREAT SERPL-MCNC: 0.9 MG/DL (ref 0.5–1.4)
DACRYOCYTES BLD QL SMEAR: NORMAL
EOSINOPHIL # BLD AUTO: 0.29 K/UL (ref 0–0.51)
EOSINOPHIL NFR BLD: 6.5 % (ref 0–6.9)
ERYTHROCYTE [DISTWIDTH] IN BLOOD BY AUTOMATED COUNT: 68.4 FL (ref 35.9–50)
EST. AVERAGE GLUCOSE BLD GHB EST-MCNC: 169 MG/DL
FASTING STATUS PATIENT QL REPORTED: NORMAL
GFR SERPLBLD CREATININE-BSD FMLA CKD-EPI: 66 ML/MIN/1.73 M 2
GLOBULIN SER CALC-MCNC: 3 G/DL (ref 1.9–3.5)
GLUCOSE SERPL-MCNC: 204 MG/DL (ref 65–99)
HBA1C MFR BLD: 7.5 % (ref 4–5.6)
HCT VFR BLD AUTO: 46.6 % (ref 37–47)
HDLC SERPL-MCNC: 72 MG/DL
HGB BLD-MCNC: 14.8 G/DL (ref 12–16)
IMM GRANULOCYTES # BLD AUTO: 0.01 K/UL (ref 0–0.11)
IMM GRANULOCYTES NFR BLD AUTO: 0.2 % (ref 0–0.9)
LDLC SERPL CALC-MCNC: 113 MG/DL
LYMPHOCYTES # BLD AUTO: 0.98 K/UL (ref 1–4.8)
LYMPHOCYTES NFR BLD: 21.9 % (ref 22–41)
MCH RBC QN AUTO: 27.3 PG (ref 27–33)
MCHC RBC AUTO-ENTMCNC: 31.8 G/DL (ref 33.6–35)
MCV RBC AUTO: 86 FL (ref 81.4–97.8)
MICROCYTES BLD QL SMEAR: ABNORMAL
MONOCYTES # BLD AUTO: 0.39 K/UL (ref 0–0.85)
MONOCYTES NFR BLD AUTO: 8.7 % (ref 0–13.4)
MORPHOLOGY BLD-IMP: NORMAL
NEUTROPHILS # BLD AUTO: 2.71 K/UL (ref 2–7.15)
NEUTROPHILS NFR BLD: 60.7 % (ref 44–72)
NRBC # BLD AUTO: 0 K/UL
NRBC BLD-RTO: 0 /100 WBC
PLATELET # BLD AUTO: 90 K/UL (ref 164–446)
PLATELET BLD QL SMEAR: NORMAL
PMV BLD AUTO: 9.1 FL (ref 9–12.9)
POIKILOCYTOSIS BLD QL SMEAR: NORMAL
POTASSIUM SERPL-SCNC: 4.2 MMOL/L (ref 3.6–5.5)
PROT SERPL-MCNC: 7.3 G/DL (ref 6–8.2)
RBC # BLD AUTO: 5.42 M/UL (ref 4.2–5.4)
RBC BLD AUTO: PRESENT
SODIUM SERPL-SCNC: 138 MMOL/L (ref 135–145)
TRIGL SERPL-MCNC: 115 MG/DL (ref 0–149)
TSH SERPL DL<=0.005 MIU/L-ACNC: 0.87 UIU/ML (ref 0.38–5.33)
WBC # BLD AUTO: 4.5 K/UL (ref 4.8–10.8)

## 2022-11-28 PROCEDURE — 84443 ASSAY THYROID STIM HORMONE: CPT

## 2022-11-28 PROCEDURE — 85025 COMPLETE CBC W/AUTO DIFF WBC: CPT

## 2022-11-28 PROCEDURE — 80053 COMPREHEN METABOLIC PANEL: CPT

## 2022-11-28 PROCEDURE — 83036 HEMOGLOBIN GLYCOSYLATED A1C: CPT

## 2022-11-28 PROCEDURE — 80061 LIPID PANEL: CPT

## 2022-11-28 PROCEDURE — 36415 COLL VENOUS BLD VENIPUNCTURE: CPT

## 2022-11-29 ENCOUNTER — TELEPHONE (OUTPATIENT)
Dept: MEDICAL GROUP | Facility: LAB | Age: 77
End: 2022-11-29
Payer: MEDICARE

## 2022-11-29 NOTE — TELEPHONE ENCOUNTER
ESTABLISHED PATIENT PRE-VISIT PLANNING     Patient was NOT contacted to complete PVP.     Note: Patient will not be contacted if there is no indication to call.     1.  Reviewed notes from the last few office visits within the medical group: Yes    2.  If any orders were placed at last visit or intended to be done for this visit (i.e. 6 mos follow-up), do we have Results/Consult Notes?           Labs - Labs were not ordered at last office visit.  Note: If patient appointment is for lab review and patient did not complete labs, check with provider if OK to reschedule patient until labs completed.         Imaging - Imaging was not ordered at last office visit.         Referrals - No referrals were ordered at last office visit.    3. Is this appointment scheduled as a Hospital Follow-Up? No    4.  Immunizations were updated in Epic using Reconcile Outside Information activity? Yes    5.  Patient is due for the following Health Maintenance Topics:   Health Maintenance Due   Topic Date Due    IMM DTaP/Tdap/Td Vaccine (2 - Td or Tdap) 10/04/2022   6.  AHA (Pulse8) form printed for Provider? N/A    4

## 2022-11-30 ENCOUNTER — PATIENT OUTREACH (OUTPATIENT)
Dept: HEALTH INFORMATION MANAGEMENT | Facility: OTHER | Age: 77
End: 2022-11-30

## 2022-11-30 ENCOUNTER — NON-PROVIDER VISIT (OUTPATIENT)
Dept: INTERNAL MEDICINE | Facility: OTHER | Age: 77
End: 2022-11-30
Payer: MEDICARE

## 2022-11-30 DIAGNOSIS — I10 PRIMARY HYPERTENSION: ICD-10-CM

## 2022-11-30 DIAGNOSIS — Z86.39 HISTORY OF VITAMIN D DEFICIENCY: ICD-10-CM

## 2022-11-30 DIAGNOSIS — E78.5 DYSLIPIDEMIA: ICD-10-CM

## 2022-11-30 DIAGNOSIS — E11.9 TYPE 2 DIABETES MELLITUS WITHOUT COMPLICATION, WITHOUT LONG-TERM CURRENT USE OF INSULIN (HCC): ICD-10-CM

## 2022-11-30 DIAGNOSIS — E66.3 OVERWEIGHT (BMI 25.0-29.9): ICD-10-CM

## 2022-11-30 DIAGNOSIS — R18.8 CIRRHOSIS OF LIVER WITH ASCITES, UNSPECIFIED HEPATIC CIRRHOSIS TYPE (HCC): ICD-10-CM

## 2022-11-30 DIAGNOSIS — K76.0 FATTY LIVER DISEASE, NONALCOHOLIC: ICD-10-CM

## 2022-11-30 DIAGNOSIS — K74.60 CIRRHOSIS OF LIVER WITH ASCITES, UNSPECIFIED HEPATIC CIRRHOSIS TYPE (HCC): ICD-10-CM

## 2022-11-30 PROCEDURE — 99490 CHRNC CARE MGMT STAFF 1ST 20: CPT | Performed by: FAMILY MEDICINE

## 2022-11-30 PROCEDURE — 97803 MED NUTRITION INDIV SUBSEQ: CPT | Performed by: DIETITIAN, REGISTERED

## 2022-11-30 NOTE — PROGRESS NOTES
Ally Doherty is a 77 y.o. year old, female returns for diabetes self management follow up.    Positive changes since last visit:    Received  iron infusion- felt better immediately  Doing much better with diet  Went to [facility]- had a great time, walked alot  Made meals while traveling, had a kitchen unit which is new for her.    Meal/Eating/Environment Pattern:    Challenges: travelling to many places this last month- did not eat as much as could in vegetables, but aimed for more    Plan not to travel in next few months- keep routine more control of environment    Ally reports cirrhosis has improved, no ascites per [place].    Cannot identify what foods cause more bloated feeling    Chair yoga- supports arm lymphedema pain     Latest Reference Range & Units 08/05/22 09:37 11/28/22 09:33   Glycohemoglobin 4.0 - 5.6 % 8.2 (H) 7.5 (H)   (H): Data is abnormally high    Comments:  Planning to go back to [employer]    Holiday eating- feels she is good, not a sweet/treat eater  Not traveling for holidays    Not testing BG with only metformin and Jardiance- look into e-pharmacy benefit manager    RTC x next available    Time Spent:  60 minutes

## 2022-11-30 NOTE — PATIENT INSTRUCTIONS
I will sign up for Nathen Chi in January at [facility], twice a week.  - keep moving - online chair yoga in the meantime for December    Keep paying attention to what I should be eating and making better choices

## 2022-12-01 NOTE — PROGRESS NOTES
Assessment: Spoke with patient for monthly outreach. Pt reports she is doing well. Had a good visit with the dietician today. Pt is pleased with the improvements in her blood work (a1c, lipid panel). She continues to do well with her weight loss goal. Reminded of follow up on 12/6. No needs at this time.        Education: weight loss, activity          Care Plan: continue plan of care          Progress: progressing          Next Outreach: 1 month

## 2022-12-05 VITALS — WEIGHT: 177 LBS | BODY MASS INDEX: 28.45 KG/M2

## 2022-12-05 ASSESSMENT — FIBROSIS 4 INDEX: FIB4 SCORE: 5.27

## 2022-12-06 ENCOUNTER — OFFICE VISIT (OUTPATIENT)
Dept: MEDICAL GROUP | Facility: LAB | Age: 77
End: 2022-12-06
Payer: MEDICARE

## 2022-12-06 VITALS
DIASTOLIC BLOOD PRESSURE: 74 MMHG | HEART RATE: 65 BPM | SYSTOLIC BLOOD PRESSURE: 110 MMHG | WEIGHT: 174 LBS | RESPIRATION RATE: 12 BRPM | OXYGEN SATURATION: 98 % | TEMPERATURE: 97 F | BODY MASS INDEX: 28.99 KG/M2 | HEIGHT: 65 IN

## 2022-12-06 DIAGNOSIS — I10 ESSENTIAL HYPERTENSION: ICD-10-CM

## 2022-12-06 DIAGNOSIS — E11.9 TYPE 2 DIABETES MELLITUS WITHOUT COMPLICATION, WITHOUT LONG-TERM CURRENT USE OF INSULIN (HCC): ICD-10-CM

## 2022-12-06 PROBLEM — E66.9 OBESITY (BMI 30-39.9): Status: RESOLVED | Noted: 2018-02-27 | Resolved: 2022-12-06

## 2022-12-06 PROCEDURE — 99214 OFFICE O/P EST MOD 30 MIN: CPT | Performed by: FAMILY MEDICINE

## 2022-12-06 RX ORDER — LOSARTAN POTASSIUM 100 MG/1
100 TABLET ORAL DAILY
Qty: 100 TABLET | Refills: 3 | Status: SHIPPED | OUTPATIENT
Start: 2022-12-06 | End: 2023-01-18 | Stop reason: SDUPTHER

## 2022-12-06 ASSESSMENT — FIBROSIS 4 INDEX: FIB4 SCORE: 5.27

## 2022-12-06 NOTE — PROGRESS NOTES
Subjective:     Chief Complaint   Patient presents with    Results         HPI:   Ally presents today with lab follow up for diabetes.     Diet: Has changed to reduce carbs, sugars. Did cheat and had some cookies from [employer].   But otherwise doing well.   Exercise: starting up chair yoga and cira chi.   Compliant with meds.   Sleep: doing well.   Bp good today. 3 lbs down.     Follows with heme/onc every 3 months and iron infusion every 6 months. Last October.   Not chewing ice which is a signal for her for iron levels to drop.      Latest Reference Range & Units 11/28/22 09:33   WBC 4.8 - 10.8 K/uL 4.5 (L)   RBC 4.20 - 5.40 M/uL 5.42 (H)   Hemoglobin 12.0 - 16.0 g/dL 14.8   Hematocrit 37.0 - 47.0 % 46.6   MCV 81.4 - 97.8 fL 86.0   MCH 27.0 - 33.0 pg 27.3   MCHC 33.6 - 35.0 g/dL 31.8 (L)   RDW 35.9 - 50.0 fL 68.4 (H)   Platelet Count 164 - 446 K/uL 90 (L)   MPV 9.0 - 12.9 fL 9.1   Neutrophils-Polys 44.00 - 72.00 % 60.70   Neutrophils (Absolute) 2.00 - 7.15 K/uL 2.71   Lymphocytes 22.00 - 41.00 % 21.90 (L)   Lymphs (Absolute) 1.00 - 4.80 K/uL 0.98 (L)   Monocytes 0.00 - 13.40 % 8.70   Monos (Absolute) 0.00 - 0.85 K/uL 0.39   Eosinophils 0.00 - 6.90 % 6.50   Eos (Absolute) 0.00 - 0.51 K/uL 0.29   Basophils 0.00 - 1.80 % 2.00 (H)   Baso (Absolute) 0.00 - 0.12 K/uL 0.09   Immature Granulocytes 0.00 - 0.90 % 0.20   Immature Granulocytes (abs) 0.00 - 0.11 K/uL 0.01   Nucleated RBC /100 WBC 0.00   NRBC (Absolute) K/uL 0.00   Plt Estimation  Decreased   RBC Morphology  Present   Anisocytosis  1+   Microcytosis  1+   Poikilocytosis  1+   Tear Drop Cells  1+   Echinocytes  1+   Comments-Diff  see below   Peripheral Smear Review  see below   Sodium 135 - 145 mmol/L 138   Potassium 3.6 - 5.5 mmol/L 4.2   Chloride 96 - 112 mmol/L 105   Co2 20 - 33 mmol/L 22   Anion Gap 7.0 - 16.0  11.0   Glucose 65 - 99 mg/dL 204 (H)   Bun 8 - 22 mg/dL 21   Creatinine 0.50 - 1.40 mg/dL 0.90   GFR (CKD-EPI) >60 mL/min/1.73  m 2 66   Calcium 8.5 - 10.5 mg/dL 9.6   AST(SGOT) 12 - 45 U/L 40   ALT(SGPT) 2 - 50 U/L 40   Alkaline Phosphatase 30 - 99 U/L 91   Total Bilirubin 0.1 - 1.5 mg/dL 0.6   Albumin 3.2 - 4.9 g/dL 4.3   Total Protein 6.0 - 8.2 g/dL 7.3   Globulin 1.9 - 3.5 g/dL 3.0   A-G Ratio g/dL 1.4   Glycohemoglobin 4.0 - 5.6 % 7.5 (H)   Estim. Avg Glu mg/dL 169   Fasting Status  Fasting   Cholesterol,Tot 100 - 199 mg/dL 208 (H)   Triglycerides 0 - 149 mg/dL 115   HDL >=40 mg/dL 72   LDL <100 mg/dL 113 (H)   TSH 0.380 - 5.330 uIU/mL 0.870   (L): Data is abnormally low  (H): Data is abnormally high      Current Outpatient Medications Ordered in Epic   Medication Sig Dispense Refill    losartan (COZAAR) 100 MG Tab Take 1 Tablet by mouth every day. 100 Tablet 1    levothyroxine (SYNTHROID) 100 MCG Tab Take 1 Tablet by mouth every morning on an empty stomach. 100 Tablet 1    metformin (GLUCOPHAGE) 1000 MG tablet Take 1 Tablet by mouth 2 times a day with meals. 200 Tablet 1    Empagliflozin (JARDIANCE) 25 MG Tab Take 1 Tablet by mouth every day. 100 Tablet 1    fluconazole (DIFLUCAN) 100 MG Tab Take 1 tab daily prn vaginal yeast infection 3 Tablet 1    propranolol (INDERAL) 10 MG Tab Take 1 Tablet by mouth every 8 hours. 90 Tablet 2    calcium carbonate (TUMS SMOOTHIES) 750 MG chewable tablet Chew 750 mg 3 times a day as needed (Indigestion).      therapeutic multivitamin-minerals (THERAGRAN-M) Tab Take 1 Tab by mouth every day.      Calcium Carbonate-Vitamin D (CALTRATE 600+D PO) Take 1 Tablet by mouth every day.       No current [facility]-ordered facility-administered medications on file.         ROS:  Gen: no fevers/chills, no changes in weight  Eyes: no changes in vision  ENT: no sore throat, no hearing loss, no bloody nose  Pulm: no sob, no cough  CV: no chest pain, no palpitations  GI: no nausea/vomiting, no diarrhea  : no dysuria  MSk: no myalgias  Skin: no rash  Neuro: no headaches, no numbness/tingling  Heme/Lymph: no easy  "bruising      Objective:     Exam:  /74 (BP Location: Left arm, Patient Position: Sitting, BP Cuff Size: Adult)   Pulse 65   Temp 36.1 °C (97 °F)   Resp 12   Ht 1.651 m (5' 5\")   Wt 78.9 kg (174 lb)   LMP 02/27/1997   SpO2 98%   BMI 28.96 kg/m²  Body mass index is 28.96 kg/m².    Gen: Alert and oriented, No apparent distress.  Neck: Neck is supple without lymphadenopathy.  Lungs: Normal effort, CTA bilaterally, no wheezes, rhonchi, or rales  CV: Regular rate and rhythm. No murmurs, rubs, or gallops.  Ext: No clubbing, cyanosis, edema.      Assessment & Plan:     77 y.o. female with the following -     1. Essential hypertension  Chronic, stable.  Blood pressure looks great today.  Refills are sent in.  She like to try mail-order pharmacy so this is sent to a different provider.  - losartan (COZAAR) 100 MG Tab; Take 1 Tablet by mouth every day.  Dispense: 100 Tablet; Refill: 3    2. Type 2 diabetes mellitus without complication, without long-term current use of insulin ([facility])  Discussed her labs.  Her A1c is much improved from previously.  Continue working on diet and exercise plans.  Plans for cira chi and yoga are good ideas in general.  In 3 months we will get a repeat A1c as well as a microalbumin for kidney function.  No medication changes with this at this point.  If the mail-order prescription for her blood pressure meds goes okay we can send everything else mail-order as well.  She will let me know.  - HEMOGLOBIN A1C; Future  - MICROALBUMIN CREAT RATIO URINE; Future              No follow-ups on file.    Please note that this dictation was created using voice recognition software. I have made every reasonable attempt to correct obvious errors, but I expect that there are errors of grammar and possibly content that I did not discover before finalizing the note.        "

## 2022-12-30 ENCOUNTER — PATIENT OUTREACH (OUTPATIENT)
Dept: HEALTH INFORMATION MANAGEMENT | Facility: OTHER | Age: 77
End: 2022-12-30
Payer: MEDICARE

## 2022-12-30 DIAGNOSIS — E78.5 DYSLIPIDEMIA: ICD-10-CM

## 2022-12-30 DIAGNOSIS — E11.9 TYPE 2 DIABETES MELLITUS WITHOUT COMPLICATION, WITHOUT LONG-TERM CURRENT USE OF INSULIN (HCC): ICD-10-CM

## 2022-12-30 DIAGNOSIS — I10 PRIMARY HYPERTENSION: ICD-10-CM

## 2022-12-30 PROCEDURE — 99999 PR NO CHARGE: CPT | Performed by: FAMILY MEDICINE

## 2022-12-31 NOTE — PROGRESS NOTES
Attempted monthly outreach follow up call, left message for return call.    Chart reviewed for Quarterly assessment. Patient continues to qualify and would benefit from CCM Program

## 2023-01-03 NOTE — PROGRESS NOTES
2nd attempt for monthly outreach follow up call, left message for return call on home & mobile phone

## 2023-01-04 NOTE — PROGRESS NOTES
Assessment  Spoke with Scottmelissa for monthly outreach follow up. Ally states she is doing well. She last A1c was 7.5. Her next labs with be in about 2 months. She states she had some cookies over the holidays. She is going to get back to her chair yoga & Tia Chi routine after the holidays. She asked about having per prescriptions changed to a mail order pharmacy. Care coordinator will follow up with PCP.    Education  Diabetes education/monitoring blood sugar/A1c     Care Plan  Continue diabetes education  Continue to monitor A1c     Progress:  Progressing     Next outreach:  1 month

## 2023-01-09 ENCOUNTER — PATIENT OUTREACH (OUTPATIENT)
Dept: HEALTH INFORMATION MANAGEMENT | Facility: OTHER | Age: 78
End: 2023-01-09
Payer: MEDICARE

## 2023-01-09 DIAGNOSIS — E11.9 TYPE 2 DIABETES MELLITUS WITHOUT COMPLICATION, WITHOUT LONG-TERM CURRENT USE OF INSULIN (HCC): ICD-10-CM

## 2023-01-09 DIAGNOSIS — I10 PRIMARY HYPERTENSION: ICD-10-CM

## 2023-01-09 DIAGNOSIS — E78.5 DYSLIPIDEMIA: ICD-10-CM

## 2023-01-18 ENCOUNTER — PATIENT OUTREACH (OUTPATIENT)
Dept: HEALTH INFORMATION MANAGEMENT | Facility: OTHER | Age: 78
End: 2023-01-18
Payer: MEDICARE

## 2023-01-18 DIAGNOSIS — E11.8 DIABETES MELLITUS TYPE 2 WITH COMPLICATIONS (HCC): ICD-10-CM

## 2023-01-18 DIAGNOSIS — E11.9 TYPE 2 DIABETES MELLITUS WITHOUT COMPLICATION, WITHOUT LONG-TERM CURRENT USE OF INSULIN (HCC): ICD-10-CM

## 2023-01-18 DIAGNOSIS — I10 PRIMARY HYPERTENSION: ICD-10-CM

## 2023-01-18 DIAGNOSIS — E03.9 HYPOTHYROIDISM, UNSPECIFIED TYPE: ICD-10-CM

## 2023-01-18 DIAGNOSIS — I10 ESSENTIAL HYPERTENSION: ICD-10-CM

## 2023-01-18 DIAGNOSIS — K75.81 NASH (NONALCOHOLIC STEATOHEPATITIS): ICD-10-CM

## 2023-01-18 DIAGNOSIS — E78.5 DYSLIPIDEMIA: ICD-10-CM

## 2023-01-18 PROCEDURE — 99490 CHRNC CARE MGMT STAFF 1ST 20: CPT | Performed by: FAMILY MEDICINE

## 2023-01-18 RX ORDER — PROPRANOLOL HYDROCHLORIDE 10 MG/1
10 TABLET ORAL EVERY 8 HOURS
Qty: 300 TABLET | Refills: 0 | Status: SHIPPED | OUTPATIENT
Start: 2023-01-18 | End: 2023-01-19 | Stop reason: SDUPTHER

## 2023-01-18 RX ORDER — EMPAGLIFLOZIN 25 MG/1
1 TABLET, FILM COATED ORAL DAILY
Qty: 100 TABLET | Refills: 1 | Status: SHIPPED | OUTPATIENT
Start: 2023-01-18 | End: 2023-01-19 | Stop reason: SDUPTHER

## 2023-01-18 RX ORDER — LOSARTAN POTASSIUM 100 MG/1
100 TABLET ORAL DAILY
Qty: 100 TABLET | Refills: 3 | Status: SHIPPED | OUTPATIENT
Start: 2023-01-18 | End: 2023-01-19 | Stop reason: SDUPTHER

## 2023-01-18 RX ORDER — LEVOTHYROXINE SODIUM 0.1 MG/1
100 TABLET ORAL
Qty: 100 TABLET | Refills: 1 | Status: SHIPPED | OUTPATIENT
Start: 2023-01-18 | End: 2023-01-19 | Stop reason: SDUPTHER

## 2023-01-18 NOTE — PROGRESS NOTES
Assessment  Spoke with Ally for monthly outreach follow up. Ally called to confirm transitioning her prescriptions to the [facility] mail in pharmacy. Will follow up with PCP for transfer of meds. Ally does not check her blood sugar regularly. States, she follows her A1c at MD appointments. Will follow her next labs and possibly discussed adding glucose monitor at that time. Discussed with glucose monitoring and Diabetes education. Denies any other questions or concerns.      Education  Discussed Diabetic Diet  Discussed transferring medications to [facility] mail in pharmacy    Care Plan  Discussed Diabetic Diet    Progress:  Progressing    Next outreach:  1 month

## 2023-01-19 ENCOUNTER — TELEPHONE (OUTPATIENT)
Dept: HEALTH INFORMATION MANAGEMENT | Facility: OTHER | Age: 78
End: 2023-01-19
Payer: MEDICARE

## 2023-01-19 DIAGNOSIS — I10 ESSENTIAL HYPERTENSION: ICD-10-CM

## 2023-01-19 DIAGNOSIS — K75.81 NASH (NONALCOHOLIC STEATOHEPATITIS): ICD-10-CM

## 2023-01-19 DIAGNOSIS — E11.8 DIABETES MELLITUS TYPE 2 WITH COMPLICATIONS (HCC): ICD-10-CM

## 2023-01-19 DIAGNOSIS — E03.9 HYPOTHYROIDISM, UNSPECIFIED TYPE: ICD-10-CM

## 2023-01-19 PROCEDURE — RXMED WILLOW AMBULATORY MEDICATION CHARGE: Performed by: FAMILY MEDICINE

## 2023-01-19 RX ORDER — LOSARTAN POTASSIUM 100 MG/1
100 TABLET ORAL DAILY
Qty: 100 TABLET | Refills: 3 | Status: SHIPPED | OUTPATIENT
Start: 2023-01-19 | End: 2023-03-06 | Stop reason: SDUPTHER

## 2023-01-19 RX ORDER — PROPRANOLOL HYDROCHLORIDE 10 MG/1
10 TABLET ORAL EVERY 8 HOURS
Qty: 300 TABLET | Refills: 0 | Status: SHIPPED | OUTPATIENT
Start: 2023-01-19 | End: 2023-04-30

## 2023-01-19 RX ORDER — LEVOTHYROXINE SODIUM 0.1 MG/1
100 TABLET ORAL
Qty: 100 TABLET | Refills: 1 | Status: SHIPPED | OUTPATIENT
Start: 2023-01-19 | End: 2023-03-06 | Stop reason: SDUPTHER

## 2023-01-19 RX ORDER — EMPAGLIFLOZIN 25 MG/1
1 TABLET, FILM COATED ORAL DAILY
Qty: 100 TABLET | Refills: 1 | Status: SHIPPED | OUTPATIENT
Start: 2023-01-19 | End: 2023-06-08 | Stop reason: SDUPTHER

## 2023-01-20 ENCOUNTER — PHARMACY VISIT (OUTPATIENT)
Dept: PHARMACY | Facility: MEDICAL CENTER | Age: 78
End: 2023-01-20
Payer: COMMERCIAL

## 2023-01-20 NOTE — TELEPHONE ENCOUNTER
Ally returned call. Notified Ally her prescriptions have been transferred to [facility] pharmacy. Instructed her to call as the pharmacy need some additional information from her. The phone number was provided. Asked Ally to call RN Care Coordinator if she had any problems or questions.

## 2023-02-16 ENCOUNTER — NON-PROVIDER VISIT (OUTPATIENT)
Dept: INTERNAL MEDICINE | Facility: OTHER | Age: 78
End: 2023-02-16
Payer: MEDICARE

## 2023-02-16 VITALS — WEIGHT: 181 LBS | BODY MASS INDEX: 30.12 KG/M2

## 2023-02-16 DIAGNOSIS — R18.8 CIRRHOSIS OF LIVER WITH ASCITES, UNSPECIFIED HEPATIC CIRRHOSIS TYPE (HCC): ICD-10-CM

## 2023-02-16 DIAGNOSIS — E78.5 DYSLIPIDEMIA: ICD-10-CM

## 2023-02-16 DIAGNOSIS — Z85.3 HISTORY OF BREAST CANCER: ICD-10-CM

## 2023-02-16 DIAGNOSIS — K76.0 FATTY LIVER DISEASE, NONALCOHOLIC: ICD-10-CM

## 2023-02-16 DIAGNOSIS — K75.81 NASH (NONALCOHOLIC STEATOHEPATITIS): ICD-10-CM

## 2023-02-16 DIAGNOSIS — Z86.39 HISTORY OF VITAMIN D DEFICIENCY: ICD-10-CM

## 2023-02-16 DIAGNOSIS — E11.65 TYPE 2 DIABETES MELLITUS WITH HYPERGLYCEMIA, WITHOUT LONG-TERM CURRENT USE OF INSULIN (HCC): ICD-10-CM

## 2023-02-16 DIAGNOSIS — K74.60 CIRRHOSIS OF LIVER WITH ASCITES, UNSPECIFIED HEPATIC CIRRHOSIS TYPE (HCC): ICD-10-CM

## 2023-02-16 PROCEDURE — 97803 MED NUTRITION INDIV SUBSEQ: CPT | Performed by: DIETITIAN, REGISTERED

## 2023-02-16 ASSESSMENT — FIBROSIS 4 INDEX: FIB4 SCORE: 5.34

## 2023-02-16 NOTE — PATIENT INSTRUCTIONS
I will check my fasting blood sugars  - check twice a week x 2 weeks  - Target goals:   fasting blood sugars:   mg/dl  Before meals:    130 mg/dl-160 mg/dl  2 hours after meals:  <180 mg/dl    I will find more new vegetables that I like- crunchy ones  - sample chayote squash  - [employer] website    Begin Nathen Chi again at the [facility] next Tues and Thurs!  - register today!  
none known

## 2023-02-16 NOTE — PROGRESS NOTES
Ally Doherty is a 78 y.o. year old, female returns for diabetes self management follow up. Just returned from [place], [place]- relaxing and was for her bday with lots of seafood!    Positive changes since last visit:  Making travel plans for [place] and back to [place], [place] for a family reunion    Found new Nathen Chi class starting next Tues/Thurs she will start!  Walking increases when on vacation    Meal/Eating/Environment Pattern:    Didn't feel she ate as well as she could- [employer] in [place] were amazing!    Lymphedema continues    Abdominal bloating continues- no discomfort    Adding more legumes into salads, loves kidney beans    Challenges: feels needs more guidance on what veggies to choose    Comments:    New PCP starting to follow up on cirrhosis, no acscites present at last visit with previous PCP    B: eggs+toast  L: cheese  D: seafood, lean protein, 1/4 veggies  Fried rice made with white rice- lower sodium soy sauce + mixed veggies    Ally needs test strips for her glucometer  Maintain awareness of diet-controlled DM with oral meds- bring greater awareness of BG before/after meals for DSM    RTC x as needed    Time Spent:  60 minutes

## 2023-02-17 ENCOUNTER — PATIENT OUTREACH (OUTPATIENT)
Dept: HEALTH INFORMATION MANAGEMENT | Facility: OTHER | Age: 78
End: 2023-02-17
Payer: MEDICARE

## 2023-02-17 DIAGNOSIS — E11.9 TYPE 2 DIABETES MELLITUS WITHOUT COMPLICATION, WITHOUT LONG-TERM CURRENT USE OF INSULIN (HCC): ICD-10-CM

## 2023-02-17 DIAGNOSIS — I10 ESSENTIAL HYPERTENSION: ICD-10-CM

## 2023-02-17 DIAGNOSIS — I10 PRIMARY HYPERTENSION: ICD-10-CM

## 2023-02-17 PROCEDURE — 99999 PR NO CHARGE: CPT | Performed by: FAMILY MEDICINE

## 2023-02-21 NOTE — PROGRESS NOTES
"Assessment  Spoke with Ally for monthly outreach follow up. Ally states she is \"doing great.\" She was recently on vacation in [place]. She states she did lots of walking and the trip was very relaxing. She is ready to get back on track with her diet. She follow up with the nutritionist last week. She struggles with eating vegetables and feels the nutritionist gave her some good tips. She is scheduled to follow up again with the nutritionist in 2 months. She is looking forward to this appointment and feels the meetings are going well. She is hoping to be able to start loosing weight now. Ally denied and questions or concerns. Encouraged to call with problems.       Education  Discussed Nutritionist follow up and Diet.     Care Plan  Continue Diabetic Education     Progress:  Progressing    Next outreach:  One month   "

## 2023-02-22 ENCOUNTER — HOSPITAL ENCOUNTER (OUTPATIENT)
Dept: LAB | Facility: MEDICAL CENTER | Age: 78
End: 2023-02-22
Attending: INTERNAL MEDICINE
Payer: MEDICARE

## 2023-02-22 LAB
ALBUMIN SERPL BCP-MCNC: 4.3 G/DL (ref 3.2–4.9)
ALBUMIN/GLOB SERPL: 1.7 G/DL
ALP SERPL-CCNC: 80 U/L (ref 30–99)
ALT SERPL-CCNC: 27 U/L (ref 2–50)
ANION GAP SERPL CALC-SCNC: 13 MMOL/L (ref 7–16)
AST SERPL-CCNC: 31 U/L (ref 12–45)
BASOPHILS # BLD AUTO: 1.3 % (ref 0–1.8)
BASOPHILS # BLD: 0.05 K/UL (ref 0–0.12)
BILIRUB SERPL-MCNC: 0.5 MG/DL (ref 0.1–1.5)
BUN SERPL-MCNC: 22 MG/DL (ref 8–22)
CALCIUM ALBUM COR SERPL-MCNC: 9.3 MG/DL (ref 8.5–10.5)
CALCIUM SERPL-MCNC: 9.5 MG/DL (ref 8.5–10.5)
CHLORIDE SERPL-SCNC: 108 MMOL/L (ref 96–112)
CO2 SERPL-SCNC: 23 MMOL/L (ref 20–33)
CREAT SERPL-MCNC: 0.84 MG/DL (ref 0.5–1.4)
EOSINOPHIL # BLD AUTO: 0.32 K/UL (ref 0–0.51)
EOSINOPHIL NFR BLD: 8.5 % (ref 0–6.9)
ERYTHROCYTE [DISTWIDTH] IN BLOOD BY AUTOMATED COUNT: 54.9 FL (ref 35.9–50)
FERRITIN SERPL-MCNC: 41.9 NG/ML (ref 10–291)
GFR SERPLBLD CREATININE-BSD FMLA CKD-EPI: 72 ML/MIN/1.73 M 2
GLOBULIN SER CALC-MCNC: 2.5 G/DL (ref 1.9–3.5)
GLUCOSE SERPL-MCNC: 197 MG/DL (ref 65–99)
HCT VFR BLD AUTO: 45.1 % (ref 37–47)
HGB BLD-MCNC: 14.2 G/DL (ref 12–16)
IMM GRANULOCYTES # BLD AUTO: 0.01 K/UL (ref 0–0.11)
IMM GRANULOCYTES NFR BLD AUTO: 0.3 % (ref 0–0.9)
LYMPHOCYTES # BLD AUTO: 0.79 K/UL (ref 1–4.8)
LYMPHOCYTES NFR BLD: 21 % (ref 22–41)
MCH RBC QN AUTO: 28.7 PG (ref 27–33)
MCHC RBC AUTO-ENTMCNC: 31.5 G/DL (ref 33.6–35)
MCV RBC AUTO: 91.1 FL (ref 81.4–97.8)
MONOCYTES # BLD AUTO: 0.36 K/UL (ref 0–0.85)
MONOCYTES NFR BLD AUTO: 9.5 % (ref 0–13.4)
NEUTROPHILS # BLD AUTO: 2.24 K/UL (ref 2–7.15)
NEUTROPHILS NFR BLD: 59.4 % (ref 44–72)
NRBC # BLD AUTO: 0 K/UL
NRBC BLD-RTO: 0 /100 WBC
PLATELET # BLD AUTO: 109 K/UL (ref 164–446)
PMV BLD AUTO: 10.1 FL (ref 9–12.9)
POTASSIUM SERPL-SCNC: 4.8 MMOL/L (ref 3.6–5.5)
PROT SERPL-MCNC: 6.8 G/DL (ref 6–8.2)
RBC # BLD AUTO: 4.95 M/UL (ref 4.2–5.4)
SODIUM SERPL-SCNC: 144 MMOL/L (ref 135–145)
WBC # BLD AUTO: 3.8 K/UL (ref 4.8–10.8)

## 2023-02-22 PROCEDURE — 85025 COMPLETE CBC W/AUTO DIFF WBC: CPT

## 2023-02-22 PROCEDURE — 36415 COLL VENOUS BLD VENIPUNCTURE: CPT

## 2023-02-22 PROCEDURE — 82728 ASSAY OF FERRITIN: CPT

## 2023-02-22 PROCEDURE — 80053 COMPREHEN METABOLIC PANEL: CPT

## 2023-02-28 ENCOUNTER — TELEPHONE (OUTPATIENT)
Dept: MEDICAL GROUP | Facility: LAB | Age: 78
End: 2023-02-28
Payer: MEDICARE

## 2023-02-28 NOTE — TELEPHONE ENCOUNTER
ESTABLISHED PATIENT PRE-VISIT PLANNING     Patient was contacted to complete PVP.     Note: Patient will not be contacted if there is no indication to call.     1.  Reviewed notes from the last few office visits within the medical group: Yes    2.  If any orders were placed at last visit or intended to be done for this visit (i.e. 6 mos follow-up), do we have Results/Consult Notes?           Labs - Labs ordered, NOT completed. Patient advised to complete prior to next appointment.  Note: If patient appointment is for lab review and patient did not complete labs, check with provider if OK to reschedule patient until labs completed.         Imaging - Imaging was not ordered at last office visit.         Referrals - No referrals were ordered at last office visit.    3. Is this appointment scheduled as a Hospital Follow-Up? No    4.  Immunizations were updated in Epic using Reconcile Outside Information activity? Yes    5.  Patient is due for the following Health Maintenance Topics:   Health Maintenance Due   Topic Date Due    IMM DTaP/Tdap/Td Vaccine (2 - Td or Tdap) 10/04/2022     6.  AHA (Pulse8) form printed for Provider? N/A

## 2023-03-01 ENCOUNTER — HOSPITAL ENCOUNTER (OUTPATIENT)
Dept: LAB | Facility: MEDICAL CENTER | Age: 78
End: 2023-03-01
Attending: FAMILY MEDICINE
Payer: MEDICARE

## 2023-03-01 DIAGNOSIS — E11.9 TYPE 2 DIABETES MELLITUS WITHOUT COMPLICATION, WITHOUT LONG-TERM CURRENT USE OF INSULIN (HCC): ICD-10-CM

## 2023-03-01 LAB
EST. AVERAGE GLUCOSE BLD GHB EST-MCNC: 169 MG/DL
HBA1C MFR BLD: 7.5 % (ref 4–5.6)

## 2023-03-01 PROCEDURE — 82043 UR ALBUMIN QUANTITATIVE: CPT

## 2023-03-01 PROCEDURE — 36415 COLL VENOUS BLD VENIPUNCTURE: CPT

## 2023-03-01 PROCEDURE — 82570 ASSAY OF URINE CREATININE: CPT

## 2023-03-01 PROCEDURE — 83036 HEMOGLOBIN GLYCOSYLATED A1C: CPT

## 2023-03-02 LAB
CREAT UR-MCNC: 39.76 MG/DL
MICROALBUMIN UR-MCNC: 1.7 MG/DL
MICROALBUMIN/CREAT UR: 43 MG/G (ref 0–30)

## 2023-03-06 ENCOUNTER — OFFICE VISIT (OUTPATIENT)
Dept: MEDICAL GROUP | Facility: LAB | Age: 78
End: 2023-03-06
Payer: MEDICARE

## 2023-03-06 VITALS
RESPIRATION RATE: 12 BRPM | HEART RATE: 66 BPM | BODY MASS INDEX: 28.32 KG/M2 | SYSTOLIC BLOOD PRESSURE: 110 MMHG | HEIGHT: 65 IN | OXYGEN SATURATION: 99 % | WEIGHT: 170 LBS | TEMPERATURE: 97 F | DIASTOLIC BLOOD PRESSURE: 58 MMHG

## 2023-03-06 DIAGNOSIS — I85.11 SECONDARY ESOPHAGEAL VARICES WITH BLEEDING (HCC): ICD-10-CM

## 2023-03-06 DIAGNOSIS — E03.9 HYPOTHYROIDISM, UNSPECIFIED TYPE: ICD-10-CM

## 2023-03-06 DIAGNOSIS — D61.818 PANCYTOPENIA (HCC): ICD-10-CM

## 2023-03-06 DIAGNOSIS — I10 ESSENTIAL HYPERTENSION: ICD-10-CM

## 2023-03-06 DIAGNOSIS — D69.6 THROMBOCYTOPENIA (HCC): ICD-10-CM

## 2023-03-06 DIAGNOSIS — E11.8 DIABETES MELLITUS TYPE 2 WITH COMPLICATIONS (HCC): ICD-10-CM

## 2023-03-06 PROCEDURE — 99214 OFFICE O/P EST MOD 30 MIN: CPT | Performed by: FAMILY MEDICINE

## 2023-03-06 RX ORDER — LOSARTAN POTASSIUM 100 MG/1
100 TABLET ORAL DAILY
Qty: 100 TABLET | Refills: 3 | Status: SHIPPED
Start: 2023-03-06 | End: 2023-06-08

## 2023-03-06 RX ORDER — LEVOTHYROXINE SODIUM 0.1 MG/1
100 TABLET ORAL
Qty: 100 TABLET | Refills: 3 | Status: SHIPPED
Start: 2023-03-06 | End: 2023-06-08

## 2023-03-06 ASSESSMENT — PATIENT HEALTH QUESTIONNAIRE - PHQ9: CLINICAL INTERPRETATION OF PHQ2 SCORE: 0

## 2023-03-06 ASSESSMENT — FIBROSIS 4 INDEX: FIB4 SCORE: 4.16

## 2023-03-06 NOTE — PROGRESS NOTES
Subjective:     Chief Complaint   Patient presents with    Results     Labs         HPI:   Ally presents today with lab follow up.     Does drink a lot of unsweetened tea. Not much water.     Diet: 2 meals daily. Potatoes are a favorite. Does think she could cut down on these. Not too much greens. Good proteins. Does like seafood. Does get some good chicken and pork.   Some rice now and then. Could cut back pasta.     The 10-year ASCVD risk score (Georgi MONIQUE, et al., 2019) is: 31.9%      Current Outpatient Medications Ordered in Epic   Medication Sig Dispense Refill    Empagliflozin (JARDIANCE) 25 MG Tab Take 1 Tablet by mouth every day. 100 Tablet 1    levothyroxine (SYNTHROID) 100 MCG Tab Take 1 Tablet by mouth every morning on an empty stomach. 100 Tablet 1    losartan (COZAAR) 100 MG Tab Take 1 Tablet by mouth every day. 100 Tablet 3    metformin (GLUCOPHAGE) 1000 MG tablet Take 1 Tablet by mouth 2 times a day with meals. 200 Tablet 1    propranolol (INDERAL) 10 MG Tab Take 1 Tablet by mouth every 8 hours for 100 days. 300 Tablet 0    fluconazole (DIFLUCAN) 100 MG Tab Take 1 tab daily prn vaginal yeast infection 3 Tablet 1    calcium carbonate (TUMS SMOOTHIES) 750 MG chewable tablet Chew 750 mg 3 times a day as needed (Indigestion).      therapeutic multivitamin-minerals (THERAGRAN-M) Tab Take 1 Tab by mouth every day.      Calcium Carbonate-Vitamin D (CALTRATE 600+D PO) Take 1 Tablet by mouth every day.       No current [facility]-ordered facility-administered medications on file.         ROS:  Gen: no fevers/chills, no changes in weight  Eyes: no changes in vision  ENT: no sore throat, no hearing loss, no bloody nose  Pulm: no sob, no cough  CV: no chest pain, no palpitations  GI: no nausea/vomiting, no diarrhea  : no dysuria  MSk: no myalgias  Skin: no rash  Neuro: no headaches, no numbness/tingling  Heme/Lymph: no easy bruising      Objective:     Exam:  /58 (BP Location: Left arm, Patient Position:  "Sitting, BP Cuff Size: Adult)   Pulse 66   Temp 36.1 °C (97 °F)   Resp 12   Ht 1.651 m (5' 5\")   Wt 77.1 kg (170 lb)   LMP 02/27/1997   SpO2 99%   BMI 28.29 kg/m²  Body mass index is 28.29 kg/m².    Gen: Alert and oriented, No apparent distress.  Neck: Neck is supple without lymphadenopathy.  Lungs: Normal effort, CTA bilaterally, no wheezes, rhonchi, or rales  CV: Regular rate and rhythm. No murmurs, rubs, or gallops.  Ext: No clubbing, cyanosis, edema.      Assessment & Plan:     78 y.o. female with the following -     1. Essential hypertension  Refills needed.  This is chronic and stable.  Well-controlled.  Continue current dosing as is.  - losartan (COZAAR) 100 MG Tab; Take 1 Tablet by mouth every day.  Dispense: 100 Tablet; Refill: 3    2. Hypothyroidism, unspecified type  Chronic, stable.  Refill sent in.  - levothyroxine (SYNTHROID) 100 MCG Tab; Take 1 Tablet by mouth every morning on an empty stomach.  Dispense: 100 Tablet; Refill: 3    3. Diabetes mellitus type 2 with complications (HCC)  We did discuss tuning up her diet a little bit more.  We will hold off on adding any further medications at this time.  We did discuss continuing to cut back on potatoes as well as Pasta and continuing to push a lot of fluids we will get labs in the next 3 months.  - HEMOGLOBIN A1C; Future  - Basic Metabolic Panel; Future  - MICROALBUMIN CREAT RATIO URINE; Future    4. Pancytopenia (HCC)  Chronic, stable.  Following with heme-onc for this.    5. Thrombocytopenia (HCC)  Chronic, stable.  Following with heme-onc.    6. Secondary esophageal varices with bleeding (HCC)  No current bleeding.  Does follow with GI.  Needs an appointment upcoming.            No follow-ups on file.    Please note that this dictation was created using voice recognition software. I have made every reasonable attempt to correct obvious errors, but I expect that there are errors of grammar and possibly content that I did not discover before " finalizing the note.

## 2023-03-17 DIAGNOSIS — I10 ESSENTIAL HYPERTENSION: ICD-10-CM

## 2023-03-17 DIAGNOSIS — E03.9 HYPOTHYROIDISM, UNSPECIFIED TYPE: ICD-10-CM

## 2023-03-20 NOTE — TELEPHONE ENCOUNTER
Pharmacy comment: Can we please get new RXs sent to [facility] Mail order pharmacy - pt no longer fills at [facility]

## 2023-03-21 ENCOUNTER — PATIENT OUTREACH (OUTPATIENT)
Dept: HEALTH INFORMATION MANAGEMENT | Facility: OTHER | Age: 78
End: 2023-03-21
Payer: MEDICARE

## 2023-03-21 DIAGNOSIS — E11.9 TYPE 2 DIABETES MELLITUS WITHOUT COMPLICATION, WITHOUT LONG-TERM CURRENT USE OF INSULIN (HCC): ICD-10-CM

## 2023-03-21 DIAGNOSIS — I10 PRIMARY HYPERTENSION: ICD-10-CM

## 2023-03-21 DIAGNOSIS — I10 ESSENTIAL HYPERTENSION: ICD-10-CM

## 2023-03-21 DIAGNOSIS — E03.9 HYPOTHYROIDISM, UNSPECIFIED TYPE: ICD-10-CM

## 2023-03-21 PROCEDURE — 99490 CHRNC CARE MGMT STAFF 1ST 20: CPT | Performed by: FAMILY MEDICINE

## 2023-03-21 RX ORDER — LEVOTHYROXINE SODIUM 0.1 MG/1
100 TABLET ORAL
Qty: 100 TABLET | Refills: 1 | Status: SHIPPED | OUTPATIENT
Start: 2023-03-21 | End: 2023-06-08 | Stop reason: SDUPTHER

## 2023-03-21 RX ORDER — LOSARTAN POTASSIUM 100 MG/1
100 TABLET ORAL DAILY
Qty: 100 TABLET | Refills: 3 | Status: SHIPPED | OUTPATIENT
Start: 2023-03-21 | End: 2023-06-08 | Stop reason: SDUPTHER

## 2023-03-21 RX ORDER — LOSARTAN POTASSIUM 100 MG/1
100 TABLET ORAL DAILY
Qty: 100 TABLET | Refills: 3 | Status: SHIPPED | OUTPATIENT
Start: 2023-03-21 | End: 2023-03-21 | Stop reason: SDUPTHER

## 2023-03-21 RX ORDER — LEVOTHYROXINE SODIUM 0.1 MG/1
100 TABLET ORAL
Qty: 100 TABLET | Refills: 1 | Status: SHIPPED | OUTPATIENT
Start: 2023-03-21 | End: 2023-03-21 | Stop reason: SDUPTHER

## 2023-03-21 NOTE — TELEPHONE ENCOUNTER
Levothyroxine and Losartan.  I only have on more Levothyroxine left. I would like the prescriptions sent to [address] [place] [address] pharmacy  Thank you

## 2023-03-21 NOTE — PROGRESS NOTES
Assessment: Spoke to patient for monthly outreach. Pt reports she is doing well. Discussed medication refills and pt notified that levothyroxine and losartan were sent in today. Pt states she has made changes to her diet after meeting with PCP earlier this month. Has been eating fewer carbs and is working on incorporating more vegetables. Reports she has some good suggestions from her nutritionist. No needs at this time.        Education: diet, medication management          Care Plan: continue plan of care, continue to meet with nutritionist          Progress: progressing          Next Outreach: 1 month

## 2023-03-22 ENCOUNTER — PHARMACY VISIT (OUTPATIENT)
Dept: PHARMACY | Facility: MEDICAL CENTER | Age: 78
End: 2023-03-22
Payer: COMMERCIAL

## 2023-03-22 PROCEDURE — RXMED WILLOW AMBULATORY MEDICATION CHARGE: Performed by: FAMILY MEDICINE

## 2023-04-04 ENCOUNTER — PATIENT OUTREACH (OUTPATIENT)
Dept: HEALTH INFORMATION MANAGEMENT | Facility: OTHER | Age: 78
End: 2023-04-04
Payer: MEDICARE

## 2023-04-04 DIAGNOSIS — I10 PRIMARY HYPERTENSION: ICD-10-CM

## 2023-04-04 DIAGNOSIS — E11.9 TYPE 2 DIABETES MELLITUS WITHOUT COMPLICATION, WITHOUT LONG-TERM CURRENT USE OF INSULIN (HCC): ICD-10-CM

## 2023-04-04 NOTE — PROGRESS NOTES
Quarterly chart review completed. Pt continues to qualify for and benefit from PCM program. Will continue to follow.

## 2023-04-26 PROCEDURE — RXMED WILLOW AMBULATORY MEDICATION CHARGE: Performed by: FAMILY MEDICINE

## 2023-04-27 ENCOUNTER — PHARMACY VISIT (OUTPATIENT)
Dept: PHARMACY | Facility: MEDICAL CENTER | Age: 78
End: 2023-04-27
Payer: COMMERCIAL

## 2023-05-04 ENCOUNTER — PATIENT OUTREACH (OUTPATIENT)
Dept: HEALTH INFORMATION MANAGEMENT | Facility: OTHER | Age: 78
End: 2023-05-04
Payer: MEDICARE

## 2023-05-04 DIAGNOSIS — E11.9 TYPE 2 DIABETES MELLITUS WITHOUT COMPLICATION, WITHOUT LONG-TERM CURRENT USE OF INSULIN (HCC): ICD-10-CM

## 2023-05-04 DIAGNOSIS — E78.5 DYSLIPIDEMIA: ICD-10-CM

## 2023-05-04 DIAGNOSIS — I10 PRIMARY HYPERTENSION: ICD-10-CM

## 2023-05-04 NOTE — PROGRESS NOTES
Attempted to call pt for monthly outreach for personal care management program. Left VM requesting call back.

## 2023-05-05 ENCOUNTER — HOSPITAL ENCOUNTER (OUTPATIENT)
Dept: LAB | Facility: MEDICAL CENTER | Age: 78
End: 2023-05-05
Attending: INTERNAL MEDICINE
Payer: MEDICARE

## 2023-05-05 LAB
BASOPHILS # BLD AUTO: 2.1 % (ref 0–1.8)
BASOPHILS # BLD: 0.09 K/UL (ref 0–0.12)
EOSINOPHIL # BLD AUTO: 0.33 K/UL (ref 0–0.51)
EOSINOPHIL NFR BLD: 7.7 % (ref 0–6.9)
ERYTHROCYTE [DISTWIDTH] IN BLOOD BY AUTOMATED COUNT: 47.1 FL (ref 35.9–50)
FERRITIN SERPL-MCNC: 16.3 NG/ML (ref 10–291)
HCT VFR BLD AUTO: 44.6 % (ref 37–47)
HGB BLD-MCNC: 13.5 G/DL (ref 12–16)
IMM GRANULOCYTES # BLD AUTO: 0.01 K/UL (ref 0–0.11)
IMM GRANULOCYTES NFR BLD AUTO: 0.2 % (ref 0–0.9)
IRON SATN MFR SERPL: 12 % (ref 15–55)
IRON SERPL-MCNC: 46 UG/DL (ref 40–170)
LYMPHOCYTES # BLD AUTO: 0.99 K/UL (ref 1–4.8)
LYMPHOCYTES NFR BLD: 23.1 % (ref 22–41)
MCH RBC QN AUTO: 27.2 PG (ref 27–33)
MCHC RBC AUTO-ENTMCNC: 30.3 G/DL (ref 33.6–35)
MCV RBC AUTO: 89.9 FL (ref 81.4–97.8)
MONOCYTES # BLD AUTO: 0.43 K/UL (ref 0–0.85)
MONOCYTES NFR BLD AUTO: 10 % (ref 0–13.4)
NEUTROPHILS # BLD AUTO: 2.44 K/UL (ref 2–7.15)
NEUTROPHILS NFR BLD: 56.9 % (ref 44–72)
NRBC # BLD AUTO: 0 K/UL
NRBC BLD-RTO: 0 /100 WBC
PLATELET # BLD AUTO: 112 K/UL (ref 164–446)
PMV BLD AUTO: 10.8 FL (ref 9–12.9)
RBC # BLD AUTO: 4.96 M/UL (ref 4.2–5.4)
TIBC SERPL-MCNC: 388 UG/DL (ref 250–450)
UIBC SERPL-MCNC: 342 UG/DL (ref 110–370)
WBC # BLD AUTO: 4.3 K/UL (ref 4.8–10.8)

## 2023-05-05 PROCEDURE — 36415 COLL VENOUS BLD VENIPUNCTURE: CPT

## 2023-05-05 PROCEDURE — 85025 COMPLETE CBC W/AUTO DIFF WBC: CPT

## 2023-05-05 PROCEDURE — 83540 ASSAY OF IRON: CPT

## 2023-05-05 PROCEDURE — 82728 ASSAY OF FERRITIN: CPT

## 2023-05-05 PROCEDURE — 83550 IRON BINDING TEST: CPT

## 2023-05-09 ENCOUNTER — HOSPITAL ENCOUNTER (OUTPATIENT)
Facility: MEDICAL CENTER | Age: 78
End: 2023-05-09
Attending: INTERNAL MEDICINE
Payer: MEDICARE

## 2023-05-09 PROCEDURE — 82728 ASSAY OF FERRITIN: CPT

## 2023-05-09 PROCEDURE — 83550 IRON BINDING TEST: CPT

## 2023-05-09 PROCEDURE — 83540 ASSAY OF IRON: CPT

## 2023-05-10 LAB
FERRITIN SERPL-MCNC: 19.2 NG/ML (ref 10–291)
IRON SATN MFR SERPL: 12 % (ref 15–55)
IRON SERPL-MCNC: 47 UG/DL (ref 40–170)
TIBC SERPL-MCNC: 407 UG/DL (ref 250–450)
UIBC SERPL-MCNC: 360 UG/DL (ref 110–370)

## 2023-06-01 ENCOUNTER — TELEPHONE (OUTPATIENT)
Dept: MEDICAL GROUP | Facility: LAB | Age: 78
End: 2023-06-01
Payer: MEDICARE

## 2023-06-01 NOTE — TELEPHONE ENCOUNTER
ESTABLISHED PATIENT PRE-VISIT PLANNING     Patient was contacted to complete PVP.     Note: Patient will not be contacted if there is no indication to call.     1.  Reviewed notes from the last few office visits within the medical group: Yes    2.  If any orders were placed at last visit or intended to be done for this visit (i.e. 6 mos follow-up), do we have Results/Consult Notes?           Labs - Labs ordered, NOT completed. Patient advised to complete prior to next appointment. Lvm to remind   Note: If patient appointment is for lab review and patient did not complete labs, check with provider if OK to reschedule patient until labs completed.         Imaging - Imaging was not ordered at last office visit.         Referrals - No referrals were ordered at last office visit.    3. Is this appointment scheduled as a Hospital Follow-Up? No    4.  Immunizations were updated in Epic using Reconcile Outside Information activity? Yes    5.  Patient is due for the following Health Maintenance Topics:   Health Maintenance Due   Topic Date Due    IMM DTaP/Tdap/Td Vaccine (2 - Td or Tdap) 10/04/2022    Annual Wellness Visit  04/12/2023    DIABETES MONOFILAMENT / LE EXAM  04/12/2023     6.  AHA (Pulse8) form printed for Provider? N/A

## 2023-06-02 ENCOUNTER — HOSPITAL ENCOUNTER (OUTPATIENT)
Dept: LAB | Facility: MEDICAL CENTER | Age: 78
End: 2023-06-02
Attending: FAMILY MEDICINE
Payer: MEDICARE

## 2023-06-02 DIAGNOSIS — E11.8 DIABETES MELLITUS TYPE 2 WITH COMPLICATIONS (HCC): ICD-10-CM

## 2023-06-02 PROCEDURE — 36415 COLL VENOUS BLD VENIPUNCTURE: CPT

## 2023-06-02 PROCEDURE — 82570 ASSAY OF URINE CREATININE: CPT

## 2023-06-02 PROCEDURE — 82043 UR ALBUMIN QUANTITATIVE: CPT

## 2023-06-02 PROCEDURE — 83036 HEMOGLOBIN GLYCOSYLATED A1C: CPT

## 2023-06-02 PROCEDURE — 80048 BASIC METABOLIC PNL TOTAL CA: CPT

## 2023-06-03 LAB
ANION GAP SERPL CALC-SCNC: 11 MMOL/L (ref 7–16)
BUN SERPL-MCNC: 23 MG/DL (ref 8–22)
CALCIUM SERPL-MCNC: 9.4 MG/DL (ref 8.5–10.5)
CHLORIDE SERPL-SCNC: 108 MMOL/L (ref 96–112)
CO2 SERPL-SCNC: 23 MMOL/L (ref 20–33)
CREAT SERPL-MCNC: 1.02 MG/DL (ref 0.5–1.4)
CREAT UR-MCNC: 63.15 MG/DL
EST. AVERAGE GLUCOSE BLD GHB EST-MCNC: 214 MG/DL
GFR SERPLBLD CREATININE-BSD FMLA CKD-EPI: 57 ML/MIN/1.73 M 2
GLUCOSE SERPL-MCNC: 206 MG/DL (ref 65–99)
HBA1C MFR BLD: 9.1 % (ref 4–5.6)
MICROALBUMIN UR-MCNC: <1.2 MG/DL
MICROALBUMIN/CREAT UR: NORMAL MG/G (ref 0–30)
POTASSIUM SERPL-SCNC: 4.9 MMOL/L (ref 3.6–5.5)
SODIUM SERPL-SCNC: 142 MMOL/L (ref 135–145)

## 2023-06-08 ENCOUNTER — OFFICE VISIT (OUTPATIENT)
Dept: MEDICAL GROUP | Facility: LAB | Age: 78
End: 2023-06-08
Payer: MEDICARE

## 2023-06-08 ENCOUNTER — PATIENT OUTREACH (OUTPATIENT)
Dept: HEALTH INFORMATION MANAGEMENT | Facility: OTHER | Age: 78
End: 2023-06-08

## 2023-06-08 VITALS
TEMPERATURE: 97 F | OXYGEN SATURATION: 93 % | HEIGHT: 65 IN | SYSTOLIC BLOOD PRESSURE: 110 MMHG | BODY MASS INDEX: 30.49 KG/M2 | WEIGHT: 183 LBS | RESPIRATION RATE: 12 BRPM | HEART RATE: 68 BPM | DIASTOLIC BLOOD PRESSURE: 56 MMHG

## 2023-06-08 DIAGNOSIS — E03.9 HYPOTHYROIDISM, UNSPECIFIED TYPE: ICD-10-CM

## 2023-06-08 DIAGNOSIS — I10 ESSENTIAL HYPERTENSION: ICD-10-CM

## 2023-06-08 DIAGNOSIS — E11.65 UNCONTROLLED TYPE 2 DIABETES MELLITUS WITH HYPERGLYCEMIA (HCC): ICD-10-CM

## 2023-06-08 DIAGNOSIS — E11.9 TYPE 2 DIABETES MELLITUS WITHOUT COMPLICATION, WITHOUT LONG-TERM CURRENT USE OF INSULIN (HCC): ICD-10-CM

## 2023-06-08 DIAGNOSIS — E78.5 DYSLIPIDEMIA: ICD-10-CM

## 2023-06-08 DIAGNOSIS — I10 PRIMARY HYPERTENSION: ICD-10-CM

## 2023-06-08 PROCEDURE — 99999 PR NO CHARGE: CPT | Performed by: FAMILY MEDICINE

## 2023-06-08 PROCEDURE — 3078F DIAST BP <80 MM HG: CPT | Performed by: FAMILY MEDICINE

## 2023-06-08 PROCEDURE — RXMED WILLOW AMBULATORY MEDICATION CHARGE: Performed by: FAMILY MEDICINE

## 2023-06-08 PROCEDURE — 99214 OFFICE O/P EST MOD 30 MIN: CPT | Performed by: FAMILY MEDICINE

## 2023-06-08 PROCEDURE — 3074F SYST BP LT 130 MM HG: CPT | Performed by: FAMILY MEDICINE

## 2023-06-08 RX ORDER — GLIPIZIDE 5 MG/1
5 TABLET, FILM COATED, EXTENDED RELEASE ORAL DAILY
Qty: 100 TABLET | Refills: 3 | Status: SHIPPED | OUTPATIENT
Start: 2023-06-08 | End: 2023-07-13

## 2023-06-08 RX ORDER — LEVOTHYROXINE SODIUM 0.1 MG/1
100 TABLET ORAL
Qty: 100 TABLET | Refills: 1 | Status: SHIPPED | OUTPATIENT
Start: 2023-06-08 | End: 2024-01-14 | Stop reason: SDUPTHER

## 2023-06-08 RX ORDER — LOSARTAN POTASSIUM 100 MG/1
100 TABLET ORAL DAILY
Qty: 100 TABLET | Refills: 3 | Status: SHIPPED | OUTPATIENT
Start: 2023-06-08 | End: 2023-08-15

## 2023-06-08 RX ORDER — EMPAGLIFLOZIN 25 MG/1
1 TABLET, FILM COATED ORAL DAILY
Qty: 100 TABLET | Refills: 1 | Status: SHIPPED | OUTPATIENT
Start: 2023-06-08 | End: 2023-06-19

## 2023-06-08 SDOH — SOCIAL STABILITY: SOCIAL NETWORK: HOW OFTEN DO YOU ATTENT MEETINGS OF THE CLUB OR ORGANIZATION YOU BELONG TO?: MORE THAN 4 TIMES PER YEAR

## 2023-06-08 SDOH — HEALTH STABILITY: MENTAL HEALTH: HOW OFTEN DO YOU HAVE A DRINK CONTAINING ALCOHOL?: MONTHLY OR LESS

## 2023-06-08 SDOH — HEALTH STABILITY: PHYSICAL HEALTH: ON AVERAGE, HOW MANY DAYS PER WEEK DO YOU ENGAGE IN MODERATE TO STRENUOUS EXERCISE (LIKE A BRISK WALK)?: 4 DAYS

## 2023-06-08 SDOH — SOCIAL STABILITY: SOCIAL NETWORK: IN A TYPICAL WEEK, HOW MANY TIMES DO YOU TALK ON THE PHONE WITH FAMILY, FRIENDS, OR NEIGHBORS?: NEVER

## 2023-06-08 SDOH — SOCIAL STABILITY: SOCIAL INSECURITY: WITHIN THE LAST YEAR, HAVE YOU BEEN AFRAID OF YOUR PARTNER OR EX-PARTNER?: NO

## 2023-06-08 SDOH — HEALTH STABILITY: MENTAL HEALTH: HOW OFTEN DO YOU HAVE 6 OR MORE DRINKS ON ONE OCCASION?: NEVER

## 2023-06-08 SDOH — SOCIAL STABILITY: SOCIAL INSECURITY
WITHIN THE LAST YEAR, HAVE YOU BEEN KICKED, HIT, SLAPPED, OR OTHERWISE PHYSICALLY HURT BY YOUR PARTNER OR EX-PARTNER?: NO

## 2023-06-08 SDOH — SOCIAL STABILITY: SOCIAL NETWORK: HOW OFTEN DO YOU GET TOGETHER WITH FRIENDS OR RELATIVES?: MORE THAN THREE TIMES A WEEK

## 2023-06-08 SDOH — HEALTH STABILITY: PHYSICAL HEALTH: ON AVERAGE, HOW MANY MINUTES DO YOU ENGAGE IN EXERCISE AT THIS LEVEL?: 20 MIN

## 2023-06-08 SDOH — SOCIAL STABILITY: SOCIAL INSECURITY
WITHIN THE LAST YEAR, HAVE TO BEEN RAPED OR FORCED TO HAVE ANY KIND OF SEXUAL ACTIVITY BY YOUR PARTNER OR EX-PARTNER?: NO

## 2023-06-08 SDOH — HEALTH STABILITY: MENTAL HEALTH: HOW MANY STANDARD DRINKS CONTAINING ALCOHOL DO YOU HAVE ON A TYPICAL DAY?: 1 OR 2

## 2023-06-08 SDOH — SOCIAL STABILITY: SOCIAL NETWORK: ARE YOU MARRIED, WIDOWED, DIVORCED, SEPARATED, NEVER MARRIED, OR LIVING WITH A PARTNER?: MARRIED

## 2023-06-08 SDOH — SOCIAL STABILITY: SOCIAL INSECURITY: WITHIN THE LAST YEAR, HAVE YOU BEEN HUMILIATED OR EMOTIONALLY ABUSED IN OTHER WAYS BY YOUR PARTNER OR EX-PARTNER?: NO

## 2023-06-08 SDOH — ECONOMIC STABILITY: INCOME INSECURITY: IN THE LAST 12 MONTHS, WAS THERE A TIME WHEN YOU WERE NOT ABLE TO PAY THE MORTGAGE OR RENT ON TIME?: NO

## 2023-06-08 SDOH — ECONOMIC STABILITY: HOUSING INSECURITY: IN THE LAST 12 MONTHS, HOW MANY PLACES HAVE YOU LIVED?: 1

## 2023-06-08 SDOH — ECONOMIC STABILITY: FOOD INSECURITY: WITHIN THE PAST 12 MONTHS, THE FOOD YOU BOUGHT JUST DIDN'T LAST AND YOU DIDN'T HAVE MONEY TO GET MORE.: NEVER TRUE

## 2023-06-08 SDOH — HEALTH STABILITY: MENTAL HEALTH
STRESS IS WHEN SOMEONE FEELS TENSE, NERVOUS, ANXIOUS, OR CAN'T SLEEP AT NIGHT BECAUSE THEIR MIND IS TROUBLED. HOW STRESSED ARE YOU?: ONLY A LITTLE

## 2023-06-08 SDOH — ECONOMIC STABILITY: INCOME INSECURITY: HOW HARD IS IT FOR YOU TO PAY FOR THE VERY BASICS LIKE FOOD, HOUSING, MEDICAL CARE, AND HEATING?: NOT HARD AT ALL

## 2023-06-08 SDOH — SOCIAL STABILITY: SOCIAL NETWORK
DO YOU BELONG TO ANY CLUBS OR ORGANIZATIONS SUCH AS CHURCH GROUPS UNIONS, FRATERNAL OR ATHLETIC GROUPS, OR SCHOOL GROUPS?: YES

## 2023-06-08 SDOH — ECONOMIC STABILITY: FOOD INSECURITY: WITHIN THE PAST 12 MONTHS, YOU WORRIED THAT YOUR FOOD WOULD RUN OUT BEFORE YOU GOT MONEY TO BUY MORE.: NEVER TRUE

## 2023-06-08 SDOH — SOCIAL STABILITY: SOCIAL NETWORK: HOW OFTEN DO YOU ATTEND CHURCH OR RELIGIOUS SERVICES?: MORE THAN 4 TIMES PER YEAR

## 2023-06-08 ASSESSMENT — LIFESTYLE VARIABLES
SKIP TO QUESTIONS 9-10: 1
AUDIT-C TOTAL SCORE: 1

## 2023-06-08 ASSESSMENT — FIBROSIS 4 INDEX: FIB4 SCORE: 4.05

## 2023-06-08 NOTE — PROGRESS NOTES
CHW Shayy met with pt at PCP appt 6/8 @9704. CHW offered pt to complete CCM and enroll in GCM. Pt reports still having concerns with medication changes, and other medical questions and would like to stay in CCM with Ailyn at this time. Pt reported she would like help finding a new  for group classes. CHW to find options for [facility]. Pt reports she needs help scheduling a Comprehensive Health Assessment through [facility]. CHW to help pt schedule appt. CHW completed SDOH with pt. Pt reports no issues financially, pt reports being very social and belonging to groups. Pt reports being an active . Pt reports being . CHW to speak with Ailyn MAN about pt concerns. Pt has contact information. CHW will contact pt in about a week regarding appt and [facility] classes.    Community Health Worker Follow-Up    Reason for outreach: Offer GCM after CCM completion    CHW Interventions: [facility] classes. Scheduling appt    Specific Resources Provided:  Housing/Shelter: N/A   Transportation: N/A  Food: N/A  Financial: N/A  Social Supports: N/A  Other: [facility]     Plan: CHW to find [facility] classes for pt to attend. CHW to schedule comprehensive health assessment for pt. CHW to follow once graduated from [facility].

## 2023-06-08 NOTE — PROGRESS NOTES
Subjective:     Chief Complaint   Patient presents with    Follow-Up         HPI:   Ally presents today with Lab follow up.     A1C went up recently. 7.5% to now 9.1%.   Think its related to diet mostly. More splurging increased carbs, sugars.   Not great with water. Unsweetened tea as well. Trying to get more meals at home, but not sure this is better.   Exercise: Walking a lot. 20 minutes or so most days. Plans to get out in garden now.   Feeling a bit glum lately.   Feels very effected by less sunlight, and colder weather.      Usually dinner is biggest meal of the day.       Current Outpatient Medications Ordered in Epic   Medication Sig Dispense Refill    glipiZIDE SR (GLIPIZIDE XL) 5 MG TABLET SR 24 HR Take 1 Tablet by mouth every day. 90 Tablet 3    metformin (GLUCOPHAGE) 1000 MG tablet Take 1 Tablet by mouth 2 times a day with meals. 200 Tablet 1    losartan (COZAAR) 100 MG Tab Take 1 Tablet by mouth every day. 100 Tablet 3    levothyroxine (SYNTHROID) 100 MCG Tab Take 1 tablet by mouth every morning on an empty stomach. 100 Tablet 1    Empagliflozin (JARDIANCE) 25 MG Tab Take 1 Tablet by mouth every day. 100 Tablet 1    fluconazole (DIFLUCAN) 100 MG Tab Take 1 tab daily prn vaginal yeast infection 3 Tablet 1    calcium carbonate (TUMS SMOOTHIES) 750 MG chewable tablet Chew 750 mg 3 times a day as needed (Indigestion).      therapeutic multivitamin-minerals (THERAGRAN-M) Tab Take 1 Tab by mouth every day.      Calcium Carbonate-Vitamin D (CALTRATE 600+D PO) Take 1 Tablet by mouth every day.       No current [facility]-ordered facility-administered medications on file.         ROS:  Gen: no fevers/chills, no changes in weight  Eyes: no changes in vision  ENT: no sore throat, no hearing loss, no bloody nose  Pulm: no sob, no cough  CV: no chest pain, no palpitations  GI: no nausea/vomiting, no diarrhea  : no dysuria  MSk: no myalgias  Skin: no rash  Neuro: no headaches, no numbness/tingling  Heme/Lymph: no easy  "bruising      Objective:     Exam:  /56 (BP Location: Left arm, Patient Position: Sitting, BP Cuff Size: Adult)   Pulse 68   Temp 36.1 °C (97 °F)   Resp 12   Ht 1.651 m (5' 5\")   Wt 83 kg (183 lb)   LMP 02/27/1997   SpO2 93%   BMI 30.45 kg/m²  Body mass index is 30.45 kg/m².    Gen: AAOx3, NAD, well appearing  HEENT: NCAT, EOMI, Nares patent, Mucosa moist  Resp: Normal chest wall rise and fall, not SOB, no tachypnea  Skin: no rash or abnormality of visible skin.   Psych: normal speech, not slurred, good insight, affect full  MSK: Moves all four limbs equally and normally, gait normal      Assessment & Plan:     78 y.o. female with the following -     1. Uncontrolled type 2 diabetes mellitus with hyperglycemia (HCC)  Reviewed again carbs and sugars to avoid and stay away from.  We did discuss different diabetes medications.  She has been on Byetta in the past and this caused pancreatitis.  Would like to try to avoid the GLP-1 agonist for her in general because of this.  We will get her labs rechecked in the next 3 months but also started sustained-release glipizide due to cost and the fact that she tolerated this well before.  - glipiZIDE SR (GLIPIZIDE XL) 5 MG TABLET SR 24 HR; Take 1 Tablet by mouth every day.  Dispense: 90 Tablet; Refill: 3  - metformin (GLUCOPHAGE) 1000 MG tablet; Take 1 Tablet by mouth 2 times a day with meals.  Dispense: 200 Tablet; Refill: 1  - Empagliflozin (JARDIANCE) 25 MG Tab; Take 1 Tablet by mouth every day.  Dispense: 100 Tablet; Refill: 1  - HEMOGLOBIN A1C; Future  - Basic Metabolic Panel; Future    2. Essential hypertension  Chronic, stable.  Well-controlled today.  We did discuss pushing fluids in general per tickly with the Jardiance on board.  - losartan (COZAAR) 100 MG Tab; Take 1 Tablet by mouth every day.  Dispense: 100 Tablet; Refill: 3    3. Hypothyroidism, unspecified type  Chronic, stable.  Refill sent in.  - levothyroxine (SYNTHROID) 100 MCG Tab; Take 1 tablet " by mouth every morning on an empty stomach.  Dispense: 100 Tablet; Refill: 1            Return in about 3 months (around 9/8/2023) for f/u labs, diabetes, f/u for AWV.    Please note that this dictation was created using voice recognition software. I have made every reasonable attempt to correct obvious errors, but I expect that there are errors of grammar and possibly content that I did not discover before finalizing the note.

## 2023-06-09 NOTE — PROGRESS NOTES
CHW Shayy reached out to pt to confirm address in order to send [facility] information, and to find out a good time to schedule comprehensive health assessment. CHW was unable to reach pt at this time. CHW LVM with contact information. CHW will contact pt in a week if no call back.

## 2023-06-13 NOTE — PROGRESS NOTES
Assessment    Spoke to patient for monthly outreach. Pt started new diabetes medications due to A1c being up. No questions regarding medications at this time. She does not check her bs at home.  Pt reports she is struggling with eating healthy foods.  She continues to be active. Pt states she doesn't like vegetables and this is the biggest problem with her healthy eating goals. If she does eat vegetables, she prefers raw. Will send some new recipes for pt to try. No other needs or concerns at this time.     Education    DM, diet     Plan of Care and Goals    Lower a1c. Improve eating habits. Restart cira chi    Barriers:    Doesn't like vegetables. Doesn't enjoy cooking    Progress:    Needs to work on diabetic diet    Next outreach: 1 month

## 2023-06-16 NOTE — PROGRESS NOTES
@2929 CHW Shayy reached out to pt to ensure she received list of [facility] classes and information on the Comprehensive Health Assessment. CHW unable to reach pt at this time. CHW LVM with contact information. CHW will attempt to contact pt in a week.  @2673 CHW received call back from pt stating she received the [facility] class list and information on the Comprehensive Health Assessment. Pt also stated she will be calling [facility] to set up the appt for the assessment. Pt expressed gratitude. Pts needs are met at this time. Pt has contact information.

## 2023-06-20 ENCOUNTER — PHARMACY VISIT (OUTPATIENT)
Dept: PHARMACY | Facility: MEDICAL CENTER | Age: 78
End: 2023-06-20
Payer: COMMERCIAL

## 2023-06-23 ENCOUNTER — TELEPHONE (OUTPATIENT)
Dept: HEALTH INFORMATION MANAGEMENT | Facility: OTHER | Age: 78
End: 2023-06-23
Payer: MEDICARE

## 2023-06-26 PROBLEM — I73.9 PAD (PERIPHERAL ARTERY DISEASE) (HCC): Status: ACTIVE | Noted: 2023-06-26

## 2023-06-28 ENCOUNTER — HOSPITAL ENCOUNTER (OUTPATIENT)
Dept: LAB | Facility: MEDICAL CENTER | Age: 78
End: 2023-06-28
Attending: PHYSICIAN ASSISTANT
Payer: MEDICARE

## 2023-06-28 LAB
BASOPHILS # BLD AUTO: 1.4 % (ref 0–1.8)
BASOPHILS # BLD: 0.06 K/UL (ref 0–0.12)
EOSINOPHIL # BLD AUTO: 0.29 K/UL (ref 0–0.51)
EOSINOPHIL NFR BLD: 6.6 % (ref 0–6.9)
ERYTHROCYTE [DISTWIDTH] IN BLOOD BY AUTOMATED COUNT: 46.3 FL (ref 35.9–50)
FERRITIN SERPL-MCNC: 11.8 NG/ML (ref 10–291)
HCT VFR BLD AUTO: 41 % (ref 37–47)
HGB BLD-MCNC: 12.4 G/DL (ref 12–16)
IMM GRANULOCYTES # BLD AUTO: 0.01 K/UL (ref 0–0.11)
IMM GRANULOCYTES NFR BLD AUTO: 0.2 % (ref 0–0.9)
IRON SATN MFR SERPL: 13 % (ref 15–55)
IRON SERPL-MCNC: 50 UG/DL (ref 40–170)
LYMPHOCYTES # BLD AUTO: 0.83 K/UL (ref 1–4.8)
LYMPHOCYTES NFR BLD: 18.9 % (ref 22–41)
MCH RBC QN AUTO: 25.8 PG (ref 27–33)
MCHC RBC AUTO-ENTMCNC: 30.2 G/DL (ref 32.2–35.5)
MCV RBC AUTO: 85.4 FL (ref 81.4–97.8)
MONOCYTES # BLD AUTO: 0.45 K/UL (ref 0–0.85)
MONOCYTES NFR BLD AUTO: 10.2 % (ref 0–13.4)
NEUTROPHILS # BLD AUTO: 2.76 K/UL (ref 1.82–7.42)
NEUTROPHILS NFR BLD: 62.7 % (ref 44–72)
NRBC # BLD AUTO: 0 K/UL
NRBC BLD-RTO: 0 /100 WBC (ref 0–0.2)
PLATELET # BLD AUTO: 104 K/UL (ref 164–446)
PMV BLD AUTO: 10.3 FL (ref 9–12.9)
RBC # BLD AUTO: 4.8 M/UL (ref 4.2–5.4)
TIBC SERPL-MCNC: 379 UG/DL (ref 250–450)
UIBC SERPL-MCNC: 329 UG/DL (ref 110–370)
WBC # BLD AUTO: 4.4 K/UL (ref 4.8–10.8)

## 2023-06-28 PROCEDURE — 83540 ASSAY OF IRON: CPT

## 2023-06-28 PROCEDURE — 82728 ASSAY OF FERRITIN: CPT

## 2023-06-28 PROCEDURE — 82105 ALPHA-FETOPROTEIN SERUM: CPT

## 2023-06-28 PROCEDURE — 85025 COMPLETE CBC W/AUTO DIFF WBC: CPT

## 2023-06-28 PROCEDURE — 83550 IRON BINDING TEST: CPT

## 2023-06-28 PROCEDURE — 36415 COLL VENOUS BLD VENIPUNCTURE: CPT

## 2023-06-30 LAB — AFP-TM SERPL-MCNC: 2 NG/ML (ref 0–9)

## 2023-07-07 ENCOUNTER — HOSPITAL ENCOUNTER (OUTPATIENT)
Dept: LAB | Facility: MEDICAL CENTER | Age: 78
DRG: 246 | End: 2023-07-07
Attending: PHYSICIAN ASSISTANT
Payer: MEDICARE

## 2023-07-07 ENCOUNTER — HOSPITAL ENCOUNTER (OUTPATIENT)
Dept: LAB | Facility: MEDICAL CENTER | Age: 78
DRG: 246 | End: 2023-07-07
Attending: INTERNAL MEDICINE
Payer: MEDICARE

## 2023-07-07 DIAGNOSIS — K75.81 NASH (NONALCOHOLIC STEATOHEPATITIS): ICD-10-CM

## 2023-07-07 LAB
ALBUMIN SERPL BCP-MCNC: 4 G/DL (ref 3.2–4.9)
ALBUMIN/GLOB SERPL: 1.5 G/DL
ALP SERPL-CCNC: 88 U/L (ref 30–99)
ALT SERPL-CCNC: 33 U/L (ref 2–50)
ANION GAP SERPL CALC-SCNC: 15 MMOL/L (ref 7–16)
AST SERPL-CCNC: 38 U/L (ref 12–45)
BASOPHILS # BLD AUTO: 1.4 % (ref 0–1.8)
BASOPHILS # BLD: 0.05 K/UL (ref 0–0.12)
BILIRUB SERPL-MCNC: 0.5 MG/DL (ref 0.1–1.5)
BUN SERPL-MCNC: 11 MG/DL (ref 8–22)
CALCIUM ALBUM COR SERPL-MCNC: 8.9 MG/DL (ref 8.5–10.5)
CALCIUM SERPL-MCNC: 8.9 MG/DL (ref 8.5–10.5)
CHLORIDE SERPL-SCNC: 109 MMOL/L (ref 96–112)
CO2 SERPL-SCNC: 21 MMOL/L (ref 20–33)
COMMENT 1642: NORMAL
CREAT SERPL-MCNC: 0.79 MG/DL (ref 0.5–1.4)
EOSINOPHIL # BLD AUTO: 0.16 K/UL (ref 0–0.51)
EOSINOPHIL NFR BLD: 4.6 % (ref 0–6.9)
ERYTHROCYTE [DISTWIDTH] IN BLOOD BY AUTOMATED COUNT: 45.5 FL (ref 35.9–50)
FERRITIN SERPL-MCNC: 11.7 NG/ML (ref 10–291)
GFR SERPLBLD CREATININE-BSD FMLA CKD-EPI: 77 ML/MIN/1.73 M 2
GLOBULIN SER CALC-MCNC: 2.7 G/DL (ref 1.9–3.5)
GLUCOSE SERPL-MCNC: 155 MG/DL (ref 65–99)
HCT VFR BLD AUTO: 40.7 % (ref 37–47)
HGB BLD-MCNC: 12.1 G/DL (ref 12–16)
IMM GRANULOCYTES # BLD AUTO: 0 K/UL (ref 0–0.11)
IMM GRANULOCYTES NFR BLD AUTO: 0 % (ref 0–0.9)
INR PPP: 1.04 (ref 0.87–1.13)
IRON SATN MFR SERPL: 10 % (ref 15–55)
IRON SERPL-MCNC: 37 UG/DL (ref 40–170)
LYMPHOCYTES # BLD AUTO: 0.82 K/UL (ref 1–4.8)
LYMPHOCYTES NFR BLD: 23.6 % (ref 22–41)
MCH RBC QN AUTO: 25.1 PG (ref 27–33)
MCHC RBC AUTO-ENTMCNC: 29.7 G/DL (ref 32.2–35.5)
MCV RBC AUTO: 84.4 FL (ref 81.4–97.8)
MICROCYTES BLD QL SMEAR: ABNORMAL
MONOCYTES # BLD AUTO: 0.37 K/UL (ref 0–0.85)
MONOCYTES NFR BLD AUTO: 10.6 % (ref 0–13.4)
MORPHOLOGY BLD-IMP: NORMAL
NEUTROPHILS # BLD AUTO: 2.08 K/UL (ref 1.82–7.42)
NEUTROPHILS NFR BLD: 59.8 % (ref 44–72)
NRBC # BLD AUTO: 0 K/UL
NRBC BLD-RTO: 0 /100 WBC (ref 0–0.2)
OVALOCYTES BLD QL SMEAR: NORMAL
PLATELET # BLD AUTO: 104 K/UL (ref 164–446)
PLATELET BLD QL SMEAR: NORMAL
PMV BLD AUTO: 10.7 FL (ref 9–12.9)
POIKILOCYTOSIS BLD QL SMEAR: NORMAL
POTASSIUM SERPL-SCNC: 4.5 MMOL/L (ref 3.6–5.5)
PROT SERPL-MCNC: 6.7 G/DL (ref 6–8.2)
PROTHROMBIN TIME: 13.5 SEC (ref 12–14.6)
RBC # BLD AUTO: 4.82 M/UL (ref 4.2–5.4)
RBC BLD AUTO: PRESENT
SODIUM SERPL-SCNC: 145 MMOL/L (ref 135–145)
TIBC SERPL-MCNC: 388 UG/DL (ref 250–450)
UIBC SERPL-MCNC: 351 UG/DL (ref 110–370)
WBC # BLD AUTO: 3.5 K/UL (ref 4.8–10.8)

## 2023-07-07 PROCEDURE — 80053 COMPREHEN METABOLIC PANEL: CPT

## 2023-07-07 PROCEDURE — 36415 COLL VENOUS BLD VENIPUNCTURE: CPT

## 2023-07-07 PROCEDURE — 83540 ASSAY OF IRON: CPT

## 2023-07-07 PROCEDURE — 83550 IRON BINDING TEST: CPT

## 2023-07-07 PROCEDURE — 82728 ASSAY OF FERRITIN: CPT

## 2023-07-07 PROCEDURE — 85025 COMPLETE CBC W/AUTO DIFF WBC: CPT

## 2023-07-07 PROCEDURE — 85610 PROTHROMBIN TIME: CPT

## 2023-07-07 RX ORDER — PROPRANOLOL HYDROCHLORIDE 10 MG/1
10 TABLET ORAL EVERY 8 HOURS
Qty: 300 TABLET | Refills: 0 | Status: CANCELLED | OUTPATIENT
Start: 2023-07-07 | End: 2023-10-15

## 2023-07-07 NOTE — TELEPHONE ENCOUNTER
Received request via: Pharmacy    Was the patient seen in the last year in this department? Yes  LOV 06/08/2023  Does the patient have an active prescription (recently filled or refills available) for medication(s) requested? No    Does the patient have half-way Plus and need 100 day supply (blood pressure, diabetes and cholesterol meds only)? Medication is not for cholesterol, blood pressure or diabetes

## 2023-07-10 ENCOUNTER — PHARMACY VISIT (OUTPATIENT)
Dept: PHARMACY | Facility: MEDICAL CENTER | Age: 78
End: 2023-07-10
Payer: COMMERCIAL

## 2023-07-10 ENCOUNTER — APPOINTMENT (OUTPATIENT)
Dept: CARDIOLOGY | Facility: MEDICAL CENTER | Age: 78
DRG: 246 | End: 2023-07-10
Attending: INTERNAL MEDICINE
Payer: MEDICARE

## 2023-07-10 ENCOUNTER — APPOINTMENT (OUTPATIENT)
Dept: RADIOLOGY | Facility: MEDICAL CENTER | Age: 78
DRG: 246 | End: 2023-07-10
Attending: EMERGENCY MEDICINE
Payer: MEDICARE

## 2023-07-10 ENCOUNTER — APPOINTMENT (OUTPATIENT)
Dept: RADIOLOGY | Facility: MEDICAL CENTER | Age: 78
DRG: 246 | End: 2023-07-10
Payer: MEDICARE

## 2023-07-10 ENCOUNTER — HOSPITAL ENCOUNTER (INPATIENT)
Facility: MEDICAL CENTER | Age: 78
LOS: 3 days | DRG: 246 | End: 2023-07-13
Attending: EMERGENCY MEDICINE | Admitting: INTERNAL MEDICINE
Payer: MEDICARE

## 2023-07-10 DIAGNOSIS — Z95.5 STATUS POST INSERTION OF DRUG-ELUTING STENT INTO LEFT ANTERIOR DESCENDING (LAD) ARTERY: ICD-10-CM

## 2023-07-10 DIAGNOSIS — I73.9 PAD (PERIPHERAL ARTERY DISEASE) (HCC): ICD-10-CM

## 2023-07-10 DIAGNOSIS — E11.65 UNCONTROLLED TYPE 2 DIABETES MELLITUS WITH HYPERGLYCEMIA (HCC): ICD-10-CM

## 2023-07-10 DIAGNOSIS — I21.02 ST ELEVATION MYOCARDIAL INFARCTION INVOLVING LEFT ANTERIOR DESCENDING (LAD) CORONARY ARTERY (HCC): ICD-10-CM

## 2023-07-10 DIAGNOSIS — R07.89 OTHER CHEST PAIN: ICD-10-CM

## 2023-07-10 DIAGNOSIS — R79.89 ELEVATED TROPONIN: ICD-10-CM

## 2023-07-10 PROBLEM — I21.3 STEMI (ST ELEVATION MYOCARDIAL INFARCTION) (HCC): Status: ACTIVE | Noted: 2023-07-10

## 2023-07-10 PROBLEM — R07.9 CHEST PAIN: Status: ACTIVE | Noted: 2023-07-10

## 2023-07-10 LAB
ACT BLD: 305 SEC (ref 74–137)
ACT BLD: 335 SEC (ref 74–137)
ALBUMIN SERPL BCP-MCNC: 4 G/DL (ref 3.2–4.9)
ALBUMIN/GLOB SERPL: 1.4 G/DL
ALP SERPL-CCNC: 88 U/L (ref 30–99)
ALT SERPL-CCNC: 27 U/L (ref 2–50)
ANION GAP SERPL CALC-SCNC: 17 MMOL/L (ref 7–16)
AST SERPL-CCNC: 30 U/L (ref 12–45)
BASOPHILS # BLD AUTO: 1.2 % (ref 0–1.8)
BASOPHILS # BLD: 0.06 K/UL (ref 0–0.12)
BILIRUB SERPL-MCNC: 0.5 MG/DL (ref 0.1–1.5)
BUN SERPL-MCNC: 17 MG/DL (ref 8–22)
CALCIUM ALBUM COR SERPL-MCNC: 9.4 MG/DL (ref 8.5–10.5)
CALCIUM SERPL-MCNC: 9.4 MG/DL (ref 8.5–10.5)
CHLORIDE SERPL-SCNC: 107 MMOL/L (ref 96–112)
CO2 SERPL-SCNC: 19 MMOL/L (ref 20–33)
CREAT SERPL-MCNC: 0.88 MG/DL (ref 0.5–1.4)
EKG IMPRESSION: NORMAL
EOSINOPHIL # BLD AUTO: 0.28 K/UL (ref 0–0.51)
EOSINOPHIL NFR BLD: 5.4 % (ref 0–6.9)
ERYTHROCYTE [DISTWIDTH] IN BLOOD BY AUTOMATED COUNT: 44.9 FL (ref 35.9–50)
EST. AVERAGE GLUCOSE BLD GHB EST-MCNC: 192 MG/DL
GFR SERPLBLD CREATININE-BSD FMLA CKD-EPI: 68 ML/MIN/1.73 M 2
GLOBULIN SER CALC-MCNC: 2.9 G/DL (ref 1.9–3.5)
GLUCOSE BLD STRIP.AUTO-MCNC: 192 MG/DL (ref 65–99)
GLUCOSE BLD STRIP.AUTO-MCNC: 223 MG/DL (ref 65–99)
GLUCOSE SERPL-MCNC: 179 MG/DL (ref 65–99)
HBA1C MFR BLD: 8.3 % (ref 4–5.6)
HCT VFR BLD AUTO: 41.5 % (ref 37–47)
HGB BLD-MCNC: 12.8 G/DL (ref 12–16)
IMM GRANULOCYTES # BLD AUTO: 0.02 K/UL (ref 0–0.11)
IMM GRANULOCYTES NFR BLD AUTO: 0.4 % (ref 0–0.9)
LYMPHOCYTES # BLD AUTO: 0.96 K/UL (ref 1–4.8)
LYMPHOCYTES NFR BLD: 18.5 % (ref 22–41)
MAGNESIUM SERPL-MCNC: 1.4 MG/DL (ref 1.5–2.5)
MCH RBC QN AUTO: 25.5 PG (ref 27–33)
MCHC RBC AUTO-ENTMCNC: 30.8 G/DL (ref 32.2–35.5)
MCV RBC AUTO: 82.7 FL (ref 81.4–97.8)
MONOCYTES # BLD AUTO: 0.48 K/UL (ref 0–0.85)
MONOCYTES NFR BLD AUTO: 9.2 % (ref 0–13.4)
NEUTROPHILS # BLD AUTO: 3.4 K/UL (ref 1.82–7.42)
NEUTROPHILS NFR BLD: 65.3 % (ref 44–72)
NRBC # BLD AUTO: 0 K/UL
NRBC BLD-RTO: 0 /100 WBC (ref 0–0.2)
PLATELET # BLD AUTO: 108 K/UL (ref 164–446)
PMV BLD AUTO: 10 FL (ref 9–12.9)
POTASSIUM SERPL-SCNC: 4.4 MMOL/L (ref 3.6–5.5)
PROT SERPL-MCNC: 6.9 G/DL (ref 6–8.2)
RBC # BLD AUTO: 5.02 M/UL (ref 4.2–5.4)
SODIUM SERPL-SCNC: 143 MMOL/L (ref 135–145)
TROPONIN T SERPL-MCNC: 38 NG/L (ref 6–19)
TROPONIN T SERPL-MCNC: 53 NG/L (ref 6–19)
WBC # BLD AUTO: 5.2 K/UL (ref 4.8–10.8)

## 2023-07-10 PROCEDURE — 92978 ENDOLUMINL IVUS OCT C 1ST: CPT | Mod: 26,LD | Performed by: INTERNAL MEDICINE

## 2023-07-10 PROCEDURE — 93005 ELECTROCARDIOGRAM TRACING: CPT | Performed by: INTERNAL MEDICINE

## 2023-07-10 PROCEDURE — 4A023N7 MEASUREMENT OF CARDIAC SAMPLING AND PRESSURE, LEFT HEART, PERCUTANEOUS APPROACH: ICD-10-PCS | Performed by: INTERNAL MEDICINE

## 2023-07-10 PROCEDURE — 93005 ELECTROCARDIOGRAM TRACING: CPT | Performed by: EMERGENCY MEDICINE

## 2023-07-10 PROCEDURE — 700111 HCHG RX REV CODE 636 W/ 250 OVERRIDE (IP)

## 2023-07-10 PROCEDURE — 700101 HCHG RX REV CODE 250

## 2023-07-10 PROCEDURE — 99223 1ST HOSP IP/OBS HIGH 75: CPT | Mod: 57 | Performed by: INTERNAL MEDICINE

## 2023-07-10 PROCEDURE — 71045 X-RAY EXAM CHEST 1 VIEW: CPT

## 2023-07-10 PROCEDURE — 99152 MOD SED SAME PHYS/QHP 5/>YRS: CPT | Performed by: INTERNAL MEDICINE

## 2023-07-10 PROCEDURE — 83735 ASSAY OF MAGNESIUM: CPT

## 2023-07-10 PROCEDURE — B2151ZZ FLUOROSCOPY OF LEFT HEART USING LOW OSMOLAR CONTRAST: ICD-10-PCS | Performed by: INTERNAL MEDICINE

## 2023-07-10 PROCEDURE — 770020 HCHG ROOM/CARE - TELE (206)

## 2023-07-10 PROCEDURE — 92941 PRQ TRLML REVSC TOT OCCL AMI: CPT | Mod: LD | Performed by: INTERNAL MEDICINE

## 2023-07-10 PROCEDURE — 36415 COLL VENOUS BLD VENIPUNCTURE: CPT

## 2023-07-10 PROCEDURE — 99153 MOD SED SAME PHYS/QHP EA: CPT

## 2023-07-10 PROCEDURE — B240ZZ3 ULTRASONOGRAPHY OF SINGLE CORONARY ARTERY, INTRAVASCULAR: ICD-10-PCS | Performed by: INTERNAL MEDICINE

## 2023-07-10 PROCEDURE — 93005 ELECTROCARDIOGRAM TRACING: CPT

## 2023-07-10 PROCEDURE — 700102 HCHG RX REV CODE 250 W/ 637 OVERRIDE(OP)

## 2023-07-10 PROCEDURE — 85025 COMPLETE CBC W/AUTO DIFF WBC: CPT

## 2023-07-10 PROCEDURE — 160035 HCHG PACU - 1ST 60 MINS PHASE I

## 2023-07-10 PROCEDURE — A9270 NON-COVERED ITEM OR SERVICE: HCPCS | Performed by: INTERNAL MEDICINE

## 2023-07-10 PROCEDURE — 160002 HCHG RECOVERY MINUTES (STAT)

## 2023-07-10 PROCEDURE — 99223 1ST HOSP IP/OBS HIGH 75: CPT | Mod: AI | Performed by: INTERNAL MEDICINE

## 2023-07-10 PROCEDURE — A9270 NON-COVERED ITEM OR SERVICE: HCPCS | Performed by: EMERGENCY MEDICINE

## 2023-07-10 PROCEDURE — 700102 HCHG RX REV CODE 250 W/ 637 OVERRIDE(OP): Performed by: INTERNAL MEDICINE

## 2023-07-10 PROCEDURE — 93458 L HRT ARTERY/VENTRICLE ANGIO: CPT | Mod: 26,59 | Performed by: INTERNAL MEDICINE

## 2023-07-10 PROCEDURE — B2111ZZ FLUOROSCOPY OF MULTIPLE CORONARY ARTERIES USING LOW OSMOLAR CONTRAST: ICD-10-PCS | Performed by: INTERNAL MEDICINE

## 2023-07-10 PROCEDURE — 700102 HCHG RX REV CODE 250 W/ 637 OVERRIDE(OP): Performed by: EMERGENCY MEDICINE

## 2023-07-10 PROCEDURE — A9270 NON-COVERED ITEM OR SERVICE: HCPCS

## 2023-07-10 PROCEDURE — 99291 CRITICAL CARE FIRST HOUR: CPT

## 2023-07-10 PROCEDURE — 93010 ELECTROCARDIOGRAM REPORT: CPT | Mod: 59 | Performed by: INTERNAL MEDICINE

## 2023-07-10 PROCEDURE — 85347 COAGULATION TIME ACTIVATED: CPT | Mod: 91

## 2023-07-10 PROCEDURE — 027034Z DILATION OF CORONARY ARTERY, ONE ARTERY WITH DRUG-ELUTING INTRALUMINAL DEVICE, PERCUTANEOUS APPROACH: ICD-10-PCS | Performed by: INTERNAL MEDICINE

## 2023-07-10 PROCEDURE — 84484 ASSAY OF TROPONIN QUANT: CPT

## 2023-07-10 PROCEDURE — RXMED WILLOW AMBULATORY MEDICATION CHARGE: Performed by: FAMILY MEDICINE

## 2023-07-10 PROCEDURE — 83036 HEMOGLOBIN GLYCOSYLATED A1C: CPT

## 2023-07-10 PROCEDURE — 80053 COMPREHEN METABOLIC PANEL: CPT

## 2023-07-10 PROCEDURE — 700117 HCHG RX CONTRAST REV CODE 255: Performed by: INTERNAL MEDICINE

## 2023-07-10 PROCEDURE — 82962 GLUCOSE BLOOD TEST: CPT

## 2023-07-10 RX ORDER — OXYCODONE HYDROCHLORIDE 5 MG/1
5 TABLET ORAL
Status: DISCONTINUED | OUTPATIENT
Start: 2023-07-10 | End: 2023-07-13 | Stop reason: HOSPADM

## 2023-07-10 RX ORDER — BISACODYL 10 MG
10 SUPPOSITORY, RECTAL RECTAL
Status: DISCONTINUED | OUTPATIENT
Start: 2023-07-10 | End: 2023-07-13 | Stop reason: HOSPADM

## 2023-07-10 RX ORDER — HEPARIN SODIUM 1000 [USP'U]/ML
INJECTION, SOLUTION INTRAVENOUS; SUBCUTANEOUS
Status: COMPLETED
Start: 2023-07-10 | End: 2023-07-10

## 2023-07-10 RX ORDER — ENOXAPARIN SODIUM 100 MG/ML
40 INJECTION SUBCUTANEOUS DAILY
Status: DISCONTINUED | OUTPATIENT
Start: 2023-07-11 | End: 2023-07-13 | Stop reason: HOSPADM

## 2023-07-10 RX ORDER — ASPIRIN 81 MG/1
81 TABLET ORAL DAILY
Status: DISCONTINUED | OUTPATIENT
Start: 2023-07-11 | End: 2023-07-11

## 2023-07-10 RX ORDER — CARVEDILOL 6.25 MG/1
6.25 TABLET ORAL 2 TIMES DAILY WITH MEALS
Status: DISCONTINUED | OUTPATIENT
Start: 2023-07-10 | End: 2023-07-13 | Stop reason: HOSPADM

## 2023-07-10 RX ORDER — POLYETHYLENE GLYCOL 3350 17 G/17G
1 POWDER, FOR SOLUTION ORAL
Status: DISCONTINUED | OUTPATIENT
Start: 2023-07-10 | End: 2023-07-13 | Stop reason: HOSPADM

## 2023-07-10 RX ORDER — MIDAZOLAM HYDROCHLORIDE 1 MG/ML
INJECTION INTRAMUSCULAR; INTRAVENOUS
Status: COMPLETED
Start: 2023-07-10 | End: 2023-07-10

## 2023-07-10 RX ORDER — ONDANSETRON 4 MG/1
4 TABLET, ORALLY DISINTEGRATING ORAL EVERY 4 HOURS PRN
Status: DISCONTINUED | OUTPATIENT
Start: 2023-07-10 | End: 2023-07-13 | Stop reason: HOSPADM

## 2023-07-10 RX ORDER — HEPARIN SODIUM 200 [USP'U]/100ML
INJECTION, SOLUTION INTRAVENOUS
Status: COMPLETED
Start: 2023-07-10 | End: 2023-07-10

## 2023-07-10 RX ORDER — NITROGLYCERIN 0.4 MG/1
0.4 TABLET SUBLINGUAL
Status: DISCONTINUED | OUTPATIENT
Start: 2023-07-10 | End: 2023-07-13 | Stop reason: HOSPADM

## 2023-07-10 RX ORDER — ACETAMINOPHEN 325 MG/1
650 TABLET ORAL EVERY 6 HOURS PRN
Status: DISCONTINUED | OUTPATIENT
Start: 2023-07-10 | End: 2023-07-13 | Stop reason: HOSPADM

## 2023-07-10 RX ORDER — AMOXICILLIN 250 MG
2 CAPSULE ORAL 2 TIMES DAILY
Status: DISCONTINUED | OUTPATIENT
Start: 2023-07-10 | End: 2023-07-13 | Stop reason: HOSPADM

## 2023-07-10 RX ORDER — VERAPAMIL HYDROCHLORIDE 2.5 MG/ML
INJECTION, SOLUTION INTRAVENOUS
Status: COMPLETED
Start: 2023-07-10 | End: 2023-07-10

## 2023-07-10 RX ORDER — ONDANSETRON 2 MG/ML
4 INJECTION INTRAMUSCULAR; INTRAVENOUS EVERY 4 HOURS PRN
Status: DISCONTINUED | OUTPATIENT
Start: 2023-07-10 | End: 2023-07-13 | Stop reason: HOSPADM

## 2023-07-10 RX ORDER — ASPIRIN 81 MG/1
324 TABLET, CHEWABLE ORAL ONCE
Status: COMPLETED | OUTPATIENT
Start: 2023-07-10 | End: 2023-07-10

## 2023-07-10 RX ORDER — ASPIRIN 81 MG/1
81 TABLET ORAL DAILY
Status: DISCONTINUED | OUTPATIENT
Start: 2023-07-10 | End: 2023-07-13 | Stop reason: HOSPADM

## 2023-07-10 RX ORDER — LOSARTAN POTASSIUM 50 MG/1
100 TABLET ORAL DAILY
Status: DISCONTINUED | OUTPATIENT
Start: 2023-07-10 | End: 2023-07-13 | Stop reason: HOSPADM

## 2023-07-10 RX ORDER — LIDOCAINE HYDROCHLORIDE 20 MG/ML
INJECTION, SOLUTION INFILTRATION; PERINEURAL
Status: COMPLETED
Start: 2023-07-10 | End: 2023-07-10

## 2023-07-10 RX ORDER — HYDROMORPHONE HYDROCHLORIDE 1 MG/ML
0.25 INJECTION, SOLUTION INTRAMUSCULAR; INTRAVENOUS; SUBCUTANEOUS
Status: DISCONTINUED | OUTPATIENT
Start: 2023-07-10 | End: 2023-07-13 | Stop reason: HOSPADM

## 2023-07-10 RX ORDER — OXYCODONE HYDROCHLORIDE 5 MG/1
2.5 TABLET ORAL
Status: DISCONTINUED | OUTPATIENT
Start: 2023-07-10 | End: 2023-07-13 | Stop reason: HOSPADM

## 2023-07-10 RX ORDER — PROPRANOLOL HYDROCHLORIDE 10 MG/1
10 TABLET ORAL 2 TIMES DAILY
COMMUNITY
End: 2023-07-18

## 2023-07-10 RX ORDER — LABETALOL HYDROCHLORIDE 5 MG/ML
10 INJECTION, SOLUTION INTRAVENOUS EVERY 4 HOURS PRN
Status: DISCONTINUED | OUTPATIENT
Start: 2023-07-10 | End: 2023-07-13 | Stop reason: HOSPADM

## 2023-07-10 RX ORDER — EPTIFIBATIDE 2 MG/ML
INJECTION, SOLUTION INTRAVENOUS
Status: COMPLETED
Start: 2023-07-10 | End: 2023-07-10

## 2023-07-10 RX ORDER — NITROGLYCERIN 0.4 MG/1
0.4 TABLET SUBLINGUAL
Status: DISCONTINUED | OUTPATIENT
Start: 2023-07-10 | End: 2023-07-10

## 2023-07-10 RX ORDER — LEVOTHYROXINE SODIUM 0.1 MG/1
100 TABLET ORAL
Status: DISCONTINUED | OUTPATIENT
Start: 2023-07-10 | End: 2023-07-13 | Stop reason: HOSPADM

## 2023-07-10 RX ADMIN — LOSARTAN POTASSIUM 100 MG: 50 TABLET, FILM COATED ORAL at 15:43

## 2023-07-10 RX ADMIN — CARVEDILOL 6.25 MG: 6.25 TABLET, FILM COATED ORAL at 17:49

## 2023-07-10 RX ADMIN — FENTANYL CITRATE 100 MCG: 50 INJECTION, SOLUTION INTRAMUSCULAR; INTRAVENOUS at 14:05

## 2023-07-10 RX ADMIN — INSULIN HUMAN 1 UNITS: 100 INJECTION, SOLUTION PARENTERAL at 20:59

## 2023-07-10 RX ADMIN — ASPIRIN 81 MG 324 MG: 81 TABLET ORAL at 10:23

## 2023-07-10 RX ADMIN — LIDOCAINE HYDROCHLORIDE: 20 INJECTION, SOLUTION INFILTRATION; PERINEURAL at 13:46

## 2023-07-10 RX ADMIN — NITROGLYCERIN 10 ML: 20 INJECTION INTRAVENOUS at 13:46

## 2023-07-10 RX ADMIN — TICAGRELOR 180 MG: 90 TABLET ORAL at 14:10

## 2023-07-10 RX ADMIN — MIDAZOLAM 1 MG: 1 INJECTION, SOLUTION INTRAMUSCULAR; INTRAVENOUS at 14:07

## 2023-07-10 RX ADMIN — HEPARIN SODIUM: 1000 INJECTION, SOLUTION INTRAVENOUS; SUBCUTANEOUS at 13:46

## 2023-07-10 RX ADMIN — EPTIFIBATIDE 20000 MCG: 2 INJECTION, SOLUTION INTRAVENOUS at 14:05

## 2023-07-10 RX ADMIN — VERAPAMIL HYDROCHLORIDE 2.5 MG: 2.5 INJECTION, SOLUTION INTRAVENOUS at 13:46

## 2023-07-10 RX ADMIN — INSULIN HUMAN 2 UNITS: 100 INJECTION, SOLUTION PARENTERAL at 17:57

## 2023-07-10 RX ADMIN — IOHEXOL 50 ML: 350 INJECTION, SOLUTION INTRAVENOUS at 14:07

## 2023-07-10 RX ADMIN — HEPARIN SODIUM 2000 UNITS: 200 INJECTION, SOLUTION INTRAVENOUS at 13:46

## 2023-07-10 ASSESSMENT — PATIENT HEALTH QUESTIONNAIRE - PHQ9
2. FEELING DOWN, DEPRESSED, IRRITABLE, OR HOPELESS: NOT AT ALL
1. LITTLE INTEREST OR PLEASURE IN DOING THINGS: NOT AT ALL
SUM OF ALL RESPONSES TO PHQ9 QUESTIONS 1 AND 2: 0

## 2023-07-10 ASSESSMENT — COGNITIVE AND FUNCTIONAL STATUS - GENERAL
DAILY ACTIVITIY SCORE: 24
SUGGESTED CMS G CODE MODIFIER DAILY ACTIVITY: CH
MOBILITY SCORE: 24
SUGGESTED CMS G CODE MODIFIER MOBILITY: CH

## 2023-07-10 ASSESSMENT — LIFESTYLE VARIABLES
CONSUMPTION TOTAL: NEGATIVE
DOES PATIENT WANT TO STOP DRINKING: CANNOT ASSESS
EVER HAD A DRINK FIRST THING IN THE MORNING TO STEADY YOUR NERVES TO GET RID OF A HANGOVER: NO
EVER FELT BAD OR GUILTY ABOUT YOUR DRINKING: NO
ON A TYPICAL DAY WHEN YOU DRINK ALCOHOL HOW MANY DRINKS DO YOU HAVE: 0
HAVE PEOPLE ANNOYED YOU BY CRITICIZING YOUR DRINKING: NO
AVERAGE NUMBER OF DAYS PER WEEK YOU HAVE A DRINK CONTAINING ALCOHOL: 0
HAVE YOU EVER FELT YOU SHOULD CUT DOWN ON YOUR DRINKING: NO
TOTAL SCORE: 0
HOW MANY TIMES IN THE PAST YEAR HAVE YOU HAD 5 OR MORE DRINKS IN A DAY: 0
TOTAL SCORE: 0
ALCOHOL_USE: NO
DO YOU DRINK ALCOHOL: NO
TOTAL SCORE: 0

## 2023-07-10 ASSESSMENT — PAIN DESCRIPTION - PAIN TYPE
TYPE: ACUTE PAIN

## 2023-07-10 ASSESSMENT — HEART SCORE
HISTORY: MODERATELY SUSPICIOUS
RISK FACTORS: 1-2 RISK FACTORS
ECG: NON-SPECIFIC REPOLARIZATION DISTURBANCE
TROPONIN: 1-3 TIMES NORMAL LIMIT
AGE: 65+
HEART SCORE: 6

## 2023-07-10 ASSESSMENT — FIBROSIS 4 INDEX
FIB4 SCORE: 4.83
FIB4 SCORE: 4.06
FIB4 SCORE: 4.06

## 2023-07-10 NOTE — PROGRESS NOTES
4 Eyes Skin Assessment Completed by MAGDA barahona and MAGDA John.    Head WDL  Ears Redness and Blanching  Nose WDL  Mouth WDL  Neck WDL  Breast/Chest Scar  Shoulder Blades WDL  Spine WDL  (R) Arm/Elbow/Hand TR band in place to right radial  (L) Arm/Elbow/Hand WDL  Abdomen WDL  Groin WDL  Scrotum/Coccyx/Buttocks WDL  (R) Leg WDL  (L) Leg WDL  (R) Heel/Foot/Toe WDL  (L) Heel/Foot/Toe WDL          Devices In Places Tele Box, Blood Pressure Cuff, and Pulse Ox      Interventions In Place Pillows, Heels Loaded W/Pillows, and Pressure Redistribution Mattress    Possible Skin Injury No    Pictures Uploaded Into Epic N/A  Wound Consult Placed N/A  RN Wound Prevention Protocol Ordered No

## 2023-07-10 NOTE — ED NOTES
2nd trop hemolyzed. Resent another sample at 1126.   Confirmed with lab now that it is in process.

## 2023-07-10 NOTE — ASSESSMENT & PLAN NOTE
- She will be on resume home Cozaar.  She will be started on Coreg.  Monitor blood pressure trend closely, start as needed IV labetalol for significant hypertension.

## 2023-07-10 NOTE — ED NOTES
Pt brought back to room, changed in to gown, connected to monitor. Seen by ERP, aware of POC. Repeat trop sent to lab. PIV obtained via ultrasound. Call light in reach.

## 2023-07-10 NOTE — PROGRESS NOTES
Received report from PACU, RN. Pt A &O x 4. Pt transported via [facility] with ACLS RN and zoll monitor. Pt arrived to unit, tele box on patient, confirmed SR with monitor room. Pt ambulated steadily with standby assist from rney to hospital bed, tolerated well. Pt oriented to unit and call light, verbalized understanding. Fall precautions in place. Call light and bedside table within reach, bed locked in lowest position with controls on. Assessment completed. Will continue to monitor.

## 2023-07-10 NOTE — ED NOTES
Pt called reporting sudden onset of chest pain/pressure. Obtained EKG, acute MI. Pt to trauma south to work up as STEMI. Pt to cath lab.

## 2023-07-10 NOTE — ED TRIAGE NOTES
"Chief Complaint   Patient presents with    Chest Pain     Starting last night while sleeping, center of chest and bilateral chest wall, described as pressure, intermittent and improved in triage, denies cardiac hx, hx GERD, took prilosec this AM     Pt ambulatory to triage for above complaints, VSS on rA, gCS 15, NAD.    Pt returned to lobby. Educated on triage process and to inform staff of any changes.     /69   Pulse 63   Temp 36.2 °C (97.1 °F) (Temporal)   Resp 18   Ht 1.702 m (5' 7\")   Wt 85.5 kg (188 lb 7.9 oz)   LMP 02/27/1997   SpO2 94%   BMI 29.52 kg/m²     "

## 2023-07-10 NOTE — ED PROVIDER NOTES
ED Provider Note    CHIEF COMPLAINT  Chief Complaint   Patient presents with    Chest Pain     Starting last night while sleeping, center of chest and bilateral chest wall, described as pressure, intermittent and improved in triage, denies cardiac hx, hx GERD, took prilosec this AM       EXTERNAL RECORDS REVIEWED      HPI/ROS  LIMITATION TO HISTORY   Select: : None  OUTSIDE HISTORIAN(S):  none    Ally Doherty is a 78 y.o. female who presents to the emergency department complaining of chest pain.  Patient complains of anterior chest pain that started around midnight.  Its in the anterior center of her chest radiates bilaterally.  It is described as pressure and squeezing initially quite severe.  Is associate with shortness of breath.  No nausea, vomiting or diaphoresis.  No tearing pain ration the scapula.  She denies any known history of heart disease.  Has a remote history of a stress test but nothing recent.  No history of venous thromboembolic disease no recent travel surgery or immobilization.  No cough fevers chills or hemoptysis.  Does have a history of diabetes.  No history of high cholesterol.  Questional history of hypertension.  Unknown family history no tobacco use.    PAST MEDICAL HISTORY   has a past medical history of Anemia, Breast cancer (HCC) (2/27/2012), Cancer (HCC) (2010), DM hyperosmolarity type II (HCC) (2/27/2012), DM II (diabetes mellitus, type II), controlled (HCC) (6/21/2012), Hiatus hernia syndrome, High cholesterol (02/10/15), HTN (hypertension) (02/12/15), Hyperlipidemia (2/27/2012), Hypothyroidism (2/27/2012), Liver cirrhosis (HCC), Malignant neoplasm of upper-inner quadrant of right breast in female, estrogen receptor positive (HCC) (3/13/2018), and Vitamin D deficiency (5/27/2014).    SURGICAL HISTORY   has a past surgical history that includes mastectomy; chemotherapy, unspecified procedure; radiation therapy plan simple; lucila by laparoscopy (2008); breast reconstruction  "(06/24/2013); tissue expander place/remove (06/24/2013); tubal ligation (1974); tonsillectomy (1953); capsulectomy (03/06/2014); breast reconstruction (02/12/2015); mammoplasty reduction (02/12/2015); mole excision (02/12/2015); mastectomy (Left, 12/09/2019); breast implant removal (Right, 12/09/2019); upper gi endoscopy,diagnosis (N/A, 06/18/2022); cataract extraction with iol (Left, 08/03/2022); and cataract extraction with iol (Right, 08/08/2022).    FAMILY HISTORY  Family History   Problem Relation Age of Onset    Other Other         adopted       SOCIAL HISTORY  Social History     Tobacco Use    Smoking status: Never    Smokeless tobacco: Never   Vaping Use    Vaping Use: Never used   Substance and Sexual Activity    Alcohol use: Yes     Alcohol/week: 0.0 oz     Comment: < 1 per week    Drug use: No    Sexual activity: Never       CURRENT MEDICATIONS  Home Medications       Reviewed by Raffaele Plasencia R.N. (Registered Nurse) on 07/10/23 at 0714  Med List Status: Not Addressed     Medication Last Dose Status   calcium carbonate (TUMS SMOOTHIES) 750 MG chewable tablet  Active   Calcium Carbonate-Vitamin D (CALTRATE 600+D PO)  Active   fluconazole (DIFLUCAN) 100 MG Tab  Active   glipiZIDE SR (GLIPIZIDE XL) 5 MG TABLET SR 24 HR  Active   levothyroxine (SYNTHROID) 100 MCG Tab  Active   losartan (COZAAR) 100 MG Tab  Active   metformin (GLUCOPHAGE) 1000 MG tablet  Active   propranolol (INDERAL) 10 MG Tab  Active   therapeutic multivitamin-minerals (THERAGRAN-M) Tab  Active                    ALLERGIES  Allergies   Allergen Reactions    [place]etta Unspecified     Pancreatitis    Penicillins Rash     All over    Statins [Hmg-Coa-R Inhibitors] Unspecified     Elevated liver enzymes       PHYSICAL EXAM  VITAL SIGNS: /69   Pulse 63   Temp 36.2 °C (97.1 °F) (Temporal)   Resp 18   Ht 1.702 m (5' 7\")   Wt 85.5 kg (188 lb 7.9 oz)   LMP 02/27/1997   SpO2 94%   BMI 29.52 kg/m²    Constitutional: Awake alert " nontoxic  HENT: Normocephalic, Atraumatic  Eyes: PERRL, EOMI, Conjunctiva normal, No discharge.   Neck: Normal range of motion, No tenderness, Supple, No stridor.   Cardiovascular: Normal heart rate, Normal rhythm, No murmurs, No rubs, No gallops.   Thorax & Lungs: Normal breath sounds, No respiratory distress, No wheezing, No chest tenderness.   Abdomen: Bowel sounds normal, Soft, No tenderness  Skin: Warm, Dry, No erythema, No rash.     Musculoskeletal: Good range of motion in all major joints.  No asymmetric edema good pulses  Neurologic: Alert,  No focal deficits noted.   Psychiatric: Affect normal      DIAGNOSTIC STUDIES / PROCEDURES  Results for orders placed or performed during the hospital encounter of 07/10/23   CBC with Differential   Result Value Ref Range    WBC 5.2 4.8 - 10.8 K/uL    RBC 5.02 4.20 - 5.40 M/uL    Hemoglobin 12.8 12.0 - 16.0 g/dL    Hematocrit 41.5 37.0 - 47.0 %    MCV 82.7 81.4 - 97.8 fL    MCH 25.5 (L) 27.0 - 33.0 pg    MCHC 30.8 (L) 32.2 - 35.5 g/dL    RDW 44.9 35.9 - 50.0 fL    Platelet Count 108 (L) 164 - 446 K/uL    MPV 10.0 9.0 - 12.9 fL    Neutrophils-Polys 65.30 44.00 - 72.00 %    Lymphocytes 18.50 (L) 22.00 - 41.00 %    Monocytes 9.20 0.00 - 13.40 %    Eosinophils 5.40 0.00 - 6.90 %    Basophils 1.20 0.00 - 1.80 %    Immature Granulocytes 0.40 0.00 - 0.90 %    Nucleated RBC 0.00 0.00 - 0.20 /100 WBC    Neutrophils (Absolute) 3.40 1.82 - 7.42 K/uL    Lymphs (Absolute) 0.96 (L) 1.00 - 4.80 K/uL    Monos (Absolute) 0.48 0.00 - 0.85 K/uL    Eos (Absolute) 0.28 0.00 - 0.51 K/uL    Baso (Absolute) 0.06 0.00 - 0.12 K/uL    Immature Granulocytes (abs) 0.02 0.00 - 0.11 K/uL    NRBC (Absolute) 0.00 K/uL   Complete Metabolic Panel (CMP)   Result Value Ref Range    Sodium 143 135 - 145 mmol/L    Potassium 4.4 3.6 - 5.5 mmol/L    Chloride 107 96 - 112 mmol/L    Co2 19 (L) 20 - 33 mmol/L    Anion Gap 17.0 (H) 7.0 - 16.0    Glucose 179 (H) 65 - 99 mg/dL    Bun 17 8 - 22 mg/dL    Creatinine  0.88 0.50 - 1.40 mg/dL    Calcium 9.4 8.5 - 10.5 mg/dL    AST(SGOT) 30 12 - 45 U/L    ALT(SGPT) 27 2 - 50 U/L    Alkaline Phosphatase 88 30 - 99 U/L    Total Bilirubin 0.5 0.1 - 1.5 mg/dL    Albumin 4.0 3.2 - 4.9 g/dL    Total Protein 6.9 6.0 - 8.2 g/dL    Globulin 2.9 1.9 - 3.5 g/dL    A-G Ratio 1.4 g/dL   Troponins NOW   Result Value Ref Range    Troponin T 38 (H) 6 - 19 ng/L   CORRECTED CALCIUM   Result Value Ref Range    Correct Calcium 9.4 8.5 - 10.5 mg/dL   ESTIMATED GFR   Result Value Ref Range    GFR (CKD-EPI) 68 >60 mL/min/1.73 m 2   TROPONIN   Result Value Ref Range    Troponin T 53 (H) 6 - 19 ng/L   EKG (NOW)   Result Value Ref Range    Report       [facility]    Test Date:  2023-07-10  Pt Name:    CHARU BAEZ              Department: ER  MRN:        [id number]                      Room:  Gender:     Female                       Technician: [id number]  :        1945                   Requested By:ER TRIAGE PROTOCOL  Order #:    [id number]                    Reading MD: BRIDGER MOURA. BLAS    Measurements  Intervals                                Axis  Rate:       57                           P:          34  VT:         158                          QRS:        -26  QRSD:       83                           T:          71  QT:         432  QTc:        421    Interpretive Statements  Sinus bradycardia  Borderline left axis deviation  Probable anterior infarct, old  Baseline wander in lead(s) II,III,aVF  Compared to ECG 2022 06:54:53  Myocardial infarct finding now present  Sinus rhythm no longer present  T-wave abnormality no longer present  Electronically Signed On 07- 09:54:25 PD T by BRIDGER MOURA. AMD     EKG   Result Value Ref Range    Report       [facility] Emergency Dept.    Test Date:  2023-07-10  Pt Name:    CHARU BAEZ              Department: ER  MRN:        [id number]                      Room:  Gender:     Female                        Technician: [id number]  :        1945                   Requested By:ER TRIAGE PROTOCOL  Order #:    [id number]                    Reading MD:    Measurements  Intervals                                Axis  Rate:       59                           P:          41  IA:         131                          QRS:        -33  QRSD:       79                           T:          13  QT:         422  QTc:        418    Interpretive Statements  Sinus bradycardia  Left axis deviation  Probable anterolateral infarct, acute  Compared to ECG 07/10/2023 07:06:08  No significant changes          RADIOLOGY  I have independently interpreted the diagnostic imaging associated with this visit and am waiting the final reading from the radiologist.     Radiologist interpretation: \    DX-CHEST-PORTABLE (1 VIEW)   Final Result      1.  No acute cardiac or pulmonary abnormalities are identified.   2.  Hiatal hernia.            COURSE & MEDICAL DECISION MAKING    ED Observation Status?  The patient does not meet observation criteria I have seen the patient she has a concerning chest pain presentation a positive troponin per protocol the time I evaluate her.  She will require hospitalization.      INITIAL ASSESSMENT, COURSE AND PLAN  Care Narrative:     Patient presents with anterior chest pain described as chest pressure.  Sesis with shortness of breath.  EKG is nonspecific changes.  A broad differential diagnosis was considered including but not limited to acute coronary syndrome, pneumonia, pneumothorax, dissection, and PE.    The patient is worked up for protocol.  Her chest x-ray does not show any acute cardiopulmonary abnormality.  She does have a hiatal hernia but I do not think this is causing her discomfort.      She does not have a history of PE or DVT no recent travel surgery or immobilization and no asymmetric swelling.  I think it is unlikely this is a PE.  The patient has a narrow mediastinum and good pulses  in all extremity and a pattern of pain inconsistent with a dissection.  I think this is less likely.      Patient does have a nonspecific EKG, symptoms concerning for ACS and elevated troponin.  She will require hospitalization for continued work-up and treatment.  Her heart score is 6.      The patient is given aspirin.  She is currently pain-free.  To make us aware if her pain worsens.  Case to be discussed with the hospitalist and care transferred at that time.  Case discussed with Dr. Savage and care transferred at that time.          ADDITIONAL PROBLEM LIST  Diabetes    DISPOSITION AND DISCUSSIONS  I have discussed management of the patient with the following physicians and MERRY's: [facility] hospitalist        Decision tools and prescription drugs considered including, but not limited to: HEART Score   .    FINAL DIAGNOSIS  1. Other chest pain    2. Elevated troponin      Addendum: The patient had recurrent chest pain which was severe.  Nurses did a repeat EKG that shows ST segment elevation anteriorly that is concerning for an ST segment elevation MI.    The patient was moved to the trauma room and made a code STEMI.  Morphine, Zofran, and nitrates are administered and patient was seen by cardiology be taken emergently to the Cath Lab.      Additional addendum: Critical care time 35 minutes.  Clued initial care and repeat care status post second EKG with EKG change.      CRITICAL CARE  The very real possibilty of a deterioration of this patient's condition required the highest level of my preparedness for sudden, emergent intervention.  I provided critical care services, which included medication orders, frequent reevaluations of the patient's condition and response to treatment, ordering and reviewing test results, and discussing the case with various consultants.  The critical care time associated with the care of the patient was 35 minutes. Review chart for interventions. This time is exclusive of any other  billable procedures.       Electronically signed by: Micky Marquez M.D., 7/10/2023 9:54 AM

## 2023-07-10 NOTE — PROCEDURES
Cardiac Catheterization Procedure    DATE: 7/10/2023    : Otis Mars MD, MPH    PROCEDURES PERFORMED:  Left heart catheterization  Coronary angiography  Percutaneous coronary intervention of mid LAD  Intravascular ultrasound to guide revascularization  Moderate conscious sedation    INDICATIONS:  Anterior/anterolateral STEMI    CONSENT:  The complete alternatives, risks, and benefits of the procedure were explained to the patient. Signed informed consent was obtained and placed in the chart prior to the procedure.    MEDICATIONS:  Lidocaine  Fentanyl  Midazolam  Nitroglycerin  Verapamil  Heparin  Integrilin bolus x1  Brilinta 180 mg p.o.    MODERATE CONSCIOUS SEDATION:  I personally supervised the administration of moderate conscious sedation by the nursing staff for 29 minutes.  Start time: 1339  End time: 1408    CONTRAST: Omnipaque 50 cc    RADIATION (Air Kerma):221 mGy    FLUOROSCOPY TIME: 7.8 minutes    ESTIMATED BLOOD LOSS: <50 cc    COMPLICATIONS: None apparent    PROCEDURE OVERVIEW:  The patient was brought to the cardiac catheterization laboratory in the fasting state. The skin over the right wrist was prepped and draped in the usual sterile fashion. Lidocaine infiltration was used to anesthetize the tissue over the right radial artery. Using the micropuncture technique, a 6-Guamanian Glidesheath was inserted in the right radial artery. A 5 Gu[place]ian Mintigoer diagnostic catheter was then advanced over a standard J-wire into the left ventricular cavity where it was gently aspirated, flushed, and then withdrawn across the aortic valve with sequential pressures measured. This catheter was then used to engage the ostium of the right coronary artery and cineangiograms were obtained in multiple projections for complete evaluation of the right coronary system. This catheter was then used to engage the ostium of the left coronary artery and cineangiograms were obtained in multiple projections for complete  evaluation of the left coronary system. Following completion of coronary angiography, we proceeded with PCI of the LAD. See below for more details.     HEMODYNAMICS:  Aortic pressure: 126 /83/104 mmHg  LVEDP: 35 mmHg  No significant aortic gradient on pullback    LEFT VENTRICULOGRAPHY   Estimated LVEF= 50%. Hypokinesis in the anterior wall    CORONARY ANGIOGRAPHY:  The left main coronary artery: Short large caliber that bifurcates to LAD and left circumflex  The left anterior descending coronary artery: Large caliber transapical vessel with 100% mid occlusion status post accessible IVUS guided PCI as detailed below  The left circumflex coronary artery: Large-caliber vessel that gives rise to large caliber OM1 and a large in caliber left PLB  The right coronary artery: Large-caliber dominant vessel with a long segment of severe 80% proximal/mid RCA stenosis    PERCUTANEOUS CORONARY INTERVENTION of mid LAD:  Pre: 100% stenosis and BEVERLY 0 flow  Post: 0% residual stenosis and BEVERLY III flow.   Guide Catheter(s): 6 Bahraini EBU 3.5  Guidewire(s): Run-through  Anticoagulation:  Heparin to maintain ACT > 250  Antiplatelet(s): Aspirin, Integrilin bolus, Brilinta  Pre-dilation balloon(s): 3 x 12 mm  IVUS or OCT used: IVUS guided  Intracoronary Atherectomy / Lithotripsy: None  Stent: Synergy 3.5 x 20 mm CHARLIE  Post-dilation balloon(s): 3.75 x 12 mm NC to about 3.8 mm     No dissection, signs of no-reflow, distal embolization or perforation post PCI.    Closing: At completion of the procedure the relevant equipment was removed from the body and hemostasis achieved by Radial band for the right radial arteriotomy site.    The patient left the cath lab  CP free,  hemodynamically stable and neurologically intact.      IMPRESSION:  1.  Severe two-vessel CAD (occluded mid LAD -STEMI culprit , and proximal/mid RCA) status post accessible IVUS guided revascularization of the mid LAD deploying Synergy 3.5 x 20 mm CHARLIE postdilated to about  3.8 mm.  2.  Low-normal estimated LV systolic function 50% with anterior hypokinesis  3.  Significantly elevated resting LVEDP 35 mmHg with no significant transaortic gradient on pullback    RECOMMENDATIONS:  Dual antiplatelet therapy for at least 12 months with close monitoring of her platelet count given MEDINA cirrhosis with chronic mild thrombocytopenia  Staged PCI to the RCA, either inpatient or outpatient  HI statin: Target LDL < 70 and TG < 150  GDMT and lifestyle modifications for secondary ASCVD prevention  Cardiac Rehab referral placed    NOTIFICATION:  The patient's  was updated in the waiting area    Referring cardiologist- Dr. Guido - was notified.    Otis Mars MD, MPH [facility]  Interventional Cardiologist  [facility]   of Clinical Internal Medicine - [facility] Henrique HOLLAND

## 2023-07-10 NOTE — ASSESSMENT & PLAN NOTE
Came with chest pain and elevated troponin and EKG showed ST elevation  Cath Lab, with findings of severe two-vessel CAD (occluded mid LAD -STEMI culprit , and proximal/mid RCA), s/p CHARLIE to the mid LAD.    Had stent on LAD and and stent on RCA on 7/12  Telemetry  Aspirin, Brilinta, rosuvastatin 20 mg daily  Echo showed ejection fraction 40%

## 2023-07-10 NOTE — H&P
Hospital Medicine History & Physical Note    Date of Service  7/10/2023    Primary Care Physician  Lillie Herrera M.D.    Consultants  cardiology      Code Status  Full Code    Chief Complaint  Chief Complaint   Patient presents with    Chest Pain     Starting last night while sleeping, center of chest and bilateral chest wall, described as pressure, intermittent and improved in triage, denies cardiac hx, hx GERD, took prilosec this AM       History of Presenting Illness  Ally Doherty is a 78 y.o. female with type 2 diabetes mellitus, hypertension, hyperlipidemia, hypothyroidism, who was doing well until last night at around midnight when she was awakened by acute onset anterior chest pain which radiated bilaterally.  She described the pain as severe, squeezing, and pressure-like, with associated shortness of breath, without any nausea, vomiting or diaphoresis.  Otherwise, she stated that she was feeling well when she went to bed last night, and had no complaints.  She denies any fevers, chills, abdominal pain, bowel movement changes. She was then emergently brought to the ED.      ED course:  On evaluation, vital signs were stable.  She was not hypoxic.  Initial blood work-up showed normal WBC count, and creatinine.  LFTs were normal.  Initial troponin was 38.  Chest x-ray (personally reviewed) did not show infiltrates or consolidation, and only showed hiatal hernia.  She had another episode of chest pain in the ED, and EKG showed anterior ST segment elevation, and subsequent troponin increased to 53.  Cardiology was consulted, and patient was emergently brought to the Cath Lab for an C, where  she was found to have severe two-vessel CAD (occluded mid LAD -STEMI culprit , and proximal/mid RCA), s/p CHARLIE to the mid LAD.  Cardiology recommended staged PCI to the mid RCA which can be done as an outpatient. Patient subsequently admitted to the hospital service.      I personally reviewed all lab  results mentioned above. Prior medical records from this institution and outside facilities were independently reviewed as noted. I also personally reviewed all ER physician and consultant recommendations and plans as documented above. History was independently obtained by myself. I discussed the plan of care with patient, family, bedside RN, cardiology, and ERP .    Review of Systems  ROS    Pertinent positives/negatives as mentioned above.     A complete review of systems was personally done by me. All other systems were negative.       Past Medical History   has a past medical history of Anemia, Breast cancer (HCC) (2/27/2012), Cancer (HCC) (2010), DM hyperosmolarity type II (HCC) (2/27/2012), DM II (diabetes mellitus, type II), controlled (HCC) (6/21/2012), Hiatus hernia syndrome, High cholesterol (02/10/15), HTN (hypertension) (02/12/15), Hyperlipidemia (2/27/2012), Hypothyroidism (2/27/2012), Liver cirrhosis (HCC), Malignant neoplasm of upper-inner quadrant of right breast in female, estrogen receptor positive (HCC) (3/13/2018), and Vitamin D deficiency (5/27/2014).    Surgical History   has a past surgical history that includes mastectomy; pr chemotherapy, unspecified procedure; pr radiation therapy plan simple; lucila by laparoscopy (2008); breast reconstruction (06/24/2013); tissue expander place/remove (06/24/2013); tubal ligation (1974); tonsillectomy (1953); capsulectomy (03/06/2014); breast reconstruction (02/12/2015); mammoplasty reduction (02/12/2015); mole excision (02/12/2015); mastectomy (Left, 12/09/2019); breast implant removal (Right, 12/09/2019); pr upper gi endoscopy,diagnosis (N/A, 06/18/2022); cataract extraction with iol (Left, 08/03/2022); and cataract extraction with iol (Right, 08/08/2022).     Family History  family history includes Other in an other family member.     Social History   reports that she has never smoked. She has never used smokeless tobacco. She reports current alcohol  use. She reports that she does not use drugs.    Allergies  Allergies   Allergen Reactions    [place]etta Unspecified     Pancreatitis    Penicillins Rash     All over    Statins [Hmg-Coa-R Inhibitors] Unspecified     Elevated liver enzymes       Medications  Prior to Admission Medications   Prescriptions Last Dose Informant Patient Reported? Taking?   Calcium Carbonate-Vitamin D (CALTRATE 600+D PO) 7/9/2023 at am Patient Yes No   Sig: Take 1 Tablet by mouth every day.   glipiZIDE SR (GLIPIZIDE XL) 5 MG TABLET SR 24 HR 7/9/2023 at am  No No   Sig: Take 1 Tablet by mouth every day.   levothyroxine (SYNTHROID) 100 MCG Tab 7/9/2023 at am  No No   Sig: Take 1 tablet by mouth every morning on an empty stomach.   losartan (COZAAR) 100 MG Tab 7/9/2023 at hs  No No   Sig: Take 1 Tablet by mouth every day.   metformin (GLUCOPHAGE) 1000 MG tablet 7/9/2023 at hs  No No   Sig: Take 1 Tablet by mouth 2 times a day with meals.   propranolol (INDERAL) 10 MG Tab 7/9/2023 at hs  Yes Yes   Sig: Take 10 mg by mouth 2 times a day.   therapeutic multivitamin-minerals (THERAGRAN-M) Tab 7/9/2023 at am Patient Yes No   Sig: Take 1 Tab by mouth every day.      Facility-Administered Medications: None       Physical Exam  Temp:  [35.9 °C (96.6 °F)-36.2 °C (97.1 °F)] 35.9 °C (96.6 °F)  Pulse:  [58-70] 62  Resp:  [14-19] 19  BP: (132-192)/(64-94) 132/64  SpO2:  [90 %-97 %] 90 %  Blood Pressure : (!) 164/81   Temperature: 35.9 °C (96.6 °F)   Pulse: 60   Respiration: 16   Pulse Oximetry: 91 %       Physical Exam  Vitals reviewed.   Constitutional:       General: She is not in acute distress.     Appearance: Normal appearance. She is normal weight. She is not ill-appearing or diaphoretic.   HENT:      Head: Normocephalic and atraumatic.      Right Ear: External ear normal.      Left Ear: External ear normal.      Mouth/Throat:      Mouth: Mucous membranes are moist.      Pharynx: No oropharyngeal exudate or posterior oropharyngeal erythema.   Eyes:       General: No scleral icterus.     Extraocular Movements: Extraocular movements intact.      Conjunctiva/sclera: Conjunctivae normal.      Pupils: Pupils are equal, round, and reactive to light.   Cardiovascular:      Rate and Rhythm: Normal rate and regular rhythm.      Heart sounds: Normal heart sounds. No murmur heard.  Pulmonary:      Effort: Pulmonary effort is normal. No respiratory distress.      Breath sounds: Normal breath sounds. No stridor. No wheezing, rhonchi or rales.   Chest:      Chest wall: No tenderness.   Abdominal:      General: Bowel sounds are normal. There is no distension.      Palpations: Abdomen is soft. There is no mass.      Tenderness: There is no abdominal tenderness. There is no guarding or rebound.   Musculoskeletal:         General: No swelling. Normal range of motion.      Cervical back: Normal range of motion and neck supple. No rigidity. No muscular tenderness.      Right lower leg: No edema.      Left lower leg: No edema.   Lymphadenopathy:      Cervical: No cervical adenopathy.   Skin:     General: Skin is warm and dry.      Coloration: Skin is not jaundiced.      Findings: No rash.   Neurological:      General: No focal deficit present.      Mental Status: She is alert and oriented to person, place, and time. Mental status is at baseline.      Cranial Nerves: No cranial nerve deficit.   Psychiatric:         Mood and Affect: Mood normal.         Behavior: Behavior normal.         Thought Content: Thought content normal.         Judgment: Judgment normal.         Laboratory:  Recent Labs     07/10/23  0740   WBC 5.2   RBC 5.02   HEMOGLOBIN 12.8   HEMATOCRIT 41.5   MCV 82.7   MCH 25.5*   MCHC 30.8*   RDW 44.9   PLATELETCT 108*   MPV 10.0     Recent Labs     07/10/23  0740   SODIUM 143   POTASSIUM 4.4   CHLORIDE 107   CO2 19*   GLUCOSE 179*   BUN 17   CREATININE 0.88   CALCIUM 9.4     Recent Labs     07/10/23  0740   ALTSGPT 27   ASTSGOT 30   ALKPHOSPHAT 88   TBILIRUBIN 0.5    GLUCOSE 179*         No results for input(s): NTPROBNP in the last 72 hours.      Recent Labs     07/10/23  0740 07/10/23  1126   TROPONINT 38* 53*       Imaging:  DX-CHEST-PORTABLE (1 VIEW)   Final Result      1.  No acute cardiac or pulmonary abnormalities are identified.   2.  Hiatal hernia.      CL-LEFT HEART CATHETERIZATION WITH POSSIBLE INTERVENTION    (Results Pending)   EC-ECHOCARDIOGRAM COMPLETE W/O CONT    (Results Pending)         Imaging studies and EKG results were independently reviewed as above.      Assessment/Plan:  Justification for Admission Status  I anticipate this patient will require at least two midnights for appropriate medical management, necessitating inpatient admission because of ACS/STEMI requiring emergent LHC and CHARLIE placement.  She will need close monitoring post cath. Patient is at risk for clinical decompensation, and will need close monitoring in the hospital.  Medically necessary services cannot be provided at a lower level of care.      Patient will need a Telemetry bed on MEDICAL service .  The need is secondary to ACS.    * ACS with STEMI (ST elevation myocardial infarction) (HCC)- (present on admission)  Assessment & Plan  - Presented with acute onset chest pain, troponin elevated, and with ST elevation on EKG.  Was brought emergently to the Cath Lab, with findings of severe two-vessel CAD (occluded mid LAD -STEMI culprit , and proximal/mid RCA), s/p CHARLIE to the mid LAD.    -Admit to telemetry.  -Discussed with interventional cardiology (Dr. Mars), start DAPT with aspirin and brilinta.  Start Coreg, and continue losartan.  -Ideally, should be on high intensity statin, but she has HMG Coa reductase inhibitor intolerance.  Check lipid profile.  Cardiology will do further discussion regarding statin therapy with the patient.   -Check echocardiogram.  -Monitor on telemetry.   -Pain control with IV morphine and sublingual nitroglycerin for chest pain recurrences.  -Cardiology  recommends staged PCI to the mid RCA, which can be done as an outpatient.    Dyslipidemia- (present on admission)  Assessment & Plan  - Ideally should be on high intensity statin given ACS/STEMI, but has statin intolerance.  Check lipid profile.    Type 2 diabetes mellitus, without long-term current use of insulin (HCC)- (present on admission)  Assessment & Plan  - Hold and glipizide and Metformin for now while in the hospital.  I will start her on sliding scale insulin coverage.  Check HbA1c. Accu-Cheks before meals and at bedtime. Goal to keep BG between 140-180 per 2019 ADA guidelines.      Hypothyroid- (present on admission)  Assessment & Plan  - Resume home Synthroid.    Hypertension- (present on admission)  Assessment & Plan  - She will be on resume home Cozaar.  She will be started on Coreg.  Monitor blood pressure trend closely, start as needed IV labetalol for significant hypertension.        VTE prophylaxis: enoxaparin ppx

## 2023-07-10 NOTE — CONSULTS
Reason for Consult:  Asked by Dr Cj Savage M.D. to see this patient with stemi    CC:   Chief Complaint   Patient presents with    Chest Pain     Starting last night while sleeping, center of chest and bilateral chest wall, described as pressure, intermittent and improved in triage, denies cardiac hx, hx GERD, took prilosec this AM       HPI:      78 year old woman with PMH breast CA, DM2, HLD, hypothyroid, liver cirrhosis presents with intermittent chest pain. Describes episode of chest squeezing waking patient from sleep. Initially improved however recurred. Ekg finding st elevation with typical angina. Code stemi called. Seen by intervention cards and general cards plan for urgent coronary angiography. Denies toxic social habits. No relevant family history. No prior cardiac history. 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     Medications / Drug list prior to admission:  No current facility-administered medications on file prior to encounter.     Current Outpatient Medications on File Prior to Encounter   Medication Sig Dispense Refill    propranolol (INDERAL) 10 MG Tab Take 10 mg by mouth 2 times a day.      glipiZIDE SR (GLIPIZIDE XL) 5 MG TABLET SR 24 HR Take 1 Tablet by mouth every day. 100 Tablet 3    metformin (GLUCOPHAGE) 1000 MG tablet Take 1 Tablet by mouth 2 times a day with meals. 200 Tablet 1    losartan (COZAAR) 100 MG Tab Take 1 Tablet by mouth every day. 100 Tablet 3    levothyroxine (SYNTHROID) 100 MCG Tab Take 1 tablet by mouth every morning on an empty stomach. 100 Tablet 1    therapeutic multivitamin-minerals (THERAGRAN-M) Tab Take 1 Tab by mouth every day.      Calcium Carbonate-Vitamin D (CALTRATE 600+D PO) Take 1 Tablet by mouth every day.         Current list of administered Medications:    Current Facility-Administered Medications:     nitroglycerin (Nitrostat) tablet 0.4 mg, 0.4 mg, Sublingual, Q5 MIN PRN, Cj Savage M.D.    VERAPAMIL HCL  "2.5 MG/ML IV SOLN, , , ,     HEPARIN SODIUM (PORCINE) 1000 UNIT/ML INJ SOLN, , , ,     LIDOCAINE HCL 2 % INJ SOLN, , , ,     HEPARIN (PORCINE) IN NACL 2000-0.9 UNIT/L-% IV SOLN, , , ,     NITROGLYCERIN 2 MG IV SOLN, , , ,     FENTANYL CITRATE (PF) 0.05 MG/ML INJ SOLN (WRAPPED), , , ,     MIDAZOLAM HCL 2 MG/2ML INJ SOLN (WRAPPER), , , ,     Current Outpatient Medications:     propranolol (INDERAL) 10 MG Tab, Take 10 mg by mouth 2 times a day., Disp: , Rfl:     glipiZIDE SR (GLIPIZIDE XL) 5 MG TABLET SR 24 HR, Take 1 Tablet by mouth every day., Disp: 100 Tablet, Rfl: 3    metformin (GLUCOPHAGE) 1000 MG tablet, Take 1 Tablet by mouth 2 times a day with meals., Disp: 200 Tablet, Rfl: 1    losartan (COZAAR) 100 MG Tab, Take 1 Tablet by mouth every day., Disp: 100 Tablet, Rfl: 3    levothyroxine (SYNTHROID) 100 MCG Tab, Take 1 tablet by mouth every morning on an empty stomach., Disp: 100 Tablet, Rfl: 1    therapeutic multivitamin-minerals (THERAGRAN-M) Tab, Take 1 Tab by mouth every day., Disp: , Rfl:     Calcium Carbonate-Vitamin D (CALTRATE 600+D PO), Take 1 Tablet by mouth every day., Disp: , Rfl:     Past Medical History:   Diagnosis Date    Anemia     Breast cancer (HCC) 2/27/2012    Cancer (HCC) 2010    right breast    DM hyperosmolarity type II (HCC) 2/27/2012    oral meds    DM II (diabetes mellitus, type II), controlled (HCC) 6/21/2012    Hiatus hernia syndrome     High cholesterol 02/10/15    Pt denies    HTN (hypertension) 02/12/15    more \"preventative\" for DM-per her     Hyperlipidemia 2/27/2012    Hypothyroidism 2/27/2012    Liver cirrhosis (HCC)     couldn't take statins    Malignant neoplasm of upper-inner quadrant of right breast in female, estrogen receptor positive (HCC) 3/13/2018    Vitamin D deficiency 5/27/2014    ICD-10 transition       Past Surgical History:   Procedure Laterality Date    CATARACT EXTRACTION WITH IOL Right 08/08/2022    Dr. Monzon    CATARACT EXTRACTION WITH IOL Left 08/03/2022    " Dr. Monzon    ND UPPER GI ENDOSCOPY,DIAGNOSIS N/A 06/18/2022    Procedure: UPPER ENDOSCOPY;  Surgeon: Venkat Hitchcock M.D.;  Location: SURGERY [facility];  Service: Gastroenterology    MASTECTOMY Left 12/09/2019    Procedure: MASTECTOMY;  Surgeon: Edward Boo M.D.;  Location: SURGERY SAME DAY [facility];  Service: Plastics    BREAST IMPLANT REMOVAL Right 12/09/2019    Procedure: REMOVAL, IMPLANT, BREAST;  Surgeon: Edward Boo M.D.;  Location: SURGERY SAME DAY [facility];  Service: Plastics    BREAST RECONSTRUCTION  02/12/2015    Performed by Edward Boo M.D. at SURGERY [facility] ORS    MAMMOPLASTY REDUCTION  02/12/2015    Performed by Edward Boo M.D. at SURGERY [facility] ORS    MOLE EXCISION  02/12/2015    Performed by Edward Boo M.D. at SURGERY [facility] ORS    CAPSULECTOMY  03/06/2014    Performed by Edward Boo M.D. at SURGERY [facility]    BREAST RECONSTRUCTION  06/24/2013    Performed by Edward Boo M.D. at SURGERY [facility] ORS    TISSUE EXPANDER PLACE/REMOVE  06/24/2013    Performed by Edward Boo M.D. at SURGERY [facility] ORS    CAYETANO BY LAPAROSCOPY  2008    TUBAL LIGATION  1974    TONSILLECTOMY  1953    MASTECTOMY      right breast    ND CHEMOTHERAPY, UNSPECIFIED PROCEDURE      ND RADIATION THERAPY PLAN SIMPLE         Family History   Problem Relation Age of Onset    Other Other         adopted     Patient family history was personally reviewed, no pertinent family history to current presentation    Social History     Socioeconomic History    Marital status:      Spouse name: Not on file    Number of children: Not on file    Years of education: Not on file    Highest education level: Bachelor's degree (e.g., BA, AB, BS)   Occupational History    Not on file   Tobacco Use    Smoking status: Never    Smokeless tobacco: Never   Vaping Use    Vaping Use: Never used   Substance and Sexual Activity    Alcohol use: Yes     Alcohol/week: 0.0 oz     Comment: < 1 per  week    Drug use: No    Sexual activity: Never   Other Topics Concern    Not on file   Social History Narrative    Not on file     Social Determinants of Health     Financial Resource Strain: Low Risk  (6/8/2023)    Overall Financial Resource Strain (CARDIA)     Difficulty of Paying Living Expenses: Not hard at all   Food Insecurity: No Food Insecurity (6/8/2023)    Hunger Vital Sign     Worried About Running Out of Food in the Last Year: Never true     Ran Out of Food in the Last Year: Never true   Transportation Needs: No Transportation Needs (6/8/2023)    PRAPARE - Transportation     Lack of Transportation (Medical): No     Lack of Transportation (Non-Medical): No   Physical Activity: Insufficiently Active (6/8/2023)    Exercise Vital Sign     Days of Exercise per Week: 4 days     Minutes of Exercise per Session: 20 min   Stress: No Stress Concern Present (6/8/2023)    [employer] - Occupational Stress Questionnaire     Feeling of Stress : Only a little   Social Connections: Socially Integrated (6/8/2023)    Social Connection and Isolation Panel [NHANES]     Frequency of Communication with Friends and Family: Never     Frequency of Social Gatherings with Friends and Family: More than three times a week     Attends Jainism Services: More than 4 times per year     Active Member of Clubs or Organizations: Yes     Attends Club or Organization Meetings: More than 4 times per year     Marital Status:    Intimate Partner Violence: Not At Risk (6/8/2023)    Humiliation, Afraid, Rape, and Kick questionnaire     Fear of Current or Ex-Partner: No     Emotionally Abused: No     Physically Abused: No     Sexually Abused: No   Housing Stability: Low Risk  (6/8/2023)    Housing Stability Vital Sign     Unable to Pay for Housing in the Last Year: No     Number of Places Lived in the Last Year: 1     Unstable Housing in the Last Year: No       ALLERGIES:  Allergies   Allergen Reactions     "[place] Unspecified     Pancreatitis    Penicillins Rash     All over    Statins [Hmg-Coa-R Inhibitors] Unspecified     Elevated liver enzymes       Review of systems:  A complete review of symptoms was reviewed with patient. This is reviewed in H&P and PMH. ALL OTHERS reviewed and negative    Physical exam:  Patient Vitals for the past 24 hrs:   BP Temp Temp src Pulse Resp SpO2 Height Weight   07/10/23 1330 (!) 164/81 -- -- 70 16 97 % -- --   07/10/23 1300 (!) 152/70 -- -- 66 14 97 % -- --   07/10/23 1230 (!) 148/67 -- -- (!) 58 18 95 % -- --   07/10/23 1200 (!) 162/69 -- -- 61 19 93 % -- --   07/10/23 1130 (!) 160/70 -- -- 60 18 94 % -- --   07/10/23 1100 (!) 192/74 -- -- 62 16 95 % -- --   07/10/23 1000 (!) 176/77 -- -- (!) 58 15 95 % -- --   07/10/23 0709 -- -- -- -- -- -- 1.702 m (5' 7\") 85.5 kg (188 lb 7.9 oz)   07/10/23 0701 136/69 36.2 °C (97.1 °F) Temporal 63 18 94 % -- --     General: No acute distress.   EYES: no jaundice  HEENT: OP clear   Neck: No bruits No JVD.   CVS:  RRR. S1 + S2. No M/R/G. No edema.  Resp: CTAB. No wheezing or crackles/rhonchi.  Abdomen: Soft, NT, ND,  Skin: Grossly nothing acute no obvious rashes  Neurological: Alert, Moves all extremities, no cranial nerve defects on limited exam  Extremities: Pulse 2+ in b/l LE. No cyanosis.     Data:  Laboratory studies personally reviewed by me:  Recent Results (from the past 24 hour(s))   EKG (NOW)    Collection Time: 07/10/23  7:06 AM   Result Value Ref Range    Report       [facility] Emergency Dept.    Test Date:  2023-07-10  Pt Name:    CHARU BAEZ              Department: ER  MRN:        [id number]                      Room:  Gender:     Female                       Technician: [id number]  :        1945                   Requested By:ER TRIAGE PROTOCOL  Order #:    [id number]                    Sary MD: BRIDGER MOURA. AMD    Measurements  Intervals                                Axis  Rate:       57             "               P:          34  TN:         158                          QRS:        -26  QRSD:       83                           T:          71  QT:         432  QTc:        421    Interpretive Statements  Sinus bradycardia  Borderline left axis deviation  Probable anterior infarct, old  Baseline wander in lead(s) II,III,aVF  Compared to ECG 06/18/2022 06:54:53  Myocardial infarct finding now present  Sinus rhythm no longer present  T-wave abnormality no longer present  Electronically Signed On 07- 09:54:25 PD T by BRIDGER MOURA. AMD     CBC with Differential    Collection Time: 07/10/23  7:40 AM   Result Value Ref Range    WBC 5.2 4.8 - 10.8 K/uL    RBC 5.02 4.20 - 5.40 M/uL    Hemoglobin 12.8 12.0 - 16.0 g/dL    Hematocrit 41.5 37.0 - 47.0 %    MCV 82.7 81.4 - 97.8 fL    MCH 25.5 (L) 27.0 - 33.0 pg    MCHC 30.8 (L) 32.2 - 35.5 g/dL    RDW 44.9 35.9 - 50.0 fL    Platelet Count 108 (L) 164 - 446 K/uL    MPV 10.0 9.0 - 12.9 fL    Neutrophils-Polys 65.30 44.00 - 72.00 %    Lymphocytes 18.50 (L) 22.00 - 41.00 %    Monocytes 9.20 0.00 - 13.40 %    Eosinophils 5.40 0.00 - 6.90 %    Basophils 1.20 0.00 - 1.80 %    Immature Granulocytes 0.40 0.00 - 0.90 %    Nucleated RBC 0.00 0.00 - 0.20 /100 WBC    Neutrophils (Absolute) 3.40 1.82 - 7.42 K/uL    Lymphs (Absolute) 0.96 (L) 1.00 - 4.80 K/uL    Monos (Absolute) 0.48 0.00 - 0.85 K/uL    Eos (Absolute) 0.28 0.00 - 0.51 K/uL    Baso (Absolute) 0.06 0.00 - 0.12 K/uL    Immature Granulocytes (abs) 0.02 0.00 - 0.11 K/uL    NRBC (Absolute) 0.00 K/uL   Complete Metabolic Panel (CMP)    Collection Time: 07/10/23  7:40 AM   Result Value Ref Range    Sodium 143 135 - 145 mmol/L    Potassium 4.4 3.6 - 5.5 mmol/L    Chloride 107 96 - 112 mmol/L    Co2 19 (L) 20 - 33 mmol/L    Anion Gap 17.0 (H) 7.0 - 16.0    Glucose 179 (H) 65 - 99 mg/dL    Bun 17 8 - 22 mg/dL    Creatinine 0.88 0.50 - 1.40 mg/dL    Calcium 9.4 8.5 - 10.5 mg/dL    AST(SGOT) 30 12 - 45 U/L    ALT(SGPT) 27 2 - 50  U/L    Alkaline Phosphatase 88 30 - 99 U/L    Total Bilirubin 0.5 0.1 - 1.5 mg/dL    Albumin 4.0 3.2 - 4.9 g/dL    Total Protein 6.9 6.0 - 8.2 g/dL    Globulin 2.9 1.9 - 3.5 g/dL    A-G Ratio 1.4 g/dL   Troponins NOW    Collection Time: 07/10/23  7:40 AM   Result Value Ref Range    Troponin T 38 (H) 6 - 19 ng/L   CORRECTED CALCIUM    Collection Time: 07/10/23  7:40 AM   Result Value Ref Range    Correct Calcium 9.4 8.5 - 10.5 mg/dL   ESTIMATED GFR    Collection Time: 07/10/23  7:40 AM   Result Value Ref Range    GFR (CKD-EPI) 68 >60 mL/min/1.73 m 2   TROPONIN    Collection Time: 07/10/23 11:26 AM   Result Value Ref Range    Troponin T 53 (H) 6 - 19 ng/L   EKG    Collection Time: 07/10/23  1:09 PM   Result Value Ref Range    Report       [facility] Emergency Dept.    Test Date:  2023-07-10  Pt Name:    CHARU BAEZ              Department: ER  MRN:        [id number]                      Room:  Gender:     Female                       Technician: [id number]  :        1945                   Requested By:ER TRIAGE PROTOCOL  Order #:    [id number]                    Reading MD:    Measurements  Intervals                                Axis  Rate:       59                           P:          41  CA:         131                          QRS:        -33  QRSD:       79                           T:          13  QT:         422  QTc:        418    Interpretive Statements  Sinus bradycardia  Left axis deviation  Probable anterolateral infarct, acute  Compared to ECG 07/10/2023 07:06:08  No significant changes         EKG 7/10/23 1309 interpreted by me sinus, anteroseptal infarct age indeterminate    All pertinent features of laboratory and imaging reviewed including primary images where applicable      Principal Problem:    Chest pain (POA: Yes)  Resolved Problems:    * No resolved hospital problems. *      Assessment / Plan:  78 year old woman with recurrent angina in ED found dynamic ekg changes  suggestive anteroseptal stemi    -urgent LHC  -TTE  -dapt 12 months, heparin gtt  -BB, ACEi  -high intensity statin     I personally discussed her case with Dr Roy and Dr Marquez    It is my pleasure to participate in the care of Ms. Doherty.  Please do not hesitate to contact me with questions or concerns.    Winston Guido MD  Cardiologist [facility]

## 2023-07-10 NOTE — ASSESSMENT & PLAN NOTE
The ASCVD Risk score (Georgi MONIQUE, et al., 2019) failed to calculate for the following reasons:    The patient has a prior MI or stroke diagnosis

## 2023-07-11 ENCOUNTER — APPOINTMENT (OUTPATIENT)
Dept: CARDIOLOGY | Facility: MEDICAL CENTER | Age: 78
DRG: 246 | End: 2023-07-11
Attending: INTERNAL MEDICINE
Payer: MEDICARE

## 2023-07-11 LAB
ANION GAP SERPL CALC-SCNC: 13 MMOL/L (ref 7–16)
BASOPHILS # BLD AUTO: 0.6 % (ref 0–1.8)
BASOPHILS # BLD: 0.03 K/UL (ref 0–0.12)
BUN SERPL-MCNC: 17 MG/DL (ref 8–22)
CALCIUM SERPL-MCNC: 8.9 MG/DL (ref 8.5–10.5)
CHLORIDE SERPL-SCNC: 107 MMOL/L (ref 96–112)
CHOLEST SERPL-MCNC: 169 MG/DL (ref 100–199)
CO2 SERPL-SCNC: 20 MMOL/L (ref 20–33)
CREAT SERPL-MCNC: 0.88 MG/DL (ref 0.5–1.4)
EKG IMPRESSION: NORMAL
EOSINOPHIL # BLD AUTO: 0.07 K/UL (ref 0–0.51)
EOSINOPHIL NFR BLD: 1.4 % (ref 0–6.9)
ERYTHROCYTE [DISTWIDTH] IN BLOOD BY AUTOMATED COUNT: 44.1 FL (ref 35.9–50)
GFR SERPLBLD CREATININE-BSD FMLA CKD-EPI: 68 ML/MIN/1.73 M 2
GLUCOSE BLD STRIP.AUTO-MCNC: 174 MG/DL (ref 65–99)
GLUCOSE BLD STRIP.AUTO-MCNC: 178 MG/DL (ref 65–99)
GLUCOSE BLD STRIP.AUTO-MCNC: 217 MG/DL (ref 65–99)
GLUCOSE BLD STRIP.AUTO-MCNC: 263 MG/DL (ref 65–99)
GLUCOSE SERPL-MCNC: 177 MG/DL (ref 65–99)
HCT VFR BLD AUTO: 38.2 % (ref 37–47)
HDLC SERPL-MCNC: 47 MG/DL
HGB BLD-MCNC: 11.8 G/DL (ref 12–16)
IMM GRANULOCYTES # BLD AUTO: 0.02 K/UL (ref 0–0.11)
IMM GRANULOCYTES NFR BLD AUTO: 0.4 % (ref 0–0.9)
LDLC SERPL CALC-MCNC: 101 MG/DL
LV EJECT FRACT  99904: 40
LV EJECT FRACT MOD 2C 99903: 53.36
LV EJECT FRACT MOD 4C 99902: 47.73
LV EJECT FRACT MOD BP 99901: 43.01
LYMPHOCYTES # BLD AUTO: 0.64 K/UL (ref 1–4.8)
LYMPHOCYTES NFR BLD: 12.8 % (ref 22–41)
MCH RBC QN AUTO: 25.3 PG (ref 27–33)
MCHC RBC AUTO-ENTMCNC: 30.9 G/DL (ref 32.2–35.5)
MCV RBC AUTO: 81.8 FL (ref 81.4–97.8)
MONOCYTES # BLD AUTO: 0.52 K/UL (ref 0–0.85)
MONOCYTES NFR BLD AUTO: 10.4 % (ref 0–13.4)
NEUTROPHILS # BLD AUTO: 3.71 K/UL (ref 1.82–7.42)
NEUTROPHILS NFR BLD: 74.4 % (ref 44–72)
NRBC # BLD AUTO: 0 K/UL
NRBC BLD-RTO: 0 /100 WBC (ref 0–0.2)
PLATELET # BLD AUTO: 101 K/UL (ref 164–446)
PLATELETS.RETICULATED NFR BLD AUTO: 2.3 % (ref 0.6–13.1)
PMV BLD AUTO: 9.6 FL (ref 9–12.9)
POTASSIUM SERPL-SCNC: 4.3 MMOL/L (ref 3.6–5.5)
RBC # BLD AUTO: 4.67 M/UL (ref 4.2–5.4)
SODIUM SERPL-SCNC: 140 MMOL/L (ref 135–145)
TRIGL SERPL-MCNC: 106 MG/DL (ref 0–149)
WBC # BLD AUTO: 5 K/UL (ref 4.8–10.8)

## 2023-07-11 PROCEDURE — 700111 HCHG RX REV CODE 636 W/ 250 OVERRIDE (IP): Mod: JZ | Performed by: INTERNAL MEDICINE

## 2023-07-11 PROCEDURE — 770020 HCHG ROOM/CARE - TELE (206)

## 2023-07-11 PROCEDURE — 93010 ELECTROCARDIOGRAM REPORT: CPT | Performed by: INTERNAL MEDICINE

## 2023-07-11 PROCEDURE — 80048 BASIC METABOLIC PNL TOTAL CA: CPT

## 2023-07-11 PROCEDURE — 80061 LIPID PANEL: CPT

## 2023-07-11 PROCEDURE — 82962 GLUCOSE BLOOD TEST: CPT

## 2023-07-11 PROCEDURE — 700102 HCHG RX REV CODE 250 W/ 637 OVERRIDE(OP): Performed by: INTERNAL MEDICINE

## 2023-07-11 PROCEDURE — 85025 COMPLETE CBC W/AUTO DIFF WBC: CPT

## 2023-07-11 PROCEDURE — A9270 NON-COVERED ITEM OR SERVICE: HCPCS

## 2023-07-11 PROCEDURE — 700102 HCHG RX REV CODE 250 W/ 637 OVERRIDE(OP)

## 2023-07-11 PROCEDURE — 85055 RETICULATED PLATELET ASSAY: CPT

## 2023-07-11 PROCEDURE — 99232 SBSQ HOSP IP/OBS MODERATE 35: CPT | Performed by: INTERNAL MEDICINE

## 2023-07-11 PROCEDURE — 99233 SBSQ HOSP IP/OBS HIGH 50: CPT | Mod: GC | Performed by: INTERNAL MEDICINE

## 2023-07-11 PROCEDURE — 97161 PT EVAL LOW COMPLEX 20 MIN: CPT

## 2023-07-11 PROCEDURE — 93306 TTE W/DOPPLER COMPLETE: CPT

## 2023-07-11 PROCEDURE — A9270 NON-COVERED ITEM OR SERVICE: HCPCS | Performed by: INTERNAL MEDICINE

## 2023-07-11 PROCEDURE — 93306 TTE W/DOPPLER COMPLETE: CPT | Mod: 26 | Performed by: INTERNAL MEDICINE

## 2023-07-11 PROCEDURE — 36415 COLL VENOUS BLD VENIPUNCTURE: CPT

## 2023-07-11 PROCEDURE — 700117 HCHG RX CONTRAST REV CODE 255: Performed by: INTERNAL MEDICINE

## 2023-07-11 RX ORDER — ROSUVASTATIN CALCIUM 20 MG/1
20 TABLET, COATED ORAL EVERY EVENING
Status: DISCONTINUED | OUTPATIENT
Start: 2023-07-11 | End: 2023-07-13 | Stop reason: HOSPADM

## 2023-07-11 RX ORDER — EZETIMIBE 10 MG/1
10 TABLET ORAL DAILY
Status: DISCONTINUED | OUTPATIENT
Start: 2023-07-11 | End: 2023-07-11

## 2023-07-11 RX ADMIN — CARVEDILOL 6.25 MG: 6.25 TABLET, FILM COATED ORAL at 08:49

## 2023-07-11 RX ADMIN — HUMAN ALBUMIN MICROSPHERES AND PERFLUTREN 3 ML: 10; .22 INJECTION, SOLUTION INTRAVENOUS at 17:06

## 2023-07-11 RX ADMIN — ENOXAPARIN SODIUM 40 MG: 100 INJECTION SUBCUTANEOUS at 18:27

## 2023-07-11 RX ADMIN — INSULIN HUMAN 1 UNITS: 100 INJECTION, SOLUTION PARENTERAL at 18:34

## 2023-07-11 RX ADMIN — ASPIRIN 81 MG: 81 TABLET, COATED ORAL at 05:12

## 2023-07-11 RX ADMIN — LOSARTAN POTASSIUM 100 MG: 50 TABLET, FILM COATED ORAL at 05:12

## 2023-07-11 RX ADMIN — LEVOTHYROXINE SODIUM 100 MCG: 0.1 TABLET ORAL at 05:12

## 2023-07-11 RX ADMIN — INSULIN HUMAN 1 UNITS: 100 INJECTION, SOLUTION PARENTERAL at 08:55

## 2023-07-11 RX ADMIN — INSULIN HUMAN 3 UNITS: 100 INJECTION, SOLUTION PARENTERAL at 14:30

## 2023-07-11 RX ADMIN — INSULIN HUMAN 2 UNITS: 100 INJECTION, SOLUTION PARENTERAL at 20:39

## 2023-07-11 RX ADMIN — TICAGRELOR 90 MG: 90 TABLET ORAL at 18:28

## 2023-07-11 RX ADMIN — ROSUVASTATIN CALCIUM 20 MG: 20 TABLET, FILM COATED ORAL at 18:28

## 2023-07-11 RX ADMIN — TICAGRELOR 90 MG: 90 TABLET ORAL at 03:06

## 2023-07-11 RX ADMIN — CARVEDILOL 6.25 MG: 6.25 TABLET, FILM COATED ORAL at 18:29

## 2023-07-11 ASSESSMENT — COGNITIVE AND FUNCTIONAL STATUS - GENERAL
SUGGESTED CMS G CODE MODIFIER MOBILITY: CH
MOBILITY SCORE: 24

## 2023-07-11 ASSESSMENT — ENCOUNTER SYMPTOMS
BLURRED VISION: 0
WEIGHT LOSS: 0
DOUBLE VISION: 0
VOMITING: 0
DIARRHEA: 0
SPEECH CHANGE: 0
PHOTOPHOBIA: 0
CLAUDICATION: 0
NAUSEA: 0
ORTHOPNEA: 0
ABDOMINAL PAIN: 0
MYALGIAS: 0
CONSTIPATION: 0
WEAKNESS: 0
DIZZINESS: 0
NECK PAIN: 0
PALPITATIONS: 0
CHILLS: 0
HEMOPTYSIS: 0
FEVER: 0
COUGH: 0

## 2023-07-11 ASSESSMENT — PATIENT HEALTH QUESTIONNAIRE - PHQ9
SUM OF ALL RESPONSES TO PHQ9 QUESTIONS 1 AND 2: 0
2. FEELING DOWN, DEPRESSED, IRRITABLE, OR HOPELESS: NOT AT ALL
1. LITTLE INTEREST OR PLEASURE IN DOING THINGS: NOT AT ALL

## 2023-07-11 ASSESSMENT — GAIT ASSESSMENTS
GAIT LEVEL OF ASSIST: SUPERVISED
DISTANCE (FEET): 500

## 2023-07-11 ASSESSMENT — FIBROSIS 4 INDEX: FIB4 SCORE: 12.84

## 2023-07-11 NOTE — DISCHARGE PLANNING
HTH/SCP TCN chart review completed. Collaborated with ROX Martinez prior to meeting with the pt. The most current review of medical record, knowledge of pt's PLOF and social support, LACE+ score of 64, 6 clicks scores of 24 ADL's and 24 mobility were considered.      Pt seen at bedside. Introduced TCN program. Provided education regarding post acute levels of care. Discussed HTH/SCP plan benefits (Meds to Beds, medical uber and [facility] transitional care). Pt verbalizes understanding.     Patient lives in a 2 level home with her spouse Isael and was independent with ADL's, IADL's, mobility (no AD) and driving at baseline.  She denies any concern with food, housing or transportation.  She has no functional concerns and states her spouse can provide transportation home at discharge.     TCN will continue to follow and collaborate with discharge planning team as additional post acute needs arise. Thank you.     Completed today:  Awaiting PT recommendations.    Choice obtained: None.  Patient states she is at her baseline level of function.    [facility] [place] referral (N). Not sent. Patient wants outpatient f/u's.       Addendum:  1830- Per chart review, PT recommends outpatient cardiac rehab on 7/11/23.

## 2023-07-11 NOTE — PROGRESS NOTES
Cardiology Follow-up Consult Note  Date of note:    7/11/2023          Patient ID:  Name:   Ally Doherty   YOB: 1945  Age:   78 y.o.  female   MRN:   [id number]    Chief Complaint   Patient presents with    Chest Pain     Starting last night while sleeping, center of chest and bilateral chest wall, described as pressure, intermittent and improved in triage, denies cardiac hx, hx GERD, took prilosec this AM     Reason for Consult:  Asked by Dr Cj Savage M.D. to see this patient with stemi    HPI:   78 year old woman with PMH breast CA, DM2, HLD, hypothyroid, liver cirrhosis presents with intermittent chest pain. Describes episode of chest squeezing waking patient from sleep. Ekg finding st elevation with typical angina. Code STEMI called. No toxic social habits. No relevant family history. No prior cardiac history. Called for urgent LHC.    LHC 7/10/23:  HEMODYNAMICS:  Aortic pressure: 126 /83/104 mmHg  LVEDP: 35 mmHg  No significant aortic gradient on pullback     LEFT VENTRICULOGRAPHY   Estimated LVEF= 50%. Hypokinesis in the anterior wall     CORONARY ANGIOGRAPHY:  The left main coronary artery: Short large caliber that bifurcates to LAD and left circumflex  The left anterior descending coronary artery: Large caliber transapical vessel with 100% mid occlusion status post accessible IVUS guided PCI as detailed below  The left circumflex coronary artery: Large-caliber vessel that gives rise to large caliber OM1 and a large in caliber left PLB  The right coronary artery: Large-caliber dominant vessel with a long segment of severe 80% proximal/mid RCA stenosis    IMPRESSION:  1.  Severe two-vessel CAD (occluded mid LAD -STEMI culprit , and proximal/mid RCA) status post accessible IVUS guided revascularization of the mid LAD deploying Synergy 3.5 x 20 mm CHARLIE postdilated to about 3.8 mm.  2.  Low-normal estimated LV systolic function 50% with anterior hypokinesis  3.  Significantly elevated resting  LVEDP 35 mmHg with no significant transaortic gradient on pullback    Interim Events:  7/11:   BP and heart rate at goal.   Patient reports no CP, SOB, lightheadedness, dizziness, leg swelling, orthopnea or PND. Has not ambulated yet. Pending echo.     ROS  No NV, No Bleeding, No dizziness   All other review of systems reviewed and negative.    Past medical, surgical, social, and family history reviewed and unchanged from admission except as noted in assessment and plan.    Medications: Reviewed in MAR  Current Facility-Administered Medications   Medication Dose Frequency Provider Last Rate Last Admin    levothyroxine (Synthroid) tablet 100 mcg  100 mcg AM ES Murali Argueta M.D.   100 mcg at 07/11/23 0512    losartan (Cozaar) tablet 100 mg  100 mg DAILY Murali Argueta M.D.   100 mg at 07/11/23 0512    aspirin EC tablet 81 mg  81 mg DAILY Otis Mars M.D.   81 mg at 07/11/23 0512    ticagrelor (Brilinta) tablet 90 mg  90 mg BID Otis Mars M.D.   90 mg at 07/11/23 0306    Respiratory Therapy Consult   Continuous RT Murali Argueta M.D.        acetaminophen (Tylenol) tablet 650 mg  650 mg Q6HRS PRN Murali Argueta M.D.        ondansetron (Zofran) syringe/vial injection 4 mg  4 mg Q4HRS PRN Murali Argueta M.D.        ondansetron (Zofran ODT) dispertab 4 mg  4 mg Q4HRS PRN Murali Argueta M.D.        senna-docusate (Pericolace Or Senokot S) 8.6-50 MG per tablet 2 Tablet  2 Tablet BID Murali Argueta M.D.        And    polyethylene glycol/lytes (Miralax) PACKET 1 Packet  1 Packet QDAY PRN Murali Argueta M.D.        And    magnesium hydroxide (Milk Of Magnesia) suspension 30 mL  30 mL QDAY PRN Murali Argueta M.D.        And    bisacodyl (Dulcolax) suppository 10 mg  10 mg QDAY PRN Murali Argueta M.D.        enoxaparin (Lovenox) inj 40 mg  40 mg DAILY AT 1800 Murali Argueta M.D.        Pharmacy Consult Request ...Pain Management Review 1 Each  1 Each PHARMACY TO DOSE Murali Argueta M.D.        oxyCODONE  "immediate-release (Roxicodone) tablet 2.5 mg  2.5 mg Q3HRS PRN Murali Argueta M.D.        Or    oxyCODONE immediate-release (Roxicodone) tablet 5 mg  5 mg Q3HRS PRN Murali Argueta M.D.        Or    HYDROmorphone (Dilaudid) injection 0.25 mg  0.25 mg Q3HRS PRN Murali Argueta M.D.        labetalol (Normodyne/Trandate) injection 10 mg  10 mg Q4HRS PRN Murali Argueta M.D.        carvedilol (Coreg) tablet 6.25 mg  6.25 mg BID WITH MEALS Murali Argueta M.D.   6.25 mg at 07/10/23 1749    nitroglycerin (Nitrostat) tablet 0.4 mg  0.4 mg Q5 MIN PRN Murali Argueta M.D.        morphine 10 mg/mL injection 2-4 mg  2-4 mg Q5 MIN PRN Murali Argueta M.D.        insulin regular (HumuLIN R,NovoLIN R) injection  1-6 Units 4X/DAY MACIEJ Argueta M.D.   1 Units at 07/10/23 2059    And    dextrose 10 % BOLUS 25 g  25 g Q15 MIN PRN Murali Argueta M.D.       Last reviewed on 7/10/2023  4:50 PM by Jeny Mcmanus, Student   Allergies   Allergen Reactions    Byetta Unspecified     Pancreatitis    Penicillins Rash     All over    Statins [Hmg-Coa-R Inhibitors] Unspecified     Elevated liver enzymes       Physical Exam  Body mass index is 29.38 kg/m². /68   Pulse 68   Temp 36.4 °C (97.5 °F) (Temporal)   Resp 18   Ht 1.702 m (5' 7\")   Wt 85.1 kg (187 lb 9.8 oz)   SpO2 92%    Vitals:    07/10/23 2200 07/10/23 2336 07/11/23 0511 07/11/23 0753   BP: 138/78 117/70 117/68 120/68   Pulse: (!) 109 66 63 68   Resp: 20 18 16 18   Temp:    36.4 °C (97.5 °F)   TempSrc:  Temporal Temporal Temporal   SpO2: 96% 93% 92% 92%   Weight:       Height:        Oxygen Therapy:  Pulse Oximetry: 92 %, O2 (LPM): 0, O2 Delivery Device: None - Room Air    General: no apparent distress  Eyes: nl conjunctiva  ENT: OP clear  Neck: no JVD   Lungs: normal respiratory effort, CTAB, trace bibasilar crackles.   Heart: RRR, systolic 2/6 murmur in mitral point, no rubs or gallops,   EXT No edema bilateral lower extremities. + pedal pulses. no " cyanosis  Abdomen: soft, non tender, non distended,  Neurological: No focal deficits  Psychiatric: Appropriate affect,   Skin: Warm extremities    Labs (personally reviewed and notable for):   Recent Results (from the past 24 hour(s))   TROPONIN    Collection Time: 07/10/23 11:26 AM   Result Value Ref Range    Troponin T 53 (H) 6 - 19 ng/L   MAGNESIUM    Collection Time: 07/10/23 11:26 AM   Result Value Ref Range    Magnesium 1.4 (L) 1.5 - 2.5 mg/dL   EKG    Collection Time: 07/10/23  1:09 PM   Result Value Ref Range    Report       [facility]    Test Date:  2023-07-10  Pt Name:    CHARU BAEZ              Department: ER  MRN:        [id number]                      Room:       [facility]  Gender:     Female                       Technician: [id number]  :        1945                   Requested By:ER TRIAGE PROTOCOL  Order #:    [id number]                    Reading MD: BRIDGER MOURA. AMD    Measurements  Intervals                                Axis  Rate:       59                           P:          41  NY:         131                          QRS:        -33  QRSD:       79                           T:          13  QT:         422  QTc:        418    Interpretive Statements  Sinus bradycardia  Left axis deviation  Anterior ST segment elevation concerning for MI  Compared to ECG 07/10/2023 07:06:08    Electronically Signed On 07- 13:48:58 PDT by BRIDGER MOURA. AMD     POCT activated clotting time device results    Collection Time: 07/10/23  1:55 PM   Result Value Ref Range    Istat Activated Clotting Time 305 (H) 74 - 137 sec   POCT activated clotting time device results    Collection Time: 07/10/23  2:08 PM   Result Value Ref Range    Istat Activated Clotting Time 335 (H) 74 - 137 sec   EKG    Collection Time: 07/10/23  3:06 PM   Result Value Ref Range    Report       [facility]    Test Date:  2023-07-10  Pt Name:    CHARU BAEZ              Department:  ER  MRN:        [id number]                      Room:       T819  Gender:     Female                       Technician: CFR  :        1945                   Requested By:DEEJAY SEGAL  Order #:    [id number]                    Reading MD: Magda Mars MD    Measurements  Intervals                                Axis  Rate:       58                           P:          69  CO:         172                          QRS:        0  QRSD:       82                           T:          98  QT:         438  QTc:        431    Interpretive Statements  Sinus rhythm  Anterior infarct, old  Nonspecific T abnormalities, lateral leads  Compared to ECG 07/10/2023 13:09:43  No significant changes  Electronically Signed On 07- 16:20:02 PDT by Magda Mars MD     POCT glucose device results    Collection Time: 07/10/23  5:52 PM   Result Value Ref Range    POC Glucose, Blood 223 (H) 65 - 99 mg/dL   POCT glucose device results    Collection Time: 07/10/23  8:57 PM   Result Value Ref Range    POC Glucose, Blood 192 (H) 65 - 99 mg/dL   EKG STAT    Collection Time: 07/10/23 10:00 PM   Result Value Ref Range    Report       [facility]    Test Date:  2023-07-10  Pt Name:    CHARU BAEZ              Department: [facility]  MRN:        [id number]                      Room:       T819  Gender:     Female                       Technician: DGG  :        1945                   Requested By:MAGDA IRIZARRY  Order #:    [id number]                    Reading MD: Devin Roy MD    Measurements  Intervals                                Axis  Rate:       62                           P:          68  CO:         169                          QRS:        127  QRSD:       122                          T:          148  QT:         456  QTc:        463    Interpretive Statements  Sinus rhythm  Right bundle branch block  Abnrm T, consider ischemia, anterolateral leads  Electronically Signed On 2023 07:55:20 PDT by Devin Roy MD      Basic Metabolic Panel (BMP)    Collection Time: 07/11/23  1:57 AM   Result Value Ref Range    Sodium 140 135 - 145 mmol/L    Potassium 4.3 3.6 - 5.5 mmol/L    Chloride 107 96 - 112 mmol/L    Co2 20 20 - 33 mmol/L    Glucose 177 (H) 65 - 99 mg/dL    Bun 17 8 - 22 mg/dL    Creatinine 0.88 0.50 - 1.40 mg/dL    Calcium 8.9 8.5 - 10.5 mg/dL    Anion Gap 13.0 7.0 - 16.0   CBC with Differential    Collection Time: 07/11/23  1:57 AM   Result Value Ref Range    WBC 5.0 4.8 - 10.8 K/uL    RBC 4.67 4.20 - 5.40 M/uL    Hemoglobin 11.8 (L) 12.0 - 16.0 g/dL    Hematocrit 38.2 37.0 - 47.0 %    MCV 81.8 81.4 - 97.8 fL    MCH 25.3 (L) 27.0 - 33.0 pg    MCHC 30.9 (L) 32.2 - 35.5 g/dL    RDW 44.1 35.9 - 50.0 fL    Platelet Count 101 (L) 164 - 446 K/uL    MPV 9.6 9.0 - 12.9 fL    Neutrophils-Polys 74.40 (H) 44.00 - 72.00 %    Lymphocytes 12.80 (L) 22.00 - 41.00 %    Monocytes 10.40 0.00 - 13.40 %    Eosinophils 1.40 0.00 - 6.90 %    Basophils 0.60 0.00 - 1.80 %    Immature Granulocytes 0.40 0.00 - 0.90 %    Nucleated RBC 0.00 0.00 - 0.20 /100 WBC    Neutrophils (Absolute) 3.71 1.82 - 7.42 K/uL    Lymphs (Absolute) 0.64 (L) 1.00 - 4.80 K/uL    Monos (Absolute) 0.52 0.00 - 0.85 K/uL    Eos (Absolute) 0.07 0.00 - 0.51 K/uL    Baso (Absolute) 0.03 0.00 - 0.12 K/uL    Immature Granulocytes (abs) 0.02 0.00 - 0.11 K/uL    NRBC (Absolute) 0.00 K/uL   Lipid Profile (Tomorrow AM)    Collection Time: 07/11/23  1:57 AM   Result Value Ref Range    Cholesterol,Tot 169 100 - 199 mg/dL    Triglycerides 106 0 - 149 mg/dL    HDL 47 >=40 mg/dL     (H) <100 mg/dL   IMMATURE PLT FRACTION    Collection Time: 07/11/23  1:57 AM   Result Value Ref Range    Imm. Plt Fraction 2.3 0.6 - 13.1 %   ESTIMATED GFR    Collection Time: 07/11/23  1:57 AM   Result Value Ref Range    GFR (CKD-EPI) 68 >60 mL/min/1.73 m 2       Cardiac Imaging and Procedures Review:    EKG and telemetry tracings personally reviewed    Impression and Medical Decision  Making:  Principal Problem:    ACS with STEMI (ST elevation myocardial infarction) (HCC) (POA: Yes)  Active Problems:    Hypertension (POA: Yes)    Hypothyroid (POA: Yes)    Type 2 diabetes mellitus, without long-term current use of insulin (HCC) (POA: Yes)    Dyslipidemia (POA: Yes)  Resolved Problems:    * No resolved hospital problems. *    Assessment and plan:   STEMI  CAD s/p PCI m-LAD  with residual RCA 80% compromise  Hypertension  Hyperlipidemia   No post-PCI symptoms, appears euvolemic, BP/HR at goal.   - Continue aspirin  - Continue Brilinta  - Continue ARB  - Continue Coreg at current dose  - Revised past reactions to statins with patient, who states they were prescribed for triglycerides 7-8 years ago but failed in treating triglycerides levels, also mentions some liver enzymes elevation, denies myalgias. Patient has cirrhosis in abd US and seems compensated, thus statins are not contraindicated. Shared decision making with patient at bedside, benefits outweigh risks in the setting of CAD and MI, thus will start Rosuvastatin 20 mg and monitor for side effects including liver function and enzymes.  - Pending echo   - Plan for repeat PCI for RCA revascularization tomorrow, NPO midnight   - Telemetry     I personally discussed her case with  Dr Leon Weiner M.D.    It is my pleasure to participate in the care of Ms. Doherty.  Please do not hesitate to contact us with questions or concerns.

## 2023-07-11 NOTE — PROGRESS NOTES
Bedside report received from MAGDA John. Patient is alert and oriented x  4, room air, SR/SB on the monitor. Fall precautions are in place. Call light is in reach.

## 2023-07-11 NOTE — PROGRESS NOTES
Hospital Medicine Daily Progress Note    Date of Service  7/11/2023    Chief Complaint  Ally Doherty is a 78 y.o. female admitted 7/10/2023 with chest pain    Hospital Course    78-year-old female with history of diabetes, hypertension dyslipidemia and hypothyroidism who presented 7/10 with chest pain.  Anterior chest pain, radiate bilaterally.  Severe with squeezing feeling.  And the pressure.  On admission troponin was elevated 53, EKG showed ST elevation, cardiology was consulted and patient was rushed to the emergency cardiac cath which showed 2 lesions in LAD and RCA, stent was placed on LAD on 7/10 and planning to repeat cardiac cath on 7/12.    Interval Problem Update  -Evaluated examined the patient at bedside, denied any new symptoms  -Continue aspirin with Brilinta with statin.  -Echo is pending  -Case was discussed with cardiology team, planning for cath tomorrow    I have discussed this patient's plan of care and discharge plan at IDT rounds today with Case Management, Nursing, Nursing leadership, and other members of the IDT team.    Consultants/Specialty  cardiology    Code Status  Full Code    Disposition  The patient is not medically cleared for discharge to home or a post-acute facility.  Anticipate discharge to: home with close outpatient follow-up    I have placed the appropriate orders for post-discharge needs.    Review of Systems  Review of Systems   Constitutional:  Negative for chills, fever and weight loss.   HENT:  Negative for ear pain, hearing loss and tinnitus.    Eyes:  Negative for blurred vision, double vision and photophobia.   Respiratory:  Negative for cough and hemoptysis.    Cardiovascular:  Negative for chest pain, palpitations, orthopnea and claudication.   Gastrointestinal:  Negative for abdominal pain, constipation, diarrhea, nausea and vomiting.   Genitourinary:  Negative for dysuria, frequency and urgency.   Musculoskeletal:  Negative for myalgias and neck pain.    Skin:  Negative for rash.   Neurological:  Negative for dizziness, speech change and weakness.        Physical Exam  Temp:  [36.3 °C (97.3 °F)-36.6 °C (97.9 °F)] 36.6 °C (97.9 °F)  Pulse:  [] 66  Resp:  [16-20] 20  BP: (117-138)/(64-78) 118/64  SpO2:  [92 %-96 %] 94 %    Physical Exam  Constitutional:       General: She is not in acute distress.     Appearance: She is obese. She is not ill-appearing.   HENT:      Head: Normocephalic and atraumatic.   Eyes:      General: No scleral icterus.  Cardiovascular:      Rate and Rhythm: Normal rate.      Heart sounds: No murmur heard.  Pulmonary:      Effort: No respiratory distress.      Breath sounds: No wheezing.   Abdominal:      General: There is no distension.      Palpations: Abdomen is soft.      Tenderness: There is no abdominal tenderness. There is no guarding.   Musculoskeletal:      Right lower leg: No edema.      Left lower leg: No edema.   Skin:     Findings: No bruising, lesion or rash.   Neurological:      General: No focal deficit present.      Mental Status: She is oriented to person, place, and time. Mental status is at baseline.      Cranial Nerves: No cranial nerve deficit.      Motor: No weakness.         Fluids    Intake/Output Summary (Last 24 hours) at 7/11/2023 1526  Last data filed at 7/11/2023 0323  Gross per 24 hour   Intake 360 ml   Output --   Net 360 ml       Laboratory  Recent Labs     07/10/23  0740 07/11/23 0157   WBC 5.2 5.0   RBC 5.02 4.67   HEMOGLOBIN 12.8 11.8*   HEMATOCRIT 41.5 38.2   MCV 82.7 81.8   MCH 25.5* 25.3*   MCHC 30.8* 30.9*   RDW 44.9 44.1   PLATELETCT 108* 101*   MPV 10.0 9.6     Recent Labs     07/10/23  0740 07/11/23  0157   SODIUM 143 140   POTASSIUM 4.4 4.3   CHLORIDE 107 107   CO2 19* 20   GLUCOSE 179* 177*   BUN 17 17   CREATININE 0.88 0.88   CALCIUM 9.4 8.9             Recent Labs     07/11/23  0157   TRIGLYCERIDE 106   HDL 47   *       Imaging  DX-CHEST-PORTABLE (1 VIEW)   Final Result      1.  No  acute cardiac or pulmonary abnormalities are identified.   2.  Hiatal hernia.      CL-LEFT HEART CATHETERIZATION WITH POSSIBLE INTERVENTION    (Results Pending)   EC-ECHOCARDIOGRAM COMPLETE W/O CONT    (Results Pending)        Assessment/Plan  * ACS with STEMI (ST elevation myocardial infarction) (HCC)- (present on admission)  Assessment & Plan  Came with chest pain and elevated troponin and EKG showed ST elevation  Cath Lab, with findings of severe two-vessel CAD (occluded mid LAD -STEMI culprit , and proximal/mid RCA), s/p CHARLIE to the mid LAD.    Had stent on LAD and planning for stent on RCA on 7/12  Telemetry  Aspirin, Brilinta, rosuvastatin 20 mg daily  Echo is pending    Dyslipidemia- (present on admission)  Assessment & Plan  Check lipid panel  Continue atorvastatin 80 mg daily    Type 2 diabetes mellitus, without long-term current use of insulin ([facility])- (present on admission)  Assessment & Plan  A1c 8.3, uncontrolled  Sliding scale  Holding oral medications  Consider Fraxiga as outpatient    Hypothyroid- (present on admission)  Assessment & Plan  - Resume home Synthroid.    Hypertension- (present on admission)  Assessment & Plan  - She will be on resume home Cozaar.  She will be started on Coreg.  Monitor blood pressure trend closely, start as needed IV labetalol for significant hypertension.         VTE prophylaxis: SCDs/TEDs and enoxaparin ppx    I have performed a physical exam and reviewed and updated ROS and Plan today (7/11/2023). In review of yesterday's note (7/10/2023), there are no changes except as documented above.    Greater than 51 minutes spent prepping to see patient (e.g. review of tests) obtaining and/or reviewing separately obtained history. Performing a medically appropriate examination and/ evaluation.  Counseling and educating the patient/family/caregiver.  Ordering medications, tests, or procedures.  Referring and communicating with other health care professionals.  Documenting clinical  information in EPIC.  Independently interpreting results and communicating results to patient/family/caregiver.  Care coordination

## 2023-07-11 NOTE — DISCHARGE PLANNING
Case Management Discharge Planning    Admission Date: 7/10/2023  GMLOS: 2  ALOS: 1    6-Clicks ADL Score: 24  6-Clicks Mobility Score: 24      Anticipated Discharge Dispo: Discharge Disposition: Discharged to home/self care (01)  Discharge Address: [address] [place] [address] [place] [address]  Discharge Contact Phone Number: Isael Doherty (Spouse)   [phone] (Mobile)    DME Needed: No    Action(s) Taken: LMSW collaborated with TCN regarding pt demographics and needs. Filled out information in Flowsheet.    Escalations Completed: None    Medically Clear: No    Next Steps: Discharge pt home with spouse when medically cleared.     Barriers to Discharge: None    Is the patient up for discharge tomorrow: Likely //    Is transport arranged for discharge disposition: Yes, spouse will be able to provide transport upon discharge.

## 2023-07-11 NOTE — THERAPY
Physical Therapy   Initial Evaluation     Patient Name: Ally Doherty  Age:  78 y.o., Sex:  female  Medical Record #: [id number]  Today's Date: 7/11/2023     Precautions: Cardiac Precautions    Assessment  Patient is a 78 y.o. female admitted with STEMI, now s/p LHC and PCI on 7/10. Pt seen for PT evaluation at this time. Educated and provided handout re: appropriate activity progression with return home including home walking program and self-monitoring activity tolerance via RPE scale and talk test. Pt verbalizes understanding. Pt demo'd mobility without assist, ambulated without AD, no LOB, able to negotiate steps without difficulty. No s/s of incr WOB/SOB and pt reports no concerns with DC home. No further acute PT needs, rec OP cardiac rehab at DC to facilitate return to PLOF, and amb with nursing while hospitalized.     Plan  Evaluation only  [place] Equipment Recommendations: None  Discharge Recommendations:  OP Cardiac rehab     07/11/23 1026   Prior Living Situation   Housing / Facility 2 Story House   Steps In Home FOS to home theater   Lives with -  Spouse   Comments spouse present and supportive, able to assist if needed   Prior Level of Functional Mobility   Bed Mobility Independent   Transfer Status Independent   Ambulation Independent   Ambulation Distance community distances   Assistive Devices Used None   Stairs Independent   Cognition    Level of Consciousness Alert   Comments pleasant and receptive to edu   Other Treatments   Other Treatments Provided handout provided re: return to activity s/p STEMI   Balance Assessment   Sitting Balance (Static) Normal   Sitting Balance (Dynamic) Normal   Standing Balance (Static) Normal   Standing Balance (Dynamic) Normal   Weight Shift Sitting Normal   Weight Shift Standing Normal   Comments no AD   Bed Mobility    Supine to Sit Supervised   Scooting Supervised   Gait Analysis   Gait Level Of Assist Supervised   Assistive Device None   Distance (Feet) 500   # of  Stairs Climbed 5   Level of Assist with Stairs Supervised   Weight Bearing Status no restrictions   Comments no LOB or incr WOB   Functional Mobility   Sit to Stand Supervised   Bed, Chair, Wheelchair Transfer Supervised

## 2023-07-11 NOTE — CARE PLAN
The patient is Stable - Low risk of patient condition declining or worsening    Shift Goals  Clinical Goals: monitor vitals and chest pain    Progress made toward(s) clinical / shift goals:    Problem: Knowledge Deficit - Standard  Goal: Patient and family/care givers will demonstrate understanding of plan of care, disease process/condition, diagnostic tests and medications  Outcome: Progressing     Problem: Acute Care of the Cardiac Cath Patient  Goal: Post Procedure Optimal Outcome for the Cardiac Cath Patient  Outcome: Progressing     Problem: Pain - Standard  Goal: Alleviation of pain or a reduction in pain to the patient’s comfort goal  Outcome: Progressing       Patient is not progressing towards the following goals:

## 2023-07-12 ENCOUNTER — APPOINTMENT (OUTPATIENT)
Dept: CARDIOLOGY | Facility: MEDICAL CENTER | Age: 78
DRG: 246 | End: 2023-07-12
Attending: INTERNAL MEDICINE
Payer: MEDICARE

## 2023-07-12 PROBLEM — I25.5 ISCHEMIC CARDIOMYOPATHY: Status: ACTIVE | Noted: 2023-07-12

## 2023-07-12 LAB
ACT BLD: 263 SEC (ref 74–137)
ACT BLD: 348 SEC (ref 74–137)
ALBUMIN SERPL BCP-MCNC: 3.3 G/DL (ref 3.2–4.9)
ALBUMIN/GLOB SERPL: 1.4 G/DL
ALP SERPL-CCNC: 74 U/L (ref 30–99)
ALT SERPL-CCNC: 29 U/L (ref 2–50)
ANION GAP SERPL CALC-SCNC: 14 MMOL/L (ref 7–16)
AST SERPL-CCNC: 91 U/L (ref 12–45)
BASOPHILS # BLD AUTO: 0.9 % (ref 0–1.8)
BASOPHILS # BLD: 0.05 K/UL (ref 0–0.12)
BILIRUB SERPL-MCNC: 0.4 MG/DL (ref 0.1–1.5)
BUN SERPL-MCNC: 16 MG/DL (ref 8–22)
CALCIUM ALBUM COR SERPL-MCNC: 9.4 MG/DL (ref 8.5–10.5)
CALCIUM SERPL-MCNC: 8.8 MG/DL (ref 8.5–10.5)
CHLORIDE SERPL-SCNC: 108 MMOL/L (ref 96–112)
CO2 SERPL-SCNC: 19 MMOL/L (ref 20–33)
CREAT SERPL-MCNC: 0.9 MG/DL (ref 0.5–1.4)
EKG IMPRESSION: NORMAL
EOSINOPHIL # BLD AUTO: 0.19 K/UL (ref 0–0.51)
EOSINOPHIL NFR BLD: 3.4 % (ref 0–6.9)
ERYTHROCYTE [DISTWIDTH] IN BLOOD BY AUTOMATED COUNT: 43.7 FL (ref 35.9–50)
GFR SERPLBLD CREATININE-BSD FMLA CKD-EPI: 66 ML/MIN/1.73 M 2
GLOBULIN SER CALC-MCNC: 2.4 G/DL (ref 1.9–3.5)
GLUCOSE BLD STRIP.AUTO-MCNC: 236 MG/DL (ref 65–99)
GLUCOSE BLD STRIP.AUTO-MCNC: 252 MG/DL (ref 65–99)
GLUCOSE BLD STRIP.AUTO-MCNC: 290 MG/DL (ref 65–99)
GLUCOSE BLD STRIP.AUTO-MCNC: 291 MG/DL (ref 65–99)
GLUCOSE SERPL-MCNC: 257 MG/DL (ref 65–99)
HCT VFR BLD AUTO: 37.3 % (ref 37–47)
HGB BLD-MCNC: 11.5 G/DL (ref 12–16)
IMM GRANULOCYTES # BLD AUTO: 0.02 K/UL (ref 0–0.11)
IMM GRANULOCYTES NFR BLD AUTO: 0.4 % (ref 0–0.9)
LYMPHOCYTES # BLD AUTO: 0.76 K/UL (ref 1–4.8)
LYMPHOCYTES NFR BLD: 13.6 % (ref 22–41)
MAGNESIUM SERPL-MCNC: 1.5 MG/DL (ref 1.5–2.5)
MCH RBC QN AUTO: 25.1 PG (ref 27–33)
MCHC RBC AUTO-ENTMCNC: 30.8 G/DL (ref 32.2–35.5)
MCV RBC AUTO: 81.3 FL (ref 81.4–97.8)
MONOCYTES # BLD AUTO: 0.74 K/UL (ref 0–0.85)
MONOCYTES NFR BLD AUTO: 13.3 % (ref 0–13.4)
NEUTROPHILS # BLD AUTO: 3.81 K/UL (ref 1.82–7.42)
NEUTROPHILS NFR BLD: 68.4 % (ref 44–72)
NRBC # BLD AUTO: 0 K/UL
NRBC BLD-RTO: 0 /100 WBC (ref 0–0.2)
PLATELET # BLD AUTO: 100 K/UL (ref 164–446)
PLATELETS.RETICULATED NFR BLD AUTO: 2 % (ref 0.6–13.1)
PMV BLD AUTO: 10 FL (ref 9–12.9)
POTASSIUM SERPL-SCNC: 4.2 MMOL/L (ref 3.6–5.5)
PROT SERPL-MCNC: 5.7 G/DL (ref 6–8.2)
RBC # BLD AUTO: 4.59 M/UL (ref 4.2–5.4)
SODIUM SERPL-SCNC: 141 MMOL/L (ref 135–145)
WBC # BLD AUTO: 5.6 K/UL (ref 4.8–10.8)

## 2023-07-12 PROCEDURE — 83735 ASSAY OF MAGNESIUM: CPT

## 2023-07-12 PROCEDURE — 700117 HCHG RX CONTRAST REV CODE 255: Performed by: INTERNAL MEDICINE

## 2023-07-12 PROCEDURE — 99233 SBSQ HOSP IP/OBS HIGH 50: CPT | Performed by: INTERNAL MEDICINE

## 2023-07-12 PROCEDURE — 700102 HCHG RX REV CODE 250 W/ 637 OVERRIDE(OP): Performed by: INTERNAL MEDICINE

## 2023-07-12 PROCEDURE — 700101 HCHG RX REV CODE 250

## 2023-07-12 PROCEDURE — 700111 HCHG RX REV CODE 636 W/ 250 OVERRIDE (IP): Mod: JZ

## 2023-07-12 PROCEDURE — 99152 MOD SED SAME PHYS/QHP 5/>YRS: CPT | Performed by: INTERNAL MEDICINE

## 2023-07-12 PROCEDURE — 82962 GLUCOSE BLOOD TEST: CPT

## 2023-07-12 PROCEDURE — 93005 ELECTROCARDIOGRAM TRACING: CPT | Performed by: INTERNAL MEDICINE

## 2023-07-12 PROCEDURE — A9270 NON-COVERED ITEM OR SERVICE: HCPCS | Performed by: INTERNAL MEDICINE

## 2023-07-12 PROCEDURE — 92929 PR PRQ TRLUML CORONARY STENT W/ANGIO ADDL ART/BRNCH: CPT | Mod: RC | Performed by: INTERNAL MEDICINE

## 2023-07-12 PROCEDURE — 770020 HCHG ROOM/CARE - TELE (206)

## 2023-07-12 PROCEDURE — 85055 RETICULATED PLATELET ASSAY: CPT

## 2023-07-12 PROCEDURE — 99153 MOD SED SAME PHYS/QHP EA: CPT

## 2023-07-12 PROCEDURE — A9270 NON-COVERED ITEM OR SERVICE: HCPCS

## 2023-07-12 PROCEDURE — 36415 COLL VENOUS BLD VENIPUNCTURE: CPT

## 2023-07-12 PROCEDURE — 92978 ENDOLUMINL IVUS OCT C 1ST: CPT | Mod: 26,RC | Performed by: INTERNAL MEDICINE

## 2023-07-12 PROCEDURE — B240ZZ3 ULTRASONOGRAPHY OF SINGLE CORONARY ARTERY, INTRAVASCULAR: ICD-10-PCS | Performed by: INTERNAL MEDICINE

## 2023-07-12 PROCEDURE — 85347 COAGULATION TIME ACTIVATED: CPT

## 2023-07-12 PROCEDURE — 85025 COMPLETE CBC W/AUTO DIFF WBC: CPT

## 2023-07-12 PROCEDURE — 93010 ELECTROCARDIOGRAM REPORT: CPT | Mod: 59 | Performed by: INTERNAL MEDICINE

## 2023-07-12 PROCEDURE — 80053 COMPREHEN METABOLIC PANEL: CPT

## 2023-07-12 PROCEDURE — 700102 HCHG RX REV CODE 250 W/ 637 OVERRIDE(OP)

## 2023-07-12 PROCEDURE — 027136Z DILATION OF CORONARY ARTERY, TWO ARTERIES WITH THREE DRUG-ELUTING INTRALUMINAL DEVICES, PERCUTANEOUS APPROACH: ICD-10-PCS | Performed by: INTERNAL MEDICINE

## 2023-07-12 PROCEDURE — 92928 PRQ TCAT PLMT NTRAC ST 1 LES: CPT | Mod: RC | Performed by: INTERNAL MEDICINE

## 2023-07-12 PROCEDURE — 700111 HCHG RX REV CODE 636 W/ 250 OVERRIDE (IP): Mod: JZ | Performed by: INTERNAL MEDICINE

## 2023-07-12 PROCEDURE — 99233 SBSQ HOSP IP/OBS HIGH 50: CPT | Mod: GC | Performed by: INTERNAL MEDICINE

## 2023-07-12 RX ORDER — HEPARIN SODIUM 200 [USP'U]/100ML
INJECTION, SOLUTION INTRAVENOUS
Status: COMPLETED
Start: 2023-07-12 | End: 2023-07-12

## 2023-07-12 RX ORDER — HEPARIN SODIUM 1000 [USP'U]/ML
INJECTION, SOLUTION INTRAVENOUS; SUBCUTANEOUS
Status: COMPLETED
Start: 2023-07-12 | End: 2023-07-12

## 2023-07-12 RX ORDER — LIDOCAINE HYDROCHLORIDE 20 MG/ML
INJECTION, SOLUTION INFILTRATION; PERINEURAL
Status: COMPLETED
Start: 2023-07-12 | End: 2023-07-12

## 2023-07-12 RX ORDER — MIDAZOLAM HYDROCHLORIDE 1 MG/ML
INJECTION INTRAMUSCULAR; INTRAVENOUS
Status: COMPLETED
Start: 2023-07-12 | End: 2023-07-12

## 2023-07-12 RX ORDER — VERAPAMIL HYDROCHLORIDE 2.5 MG/ML
INJECTION, SOLUTION INTRAVENOUS
Status: COMPLETED
Start: 2023-07-12 | End: 2023-07-12

## 2023-07-12 RX ORDER — SPIRONOLACTONE 25 MG/1
25 TABLET ORAL
Status: DISCONTINUED | OUTPATIENT
Start: 2023-07-12 | End: 2023-07-13 | Stop reason: HOSPADM

## 2023-07-12 RX ORDER — ASPIRIN 81 MG/1
81 TABLET ORAL DAILY
Status: DISCONTINUED | OUTPATIENT
Start: 2023-07-12 | End: 2023-07-12

## 2023-07-12 RX ADMIN — ROSUVASTATIN CALCIUM 20 MG: 20 TABLET, FILM COATED ORAL at 18:27

## 2023-07-12 RX ADMIN — TICAGRELOR 90 MG: 90 TABLET ORAL at 18:27

## 2023-07-12 RX ADMIN — INSULIN HUMAN 3 UNITS: 100 INJECTION, SOLUTION PARENTERAL at 22:13

## 2023-07-12 RX ADMIN — INSULIN HUMAN 3 UNITS: 100 INJECTION, SOLUTION PARENTERAL at 13:46

## 2023-07-12 RX ADMIN — INSULIN HUMAN 3 UNITS: 100 INJECTION, SOLUTION PARENTERAL at 09:28

## 2023-07-12 RX ADMIN — INSULIN HUMAN 2 UNITS: 100 INJECTION, SOLUTION PARENTERAL at 18:28

## 2023-07-12 RX ADMIN — CARVEDILOL 6.25 MG: 6.25 TABLET, FILM COATED ORAL at 09:26

## 2023-07-12 RX ADMIN — HEPARIN SODIUM: 1000 INJECTION, SOLUTION INTRAVENOUS; SUBCUTANEOUS at 08:49

## 2023-07-12 RX ADMIN — TICAGRELOR 90 MG: 90 TABLET ORAL at 05:07

## 2023-07-12 RX ADMIN — NITROGLYCERIN 10 ML: 20 INJECTION INTRAVENOUS at 08:26

## 2023-07-12 RX ADMIN — VERAPAMIL HYDROCHLORIDE 5 MG: 2.5 INJECTION, SOLUTION INTRAVENOUS at 08:24

## 2023-07-12 RX ADMIN — ASPIRIN 81 MG: 81 TABLET, COATED ORAL at 05:07

## 2023-07-12 RX ADMIN — SPIRONOLACTONE 25 MG: 25 TABLET ORAL at 11:43

## 2023-07-12 RX ADMIN — MIDAZOLAM 2 MG: 1 INJECTION, SOLUTION INTRAMUSCULAR; INTRAVENOUS at 08:32

## 2023-07-12 RX ADMIN — FENTANYL CITRATE 100 MCG: 50 INJECTION, SOLUTION INTRAMUSCULAR; INTRAVENOUS at 08:23

## 2023-07-12 RX ADMIN — CARVEDILOL 6.25 MG: 6.25 TABLET, FILM COATED ORAL at 18:27

## 2023-07-12 RX ADMIN — HEPARIN SODIUM: 1000 INJECTION, SOLUTION INTRAVENOUS; SUBCUTANEOUS at 08:24

## 2023-07-12 RX ADMIN — HEPARIN SODIUM 2000 UNITS: 200 INJECTION, SOLUTION INTRAVENOUS at 08:25

## 2023-07-12 RX ADMIN — IOHEXOL 40 ML: 350 INJECTION, SOLUTION INTRAVENOUS at 08:38

## 2023-07-12 RX ADMIN — LEVOTHYROXINE SODIUM 100 MCG: 0.1 TABLET ORAL at 05:08

## 2023-07-12 RX ADMIN — OXYCODONE HYDROCHLORIDE 5 MG: 5 TABLET ORAL at 10:11

## 2023-07-12 RX ADMIN — LOSARTAN POTASSIUM 100 MG: 50 TABLET, FILM COATED ORAL at 05:07

## 2023-07-12 RX ADMIN — ENOXAPARIN SODIUM 40 MG: 100 INJECTION SUBCUTANEOUS at 18:27

## 2023-07-12 RX ADMIN — LIDOCAINE HYDROCHLORIDE: 20 INJECTION, SOLUTION INFILTRATION; PERINEURAL at 08:23

## 2023-07-12 ASSESSMENT — ENCOUNTER SYMPTOMS
CLAUDICATION: 0
NAUSEA: 0
SPEECH CHANGE: 0
MYALGIAS: 0
CHILLS: 0
FEVER: 0
BLURRED VISION: 0
COUGH: 0
PALPITATIONS: 0
DIZZINESS: 0
ABDOMINAL PAIN: 0
WEIGHT LOSS: 0
NECK PAIN: 0
CONSTIPATION: 0
VOMITING: 0
DIARRHEA: 0
DOUBLE VISION: 0
HEMOPTYSIS: 0
PHOTOPHOBIA: 0
WEAKNESS: 0
ORTHOPNEA: 0

## 2023-07-12 ASSESSMENT — PAIN DESCRIPTION - PAIN TYPE: TYPE: ACUTE PAIN

## 2023-07-12 NOTE — DISCHARGE PLANNING
[facility] TCN chart review completed. Collaborated with ONEL Martinez. Current discharge considerations are dc to home with outpatient follow ups as indicated.  No new TCN needs identified at this time. TCN will continue to follow and collaborate with discharge planning team as additional post acute needs arise. Thank you.    Completed:  PT recs outpatient cardiac rehab on 7/11  Choice obtained: None.   [facility] referral (N). Not sent. Patient wants outpatient f/u's.

## 2023-07-12 NOTE — DIETARY
Nutrition Services: Cardiac Education Consult   Day 2 of admit.  Ally Doherty is a 78 y.o. female with admitting DX of Chest pain [R07.9]  STEMI (ST elevation myocardial infarction) (HCC) [I21.3]    RD able to visit pt at bedside to provide heart healthy and consistent carb education. RD discussed a heart healthy diet limiting saturated fats, sodium, cholesterol, beverages and fiber.   Provided handout reinforcing topics discussed. Pt demonstrated readiness and evidence of learning. RD able to answer all questions to patient's satisfaction.     No other education needs identified at this time. Consider referral to outpatient nutrition services for continuation of education as indicated or per pt preferences.     Please re-consult RD as indicated.

## 2023-07-12 NOTE — PROCEDURES
Cardiac Catheterization Procedure    DATE: 7/12/2023    : Otis Mars MD, MPH    PROCEDURES PERFORMED:  Left heart catheterization  Selective coronary angiography of the RCA  Percutaneous coronary intervention of proximal/mid RCA  Percutaneous coronary intervention of the mid R-PDA  IVUS  Ultrasound-guided right radial arterial access  Moderate conscious sedation    INDICATIONS:  Staged PCI, recent STEMI    CONSENT:  The complete alternatives, risks, and benefits of the procedure were explained to the patient. Signed informed consent was obtained and placed in the chart prior to the procedure.    MEDICATIONS:  Lidocaine  Fentanyl  Midazolam  Nitroglycerin  Verapamil  Heparin  Patient already on the    MODERATE CONSCIOUS SEDATION:  I personally supervised the administration of moderate conscious sedation by the nursing staff for 38 minutes.  Start time: 8:02 AM  End time: 8:40 AM    CONTRAST: Omnipaque 40 cc    RADIATION (Air Kerma): 281 mGy    FLUOROSCOPY TIME: 11.9 minutes    ESTIMATED BLOOD LOSS: < 20 cc    COMPLICATIONS: None apparent    PROCEDURE OVERVIEW:  The patient was brought to the cardiac catheterization laboratory in the fasting state. The skin over the right wrist was prepped and draped in the usual sterile fashion. Lidocaine infiltration was used to anesthetize the tissue over the right radial artery. Using the micropuncture technique, and ultrasound guidance, a 6-Burkinan Glidesheath was inserted in the right radial artery. A 6 Burkinan AR-1 guide catheter was then advanced over a standard J-wire into the left ventricular cavity where it was gently aspirated, flushed, and then withdrawn across the aortic valve with sequential pressures measured. This catheter was then used to engage the ostium of the right coronary artery and cineangiogram was obtained and then we proceeded with PCI of the RCA then PDA. See below for more details.     HEMODYNAMICS:  Aortic pressure: 118 /65 mmHg  LVEDP: 12  mmHg  No significant aortic gradient on pullback      SELECTIVE CORONARY ANGIOGRAPHY:  The right coronary artery: Severe proximal/mid RCA stenoses status post PCI, severe mid PDA stenosis status post PCI.  Dominant RCA    PERCUTANEOUS CORONARY INTERVENTION of proximal/mid RCA:  Pre: 80% stenosis and BEVERLY III flow  Post: 0% residual stenosis and BEVERLY III flow.   Guide Catheter(s): 6 Maori AR-1 guide provided good support  Guidewire(s): Run-through  Anticoagulation:  Heparin to maintain ACT > 250  Antiplatelet(s): Patient already on DAPT (aspirin and Brilinta)  Pre-dilation balloon(s): 3 x 12 mm NC  IVUS or OCT used: IVUS guided  Stent: Overlapping Synergy 3 x 38 mm CHARLIE and 3 x 16 mm CHARLIE proximally  Post-dilation balloon(s): 3 x 12 mm NC, 3.5 x 12 mm NC proximally to about 3.6 mm    No dissection, signs of no-reflow, distal embolization or perforation post PCI.      PERCUTANEOUS CORONARY INTERVENTION of mid right PDA:  Pre: 80% stenosis and BEVERLY III flow  Post: 0% residual stenosis and BEVERLY III flow.   Guide Catheter(s): 6 Maori AR-1 guide provided good support  Guidewire(s): Run-through  Anticoagulation:  Heparin to maintain ACT > 250  Antiplatelet(s): Patient already on DAPT (aspirin and Brilinta)  Pre-dilation balloon(s): 2.5 x 12 mm NC  IVUS or OCT used: No  Stent: Synergy 2.5 x 16 mm CHARLIE  Post-dilation balloon(s): 2.5 x 12 mm NC to about 2.6 mm    Closing: At completion of the procedure the relevant equipment was removed from the body and hemostasis achieved by Radial band for the right radial arteriotomy site.    The patient left the cath lab  CP free,  hemodynamically stable and neurologically intact.      IMPRESSION:  1.  Severe proximal/mid RCA stenoses status post successful IVUS guided PCI deploying overlapping Synergy 3 x 38 mm and 3 x 16 mm CHARLIE postdilated proximally to about 3.6 mm.  2.  Severe mid right PDA stenosis status post successful PCI deploying Synergy 2.5 x 16 mm CHARLIE, postdilated to about 2.6  mm.  3.  Normal resting LVEDP 12 mmHg with no significant transaortic gradient on pullback    RECOMMENDATIONS:  Dual antiplatelet therapy for at least 24 months if tolerated given stent burden (LAD and RCA/PDA) then consider switching to monotherapy with Plavix 75 mg daily for life  HI statin or bempedoic acid-ezetimibe or PCSK9-I if intolerant to statins: Target LDL < 70 and TG < 150  GDMT and lifestyle modifications for secondary ASCVD prevention  Cardiac Rehab referral    NOTIFICATION:  The patient's  was attempted to be called and notified, left a voicemail message.    Referring cardiologist - Dr. Guido - was notified.    Otis Mars MD, MPH [facility]  Interventional Cardiologist  [facility]   of Clinical Internal Medicine - [facility] Henrique HOLLAND

## 2023-07-12 NOTE — PROGRESS NOTES
1005: Unable to take 3mls out of TR band.   Hematoma forming above TR band, holding manual pressure.  Notified cardiology    1024: 15mins manual pressure applied. Sandbag applied above TR band. Per cardiology leave TR band in place. RN attempt slow air removal. (-1ml)    1045: sandbag removed. -3mls     1100: cardiology at bedside. -3mls (total removed 7ml)    1115: -3mls (total of 10ml removed)    1130: -4mls removed. (Total of 14ml)    1145: TR Band removed. Gauze and Tegaderm in place. Small hematoma. Sandbag applied for 15 mins.

## 2023-07-12 NOTE — CARE PLAN
Problem: Knowledge Deficit - Standard  Goal: Patient and family/care givers will demonstrate understanding of plan of care, disease process/condition, diagnostic tests and medications  Outcome: Progressing     Problem: Acute Care of the Cardiac Cath Patient  Goal: Post Procedure Optimal Outcome for the Cardiac Cath Patient  Outcome: Progressing     Problem: Pain - Standard  Goal: Alleviation of pain or a reduction in pain to the patient’s comfort goal  Outcome: Progressing   The patient is Stable - Low risk of patient condition declining or worsening    Shift Goals  Clinical Goals: mobility, safety, npo at mn for heart cath  Patient Goals: rest, comfort  Family Goals: osiris    Progress made toward(s) clinical / shift goals:  mobility, safety, NPO at MN, knowledge deficit, BG managed,     Patient is not progressing towards the following goals:

## 2023-07-12 NOTE — PROGRESS NOTES
Pt back from cath lab. Post op vitals started. Patient denies pain. TR band on right radial (14mls)

## 2023-07-12 NOTE — DOCUMENTATION QUERY
[facility]                                                                       Query Response Note      PATIENT:               CHARU BAEZ  ACCT #:                  [id number]  MRN:                     [id number]  :                      1945  ADMIT DATE:       7/10/2023 8:57 AM  DISCH DATE:          RESPONDING  PROVIDER #:        [id number]           QUERY TEXT:    Documentation in the medical record indicates that this patient has been diagnosed as having HFrEF.     Can the acuity of the heart failure be further specified based on the clinical indicators and required treatments?    The patient's Clinical Indicators include:  Findings:  --Patient admitted for STEMI  --Per cardiology PN , ''HFrEF [id number]%'' documented  --Echocardiogram from :  Moderately reduced left ventricular systolic function. The left ventricular      ejection fraction is visually estimated to be 35-40%. Nearly the entire distal to apical left ventricle is akinetic.      Grade 1 diastolic dysfunction  --CXR on admission 07/10:  No areas of air space disease are demonstrated. No effusion or pneumothorax    Treatment:  --S/p C x2 with stenting of RCA and LAD  --Echocardiogram  --CXR  --Cardiology consult  --Coreg tablet 6.25mg oral 2 times daily    Risk Factors:  --STEMI  --CAD  --Ischemic cardiomyopathy    Thank you,  Briana SON, RN  Clinical   Connect via [employer]  Options provided:   -- Acute   -- Exacerbation or decompensation   -- Chronic   -- Acute on Chronic   -- Other explanation, Please specify   -- Unable to determine      Query created by: Briana Farnsworth on 2023 9:43 AM    RESPONSE TEXT:    Chronic          Electronically signed by:  AUGUSTO SMITH MD 2023 9:56 AM

## 2023-07-12 NOTE — PROGRESS NOTES
Cardiology Follow-up Consult Note  Date of note:    7/12/2023          Patient ID:  Name:   Ally Doherty   YOB: 1945  Age:   78 y.o.  female   MRN:   [id number]    Chief Complaint   Patient presents with    Chest Pain     Starting last night while sleeping, center of chest and bilateral chest wall, described as pressure, intermittent and improved in triage, denies cardiac hx, hx GERD, took prilosec this AM     Reason for Consult:  STEMI    HPI:   78 year old woman with PMH breast CA, DM2, HLD, hypothyroid, liver cirrhosis presents with intermittent chest pain. Describes episode of chest squeezing waking patient from sleep. Ekg finding st elevation with typical angina. Code STEMI called. No toxic social habits. No relevant family history. No prior cardiac history. Called for urgent LHC.    LHC 7/10/23:  HEMODYNAMICS:  Aortic pressure: 126 /83/104 mmHg  LVEDP: 35 mmHg  No significant aortic gradient on pullback     LEFT VENTRICULOGRAPHY   Estimated LVEF= 50%. Hypokinesis in the anterior wall     CORONARY ANGIOGRAPHY:  The left main coronary artery: Short large caliber that bifurcates to LAD and left circumflex  The left anterior descending coronary artery: Large caliber transapical vessel with 100% mid occlusion status post accessible IVUS guided PCI as detailed below  The left circumflex coronary artery: Large-caliber vessel that gives rise to large caliber OM1 and a large in caliber left PLB  The right coronary artery: Large-caliber dominant vessel with a long segment of severe 80% proximal/mid RCA stenosis    IMPRESSION:  1.  Severe two-vessel CAD (occluded mid LAD -STEMI culprit , and proximal/mid RCA) status post accessible IVUS guided revascularization of the mid LAD deploying Synergy 3.5 x 20 mm CHARLIE postdilated to about 3.8 mm.  2.  Low-normal estimated LV systolic function 50% with anterior hypokinesis  3.  Significantly elevated resting LVEDP 35 mmHg with no significant transaortic gradient  on pullback    Interim Events:  7/11:   BP and heart rate at goal. Shared decision making with patient at bedside, benefits outweigh risks in the setting of CAD and MI, thus will started on Rosuvastatin 20 mg and monitor for side effects including liver function and enzymes.    Echocardiogram 7/11:   Moderately reduced left ventricular systolic function.  The left ventricular ejection fraction is visually estimated to be 35-  40%.  Nearly the entire distal to apical left ventricle is akinetic.  No apical thrombus.  Grade I diastolic dysfunction.  The right ventricle is normal in size and systolic function.  Estimated right ventricular systolic pressure is 39 mmHg.  No significant valvular abnormalities.    7/12: Taken to cath lab in AM. Stable vital signs. AST elevated at 91. Patient came back with no symptoms, no complications from procedure. Small hematoma in right wrist resolved with pressure by RN staff. Discussed echo findings.     ROS  No NV, No Bleeding, No dizziness   All other review of systems reviewed and negative.    Past medical, surgical, social, and family history reviewed and unchanged from admission except as noted in assessment and plan.    Medications: Reviewed in MAR  Current Facility-Administered Medications   Medication Dose Frequency Provider Last Rate Last Admin    rosuvastatin (Crestor) tablet 20 mg  20 mg Q EVENING Trudi Jack M.D.   20 mg at 07/11/23 1828    levothyroxine (Synthroid) tablet 100 mcg  100 mcg AM ES Murali Argueta M.D.   100 mcg at 07/12/23 0508    losartan (Cozaar) tablet 100 mg  100 mg DAILY Murali Argueta M.D.   100 mg at 07/12/23 0507    aspirin EC tablet 81 mg  81 mg DAILY Otis Mars M.D.   81 mg at 07/12/23 0507    ticagrelor (Brilinta) tablet 90 mg  90 mg BID Otis Mars M.D.   90 mg at 07/12/23 0507    Respiratory Therapy Consult   Continuous RT Murali Argueta M.D.        acetaminophen (Tylenol) tablet 650 mg  650 mg Q6HRS PRN Murali Argueta M.D.         ondansetron (Zofran) syringe/vial injection 4 mg  4 mg Q4HRS PRN Murali Argueta M.D.        ondansetron (Zofran ODT) dispertab 4 mg  4 mg Q4HRS PRN Murali Argueta M.D.        senna-docusate (Pericolace Or Senokot S) 8.6-50 MG per tablet 2 Tablet  2 Tablet BID Murali rAgueta M.D.        And    polyethylene glycol/lytes (Miralax) PACKET 1 Packet  1 Packet QDAY PRN Murali Argueta M.D.        And    magnesium hydroxide (Milk Of Magnesia) suspension 30 mL  30 mL QDAY PRN Murali Argueta M.D.        And    bisacodyl (Dulcolax) suppository 10 mg  10 mg QDAY PRN Murali Argueta M.D.        enoxaparin (Lovenox) inj 40 mg  40 mg DAILY AT 1800 Murali Argueta M.D.   40 mg at 07/11/23 1827    Pharmacy Consult Request ...Pain Management Review 1 Each  1 Each PHARMACY TO DOSE Murali Argueta M.D.        oxyCODONE immediate-release (Roxicodone) tablet 2.5 mg  2.5 mg Q3HRS PRN Murali Argueta M.D.        Or    oxyCODONE immediate-release (Roxicodone) tablet 5 mg  5 mg Q3HRS PRN Murali Argueta M.D.        Or    HYDROmorphone (Dilaudid) injection 0.25 mg  0.25 mg Q3HRS PRN Murali Argueta M.D.        labetalol (Normodyne/Trandate) injection 10 mg  10 mg Q4HRS PRN Murali Argueta M.D.        carvedilol (Coreg) tablet 6.25 mg  6.25 mg BID WITH MEALS Murali Argueta M.D.   6.25 mg at 07/11/23 1829    nitroglycerin (Nitrostat) tablet 0.4 mg  0.4 mg Q5 MIN PRN Murali Argueta M.D.        morphine 10 mg/mL injection 2-4 mg  2-4 mg Q5 MIN PRN Murali Argueta M.D.        insulin regular (HumuLIN R,NovoLIN R) injection  1-6 Units 4X/DAY ACHS Murali Argueta M.D.   2 Units at 07/11/23 2039    And    dextrose 10 % BOLUS 25 g  25 g Q15 MIN PRN Murali Argueta M.D.       Last reviewed on 7/10/2023  4:50 PM by Jeny Mcmanus, Student   Allergies   Allergen Reactions    Byetta Unspecified     Pancreatitis    Penicillins Rash     All over    Statins [Hmg-Coa-R Inhibitors] Unspecified     Elevated liver enzymes       Physical Exam  Body mass  "index is 30.32 kg/m². /57   Pulse 66   Temp 36.7 °C (98.1 °F) (Temporal)   Resp 17   Ht 1.702 m (5' 7\")   Wt 87.8 kg (193 lb 9 oz)   SpO2 93%    Vitals:    07/11/23 2028 07/11/23 2347 07/12/23 0019 07/12/23 0508   BP: 114/55  111/56 127/57   Pulse: 69  68 66   Resp: 16  16 17   Temp: 36.8 °C (98.2 °F)  36.7 °C (98.1 °F) 36.7 °C (98.1 °F)   TempSrc: Temporal  Temporal Temporal   SpO2: 93%  95% 93%   Weight:  87.8 kg (193 lb 9 oz)     Height:        Oxygen Therapy:  Pulse Oximetry: 93 %, O2 (LPM): 0, O2 Delivery Device: None - Room Air    General: no apparent distress  Eyes: nl conjunctiva  ENT: OP clear  Neck: no JVD   Lungs: normal respiratory effort, CTAB, trace bibasilar crackles.   Heart: RRR, systolic 2/6 murmur in mitral point, no rubs or gallops,   EXT No edema bilateral lower extremities. + pedal pulses. no cyanosis. No bleeding in right wrist, mild edema and bruise. Compression device in place.  Abdomen: soft, non tender, non distended,  Neurological: No focal deficits  Psychiatric: Appropriate affect,   Skin: Warm extremities    Labs (personally reviewed and notable for):   Recent Results (from the past 24 hour(s))   POCT glucose device results    Collection Time: 07/11/23  8:52 AM   Result Value Ref Range    POC Glucose, Blood 174 (H) 65 - 99 mg/dL   POCT glucose device results    Collection Time: 07/11/23 12:53 PM   Result Value Ref Range    POC Glucose, Blood 263 (H) 65 - 99 mg/dL   EC-ECHOCARDIOGRAM COMPLETE W/ CONT    Collection Time: 07/11/23  5:03 PM   Result Value Ref Range    Eject.Frac. MOD BP 43.01     Eject.Frac. MOD 4C 47.73     Eject.Frac. MOD 2C 53.36     Left Ventrical Ejection Fraction 40    POCT glucose device results    Collection Time: 07/11/23  6:33 PM   Result Value Ref Range    POC Glucose, Blood 178 (H) 65 - 99 mg/dL   POCT glucose device results    Collection Time: 07/11/23  8:38 PM   Result Value Ref Range    POC Glucose, Blood 217 (H) 65 - 99 mg/dL   Comp Metabolic " Panel    Collection Time: 07/12/23  1:06 AM   Result Value Ref Range    Sodium 141 135 - 145 mmol/L    Potassium 4.2 3.6 - 5.5 mmol/L    Chloride 108 96 - 112 mmol/L    Co2 19 (L) 20 - 33 mmol/L    Anion Gap 14.0 7.0 - 16.0    Glucose 257 (H) 65 - 99 mg/dL    Bun 16 8 - 22 mg/dL    Creatinine 0.90 0.50 - 1.40 mg/dL    Calcium 8.8 8.5 - 10.5 mg/dL    AST(SGOT) 91 (H) 12 - 45 U/L    ALT(SGPT) 29 2 - 50 U/L    Alkaline Phosphatase 74 30 - 99 U/L    Total Bilirubin 0.4 0.1 - 1.5 mg/dL    Albumin 3.3 3.2 - 4.9 g/dL    Total Protein 5.7 (L) 6.0 - 8.2 g/dL    Globulin 2.4 1.9 - 3.5 g/dL    A-G Ratio 1.4 g/dL   CBC WITH DIFFERENTIAL    Collection Time: 07/12/23  1:06 AM   Result Value Ref Range    WBC 5.6 4.8 - 10.8 K/uL    RBC 4.59 4.20 - 5.40 M/uL    Hemoglobin 11.5 (L) 12.0 - 16.0 g/dL    Hematocrit 37.3 37.0 - 47.0 %    MCV 81.3 (L) 81.4 - 97.8 fL    MCH 25.1 (L) 27.0 - 33.0 pg    MCHC 30.8 (L) 32.2 - 35.5 g/dL    RDW 43.7 35.9 - 50.0 fL    Platelet Count 100 (L) 164 - 446 K/uL    MPV 10.0 9.0 - 12.9 fL    Neutrophils-Polys 68.40 44.00 - 72.00 %    Lymphocytes 13.60 (L) 22.00 - 41.00 %    Monocytes 13.30 0.00 - 13.40 %    Eosinophils 3.40 0.00 - 6.90 %    Basophils 0.90 0.00 - 1.80 %    Immature Granulocytes 0.40 0.00 - 0.90 %    Nucleated RBC 0.00 0.00 - 0.20 /100 WBC    Neutrophils (Absolute) 3.81 1.82 - 7.42 K/uL    Lymphs (Absolute) 0.76 (L) 1.00 - 4.80 K/uL    Monos (Absolute) 0.74 0.00 - 0.85 K/uL    Eos (Absolute) 0.19 0.00 - 0.51 K/uL    Baso (Absolute) 0.05 0.00 - 0.12 K/uL    Immature Granulocytes (abs) 0.02 0.00 - 0.11 K/uL    NRBC (Absolute) 0.00 K/uL   MAGNESIUM    Collection Time: 07/12/23  1:06 AM   Result Value Ref Range    Magnesium 1.5 1.5 - 2.5 mg/dL   IMMATURE PLT FRACTION    Collection Time: 07/12/23  1:06 AM   Result Value Ref Range    Imm. Plt Fraction 2.0 0.6 - 13.1 %   CORRECTED CALCIUM    Collection Time: 07/12/23  1:06 AM   Result Value Ref Range    Correct Calcium 9.4 8.5 - 10.5 mg/dL    ESTIMATED GFR    Collection Time: 07/12/23  1:06 AM   Result Value Ref Range    GFR (CKD-EPI) 66 >60 mL/min/1.73 m 2       Cardiac Imaging and Procedures Review:    EKG and telemetry tracings personally reviewed    Impression and Medical Decision Making:  Principal Problem:    ACS with STEMI (ST elevation myocardial infarction) (HCC) (POA: Yes)  Active Problems:    Hypertension (POA: Yes)    Hypothyroid (POA: Yes)    Type 2 diabetes mellitus, without long-term current use of insulin (HCC) (POA: Yes)    Dyslipidemia (POA: Yes)  Resolved Problems:    * No resolved hospital problems. *    Assessment and plan:   STEMI  CAD s/p PCI m-LAD 7/10 and PCI p/m-RCA and mPDA 7/12  Ischemic cardiomyopathy - HFrEF 35-40%  Hypertension  Hyperlipidemia   No post-PCI symptoms/complications, appears euvolemic, BP/HR at goal.   - Continue Aspirin  - Continue Ticagrelor  - Continue ARB  - Continue Coreg at current dose  - Continue Rosuvastatin 20 mg for now, continue monitoring liver enzymes   - Start Spironolactone 25 mg daily   - Patient has referral for cardiac rehab  - Discussed diagnosis of HF and need to repeat echo in 3 months , by then will discuss need for further GDMT meds   - Telemetry     I personally discussed her case with Dr Leon Weiner M.D.    It is my pleasure to participate in the care of Ms. Doherty. Please do not hesitate to contact us with questions or concerns.

## 2023-07-13 ENCOUNTER — PHARMACY VISIT (OUTPATIENT)
Dept: PHARMACY | Facility: MEDICAL CENTER | Age: 78
End: 2023-07-13
Payer: MEDICARE

## 2023-07-13 VITALS
HEIGHT: 67 IN | HEART RATE: 70 BPM | RESPIRATION RATE: 17 BRPM | OXYGEN SATURATION: 93 % | WEIGHT: 193.56 LBS | DIASTOLIC BLOOD PRESSURE: 62 MMHG | BODY MASS INDEX: 30.38 KG/M2 | SYSTOLIC BLOOD PRESSURE: 119 MMHG | TEMPERATURE: 98.4 F

## 2023-07-13 LAB
ALBUMIN SERPL BCP-MCNC: 3.2 G/DL (ref 3.2–4.9)
ALP SERPL-CCNC: 76 U/L (ref 30–99)
ALT SERPL-CCNC: 24 U/L (ref 2–50)
ANION GAP SERPL CALC-SCNC: 12 MMOL/L (ref 7–16)
AST SERPL-CCNC: 65 U/L (ref 12–45)
BASOPHILS # BLD AUTO: 1.1 % (ref 0–1.8)
BASOPHILS # BLD: 0.06 K/UL (ref 0–0.12)
BILIRUB CONJ SERPL-MCNC: <0.2 MG/DL (ref 0.1–0.5)
BILIRUB INDIRECT SERPL-MCNC: ABNORMAL MG/DL (ref 0–1)
BILIRUB SERPL-MCNC: 0.4 MG/DL (ref 0.1–1.5)
BUN SERPL-MCNC: 17 MG/DL (ref 8–22)
CALCIUM SERPL-MCNC: 8.5 MG/DL (ref 8.5–10.5)
CHLORIDE SERPL-SCNC: 105 MMOL/L (ref 96–112)
CO2 SERPL-SCNC: 20 MMOL/L (ref 20–33)
CREAT SERPL-MCNC: 0.97 MG/DL (ref 0.5–1.4)
EKG IMPRESSION: NORMAL
EOSINOPHIL # BLD AUTO: 0.13 K/UL (ref 0–0.51)
EOSINOPHIL NFR BLD: 2.4 % (ref 0–6.9)
ERYTHROCYTE [DISTWIDTH] IN BLOOD BY AUTOMATED COUNT: 44.8 FL (ref 35.9–50)
FERRITIN SERPL-MCNC: 17.4 NG/ML (ref 10–291)
GFR SERPLBLD CREATININE-BSD FMLA CKD-EPI: 60 ML/MIN/1.73 M 2
GLUCOSE BLD STRIP.AUTO-MCNC: 241 MG/DL (ref 65–99)
GLUCOSE SERPL-MCNC: 291 MG/DL (ref 65–99)
HCT VFR BLD AUTO: 35.2 % (ref 37–47)
HGB BLD-MCNC: 10.9 G/DL (ref 12–16)
IMM GRANULOCYTES # BLD AUTO: 0.02 K/UL (ref 0–0.11)
IMM GRANULOCYTES NFR BLD AUTO: 0.4 % (ref 0–0.9)
IRON SATN MFR SERPL: 9 % (ref 15–55)
IRON SERPL-MCNC: 30 UG/DL (ref 40–170)
LYMPHOCYTES # BLD AUTO: 0.74 K/UL (ref 1–4.8)
LYMPHOCYTES NFR BLD: 13.8 % (ref 22–41)
MAGNESIUM SERPL-MCNC: 1.4 MG/DL (ref 1.5–2.5)
MCH RBC QN AUTO: 25.2 PG (ref 27–33)
MCHC RBC AUTO-ENTMCNC: 31 G/DL (ref 32.2–35.5)
MCV RBC AUTO: 81.5 FL (ref 81.4–97.8)
MONOCYTES # BLD AUTO: 0.7 K/UL (ref 0–0.85)
MONOCYTES NFR BLD AUTO: 13.1 % (ref 0–13.4)
NEUTROPHILS # BLD AUTO: 3.7 K/UL (ref 1.82–7.42)
NEUTROPHILS NFR BLD: 69.2 % (ref 44–72)
NRBC # BLD AUTO: 0 K/UL
NRBC BLD-RTO: 0 /100 WBC (ref 0–0.2)
PLATELET # BLD AUTO: 96 K/UL (ref 164–446)
PLATELETS.RETICULATED NFR BLD AUTO: 3.1 % (ref 0.6–13.1)
PMV BLD AUTO: 11 FL (ref 9–12.9)
POTASSIUM SERPL-SCNC: 3.7 MMOL/L (ref 3.6–5.5)
PROT SERPL-MCNC: 5.7 G/DL (ref 6–8.2)
RBC # BLD AUTO: 4.32 M/UL (ref 4.2–5.4)
SODIUM SERPL-SCNC: 137 MMOL/L (ref 135–145)
TIBC SERPL-MCNC: 330 UG/DL (ref 250–450)
UIBC SERPL-MCNC: 300 UG/DL (ref 110–370)
WBC # BLD AUTO: 5.4 K/UL (ref 4.8–10.8)

## 2023-07-13 PROCEDURE — 82962 GLUCOSE BLOOD TEST: CPT

## 2023-07-13 PROCEDURE — 700105 HCHG RX REV CODE 258: Performed by: INTERNAL MEDICINE

## 2023-07-13 PROCEDURE — A9270 NON-COVERED ITEM OR SERVICE: HCPCS | Performed by: INTERNAL MEDICINE

## 2023-07-13 PROCEDURE — 700111 HCHG RX REV CODE 636 W/ 250 OVERRIDE (IP)

## 2023-07-13 PROCEDURE — 85025 COMPLETE CBC W/AUTO DIFF WBC: CPT

## 2023-07-13 PROCEDURE — 36415 COLL VENOUS BLD VENIPUNCTURE: CPT

## 2023-07-13 PROCEDURE — 83540 ASSAY OF IRON: CPT

## 2023-07-13 PROCEDURE — 700102 HCHG RX REV CODE 250 W/ 637 OVERRIDE(OP): Performed by: INTERNAL MEDICINE

## 2023-07-13 PROCEDURE — 80076 HEPATIC FUNCTION PANEL: CPT

## 2023-07-13 PROCEDURE — 80048 BASIC METABOLIC PNL TOTAL CA: CPT

## 2023-07-13 PROCEDURE — 83550 IRON BINDING TEST: CPT

## 2023-07-13 PROCEDURE — 82728 ASSAY OF FERRITIN: CPT

## 2023-07-13 PROCEDURE — 93005 ELECTROCARDIOGRAM TRACING: CPT | Performed by: INTERNAL MEDICINE

## 2023-07-13 PROCEDURE — RXMED WILLOW AMBULATORY MEDICATION CHARGE: Performed by: INTERNAL MEDICINE

## 2023-07-13 PROCEDURE — 85055 RETICULATED PLATELET ASSAY: CPT

## 2023-07-13 PROCEDURE — 99233 SBSQ HOSP IP/OBS HIGH 50: CPT | Mod: GC | Performed by: INTERNAL MEDICINE

## 2023-07-13 PROCEDURE — 83735 ASSAY OF MAGNESIUM: CPT

## 2023-07-13 PROCEDURE — 700111 HCHG RX REV CODE 636 W/ 250 OVERRIDE (IP): Mod: JZ | Performed by: INTERNAL MEDICINE

## 2023-07-13 PROCEDURE — 93010 ELECTROCARDIOGRAM REPORT: CPT | Performed by: INTERNAL MEDICINE

## 2023-07-13 PROCEDURE — 99239 HOSP IP/OBS DSCHRG MGMT >30: CPT | Performed by: INTERNAL MEDICINE

## 2023-07-13 RX ORDER — SPIRONOLACTONE 25 MG/1
25 TABLET ORAL DAILY
Qty: 60 TABLET | Refills: 3 | Status: SHIPPED | OUTPATIENT
Start: 2023-07-14 | End: 2023-07-18 | Stop reason: SDUPTHER

## 2023-07-13 RX ORDER — MAGNESIUM SULFATE HEPTAHYDRATE 40 MG/ML
4 INJECTION, SOLUTION INTRAVENOUS ONCE
Status: COMPLETED | OUTPATIENT
Start: 2023-07-13 | End: 2023-07-13

## 2023-07-13 RX ORDER — OMEPRAZOLE 20 MG/1
20 CAPSULE, DELAYED RELEASE ORAL DAILY
Qty: 60 CAPSULE | Refills: 4 | Status: SHIPPED | OUTPATIENT
Start: 2023-07-13 | End: 2023-08-15

## 2023-07-13 RX ORDER — OMEPRAZOLE 20 MG/1
20 CAPSULE, DELAYED RELEASE ORAL DAILY
Status: DISCONTINUED | OUTPATIENT
Start: 2023-07-13 | End: 2023-07-13 | Stop reason: HOSPADM

## 2023-07-13 RX ORDER — ROSUVASTATIN CALCIUM 20 MG/1
20 TABLET, COATED ORAL EVERY EVENING
Qty: 60 TABLET | Refills: 11 | Status: SHIPPED | OUTPATIENT
Start: 2023-07-13 | End: 2023-07-18 | Stop reason: SINTOL

## 2023-07-13 RX ORDER — DAPAGLIFLOZIN 5 MG/1
5 TABLET, FILM COATED ORAL DAILY
Qty: 30 TABLET | Refills: 2 | Status: SHIPPED | OUTPATIENT
Start: 2023-07-13 | End: 2023-07-18

## 2023-07-13 RX ORDER — ASPIRIN 81 MG/1
81 TABLET ORAL DAILY
Qty: 100 TABLET | Refills: 7 | Status: SHIPPED | OUTPATIENT
Start: 2023-07-14

## 2023-07-13 RX ORDER — CARVEDILOL 6.25 MG/1
6.25 TABLET ORAL 2 TIMES DAILY WITH MEALS
Qty: 60 TABLET | Refills: 6 | Status: SHIPPED | OUTPATIENT
Start: 2023-07-13 | End: 2023-07-18 | Stop reason: SDUPTHER

## 2023-07-13 RX ADMIN — INSULIN HUMAN 2 UNITS: 100 INJECTION, SOLUTION PARENTERAL at 09:03

## 2023-07-13 RX ADMIN — TICAGRELOR 90 MG: 90 TABLET ORAL at 04:55

## 2023-07-13 RX ADMIN — LOSARTAN POTASSIUM 100 MG: 50 TABLET, FILM COATED ORAL at 04:56

## 2023-07-13 RX ADMIN — SODIUM CHLORIDE 250 MG: 9 INJECTION, SOLUTION INTRAVENOUS at 10:11

## 2023-07-13 RX ADMIN — MAGNESIUM SULFATE HEPTAHYDRATE 4 G: 4 INJECTION, SOLUTION INTRAVENOUS at 04:55

## 2023-07-13 RX ADMIN — SPIRONOLACTONE 25 MG: 25 TABLET ORAL at 04:57

## 2023-07-13 RX ADMIN — OMEPRAZOLE 20 MG: 20 CAPSULE, DELAYED RELEASE ORAL at 10:17

## 2023-07-13 RX ADMIN — CARVEDILOL 6.25 MG: 6.25 TABLET, FILM COATED ORAL at 09:03

## 2023-07-13 RX ADMIN — ASPIRIN 81 MG: 81 TABLET, COATED ORAL at 04:55

## 2023-07-13 RX ADMIN — LEVOTHYROXINE SODIUM 100 MCG: 0.1 TABLET ORAL at 04:56

## 2023-07-13 ASSESSMENT — PAIN DESCRIPTION - PAIN TYPE: TYPE: ACUTE PAIN

## 2023-07-13 NOTE — PROGRESS NOTES
Cardiology Follow-up Consult Note  Date of note:    7/12/2023          Patient ID:  Name:   Ally Doherty   YOB: 1945  Age:   78 y.o.  female   MRN:   [id number]    Chief Complaint   Patient presents with    Chest Pain     Starting last night while sleeping, center of chest and bilateral chest wall, described as pressure, intermittent and improved in triage, denies cardiac hx, hx GERD, took prilosec this AM     Reason for Consult:  STEMI    HPI/ID:  78 year old woman with PMH breast CA, DM2, HLD, hypothyroid, liver cirrhosis presents with intermittent chest pain. Describes episode of chest squeezing waking patient from sleep. Ekg finding st elevation with typical angina. Code STEMI called. No toxic social habits. No relevant family history. No prior cardiac history. Called for urgent LHC.    [facility] 7/10/23:    1.  Severe two-vessel CAD (occluded mid LAD -STEMI culprit , and proximal/mid RCA) status post accessible IVUS guided revascularization of the mid LAD deploying Synergy 3.5 x 20 mm CHARLIE postdilated to about 3.8 mm.  2.  Low-normal estimated LV systolic function 50% with anterior hypokinesis  3.  Significantly elevated resting LVEDP 35 mmHg with no significant transaortic gradient on pullback    Echocardiogram 7/11:   Moderately reduced left ventricular systolic function.  The left ventricular ejection fraction is visually estimated to be 35-  40%.  Nearly the entire distal to apical left ventricle is akinetic.  No apical thrombus.  Grade I diastolic dysfunction.  The right ventricle is normal in size and systolic function.  Estimated right ventricular systolic pressure is 39 mmHg.  No significant valvular abnormalities.    [facility] 7/12/23:     1.  Severe proximal/mid RCA stenoses status post successful IVUS guided PCI deploying overlapping Synergy 3 x 38 mm and 3 x 16 mm CHARLIE postdilated proximally to about 3.6 mm.  2.  Severe mid right PDA stenosis status post successful PCI deploying Synergy 2.5 x 16  mm CHARLIE, postdilated to about 2.6 mm.  3.  Normal resting LVEDP 12 mmHg with no significant transaortic gradient on pullback    Interim Events:  Patient feels good, no CP/SOB/lightheadedness/N/V. No right arm pain, mild bruise. Ambulated yesterday with no issues. Vitals stable, monitor uneventful, T wave inversion in anterior leads.   AST trended down to 60s from 90s.     ROS  No NV, No Bleeding, No dizziness   All other review of systems reviewed and negative.    Past medical, surgical, social, and family history reviewed and unchanged from admission except as noted in assessment and plan.    Medications: Reviewed in MAR  Current Facility-Administered Medications   Medication Dose Frequency Provider Last Rate Last Admin    magnesium sulfate IVPB premix 4 g  4 g Once Anu Ramos, A.P.R.N. 25 mL/hr at 07/13/23 0455 4 g at 07/13/23 0455    spironolactone (Aldactone) tablet 25 mg  25 mg Q DAY Winston Guido M.D.   25 mg at 07/13/23 0457    rosuvastatin (Crestor) tablet 20 mg  20 mg Q EVENING Trudi Jack M.D.   20 mg at 07/12/23 1827    levothyroxine (Synthroid) tablet 100 mcg  100 mcg AM ES Murali Argueta M.D.   100 mcg at 07/13/23 0456    losartan (Cozaar) tablet 100 mg  100 mg DAILY Murali Argueta M.D.   100 mg at 07/13/23 0456    aspirin EC tablet 81 mg  81 mg DAILY Otis Mars M.D.   81 mg at 07/13/23 0455    ticagrelor (Brilinta) tablet 90 mg  90 mg BID Otis Mars M.D.   90 mg at 07/13/23 0455    Respiratory Therapy Consult   Continuous RT Murali Argueta M.D.        acetaminophen (Tylenol) tablet 650 mg  650 mg Q6HRS PRN Murali Argueta M.D.        ondansetron (Zofran) syringe/vial injection 4 mg  4 mg Q4HRS PRN Murali Argueta M.D.        ondansetron (Zofran ODT) dispertab 4 mg  4 mg Q4HRS PRN Murali Argueta M.D.        senna-docusate (Pericolace Or Senokot S) 8.6-50 MG per tablet 2 Tablet  2 Tablet BID Murali Argueta M.D.        And    polyethylene glycol/lytes (Miralax) PACKET 1 Packet  1 Packet  "QDAY PRN Murali Argueta M.D.        And    magnesium hydroxide (Milk Of Magnesia) suspension 30 mL  30 mL QDAY PRN Murali Argueta M.D.        And    bisacodyl (Dulcolax) suppository 10 mg  10 mg QDAY PRN Murali Argueta M.D.        enoxaparin (Lovenox) inj 40 mg  40 mg DAILY AT 1800 Murali Argueta M.D.   40 mg at 07/12/23 1827    Pharmacy Consult Request ...Pain Management Review 1 Each  1 Each PHARMACY TO DOSE Murali Argueta M.D.        oxyCODONE immediate-release (Roxicodone) tablet 2.5 mg  2.5 mg Q3HRS PRN Murali Argueta M.D.        Or    oxyCODONE immediate-release (Roxicodone) tablet 5 mg  5 mg Q3HRS PRN Murali Argueta M.D.   5 mg at 07/12/23 1011    Or    HYDROmorphone (Dilaudid) injection 0.25 mg  0.25 mg Q3HRS PRN Murali Argueta M.D.        labetalol (Normodyne/Trandate) injection 10 mg  10 mg Q4HRS PRN Murali Argueta M.D.        carvedilol (Coreg) tablet 6.25 mg  6.25 mg BID WITH MEALS Murali Argueta M.D.   6.25 mg at 07/12/23 1827    nitroglycerin (Nitrostat) tablet 0.4 mg  0.4 mg Q5 MIN PRN Murali Argueta M.D.        morphine 10 mg/mL injection 2-4 mg  2-4 mg Q5 MIN PRN Murali Argueta M.D.        insulin regular (HumuLIN R,NovoLIN R) injection  1-6 Units 4X/DAY ACHS Murali Argueta M.D.   3 Units at 07/12/23 2213    And    dextrose 10 % BOLUS 25 g  25 g Q15 MIN PRN Murali Argueta M.D.       Last reviewed on 7/10/2023  4:50 PM by Jeny Mcmanus Student   Allergies   Allergen Reactions    Byetta Unspecified     Pancreatitis    Penicillins Rash     All over    Statins [Hmg-Coa-R Inhibitors] Unspecified     Elevated liver enzymes       Physical Exam  Body mass index is 30.32 kg/m². /62   Pulse 70   Temp 36.9 °C (98.4 °F) (Temporal)   Resp 17   Ht 1.702 m (5' 7\")   Wt 87.8 kg (193 lb 9 oz)   SpO2 93%    Vitals:    07/12/23 2024 07/12/23 2351 07/13/23 0345 07/13/23 0731   BP: 100/51 132/65 123/63 119/62   Pulse: 79 76 72 70   Resp: 16 18 16 17   Temp: 36.4 °C (97.5 °F) 36.5 °C (97.7 " °F)  36.9 °C (98.4 °F)   TempSrc: Temporal Temporal Temporal Temporal   SpO2: 94% 95% 94% 93%   Weight:       Height:        Oxygen Therapy:  Pulse Oximetry: 93 %, O2 (LPM): 0, O2 Delivery Device: None - Room Air    General: no apparent distress  Eyes: nl conjunctiva  ENT: OP clear  Neck: no JVD   Lungs: normal respiratory effort, CTAB, trace bibasilar crackles.   Heart: RRR, systolic 2/6 murmur in mitral point, no rubs or gallops,   EXT No edema bilateral lower extremities. + pedal pulses. no cyanosis. Right forearm with 8cm bruise and underlying minor hematoma, mildly tender to palpation, no signs of bleeding.   Abdomen: soft, non tender, non distended,  Neurological: No focal deficits  Psychiatric: Appropriate affect,   Skin: Warm extremities    Labs (personally reviewed and notable for):   Recent Results (from the past 24 hour(s))   POCT activated clotting time device results    Collection Time: 23  8:20 AM   Result Value Ref Range    Istat Activated Clotting Time 348 (H) 74 - 137 sec   POCT activated clotting time device results    Collection Time: 23  8:42 AM   Result Value Ref Range    Istat Activated Clotting Time 263 (H) 74 - 137 sec   POCT glucose device results    Collection Time: 23  9:25 AM   Result Value Ref Range    POC Glucose, Blood 252 (H) 65 - 99 mg/dL   EKG STAT    Collection Time: 23  9:54 AM   Result Value Ref Range    Report       [facility]    Test Date:  2023  Pt Name:    CHARU BAEZ              Department: 183  MRN:        [id number]                      Room:       T819  Gender:     Female                       Technician: [facility]  :        1945                   Requested By:MAGDA IRIZARRY  Order #:    [id number]                    Reading MD: Rick Mahan MD    Measurements  Intervals                                Axis  Rate:       67                           P:          54  AK:         168                          QRS:        114  QRSD:        120                          T:          155  QT:         498  QTc:        526    Interpretive Statements  Sinus rhythm  IVCD, consider atypical RBBB  Anterolateral infarct, age indeterminate  Abnormal T, probable ischemia, lateral leads  Compared to ECG 07/10/2023 22:00:48  WORSENED T WAVE INVERSIONS  Electronically Signed On 07- 18:18:15 PDT by Rick Mahan MD     POCT glucose device results    Collection Time: 07/12/23  1:42 PM   Result Value Ref Range    POC Glucose, Blood 291 (H) 65 - 99 mg/dL   POCT glucose device results    Collection Time: 07/12/23  6:23 PM   Result Value Ref Range    POC Glucose, Blood 236 (H) 65 - 99 mg/dL   POCT glucose device results    Collection Time: 07/12/23 10:12 PM   Result Value Ref Range    POC Glucose, Blood 290 (H) 65 - 99 mg/dL   CBC WITH DIFFERENTIAL    Collection Time: 07/13/23 12:08 AM   Result Value Ref Range    WBC 5.4 4.8 - 10.8 K/uL    RBC 4.32 4.20 - 5.40 M/uL    Hemoglobin 10.9 (L) 12.0 - 16.0 g/dL    Hematocrit 35.2 (L) 37.0 - 47.0 %    MCV 81.5 81.4 - 97.8 fL    MCH 25.2 (L) 27.0 - 33.0 pg    MCHC 31.0 (L) 32.2 - 35.5 g/dL    RDW 44.8 35.9 - 50.0 fL    Platelet Count 96 (L) 164 - 446 K/uL    MPV 11.0 9.0 - 12.9 fL    Neutrophils-Polys 69.20 44.00 - 72.00 %    Lymphocytes 13.80 (L) 22.00 - 41.00 %    Monocytes 13.10 0.00 - 13.40 %    Eosinophils 2.40 0.00 - 6.90 %    Basophils 1.10 0.00 - 1.80 %    Immature Granulocytes 0.40 0.00 - 0.90 %    Nucleated RBC 0.00 0.00 - 0.20 /100 WBC    Neutrophils (Absolute) 3.70 1.82 - 7.42 K/uL    Lymphs (Absolute) 0.74 (L) 1.00 - 4.80 K/uL    Monos (Absolute) 0.70 0.00 - 0.85 K/uL    Eos (Absolute) 0.13 0.00 - 0.51 K/uL    Baso (Absolute) 0.06 0.00 - 0.12 K/uL    Immature Granulocytes (abs) 0.02 0.00 - 0.11 K/uL    NRBC (Absolute) 0.00 K/uL   Basic Metabolic Panel    Collection Time: 07/13/23 12:08 AM   Result Value Ref Range    Sodium 137 135 - 145 mmol/L    Potassium 3.7 3.6 - 5.5 mmol/L    Chloride 105 96 - 112  mmol/L    Co2 20 20 - 33 mmol/L    Glucose 291 (H) 65 - 99 mg/dL    Bun 17 8 - 22 mg/dL    Creatinine 0.97 0.50 - 1.40 mg/dL    Calcium 8.5 8.5 - 10.5 mg/dL    Anion Gap 12.0 7.0 - 16.0   MAGNESIUM    Collection Time: 07/13/23 12:08 AM   Result Value Ref Range    Magnesium 1.4 (L) 1.5 - 2.5 mg/dL   IMMATURE PLT FRACTION    Collection Time: 07/13/23 12:08 AM   Result Value Ref Range    Imm. Plt Fraction 3.1 0.6 - 13.1 %   ESTIMATED GFR    Collection Time: 07/13/23 12:08 AM   Result Value Ref Range    GFR (CKD-EPI) 60 >60 mL/min/1.73 m 2       Cardiac Imaging and Procedures Review:    EKG and telemetry tracings personally reviewed    Impression and Medical Decision Making:  Principal Problem:    ACS with STEMI (ST elevation myocardial infarction) (HCC) (POA: Yes)  Active Problems:    Hypertension (POA: Yes)    Hypothyroid (POA: Yes)    Type 2 diabetes mellitus, without long-term current use of insulin (HCC) (POA: Yes)    Dyslipidemia (POA: Yes)    Ischemic cardiomyopathy (POA: Unknown)  Resolved Problems:    * No resolved hospital problems. *    Assessment and plan:   STEMI  CAD s/p PCI m-LAD 7/10 and PCI p/m-RCA and m-rPDA 7/12  Ischemic cardiomyopathy - HFrEF 35-40%  Hypertension  Hyperlipidemia   No post-PCI symptoms/complications, appears euvolemic, BP/HR at goal. Stable for discharge.   - Continue Aspirin  - Continue Ticagrelor  - Continue Losartan 100 mg daily   - Continue Coreg at current dose (pt to stop propranolol prescribed by outpatient GI - cirrhosis)   - Continue Rosuvastatin 20 mg, continue monitoring liver enzymes with PCP   - Outpatient cardiology follow up   - Patient has referral for cardiac rehab  - Will need repeat echo in 3 months, by then can discuss need for further GDMT meds (SLGT2i, ?Entresto)  - Ok for discharge from cardiology standpoint, we will sign off at this point    I personally discussed her case with Dr Leon Weiner M.D.  Please do not hesitate to contact us with questions or  concerns.

## 2023-07-13 NOTE — PROGRESS NOTES
NOC HOSPITALIST CROSS COVER    Notified by RN regarding a mag level of 1.4.      Vitals:    07/13/23 0345   BP: 123/63   Pulse: 72   Resp: 16   Temp:    SpO2: 94%          Plan:  #Hypomagnesemia  -Mag sulfate 4 g IV         -----------------------------------------------------------------------------------------------------------    Electronically signed by:  Chau Ramos, ИРИНА, APRN, [id number]  Hospitalist Services

## 2023-07-13 NOTE — CARE PLAN
The patient is Stable - Low risk of patient condition declining or worsening    Shift Goals  Clinical Goals: tele monitor, monitor hemodynamic stability, radial access site  Patient Goals: DC tomorrow  Family Goals: osiris    Progress made toward(s) clinical / shift goals:    Problem: Knowledge Deficit - Standard  Goal: Patient and family/care givers will demonstrate understanding of plan of care, disease process/condition, diagnostic tests and medications  Outcome: Progressing     Problem: Acute Care of the Cardiac Cath Patient  Goal: Post Procedure Optimal Outcome for the Cardiac Cath Patient  Outcome: Progressing     Problem: Pain - Standard  Goal: Alleviation of pain or a reduction in pain to the patient’s comfort goal  Outcome: Progressing       Patient is not progressing towards the following goals:

## 2023-07-13 NOTE — DISCHARGE PLANNING
HTH/SCP TCN chart review completed. Collaborated with ONEL Martinez. As prior, current discharge considerations are dc to home with outpatient follow ups as indicated.  No new TCN needs identified at this time. TCN will continue to follow and collaborate with discharge planning team as additional post acute needs arise. Thank you.     Completed:  PT recs outpatient cardiac rehab on 7/11  Choice obtained: None.   [facility] referral (N). Not sent. Patient wants outpatient f/u's.

## 2023-07-13 NOTE — PROGRESS NOTES
Pt discharged to home via WC at 1250. IV removed prior to dc. Copy of discharge instructions sent with pt as well as in the chart. All personal belongings sent with pt at time of departure. Pt was provided discharge instructions including where to  medications prior to leaving unit. Charge RN and UC notified at time of dc.

## 2023-07-13 NOTE — PROGRESS NOTES
Hospital Medicine Daily Progress Note    Date of Service  7/12/2023    Chief Complaint  Ally Doherty is a 78 y.o. female admitted 7/10/2023 with chest pain    Hospital Course    78-year-old female with history of diabetes, hypertension dyslipidemia and hypothyroidism who presented 7/10 with chest pain.  Anterior chest pain, radiate bilaterally.  Severe with squeezing feeling.  And the pressure.  On admission troponin was elevated 53, EKG showed ST elevation, cardiology was consulted and patient was rushed to the emergency cardiac cath which showed 2 lesions in LAD and RCA, stent was placed on LAD on 7/10 and and stent on RCA was placed on 7/12.  Echo was done and showed cardiomyopathy and ejection fraction 35 -40% with anteriorly distal to apical left ventricle akinesis, patient started with aspirin, Brilinta, spironolactone 25 mg daily, rosuvastatin 20 mg daily, Coreg 6.2 mg twice daily with losartan 100 mg daily.  Echo was done        Interval Problem Update  -Evaluated examined the patient at bedside, denied any new symptoms  -EKG, reviewed personally, was done after cardiac cath and showed significant T inversion.  However no chest pain  -Received stent on RCA  -Continue medication for cardiomyopathy  -Case was discussed with the patient and her , continue monitoring the patient for tonight.      I have discussed this patient's plan of care and discharge plan at IDT rounds today with Case Management, Nursing, Nursing leadership, and other members of the IDT team.    Consultants/Specialty  cardiology    Code Status  Full Code    Disposition  The patient is not medically cleared for discharge to home or a post-acute facility.  Anticipate discharge to: home with close outpatient follow-up    I have placed the appropriate orders for post-discharge needs.    Review of Systems  Review of Systems   Constitutional:  Negative for chills, fever and weight loss.   HENT:  Negative for ear pain, hearing loss and  tinnitus.    Eyes:  Negative for blurred vision, double vision and photophobia.   Respiratory:  Negative for cough and hemoptysis.    Cardiovascular:  Negative for chest pain, palpitations, orthopnea and claudication.   Gastrointestinal:  Negative for abdominal pain, constipation, diarrhea, nausea and vomiting.   Genitourinary:  Negative for dysuria, frequency and urgency.   Musculoskeletal:  Negative for myalgias and neck pain.   Skin:  Negative for rash.   Neurological:  Negative for dizziness, speech change and weakness.        Physical Exam  Temp:  [36.3 °C (97.3 °F)-36.8 °C (98.2 °F)] 36.3 °C (97.3 °F)  Pulse:  [66-82] 82  Resp:  [15-20] 15  BP: (107-133)/(55-62) 133/62  SpO2:  [91 %-95 %] 93 %    Physical Exam  Constitutional:       General: She is not in acute distress.     Appearance: She is obese. She is not ill-appearing.   HENT:      Head: Normocephalic and atraumatic.   Eyes:      General: No scleral icterus.  Cardiovascular:      Rate and Rhythm: Normal rate.      Heart sounds: No murmur heard.  Pulmonary:      Effort: No respiratory distress.      Breath sounds: No wheezing.   Abdominal:      General: There is no distension.      Palpations: Abdomen is soft.      Tenderness: There is no abdominal tenderness. There is no guarding.   Musculoskeletal:      Right lower leg: No edema.      Left lower leg: No edema.   Skin:     Findings: No bruising, lesion or rash.   Neurological:      General: No focal deficit present.      Mental Status: She is oriented to person, place, and time. Mental status is at baseline.      Cranial Nerves: No cranial nerve deficit.      Motor: No weakness.         Fluids    Intake/Output Summary (Last 24 hours) at 7/12/2023 1739  Last data filed at 7/12/2023 1200  Gross per 24 hour   Intake 440 ml   Output --   Net 440 ml       Laboratory  Recent Labs     07/10/23  0740 07/11/23  0157 07/12/23  0106   WBC 5.2 5.0 5.6   RBC 5.02 4.67 4.59   HEMOGLOBIN 12.8 11.8* 11.5*   HEMATOCRIT  41.5 38.2 37.3   MCV 82.7 81.8 81.3*   MCH 25.5* 25.3* 25.1*   MCHC 30.8* 30.9* 30.8*   RDW 44.9 44.1 43.7   PLATELETCT 108* 101* 100*   MPV 10.0 9.6 10.0     Recent Labs     07/10/23  0740 07/11/23  0157 07/12/23  0106   SODIUM 143 140 141   POTASSIUM 4.4 4.3 4.2   CHLORIDE 107 107 108   CO2 19* 20 19*   GLUCOSE 179* 177* 257*   BUN 17 17 16   CREATININE 0.88 0.88 0.90   CALCIUM 9.4 8.9 8.8             Recent Labs     07/11/23  0157   TRIGLYCERIDE 106   HDL 47   *       Imaging  EC-ECHOCARDIOGRAM COMPLETE W/ CONT   Final Result      DX-CHEST-PORTABLE (1 VIEW)   Final Result      1.  No acute cardiac or pulmonary abnormalities are identified.   2.  Hiatal hernia.      CL-LEFT HEART CATHETERIZATION WITH POSSIBLE INTERVENTION    (Results Pending)   CL-LEFT HEART CATHETERIZATION WITH POSSIBLE INTERVENTION    (Results Pending)        Assessment/Plan  * ACS with STEMI (ST elevation myocardial infarction) (HCC)- (present on admission)  Assessment & Plan  Came with chest pain and elevated troponin and EKG showed ST elevation  Cath Lab, with findings of severe two-vessel CAD (occluded mid LAD -STEMI culprit , and proximal/mid RCA), s/p CHARLIE to the mid LAD.    Had stent on LAD and and stent on RCA on 7/12  Telemetry  Aspirin, Brilinta, rosuvastatin 20 mg daily  Echo showed ejection fraction 40%    Ischemic cardiomyopathy  Assessment & Plan  After acute coronary syndrome  Ejection fraction showed 35% with wall motion abnormality  No exacerbation and patient is on room air  Patient received stent on RCA and LAD  Continue aspirin, Brilinta at least for 12 months  Continue spironolactone, Coreg, rosuvastatin and losartan, increase the doses as possible  Follow-up with heart failure clinic and consider Farixiga   Repeat echo in 3 months after discharge.     Dyslipidemia- (present on admission)  Assessment & Plan  Check lipid panel  Continue atorvastatin 80 mg daily    Type 2 diabetes mellitus, without long-term current use  of insulin (HCC)- (present on admission)  Assessment & Plan  A1c 8.3, uncontrolled  Sliding scale  Holding oral medications  Consider Nura as outpatient    Hypothyroid- (present on admission)  Assessment & Plan  - Resume home Synthroid.    Hypertension- (present on admission)  Assessment & Plan  - She will be on resume home Cozaar.  She will be started on Coreg.  Monitor blood pressure trend closely, start as needed IV labetalol for significant hypertension.         VTE prophylaxis: SCDs/TEDs and enoxaparin ppx    I have performed a physical exam and reviewed and updated ROS and Plan today (7/12/2023). In review of yesterday's note (7/11/2023), there are no changes except as documented above.    Greater than 51 minutes spent prepping to see patient (e.g. review of tests) obtaining and/or reviewing separately obtained history. Performing a medically appropriate examination and/ evaluation.  Counseling and educating the patient/family/caregiver.  Ordering medications, tests, or procedures.  Referring and communicating with other health care professionals.  Documenting clinical information in EPIC.  Independently interpreting results and communicating results to patient/family/caregiver.  Care coordination

## 2023-07-13 NOTE — PROGRESS NOTES
Bedside report received from MAGDA Thakur. Patient is alert and oriented x  4, room air, SR on the monitor. Right radial site is CDI with bruising. Cap refill less then 3 seconds, patient denies numbness, tingling pain to right extremity. Fall precautions are in place. Call light is in reach.

## 2023-07-13 NOTE — DISCHARGE SUMMARY
Discharge Summary    CHIEF COMPLAINT ON ADMISSION  Chief Complaint   Patient presents with    Chest Pain     Starting last night while sleeping, center of chest and bilateral chest wall, described as pressure, intermittent and improved in triage, denies cardiac hx, hx GERD, took prilosec this AM       Reason for Admission  STEMI  Ischemic cardiomyopathy    Admission Date  7/10/2023    CODE STATUS  Full code    HPI & HOSPITAL COURSE       78-year-old female with history of diabetes, hypertension dyslipidemia and hypothyroidism who presented 7/10 with chest pain.  Anterior chest pain, radiate bilaterally.  Severe with squeezing feeling.  And the pressure.  On admission troponin was elevated 53, EKG showed ST elevation, cardiology was consulted and patient was rushed to the emergency cardiac cath which showed 2 lesions in LAD and RCA, stent was placed on LAD on 7/10 and and stent on RCA was placed on 7/12.  Echo was done and showed cardiomyopathy and ejection fraction 35 -40% with anteriorly distal to apical left ventricle akinesis, patient was started with aspirin, Brilinta, spironolactone 25 mg daily, rosuvastatin 20 mg daily, Coreg 6.2 mg twice daily with losartan 100 mg daily.      Her labs showed A1c 8.3 and , patient has some reaction to atorvastatin, will start with atorvastatin 20 mg and close monitoring and increase the dose if tolerated as outpatient.    Since patient has history of diabetes and new diagnosis of ischemic cardiomyopathy, will discontinue glipizide and start with farxiga  and continue metformin as outpatient, to follow-up closely with PCP.      Patient was found to have anemia hemoglobin around 10, iron panel showed iron deficiency, recent colonoscopy around a year ago with no significant mass however patient has some procedure on her esophagus, will continue omeprazole, patient received IV iron before discharge.  To follow-up closely with PCP and consider GI referral if needed    Had a  long discussion with the patient about the importance of taking her medications with history aspirin and Brilinta, close monitoring with cardiology clinic, also discussed the importance of controlling her blood pressure and diabetes, patient understood and agreed.      therefore, she is discharged in good and stable condition to home with close outpatient follow-up.    The patient met 2-midnight criteria for an inpatient stay at the time of discharge.    Discharge Date  07/13/23      FOLLOW UP ITEMS POST DISCHARGE  Follow-up with cardiology clinic to review the medication and increase the doses if tolerated  Continue monitoring with PCP for hypertension and high blood sugar    DISCHARGE DIAGNOSES  Principal Problem:    ACS with STEMI (ST elevation myocardial infarction) (HCC) (POA: Yes)  Active Problems:    Ischemic cardiomyopathy (POA: Unknown)    Hypertension (POA: Yes)    Hypothyroid (POA: Yes)    Type 2 diabetes mellitus, without long-term current use of insulin (HCC) (POA: Yes)    Dyslipidemia (POA: Yes)  Resolved Problems:    * No resolved hospital problems. *      FOLLOW UP  Future Appointments   Date Time Provider Department Center   7/18/2023  9:30 AM MALOU Roland [facility] None   8/29/2023  9:00 AM Lillie Herrera M.D. [facility]  [address]  [address]  [facility] [id number] [phone]  Call  Your doctor has referred you for Cardiac Rehab, which is important for your recovery. Please call to make an appointment.    Lillie Herrera M.D.  [address]  [facility] [id number] [phone]    Follow up in 1 week(s)        MEDICATIONS ON DISCHARGE     Medication List        START taking these medications        Instructions   Aspirin Low Dose 81 MG EC tablet  Start taking on: July 14, 2023  Generic drug: aspirin   Take 1 Tablet by mouth every day.  Dose: 81 mg     Brilinta 90 MG Tabs tablet  Generic drug:  ticagrelor   Take 1 Tablet by mouth 2 times a day.  Dose: 90 mg     carvedilol 6.25 MG Tabs  Commonly known as: Coreg   Take 1 Tablet by mouth 2 times a day with meals.  Dose: 6.25 mg     dapagliflozin propanediol 5 MG Tabs  Commonly known as: Farxiga   Take 1 Tablet by mouth every day.  Dose: 5 mg     omeprazole 20 MG delayed-release capsule  Commonly known as: PriLOSEC   Take 1 Capsule by mouth every day.  Dose: 20 mg     rosuvastatin 20 MG Tabs  Commonly known as: Crestor   Take 1 Tablet by mouth every evening.  Dose: 20 mg     spironolactone 25 MG Tabs  Start taking on: July 14, 2023  Commonly known as: Aldactone   Take 1 Tablet by mouth every day.  Dose: 25 mg            CONTINUE taking these medications        Instructions   CALTRATE 600+D PO   Take 1 Tablet by mouth every day.  Dose: 1 Tablet     levothyroxine 100 MCG Tabs  Commonly known as: Synthroid   Take 1 tablet by mouth every morning on an empty stomach.  Dose: 100 mcg     losartan 100 MG Tabs  Commonly known as: Cozaar   Take 1 Tablet by mouth every day.  Dose: 100 mg     metformin 1000 MG tablet  Commonly known as: Glucophage   Take 1 Tablet by mouth 2 times a day with meals.  Dose: 1,000 mg     propranolol 10 MG Tabs  Commonly known as: Inderal   Take 10 mg by mouth 2 times a day.  Dose: 10 mg     therapeutic multivitamin-minerals Tabs   Take 1 Tab by mouth every day.  Dose: 1 Tablet            STOP taking these medications      glipiZIDE SR 5 MG Tb24  Commonly known as: glipiZIDE XL              Allergies  Allergies   Allergen Reactions    Byetta Unspecified     Pancreatitis    Penicillins Rash     All over    Statins [Hmg-Coa-R Inhibitors] Unspecified     Elevated liver enzymes       DIET  No orders of the defined types were placed in this encounter.      ACTIVITY  As tolerated.  Weight bearing as tolerated    CONSULTATIONS  Cardiology    PROCEDURES  Cardiac cath    LABORATORY  Lab Results   Component Value Date    SODIUM 137 07/13/2023     POTASSIUM 3.7 07/13/2023    CHLORIDE 105 07/13/2023    CO2 20 07/13/2023    GLUCOSE 291 (H) 07/13/2023    BUN 17 07/13/2023    CREATININE 0.97 07/13/2023        Lab Results   Component Value Date    WBC 5.4 07/13/2023    HEMOGLOBIN 10.9 (L) 07/13/2023    HEMATOCRIT 35.2 (L) 07/13/2023    PLATELETCT 96 (L) 07/13/2023        Total time of the discharge process exceeds 35 minutes.

## 2023-07-13 NOTE — DISCHARGE INSTRUCTIONS
Diagnosis:  Acute Coronary Syndrome (ACS) is a diagnosis that encompasses cardiac-related chest pain and heart attack. ACS occurs when the blood flow to the heart muscle is severely reduced or cut off completely due to a slow process called atherosclerosis.  Atherosclerosis is a disease in which the coronary arteries become narrow from a buildup of fat, cholesterol, and other substances that combine to form plaque. If the plaque breaks, a blood clot will form and block the blood flow to the heart muscle. This lack of blood flow can cause damage or death to the heart muscle which is called a heart attack or Myocardial Infarction (MI). There are two different types of MIs:  ST Elevation Myocardial Infarction or STEMI (the most severe type of heart attack) and Non-ST Elevation Myocardial Infarction or NSTEMI.    Treatment Plan:  Cardiac Diet  - Low fat, low salt, low cholesterol   Cardiac Rehab  - Your doctor has ordered you a referral to [facility].  Call [phone] to schedule an appointment.  Attend my follow-up appointment with my Cardiologist.  Take my medications as prescribed by my doctor  Exercise daily  Lower my bad cholesterol and raise my good cholesterol, Reduce stress, and Control my diabetes    Medications:  Certain medications are used to treat ACS.  Remember to always take medications as prescribed and never stop talking medications unless told by your doctor.    You have been prescribed the following medicatons:    Aspirin - Aspirin is used as a blood thinning medication and you will require this medication indefinitely.  Anti-platelet/blood thinner - Your Anti-platelet/Blood thinning medication is called Brilinta/Trigrelor, and is used in combination with aspirin to prevent clots from forming in your heart and/or around your stent.  Your doctor will determine how long you need to be on this medicine.  Beta-Blocker - Beta-Blocker Carvedilol/Coreg is used to lower blood pressure and heart rate, and/or  helps your heart heal after a heart attack.  Statin - Statin Crestor/Rosuvastatin is used to lower cholesterol.  Angiotensin Receptor Blocker (ARB) - Angiotensin Receptor Blocker Losartan/Cozar  HF Patient Discharge Instructions  Monitor your weight daily, and maintain a weight chart, to track your weight changes.   Activity as tolerated, unless your Doctor has ordered otherwise. Other activity order.  Follow a low fat, low cholesterol, low salt diet unless instructed otherwise by your Doctor. Read the labels on the back of food products and track your intake of fat, cholesterol and salt.   Fluid Restriction No. If a Fluid Restriction has been ordered by your Doctor, measure fluids with a measuring cup to ensure that you are not exceeding the restriction.   No smoking.  Oxygen No. If your Doctor has ordered that you wear Oxygen at home, it is important to wear it as ordered.  Did you receive an explanation from staff on the importance of taking each of your medications and why it is necessary to keep taking them unless your doctor says to stop? Yes  Were all of your questions answered about how to manage your heart failure and what to do if you have increased signs and symptoms after you go home? Yes  Do you feel like your heart failure care team involved you in the care treatment plan and allowed you to make decisions regarding your care while in the hospital and addressed any discharge needs you might have? Yes    See the educational handout provided at discharge for more information on monitoring your daily weight, activity and diet. This also explains more about Heart Failure, symptoms of a flare-up and some of the tests that you have undergone.     Warning Signs of a Flare-Up include:  Swelling in the ankles or lower legs.  Shortness of breath, while at rest, or while doing normal activities.   Shortness of breath at night when in bed, or coughing in bed.   Requiring more pillows to sleep at night, or needing  to sit up at night to sleep.  Feeling weak, dizzy or fatigued.     When to call your Doctor:  Call [employer] seven days a week from 8:00 a.m. to 8:00 p.m. for medical questions [phone].  Call your Primary Care Physician or Cardiologist if:   You experience any pain radiating to your jaw or neck.  You have any difficulty breathing.  You experience weight gain of 3 lbs in a day or 5 lbs in a week.   You feel any palpitations or irregular heartbeats.  You become dizzy or lose consciousness.   If you have had an angiogram or had a pacemaker or AICD placed, and experience:  Bleeding, drainage or swelling at the surgical / puncture site.  Fever greater than 100.0 F  Shock from internal defibrillator.  Cool and / or numb extremities.     Please access the AHA My HF Guide/Heart Failure Interactive Workbook:   [email or web address]   is used to lower blood pressure and treat heart failure.

## 2023-07-13 NOTE — ASSESSMENT & PLAN NOTE
After acute coronary syndrome  Ejection fraction showed 35% with wall motion abnormality  No exacerbation and patient is on room air  Patient received stent on RCA and LAD  Continue aspirin, Brilinta at least for 12 months  Continue spironolactone, Coreg, rosuvastatin and losartan, increase the doses as possible  Follow-up with heart failure clinic and consider Farixiga   Repeat echo in 3 months after discharge.

## 2023-07-14 ENCOUNTER — PATIENT OUTREACH (OUTPATIENT)
Dept: MEDICAL GROUP | Facility: LAB | Age: 78
End: 2023-07-14
Payer: MEDICARE

## 2023-07-15 ENCOUNTER — APPOINTMENT (OUTPATIENT)
Dept: RADIOLOGY | Facility: MEDICAL CENTER | Age: 78
End: 2023-07-15
Attending: EMERGENCY MEDICINE
Payer: MEDICARE

## 2023-07-15 ENCOUNTER — HOSPITAL ENCOUNTER (EMERGENCY)
Facility: MEDICAL CENTER | Age: 78
End: 2023-07-15
Attending: EMERGENCY MEDICINE
Payer: MEDICARE

## 2023-07-15 VITALS
HEIGHT: 67 IN | OXYGEN SATURATION: 95 % | WEIGHT: 188.93 LBS | DIASTOLIC BLOOD PRESSURE: 66 MMHG | RESPIRATION RATE: 18 BRPM | HEART RATE: 76 BPM | BODY MASS INDEX: 29.65 KG/M2 | SYSTOLIC BLOOD PRESSURE: 132 MMHG | TEMPERATURE: 98 F

## 2023-07-15 DIAGNOSIS — I80.8 SUPERFICIAL THROMBOPHLEBITIS OF LEFT UPPER EXTREMITY: ICD-10-CM

## 2023-07-15 PROCEDURE — 99284 EMERGENCY DEPT VISIT MOD MDM: CPT

## 2023-07-15 PROCEDURE — 93971 EXTREMITY STUDY: CPT | Mod: LT

## 2023-07-15 ASSESSMENT — FIBROSIS 4 INDEX: FIB4 SCORE: 10.5

## 2023-07-16 NOTE — ED PROVIDER NOTES
"ER Provider Note    Scribed for Dr. Nick White M.D. by Vinicius Goel. 7/15/2023  6:22 PM    Primary Care Provider: Lillie Herrera M.D.    CHIEF COMPLAINT  Chief Complaint   Patient presents with    Arm Pain     left       EXTERNAL RECORDS REVIEWED  She was admitted for STEMI on July 10.    HPI/ROS    OUTSIDE HISTORIAN(S):   at bedside.    Ally Doherty is a 78 y.o. female with a history of heart stent who presents to the ED for left arm pain onset yesterday. She reports it started to hurt yesterday and now it feels tight to the upper arm. She adds that today she walked without pain. She affirms use of Brilinta medication. No alleviating or exacerbating factors reported. She denies chest pain and shortness of breath.    PAST MEDICAL HISTORY  Past Medical History:   Diagnosis Date    Anemia     Breast cancer (HCC) 2/27/2012    Cancer (HCC) 2010    right breast    DM hyperosmolarity type II (HCC) 2/27/2012    oral meds    DM II (diabetes mellitus, type II), controlled (HCC) 6/21/2012    Hiatus hernia syndrome     High cholesterol 02/10/15    Pt denies    HTN (hypertension) 02/12/15    more \"preventative\" for DM-per her     Hyperlipidemia 2/27/2012    Hypothyroidism 2/27/2012    Liver cirrhosis (HCC)     couldn't take statins    Malignant neoplasm of upper-inner quadrant of right breast in female, estrogen receptor positive (HCC) 3/13/2018    Vitamin D deficiency 5/27/2014    ICD-10 transition       SURGICAL HISTORY  Past Surgical History:   Procedure Laterality Date    CATARACT EXTRACTION WITH IOL Right 08/08/2022    Dr. Monzon    CATARACT EXTRACTION WITH IOL Left 08/03/2022    Dr. Monzon    NM UPPER GI ENDOSCOPY,DIAGNOSIS N/A 06/18/2022    Procedure: UPPER ENDOSCOPY;  Surgeon: Venkat Hitchcock M.D.;  Location: SURGERY [facility];  Service: Gastroenterology    MASTECTOMY Left 12/09/2019    Procedure: MASTECTOMY;  Surgeon: Edward Boo M.D.;  Location: SURGERY SAME DAY [facility];  " Service: Plastics    BREAST IMPLANT REMOVAL Right 12/09/2019    Procedure: REMOVAL, IMPLANT, BREAST;  Surgeon: Edward Boo M.D.;  Location: SURGERY SAME DAY [facility];  Service: Plastics    BREAST RECONSTRUCTION  02/12/2015    Performed by Edward Boo M.D. at SURGERY [facility]    MAMMOPLASTY REDUCTION  02/12/2015    Performed by Edward Boo M.D. at SURGERY [facility]    MOLE EXCISION  02/12/2015    Performed by Edward Boo M.D. at SURGERY [facility]    CAPSULECTOMY  03/06/2014    Performed by Edward Boo M.D. at SURGERY [facility]    BREAST RECONSTRUCTION  06/24/2013    Performed by Edward Boo M.D. at SURGERY [facility]    TISSUE EXPANDER PLACE/REMOVE  06/24/2013    Performed by Edward Boo M.D. at SURGERY [facility]    CAYETANO BY LAPAROSCOPY  2008    TUBAL LIGATION  1974    TONSILLECTOMY  1953    MASTECTOMY      right breast    HI CHEMOTHERAPY, UNSPECIFIED PROCEDURE      HI RADIATION THERAPY PLAN SIMPLE         FAMILY HISTORY  Family History   Problem Relation Age of Onset    Other Other         adopted       SOCIAL HISTORY   reports that she has never smoked. She has never used smokeless tobacco. She reports current alcohol use. She reports that she does not use drugs.    CURRENT MEDICATIONS  Discharge Medication List as of 7/15/2023  8:45 PM        CONTINUE these medications which have NOT CHANGED    Details   aspirin 81 MG EC tablet Take 1 Tablet by mouth every day., Disp-100 Tablet, R-7, Normal      carvedilol (COREG) 6.25 MG Tab Take 1 Tablet by mouth 2 times a day with meals., Disp-60 Tablet, R-6, Normal      omeprazole (PRILOSEC) 20 MG delayed-release capsule Take 1 Capsule by mouth every day., Disp-60 Capsule, R-4, Normal      rosuvastatin (CRESTOR) 20 MG Tab Take 1 Tablet by mouth every evening., Disp-60 Tablet, R-11, Normal      spironolactone (ALDACTONE) 25 MG Tab Take 1 Tablet by mouth every day., Disp-60 Tablet, R-3, Normal      ticagrelor (BRILINTA) 90  "MG Tab tablet Take 1 Tablet by mouth 2 times a day., Disp-90 Tablet, R-8, Normal      dapagliflozin propanediol (FARXIGA) 5 MG Tab Take 1 Tablet by mouth every day., Disp-30 Tablet, R-2, Normal      propranolol (INDERAL) 10 MG Tab Take 10 mg by mouth 2 times a day., Historical Med      metformin (GLUCOPHAGE) 1000 MG tablet Take 1 Tablet by mouth 2 times a day with meals., Disp-200 Tablet, R-1, Normal      losartan (COZAAR) 100 MG Tab Take 1 Tablet by mouth every day., Disp-100 Tablet, R-3, Normal      levothyroxine (SYNTHROID) 100 MCG Tab Take 1 tablet by mouth every morning on an empty stomach., Disp-100 Tablet, R-1, Normal      therapeutic multivitamin-minerals (THERAGRAN-M) Tab Take 1 Tab by mouth every day., Historical Med      Calcium Carbonate-Vitamin D (CALTRATE 600+D PO) Take 1 Tablet by mouth every day., Historical Med             ALLERGIES  Byetta, Penicillins, and Statins [hmg-coa-r inhibitors]    PHYSICAL EXAM  /69   Pulse 79   Temp 36.6 °C (97.9 °F) (Temporal)   Resp 18   Ht 1.702 m (5' 7\")   Wt 85.7 kg (188 lb 15 oz)   LMP 02/27/1997   SpO2 97%   BMI 29.59 kg/m²   Constitutional: Alert in no apparent distress.  HENT: No signs of trauma, Bilateral external ears normal, Nose normal.   Eyes: Pupils are equal and reactive, Conjunctiva normal, Non-icteric.   Neck: Normal range of motion, No tenderness, Supple, No stridor.   Lymphatic: No lymphadenopathy noted.   Cardiovascular: Regular rate and rhythm, no murmurs.   Thorax & Lungs: Normal breath sounds, No respiratory distress, No wheezing, No chest tenderness.   Abdomen: Bowel sounds normal, Soft, No tenderness, No masses, No pulsatile masses. No peritoneal signs.  Skin: Warm, Dry. Ecchymosis and tendernes noted to antecubital  fossa left arm 5x5 cm diameter. Slight ecchymosis extending up right arm along puncture site of catheterization.  Back: No bony tenderness, No CVA tenderness.   Extremities: Intact distal pulses, No edema, No " tenderness, No cyanosis.  Musculoskeletal: Good range of motion in all major joints. No tenderness to palpation or major deformities noted.   Neurologic: Alert , Normal motor function, Normal sensory function, No focal deficits noted.   Psychiatric: Affect normal, Judgment normal, Mood normal.     DIAGNOSTIC STUDIES & PROCEDURES    Radiology:   The attending Emergency Physician has independently interpreted the diagnostic imaging associated with this visit and is awaiting the final reading from the radiologist, which will be displayed below.    Preliminary interpretation is a follows: No obvious abscess seen  Radiologist interpretation:     US-EXTREMITY VENOUS UPPER UNILAT LEFT   Final Result           COURSE & MEDICAL DECISION MAKING    ED Observation Status? Yes; I am placing the patient in to an observation status due to a diagnostic uncertainty as well as therapeutic intensity. Patient placed in observation status at 6:27 PM, 7/15/2023.     Observation plan is as follows: imaging    Upon Reevaluation, the patient's condition has: Improved; and will be discharged.    Patient discharged from ED Observation status at 8:47 PM   (Time) 7/15 (Date).     INITIAL ASSESSMENT AND PLAN  Care Narrative: Patient with pain in the left upper extremity and ecchymosis.  I am concerned about superficial thrombophlebitis but I will also get an ultrasound to evaluate for deep vein thrombosis.  We will reassess after imaging.      6:22 PM - Patient seen and evaluated at bedside. Discussed plan of care, including possible serum studies and imaging. Patient agrees to plan of care. Ordered US-extremty venous upper unilat left to evaluate.      ADDITIONAL PROBLEM LIST AND DISPOSITION  There is superficial clot noted there is no deep clot noted.  At this time we will instruct the patient to treat as superficial thrombophlebitis.  We will discharge home with strict return precautions and follow-up.               DISPOSITION AND  DISCUSSIONS  I have discussed management of the patient with the following physicians and MERRY's: none    Discussion of management with other QHP or appropriate source(s): None     Escalation of care considered, and ultimately not performed: acute inpatient care management, however at this time, the patient is most appropriate for outpatient management.    Barriers to care at this time, including but not limited to:  none .     Decision tools and prescription drugs considered including, but not limited to: Pain Medications not felt necessary. .        FINAL IMPRESSION   1. Superficial thrombophlebitis of left upper extremity         Vinicius ABAD (Gera), am scribing for, and in the presence of, Nick White M.D..    Electronically signed by: Vinicius Goel (Gera), 7/15/2023    INick M.D. personally performed the services described in this documentation, as scribed by Vinicius Goel in my presence, and it is both accurate and complete.    The note accurately reflects work and decisions made by me.  Nick White M.D.  7/15/2023  11:50 PM

## 2023-07-16 NOTE — ED NOTES
Bedside report received from previous Serena ruff RN Assumed patient care. Verified patient identification. Checked on bed with unlabored respirations. Family @ bedside.  Denied any new complaints. Gurney in low position, side rail up for pt safety. Call light within reach. Will continue to monitor for any complications.

## 2023-07-16 NOTE — ED TRIAGE NOTES
Chief Complaint   Patient presents with    Arm Pain     left      Pt ambulate to triage. Pt reports stents placed in heart last week, left arm was used for IV access. Pt presents with swelling and bruising to upper left arm. Worsening over last day.   CSM intact.

## 2023-07-16 NOTE — ED NOTES
Pt discharged to home. Discharge paperwork provided. Education provided by ERP.  Pt was given follow up instructions. Pt verbalized understanding of all instructions for discharge. Patient ambulatory, alert and oriented x 4, out of ER with all belongings and steady gait.   Dressing: pressure dressing

## 2023-07-17 ENCOUNTER — TELEPHONE (OUTPATIENT)
Dept: SOCIAL WORK | Facility: CLINIC | Age: 78
End: 2023-07-17
Payer: MEDICARE

## 2023-07-18 ENCOUNTER — OFFICE VISIT (OUTPATIENT)
Dept: CARDIOLOGY | Facility: MEDICAL CENTER | Age: 78
End: 2023-07-18
Payer: MEDICARE

## 2023-07-18 VITALS
DIASTOLIC BLOOD PRESSURE: 60 MMHG | OXYGEN SATURATION: 91 % | BODY MASS INDEX: 28.72 KG/M2 | RESPIRATION RATE: 18 BRPM | HEART RATE: 75 BPM | WEIGHT: 183 LBS | SYSTOLIC BLOOD PRESSURE: 108 MMHG | HEIGHT: 67 IN

## 2023-07-18 DIAGNOSIS — E78.5 DYSLIPIDEMIA: ICD-10-CM

## 2023-07-18 DIAGNOSIS — I10 PRIMARY HYPERTENSION: ICD-10-CM

## 2023-07-18 DIAGNOSIS — I21.02 ST ELEVATION MYOCARDIAL INFARCTION INVOLVING LEFT ANTERIOR DESCENDING (LAD) CORONARY ARTERY (HCC): ICD-10-CM

## 2023-07-18 DIAGNOSIS — Z95.5 STENTED CORONARY ARTERY: ICD-10-CM

## 2023-07-18 DIAGNOSIS — I25.5 ISCHEMIC CARDIOMYOPATHY: ICD-10-CM

## 2023-07-18 PROBLEM — F32.9 MAJOR DEPRESSIVE DISORDER, SINGLE EPISODE, UNSPECIFIED: Status: ACTIVE | Noted: 2023-07-18

## 2023-07-18 PROBLEM — R07.89 OTHER CHEST PAIN: Status: RESOLVED | Noted: 2023-07-18 | Resolved: 2023-07-18

## 2023-07-18 PROBLEM — Z71.89 ADVANCED CARE PLANNING/COUNSELING DISCUSSION: Status: RESOLVED | Noted: 2022-06-17 | Resolved: 2023-07-18

## 2023-07-18 PROBLEM — D69.6 THROMBOCYTOPENIA (HCC): Status: RESOLVED | Noted: 2022-04-11 | Resolved: 2023-07-18

## 2023-07-18 PROBLEM — R07.89 OTHER CHEST PAIN: Status: ACTIVE | Noted: 2023-07-18

## 2023-07-18 PROCEDURE — 3078F DIAST BP <80 MM HG: CPT

## 2023-07-18 PROCEDURE — 99213 OFFICE O/P EST LOW 20 MIN: CPT

## 2023-07-18 PROCEDURE — 99212 OFFICE O/P EST SF 10 MIN: CPT

## 2023-07-18 PROCEDURE — 3074F SYST BP LT 130 MM HG: CPT

## 2023-07-18 PROCEDURE — 99214 OFFICE O/P EST MOD 30 MIN: CPT

## 2023-07-18 RX ORDER — EMPAGLIFLOZIN 10 MG/1
10 TABLET, FILM COATED ORAL DAILY
Qty: 100 TABLET | Refills: 3 | Status: SHIPPED | OUTPATIENT
Start: 2023-07-18 | End: 2023-07-24 | Stop reason: SDUPTHER

## 2023-07-18 RX ORDER — GLIPIZIDE 10 MG/1
TABLET, FILM COATED, EXTENDED RELEASE ORAL
COMMUNITY
Start: 2023-06-29 | End: 2023-07-18

## 2023-07-18 RX ORDER — SPIRONOLACTONE 25 MG/1
25 TABLET ORAL DAILY
Qty: 100 TABLET | Refills: 3 | Status: SHIPPED | OUTPATIENT
Start: 2023-07-18 | End: 2023-07-31

## 2023-07-18 RX ORDER — CARVEDILOL 6.25 MG/1
6.25 TABLET ORAL 2 TIMES DAILY WITH MEALS
Qty: 200 TABLET | Refills: 3 | Status: SHIPPED | OUTPATIENT
Start: 2023-07-18

## 2023-07-18 ASSESSMENT — ENCOUNTER SYMPTOMS
CONSTITUTIONAL NEGATIVE: 1
PND: 0
NEUROLOGICAL NEGATIVE: 1
MUSCULOSKELETAL NEGATIVE: 1
DEPRESSION: 0
GASTROINTESTINAL NEGATIVE: 1
EYES NEGATIVE: 1
SHORTNESS OF BREATH: 0
NERVOUS/ANXIOUS: 0
ORTHOPNEA: 0
PALPITATIONS: 0

## 2023-07-18 ASSESSMENT — FIBROSIS 4 INDEX: FIB4 SCORE: 10.5

## 2023-07-18 NOTE — PROGRESS NOTES
"Chief Complaint   Patient presents with    MI (Non ST Segment Elevation MI)       Subjective     Ally Doherty is a 78 y.o. female who presents today for hospital follow-up.  She was admitted on 7/10/2023 for STEMI, she was treated with staged PCI on 7/10/2023 she had 1 stent to LAD, on 7/12/2023 she had 2 stents placed to RCA and to PDA.  Echocardiogram showed reduced ejection fraction and she was started on DAPT with aspirin and Brilinta, spironolactone, rosuvastatin, carvedilol, losartan, and Farxiga.     They have a history of diabetes, hypertension dyslipidemia, MEDINA, PAD, breast cancer, depression and hypothyroidism     They are accompanied today by her     They are a new patient to our practice.    They have been feeling feeling really well, She has been able to go on short walks with no symptoms    ETOH: rare, Tobacco: never, Recreational drugs: none, Caffeine: drinks 3-4 ice tea per day    No symptoms of chest pain, palpitations, shortness of breath, exercise intolerance, dyspnea, or lower extremity edema.        Past Medical History:   Diagnosis Date    Anemia     Breast cancer (HCC) 2/27/2012    Cancer (HCC) 2010    right breast    DM hyperosmolarity type II (HCC) 2/27/2012    oral meds    DM II (diabetes mellitus, type II), controlled (HCC) 6/21/2012    Hiatus hernia syndrome     High cholesterol 02/10/15    Pt denies    HTN (hypertension) 02/12/15    more \"preventative\" for DM-per her     Hyperlipidemia 2/27/2012    Hypothyroidism 2/27/2012    Liver cirrhosis (HCC)     couldn't take statins    Malignant neoplasm of upper-inner quadrant of right breast in female, estrogen receptor positive (HCC) 3/13/2018    Vitamin D deficiency 5/27/2014    ICD-10 transition     Past Surgical History:   Procedure Laterality Date    CATARACT EXTRACTION WITH IOL Right 08/08/2022    Dr. Monzon    CATARACT EXTRACTION WITH IOL Left 08/03/2022    Dr. Monzon    PA UPPER GI ENDOSCOPY,DIAGNOSIS N/A 06/18/2022    " Procedure: UPPER ENDOSCOPY;  Surgeon: Venkat Hitchcock M.D.;  Location: SURGERY [facility];  Service: Gastroenterology    MASTECTOMY Left 12/09/2019    Procedure: MASTECTOMY;  Surgeon: Edward Boo M.D.;  Location: SURGERY SAME DAY [facility];  Service: Plastics    BREAST IMPLANT REMOVAL Right 12/09/2019    Procedure: REMOVAL, IMPLANT, BREAST;  Surgeon: Edward Boo M.D.;  Location: SURGERY SAME DAY [facility];  Service: Plastics    BREAST RECONSTRUCTION  02/12/2015    Performed by Edward Boo M.D. at SURGERY [facility]    MAMMOPLASTY REDUCTION  02/12/2015    Performed by Edward Boo M.D. at SURGERY [facility] ORS    MOLE EXCISION  02/12/2015    Performed by Edward Boo M.D. at SURGERY [facility] ORS    CAPSULECTOMY  03/06/2014    Performed by Edward Boo M.D. at [facility] ORS    BREAST RECONSTRUCTION  06/24/2013    Performed by Edward Boo M.D. at SURGERY [facility] ORS    TISSUE EXPANDER PLACE/REMOVE  06/24/2013    Performed by Edward Boo M.D. at SURGERY [facility] ORS    CAYETANO BY LAPAROSCOPY  2008    TUBAL LIGATION  1974    TONSILLECTOMY  1953    MASTECTOMY      right breast    IN CHEMOTHERAPY, UNSPECIFIED PROCEDURE      IN RADIATION THERAPY PLAN SIMPLE       Family History   Problem Relation Age of Onset    Other Other         adopted     Social History     Socioeconomic History    Marital status:      Spouse name: Not on file    Number of children: Not on file    Years of education: Not on file    Highest education level: Bachelor's degree (e.g., BA, AB, BS)   Occupational History    Not on file   Tobacco Use    Smoking status: Never    Smokeless tobacco: Never   Vaping Use    Vaping Use: Never used   Substance and Sexual Activity    Alcohol use: Yes     Alcohol/week: 0.0 oz     Comment: < 1 per week    Drug use: No    Sexual activity: Never   Other Topics Concern    Not on file   Social History Narrative    Not on file     Social Determinants of Health      Financial Resource Strain: Low Risk  (6/8/2023)    Overall Financial Resource Strain (CARDIA)     Difficulty of Paying Living Expenses: Not hard at all   Food Insecurity: No Food Insecurity (6/8/2023)    Hunger Vital Sign     Worried About Running Out of Food in the Last Year: Never true     Ran Out of Food in the Last Year: Never true   Transportation Needs: No Transportation Needs (6/8/2023)    PRAPARE - Transportation     Lack of Transportation (Medical): No     Lack of Transportation (Non-Medical): No   Physical Activity: Insufficiently Active (6/8/2023)    Exercise Vital Sign     Days of Exercise per Week: 4 days     Minutes of Exercise per Session: 20 min   Stress: No Stress Concern Present (6/8/2023)    [employer] [place] [employer] - Occupational Stress Questionnaire     Feeling of Stress : Only a little   Social Connections: Socially Integrated (6/8/2023)    Social Connection and Isolation Panel [NHANES]     Frequency of Communication with Friends and Family: Never     Frequency of Social Gatherings with Friends and Family: More than three times a week     Attends Orthodox Services: More than 4 times per year     Active Member of Clubs or Organizations: Yes     Attends Club or Organization Meetings: More than 4 times per year     Marital Status:    Intimate Partner Violence: Not At Risk (6/8/2023)    Humiliation, Afraid, Rape, and Kick questionnaire     Fear of Current or Ex-Partner: No     Emotionally Abused: No     Physically Abused: No     Sexually Abused: No   Housing Stability: Low Risk  (6/8/2023)    Housing Stability Vital Sign     Unable to Pay for Housing in the Last Year: No     Number of Places Lived in the Last Year: 1     Unstable Housing in the Last Year: No     Allergies   Allergen Reactions    [place] Unspecified     Pancreatitis    Penicillins Rash     All over    Statins [Hmg-Coa-R Inhibitors] Unspecified     Elevated liver enzymes     Outpatient Encounter Medications  as of 7/18/2023   Medication Sig Dispense Refill    glipiZIDE SR (GLUCOTROL) 10 MG TABLET SR 24 HR       aspirin 81 MG EC tablet Take 1 Tablet by mouth every day. 100 Tablet 7    carvedilol (COREG) 6.25 MG Tab Take 1 Tablet by mouth 2 times a day with meals. 60 Tablet 6    omeprazole (PRILOSEC) 20 MG delayed-release capsule Take 1 Capsule by mouth every day. 60 Capsule 4    rosuvastatin (CRESTOR) 20 MG Tab Take 1 Tablet by mouth every evening. 60 Tablet 11    spironolactone (ALDACTONE) 25 MG Tab Take 1 Tablet by mouth every day. 60 Tablet 3    ticagrelor (BRILINTA) 90 MG Tab tablet Take 1 Tablet by mouth 2 times a day. 90 Tablet 8    metformin (GLUCOPHAGE) 1000 MG tablet Take 1 Tablet by mouth 2 times a day with meals. 200 Tablet 1    losartan (COZAAR) 100 MG Tab Take 1 Tablet by mouth every day. 100 Tablet 3    levothyroxine (SYNTHROID) 100 MCG Tab Take 1 tablet by mouth every morning on an empty stomach. 100 Tablet 1    therapeutic multivitamin-minerals (THERAGRAN-M) Tab Take 1 Tab by mouth every day.      Calcium Carbonate-Vitamin D (CALTRATE 600+D PO) Take 1 Tablet by mouth every day.      dapagliflozin propanediol (FARXIGA) 5 MG Tab Take 1 Tablet by mouth every day. (Patient not taking: Reported on 7/18/2023) 30 Tablet 2    [DISCONTINUED] propranolol (INDERAL) 10 MG Tab Take 10 mg by mouth 2 times a day.       No facility-administered encounter medications on file as of 7/18/2023.     Review of Systems   Constitutional: Negative.    HENT: Negative.     Eyes: Negative.    Respiratory:  Negative for shortness of breath.    Cardiovascular:  Negative for chest pain, palpitations, orthopnea, leg swelling and PND.   Gastrointestinal: Negative.    Genitourinary: Negative.    Musculoskeletal: Negative.    Skin: Negative.    Neurological: Negative.    Endo/Heme/Allergies: Negative.    Psychiatric/Behavioral:  Negative for depression. The patient is not nervous/anxious.               Objective     /60 (BP Location:  "Left arm, Patient Position: Sitting, BP Cuff Size: Adult)   Pulse 75   Resp 18   Ht 1.702 m (5' 7\")   Wt 83 kg (183 lb)   LMP 02/27/1997   SpO2 91%   BMI 28.66 kg/m²     Physical Exam  Constitutional:       Appearance: Normal appearance. She is obese.   HENT:      Head: Normocephalic and atraumatic.   Neck:      Vascular: No JVD.   Cardiovascular:      Rate and Rhythm: Normal rate and regular rhythm.      Pulses: Normal pulses.      Heart sounds: Normal heart sounds. No murmur heard.     No friction rub.   Pulmonary:      Effort: Pulmonary effort is normal. No respiratory distress.      Breath sounds: Normal breath sounds.   Abdominal:      General: There is no distension.      Palpations: Abdomen is soft.   Musculoskeletal:      Right lower leg: No edema.      Left lower leg: No edema.   Skin:     General: Skin is warm and dry.   Neurological:      General: No focal deficit present.      Mental Status: She is alert and oriented to person, place, and time.   Psychiatric:         Mood and Affect: Mood normal.         Behavior: Behavior normal.            Lab Results   Component Value Date/Time    CHOLSTRLTOT 169 07/11/2023 01:57 AM     (H) 07/11/2023 01:57 AM    HDL 47 07/11/2023 01:57 AM    TRIGLYCERIDE 106 07/11/2023 01:57 AM       Lab Results   Component Value Date/Time    SODIUM 137 07/13/2023 12:08 AM    POTASSIUM 3.7 07/13/2023 12:08 AM    CHLORIDE 105 07/13/2023 12:08 AM    CO2 20 07/13/2023 12:08 AM    GLUCOSE 291 (H) 07/13/2023 12:08 AM    BUN 17 07/13/2023 12:08 AM    CREATININE 0.97 07/13/2023 12:08 AM     Lab Results   Component Value Date/Time    ALKPHOSPHAT 76 07/13/2023 12:08 AM    ASTSGOT 65 (H) 07/13/2023 12:08 AM    ALTSGPT 24 07/13/2023 12:08 AM    TBILIRUBIN 0.4 07/13/2023 12:08 AM      Angiogram 7/10/2023  PROCEDURES PERFORMED:  Left heart catheterization  Coronary angiography  Percutaneous coronary intervention of mid LAD  Intravascular ultrasound to guide " revascularization  Moderate conscious sedation     INDICATIONS:  Anterior/anterolateral STEMI    HEMODYNAMICS:  Aortic pressure: 126 /83/104 mmHg  LVEDP: 35 mmHg  No significant aortic gradient on pullback     LEFT VENTRICULOGRAPHY   Estimated LVEF= 50%. Hypokinesis in the anterior wall     CORONARY ANGIOGRAPHY:  The left main coronary artery: Short large caliber that bifurcates to LAD and left circumflex  The left anterior descending coronary artery: Large caliber transapical vessel with 100% mid occlusion status post accessible IVUS guided PCI as detailed below  The left circumflex coronary artery: Large-caliber vessel that gives rise to large caliber OM1 and a large in caliber left PLB  The right coronary artery: Large-caliber dominant vessel with a long segment of severe 80% proximal/mid RCA stenosis     PERCUTANEOUS CORONARY INTERVENTION of mid LAD:  Pre: 100% stenosis and BEVERLY 0 flow  Post: 0% residual stenosis and BEVERLY III flow.   Guide Catheter(s): 6 Upper sorbian EBU 3.5  Guidewire(s): Run-through  Anticoagulation:  Heparin to maintain ACT > 250  Antiplatelet(s): Aspirin, Integrilin bolus, Brilinta  Pre-dilation balloon(s): 3 x 12 mm  IVUS or OCT used: IVUS guided  Intracoronary Atherectomy / Lithotripsy: None  Stent: Synergy 3.5 x 20 mm CHARLIE  Post-dilation balloon(s): 3.75 x 12 mm NC to about 3.8 mm    IMPRESSION:  1.  Severe two-vessel CAD (occluded mid LAD -STEMI culprit , and proximal/mid RCA) status post accessible IVUS guided revascularization of the mid LAD deploying Synergy 3.5 x 20 mm CHARLIE postdilated to about 3.8 mm.  2.  Low-normal estimated LV systolic function 50% with anterior hypokinesis  3.  Significantly elevated resting LVEDP 35 mmHg with no significant transaortic gradient on pullback     RECOMMENDATIONS:  Dual antiplatelet therapy for at least 12 months with close monitoring of her platelet count given MEDINA cirrhosis with chronic mild thrombocytopenia  Staged PCI to the RCA, either inpatient or  outpatient  HI statin: Target LDL < 70 and TG < 150  GDMT and lifestyle modifications for secondary ASCVD prevention  Cardiac Rehab referral placed    Angiogram 7/12/2023  IMPRESSION:  1.  Severe proximal/mid RCA stenoses status post successful IVUS guided PCI deploying overlapping Synergy 3 x 38 mm and 3 x 16 mm CHARLIE postdilated proximally to about 3.6 mm.  2.  Severe mid right PDA stenosis status post successful PCI deploying Synergy 2.5 x 16 mm CHARLIE, postdilated to about 2.6 mm.  3.  Normal resting LVEDP 12 mmHg with no significant transaortic gradient on pullback     RECOMMENDATIONS:  Dual antiplatelet therapy for at least 24 months if tolerated given stent burden (LAD and RCA/PDA) then consider switching to monotherapy with Plavix 75 mg daily for life  HI statin or bempedoic acid-ezetimibe or [id number]-I if intolerant to statins: Target LDL < 70 and TG < 150  GDMT and lifestyle modifications for secondary ASCVD prevention  Cardiac Rehab referral    Echocardiogram 7/11/2023  CONCLUSIONS  Moderately reduced left ventricular systolic function.  The left ventricular ejection fraction is visually estimated to be 35-  40%.  Nearly the entire distal to apical left ventricle is akinetic.  No apical thrombus.  Grade I diastolic dysfunction.  The right ventricle is normal in size and systolic function.  Estimated right ventricular systolic pressure is 39 mmHg.  No significant valvular abnormalities.    Assessment & Plan     1. Stented coronary artery        2. ST elevation myocardial infarction involving left anterior descending (LAD) coronary artery (HCC)        3. Ischemic cardiomyopathy        4. Primary hypertension        5. Dyslipidemia            Medical Decision Making: Today's Assessment/Status/Plan:        CAD with history of NSTEMI/STEMI  Status post staged PCI, stent x1 to LAD on 7/10/2023  Stent x1 to RCA, stent x1 to PDA on 7/12/2023  Now with ischemic cardiomyopathy  -Euvolemic on exam  -DAPT with aspirin and  Brilinta for 1 year  -Statin: Intolerant in the past, referral to lipid clinic  -BB: Carvedilol 6.25 mg twice daily  -EF 35 to 40%  -Losartan 100 mg daily  -Spironolactone 25 mg daily  -Jardiance 10 mg daily  -Cardiac rehab referral placed  - Education provided  -Discussed returning to ER if chest pain returns and/or call cardiology office at [phone] with any additional new or worsening symptoms  -Discussed CV risk factor modification including cholesterol management, blood pressure management, smoking cessation, diet and lifestyle changes.  -Repeat echocardiogram ordered, BMP ordered     Hypertension  -Good control continue medications as above    Hyperlipidemia  -Most recent   -Recheck lipid panel in 3 months   -With history of MEDINA and previous intolerance to statins I have placed a referral to lipid clinic    Follow-up with Stacie TERRELL in 3 months    This note was dictated using Dragon speech recognition software.

## 2023-07-18 NOTE — PATIENT INSTRUCTIONS
Follow up with Stacie TERRELL in 3 months  Get echocardiogram  Start with cardiac rehab  Get labs next week  Get labs again in 3 months  Follow up with lipid clinic  Stop farxiga, start jardiance

## 2023-07-18 NOTE — TELEPHONE ENCOUNTER
ED Follow-up  Call Attempts: 2  Date of ED visit: 07/15/23  Call Outcome: Reviewed ED visit with patient  Since you've been home, how have you been feeling?: Better  Were you prescribed any medications?: No  Do you have any questions about your discharge instructions?: No  RN Recommendations: No follow-up appointment needed at this time  Total time spent (mins): 1

## 2023-07-19 ENCOUNTER — TELEPHONE (OUTPATIENT)
Dept: VASCULAR LAB | Facility: MEDICAL CENTER | Age: 78
End: 2023-07-19
Payer: MEDICARE

## 2023-07-19 NOTE — TELEPHONE ENCOUNTER
"[facility]    Received lipid referral from ANA MOMIN on 7/18    \"Note:    Needs LDL less than 70 with history of STEMI, has MEDINA and previously had bump in liver enzymes with statins\"    1st call  LM to establish care    Insurance: SCP  PCP: [facility]  Locations to be seen: Any    [facility] at [phone], fax [phone]    Mulu Ortiz, PharmD    "

## 2023-07-20 ENCOUNTER — PATIENT OUTREACH (OUTPATIENT)
Dept: HEALTH INFORMATION MANAGEMENT | Facility: OTHER | Age: 78
End: 2023-07-20
Payer: MEDICARE

## 2023-07-20 DIAGNOSIS — I10 ESSENTIAL HYPERTENSION: ICD-10-CM

## 2023-07-20 DIAGNOSIS — I10 PRIMARY HYPERTENSION: ICD-10-CM

## 2023-07-20 DIAGNOSIS — E78.5 DYSLIPIDEMIA: ICD-10-CM

## 2023-07-20 DIAGNOSIS — E11.9 TYPE 2 DIABETES MELLITUS WITHOUT COMPLICATION, WITHOUT LONG-TERM CURRENT USE OF INSULIN (HCC): ICD-10-CM

## 2023-07-20 PROCEDURE — 99999 PR NO CHARGE: CPT | Performed by: FAMILY MEDICINE

## 2023-07-20 NOTE — PROGRESS NOTES
Assessment    Spoke with patient for monthly outreach. Pt reports she is doing well with her recovery from her MI. Had follow up with cardiology a couple of days ago. Pt reports she is still awaiting a call regarding approval of jardiance. Note from pharmacy team in [facility]. Provided pt with their contact info. She will follow up with this RN if she continues to have issues with obtaining this medication. Pt is looking forward to cardiac rehab and particularly the nutrition information. No needs or concerns at this time. Inquired if pt would like to schedule sooner follow up with pcp. She would like to keep end of august appointment for now, but will reach out with any concerns.     Education    Medication management    Plan of Care and Goals    Begin cardiac rehab. Implement recommended dietary changes     Barriers:    Medication cost/availability     Progress:    Set back with recent MI. Pt is doing well and motivated to proceed with recovery    Next outreach: 1 month        Quarterly and annual chart review completed. Pt continues to qualify for and benefit from personal care management program. Will continue to follow.

## 2023-07-24 ENCOUNTER — TELEPHONE (OUTPATIENT)
Dept: CARDIOLOGY | Facility: MEDICAL CENTER | Age: 78
End: 2023-07-24
Payer: MEDICARE

## 2023-07-24 DIAGNOSIS — I25.5 ISCHEMIC CARDIOMYOPATHY: ICD-10-CM

## 2023-07-24 RX ORDER — EMPAGLIFLOZIN 10 MG/1
10 TABLET, FILM COATED ORAL DAILY
Qty: 100 TABLET | Refills: 3 | Status: SHIPPED | OUTPATIENT
Start: 2023-07-24 | End: 2023-12-08

## 2023-07-24 NOTE — TELEPHONE ENCOUNTER
Caller: Ally Doherty     Medication Name and Dosage:  Empagliflozin (JARDIANCE) 10 MG Tab tablet [[id number]]  .  Medication amount left: 0    Preferred Pharmacy: [facility] [address]    Other questions (Topic): N/A    Callback Number (Will only call for issues): [phone] (home)      Thank You    Yani LADD

## 2023-07-25 ENCOUNTER — HOSPITAL ENCOUNTER (OUTPATIENT)
Dept: LAB | Facility: MEDICAL CENTER | Age: 78
End: 2023-07-25
Payer: MEDICARE

## 2023-07-25 DIAGNOSIS — I21.02 ST ELEVATION MYOCARDIAL INFARCTION INVOLVING LEFT ANTERIOR DESCENDING (LAD) CORONARY ARTERY (HCC): ICD-10-CM

## 2023-07-25 DIAGNOSIS — I25.5 ISCHEMIC CARDIOMYOPATHY: ICD-10-CM

## 2023-07-25 PROCEDURE — 80048 BASIC METABOLIC PNL TOTAL CA: CPT

## 2023-07-25 PROCEDURE — 36415 COLL VENOUS BLD VENIPUNCTURE: CPT

## 2023-07-26 ENCOUNTER — TELEPHONE (OUTPATIENT)
Dept: CARDIOLOGY | Facility: MEDICAL CENTER | Age: 78
End: 2023-07-26
Payer: MEDICARE

## 2023-07-26 DIAGNOSIS — I10 PRIMARY HYPERTENSION: ICD-10-CM

## 2023-07-26 DIAGNOSIS — Z79.899 ENCOUNTER FOR MONITORING DIURETIC THERAPY: ICD-10-CM

## 2023-07-26 DIAGNOSIS — Z51.81 ENCOUNTER FOR MONITORING DIURETIC THERAPY: ICD-10-CM

## 2023-07-26 LAB
ANION GAP SERPL CALC-SCNC: 21 MMOL/L (ref 7–16)
BUN SERPL-MCNC: 31 MG/DL (ref 8–22)
CALCIUM SERPL-MCNC: 9.7 MG/DL (ref 8.5–10.5)
CHLORIDE SERPL-SCNC: 110 MMOL/L (ref 96–112)
CO2 SERPL-SCNC: 12 MMOL/L (ref 20–33)
CREAT SERPL-MCNC: 1.42 MG/DL (ref 0.5–1.4)
GFR SERPLBLD CREATININE-BSD FMLA CKD-EPI: 38 ML/MIN/1.73 M 2
GLUCOSE SERPL-MCNC: 117 MG/DL (ref 65–99)
POTASSIUM SERPL-SCNC: 4.8 MMOL/L (ref 3.6–5.5)
SODIUM SERPL-SCNC: 143 MMOL/L (ref 135–145)

## 2023-07-27 NOTE — TELEPHONE ENCOUNTER
Phone Number Called: [phone]    Call outcome: Did not leave a detailed message. Requested patient to call back.    Message: Called to discuss LH recommendations. Told patient  that I will send a Can'tWait message & to call back with further questions or if she prefers not to communicate via [employer].

## 2023-07-27 NOTE — TELEPHONE ENCOUNTER
----- Message from MALOU Roland sent at 7/26/2023  1:39 PM PDT -----  Her creatinine has gone up, please have her stop the spironolactone, hydrate well, and recheck BMP in 2 weeks.  Thank you

## 2023-07-31 ENCOUNTER — NON-PROVIDER VISIT (OUTPATIENT)
Dept: VASCULAR LAB | Facility: MEDICAL CENTER | Age: 78
End: 2023-07-31
Attending: INTERNAL MEDICINE
Payer: MEDICARE

## 2023-07-31 VITALS — HEART RATE: 60 BPM | DIASTOLIC BLOOD PRESSURE: 64 MMHG | SYSTOLIC BLOOD PRESSURE: 100 MMHG

## 2023-07-31 DIAGNOSIS — E78.5 DYSLIPIDEMIA: ICD-10-CM

## 2023-07-31 PROCEDURE — RXMED WILLOW AMBULATORY MEDICATION CHARGE

## 2023-07-31 PROCEDURE — 99213 OFFICE O/P EST LOW 20 MIN: CPT

## 2023-07-31 RX ORDER — GLIPIZIDE 5 MG/1
5 TABLET ORAL 2 TIMES DAILY
COMMUNITY
End: 2023-08-15

## 2023-07-31 RX ORDER — ROSUVASTATIN CALCIUM 20 MG/1
20 TABLET, COATED ORAL EVERY EVENING
COMMUNITY
End: 2023-09-01 | Stop reason: SDUPTHER

## 2023-07-31 NOTE — PROGRESS NOTES
[facility] - Initial Visit  Date of Service: 07/31/23    Ally Doherty has been referred for evaluation and management of dyslipidemia    Referral Source: Adrianna TERRELL     Subjective    HPI  History of ASCVD: Yes, Details: Patient was recently admitted for STEMI with PCI (1 stent to LAD, 2 stents placed to RCA and PDA)  Other Established (non-atherosclerotic) Vascular Disease, if Present: HTN, DM2  Age at Initial Diagnosis of Dyslipidemia: late 60s    Current Prescription Lipid Lowering Medications - including dose:   Statin: Rosuvastatin 20mg QD  Non-Statin: None  Current Lipid Lowering and Related Supplements:   Vit D  Any Current Side Effects Potentially Related to Lipid Lowering therapy?   No  Current Adherence to Lipid Lowering Therapies   Complete  Previously Attempted Interventions for Lipids - including outcome  Statin: Lovastatin    Outcome:  pt reports LDL increased  Non-Statin: None   Outcome: N/A  Any Previous History of Statin Intolerance?   Yes, Details: patient reports previously prescribed Lovastatin, and her LDL values actually increased so she was taken off of medication  Baseline Lipids Prior to Treatment:    Latest Reference Range & Units 07/11/23 01:57   Cholesterol,Tot 100 - 199 mg/dL 169   Triglycerides 0 - 149 mg/dL 106   HDL >=40 mg/dL 47   LDL <100 mg/dL 101 (H)     Other Pertinent History: Patient will be starting cardiac rehab in Sept  History of other CV risk factors: Patient has long hx of DM and current A1c is above goal at 8.3% (7/10/23)    PAST MEDICAL HISTORY:  has a past medical history of Anemia, Breast cancer (HCC) (2/27/2012), Cancer (HCC) (2010), DM hyperosmolarity type II (HCC) (2/27/2012), DM II (diabetes mellitus, type II), controlled ([facility]) (6/21/2012), Hiatus hernia syndrome, High cholesterol (02/10/15), HTN (hypertension) (02/12/15), Hyperlipidemia (2/27/2012), Hypothyroidism (2/27/2012), Liver cirrhosis (HCC), Malignant neoplasm of  upper-inner quadrant of right breast in female, estrogen receptor positive (HCC) (3/13/2018), and Vitamin D deficiency (5/27/2014).    PAST SURGICAL HISTORY:  has a past surgical history that includes mastectomy; pr chemotherapy, unspecified procedure; pr radiation therapy plan simple; lucila by laparoscopy (2008); breast reconstruction (06/24/2013); tissue expander place/remove (06/24/2013); tubal ligation (1974); tonsillectomy (1953); capsulectomy (03/06/2014); breast reconstruction (02/12/2015); mammoplasty reduction (02/12/2015); mole excision (02/12/2015); mastectomy (Left, 12/09/2019); breast implant removal (Right, 12/09/2019); pr upper gi endoscopy,diagnosis (N/A, 06/18/2022); cataract extraction with iol (Left, 08/03/2022); and cataract extraction with iol (Right, 08/08/2022).    CURRENT MEDICATIONS:   Current Outpatient Medications:     rosuvastatin, 20 mg, Oral, Q EVENING, Taking    glipiZIDE, 5 mg, Oral, BID, Taking    carvedilol, 6.25 mg, Oral, BID WITH MEALS, Taking    aspirin, 81 mg, Oral, DAILY, Taking    omeprazole, 20 mg, Oral, DAILY, Taking    ticagrelor, 90 mg, Oral, BID, Taking    metformin, 1,000 mg, Oral, BID WITH MEALS, Taking    losartan, 100 mg, Oral, DAILY, Taking    levothyroxine, 100 mcg, Oral, AM ES, Taking    therapeutic multivitamin-minerals, 1 Tablet, Oral, DAILY, Taking    Calcium Carbonate-Vitamin D (CALTRATE 600+D PO), 1 Tablet, Oral, DAILY, Taking    Jardiance, 10 mg, Oral, DAILY (Patient not taking: Reported on 7/31/2023), Not Taking    ALLERGIES: Byetta, Penicillins, and Statins [hmg-coa-r inhibitors]    FAMILY HISTORY: patient is unsure of family hx as she was adopted and has no information on biological parents    SOCIAL HISTORY   Social History     Tobacco Use   Smoking Status Never   Smokeless Tobacco Never     Change in weight: Stable  Exercise habits:  patient walks daily for 15 min  Diet: common adult - following a heart healthy diet and low sodium diet        Objective       Vitals:    07/31/23 1135   BP: 100/64   Pulse: 60      Physical Exam    DATA REVIEW:  Most Recent Lipid Panel:   Lab Results   Component Value Date    CHOLSTRLTOT 169 07/11/2023    TRIGLYCERIDE 106 07/11/2023    HDL 47 07/11/2023     (H) 07/11/2023       Other Pertinent Blood Work:   Lab Results   Component Value Date    SODIUM 143 07/25/2023    POTASSIUM 4.8 07/25/2023    CHLORIDE 110 07/25/2023    CO2 12 (L) 07/25/2023    ANION 21.0 (H) 07/25/2023    GLUCOSE 117 (H) 07/25/2023    BUN 31 (H) 07/25/2023    CREATININE 1.42 (H) 07/25/2023    CALCIUM 9.7 07/25/2023    ASTSGOT 65 (H) 07/13/2023    ALTSGPT 24 07/13/2023    ALKPHOSPHAT 76 07/13/2023    TBILIRUBIN 0.4 07/13/2023    ALBUMIN 3.2 07/13/2023    AGRATIO 1.4 07/12/2023    TSHULTRASEN 0.870 11/28/2022       Other: NA    Recent Imaging Studies:    None since last visit        ASSESSMENT AND PLAN  Patient Type, check all that apply:   Secondary Prevention  Established Atherosclerotic Cardiovascular Disease (ASCVD)  Yes, Details: patient had STEMI and stents placed earlier this month  Other Established (non-atherosclerotic) Vascular Disease, if Present:  HTN, DM2  Evidence of Heterozygous Familial Hypercholesterolemia (FH):   No  ACC/AHA Indication for Statin Therapy, mariah all that apply:  Established ASCVD: Indication for High intensity statin   Calculated Risk for ASCVD, if applicable    N/A  Other Significant Risk Markers, if any, mariah all that apply   None  Goal LDL-C and nonHDL-C based on Clinic Protocol  LDL-C:   <70 mg/dL  Lifestyle Recommendations From Today’s Visit:    Eating Plan: Concentrate on  Low sat/Trans fat, Low simple carb, and DASH/Med Style diet and Exercise: continue walking daily and increase as tolerable  Statin Therapy Recommendations from Today’s Visit:   Continue with Rosuvastatin 20mg QD  Non-Statin Medications Recommendations from Today’s Visit:   None  Indication for PCSK9 Inhibitor, if applicable:  Not currently  indicated  Supplements Recommended at this visit:   Continue with Vit D   Recommendations for Other Cardiovascular Risk Factors, mariah all that apply:   Hypertension- continue to monitor and ensure good control with goal of <130/70 and Diabetes/Impaired Fasting Glucose- continue to manage via PCP and goal of A1c <7.0%    Other Issues:  Patient seen in clinic today for initial appt in LIPID clinic  Patient had STEMI along with 3 stents placed earlier this month  Patient had LDL of 101 mg/dL (7/11/23) which is above current goal of <70 mg/dL  Patient was started on Rosuvastatin 20mg QD just over a week ago and so far tolerating this medication with no complaints  Patient will begin cardiac rehab in Sept  Patient reports she is due to draw labs and meet with cardiology again in about 2 weeks.  Therefore, will have the patient continue with the current regimen and will have her draw labs again for her next follow up in LIPID clinic and discuss results at next appt in about 3 months.      Studies Ordered at Todays Visit: None  Blood Work Ordered At Today’s visit: CMP and Lipid panel,  Lipo (A)  Follow-Up: 6 weeks     Sherice Tian  PharmD      CC:  Lillie Herrera M.D.  Adrianna Meadows, *  Michael Bloch, M.D.

## 2023-08-01 ENCOUNTER — PHARMACY VISIT (OUTPATIENT)
Dept: PHARMACY | Facility: MEDICAL CENTER | Age: 78
End: 2023-08-01
Payer: MEDICARE

## 2023-08-02 NOTE — LETTER
[employer] [facility]  Su Randhawa M.D.  [address]  [place] [address]  Fax: [phone]   Authorization for Release/Disclosure of   Protected Health Information   Name: CHARU DOHERTY : 1945 SSN: [id number]   Address: [address] [place] [address]  [place] [address] Phone:    [phone] (home)    I authorize the entity listed below to release/disclose the PHI below to:   [employer] [facility]/Su Randhawa M.D. and Su Randhawa M.D.   Provider or Entity Name:    Dr. Vogel   Address   City, [facility]   Phone:      Fax:     Reason for request: continuity of care   Information to be released:    [  ] LAST COLONOSCOPY,  including any PATH REPORT and follow-up  [  ] LAST FIT/COLOGUARD RESULT [  ] LAST DEXA  [  ] LAST MAMMOGRAM  [  ] LAST PAP  [  ] LAST LABS [x  ] RETINA EXAM REPORT  [  ] IMMUNIZATION RECORDS  [  ] Release all info      [  ] Check here and initial the line next to each item to release ALL health information INCLUDING  _____ Care and treatment for drug and / or alcohol abuse  _____ HIV testing, infection status, or AIDS  _____ Genetic Testing    DATES OF SERVICE OR TIME PERIOD TO BE DISCLOSED: _____________  I understand and acknowledge that:  * This Authorization may be revoked at any time by you in writing, except if your health information has already been used or disclosed.  * Your health information that will be used or disclosed as a result of you signing this authorization could be re-disclosed by the recipient. If this occurs, your re-disclosed health information may no longer be protected by State or Federal laws.  * You may refuse to sign this Authorization. Your refusal will not affect your ability to obtain treatment.  * This Authorization becomes effective upon signing and will  on (date) __________.      If no date is indicated, this Authorization will  one (1) year from the signature date.    Name: Charu Doherty    Signature:   Date:     2018      He is doing well following surgery.  He may return to .  He will follow up in 1 month for CI activation.  I reminded his pneumonia vaccination continue to remain up to date in the future.     Bilateral tubes are in place and present.  He may return in 6 months for routine tube check       PLEASE FAX REQUESTED RECORDS BACK TO: [phone]

## 2023-08-08 ENCOUNTER — HOSPITAL ENCOUNTER (OUTPATIENT)
Facility: MEDICAL CENTER | Age: 78
End: 2023-08-08
Attending: NURSE PRACTITIONER
Payer: MEDICARE

## 2023-08-08 ENCOUNTER — HOSPITAL ENCOUNTER (OUTPATIENT)
Dept: LAB | Facility: MEDICAL CENTER | Age: 78
End: 2023-08-08
Payer: MEDICARE

## 2023-08-08 ENCOUNTER — TELEPHONE (OUTPATIENT)
Dept: CARDIOLOGY | Facility: MEDICAL CENTER | Age: 78
End: 2023-08-08
Payer: MEDICARE

## 2023-08-08 DIAGNOSIS — Z51.81 ENCOUNTER FOR MONITORING DIURETIC THERAPY: ICD-10-CM

## 2023-08-08 DIAGNOSIS — I10 PRIMARY HYPERTENSION: ICD-10-CM

## 2023-08-08 DIAGNOSIS — E78.5 DYSLIPIDEMIA: ICD-10-CM

## 2023-08-08 DIAGNOSIS — Z79.899 ENCOUNTER FOR MONITORING DIURETIC THERAPY: ICD-10-CM

## 2023-08-08 LAB
ANION GAP SERPL CALC-SCNC: 15 MMOL/L (ref 7–16)
BUN SERPL-MCNC: 44 MG/DL (ref 8–22)
CALCIUM SERPL-MCNC: 9.9 MG/DL (ref 8.5–10.5)
CHLORIDE SERPL-SCNC: 106 MMOL/L (ref 96–112)
CHOLEST SERPL-MCNC: 90 MG/DL (ref 100–199)
CO2 SERPL-SCNC: 17 MMOL/L (ref 20–33)
CREAT SERPL-MCNC: 1.83 MG/DL (ref 0.5–1.4)
FASTING STATUS PATIENT QL REPORTED: NORMAL
GFR SERPLBLD CREATININE-BSD FMLA CKD-EPI: 28 ML/MIN/1.73 M 2
GLUCOSE SERPL-MCNC: 144 MG/DL (ref 65–99)
HDLC SERPL-MCNC: 37 MG/DL
LDLC SERPL CALC-MCNC: 27 MG/DL
POTASSIUM SERPL-SCNC: 4.8 MMOL/L (ref 3.6–5.5)
SODIUM SERPL-SCNC: 138 MMOL/L (ref 135–145)
TRIGL SERPL-MCNC: 128 MG/DL (ref 0–149)

## 2023-08-08 PROCEDURE — 80048 BASIC METABOLIC PNL TOTAL CA: CPT

## 2023-08-08 PROCEDURE — 36415 COLL VENOUS BLD VENIPUNCTURE: CPT

## 2023-08-08 PROCEDURE — 80061 LIPID PANEL: CPT

## 2023-08-08 NOTE — TELEPHONE ENCOUNTER
----- Message from FILOMENA CabreraRTRAM sent at 8/8/2023  1:03 PM PDT -----  Regarding: RE: EXPECTED DATE ERROR  Contact: [phone]  Nai,    Can you please help the lab with this order by today?    Thanks!    Harriett  ----- Message -----  From: Mahogany Xavier  Sent: 8/8/2023  10:02 AM PDT  To: MALOU Roland  Subject: EXPECTED DATE ERROR                              Hi there!  Good morning, I had Ally at my lab today getting her blood drawn and unfortunately the Lipid Profile you ordered has an expected date that is too far out for us to release the results.   Is there any way you can resubmit that without the expected date? It will be much appreciated by our staff and the patient.  If the order is not updated by EOD (4pm), we would need to discard the patient's blood.    Thank you for your time,  Davina Martinez

## 2023-08-09 ENCOUNTER — TELEPHONE (OUTPATIENT)
Dept: CARDIOLOGY | Facility: MEDICAL CENTER | Age: 78
End: 2023-08-09
Payer: MEDICARE

## 2023-08-09 PROCEDURE — RXMED WILLOW AMBULATORY MEDICATION CHARGE

## 2023-08-09 NOTE — TELEPHONE ENCOUNTER
Patient called and discussed SC's recommendations. Patient plans to reach out to PCP right away and will let us know if she has any trouble getting in. All questions/concerns answered at this time, patient appreciative of information given..

## 2023-08-09 NOTE — TELEPHONE ENCOUNTER
Patient called, [phone], and voicemail reached. Message left with number provided to return call. Will re-attempt to reach patient if call not returned.

## 2023-08-09 NOTE — TELEPHONE ENCOUNTER
----- Message from MALOU Cabrera sent at 8/9/2023 10:27 AM PDT -----  Can you call this patient and check for OSVALDO symptoms and let her know to reach out to PCP right away for review of labs and plan of care.    Recommend hydrate well with water. If not able to get in with PCP, recommend urgent nephrologist consultation. SC

## 2023-08-09 NOTE — TELEPHONE ENCOUNTER
Caller: Ally Doherty     Topic/issue: Patient returned Arleen's call. Please advise.     Callback Number: [phone]      Thank you,     Adelaida BONNER

## 2023-08-10 ENCOUNTER — PHARMACY VISIT (OUTPATIENT)
Dept: PHARMACY | Facility: MEDICAL CENTER | Age: 78
End: 2023-08-10
Payer: MEDICARE

## 2023-08-15 ENCOUNTER — OFFICE VISIT (OUTPATIENT)
Dept: MEDICAL GROUP | Facility: LAB | Age: 78
End: 2023-08-15
Payer: MEDICARE

## 2023-08-15 ENCOUNTER — DOCUMENTATION (OUTPATIENT)
Dept: VASCULAR LAB | Facility: MEDICAL CENTER | Age: 78
End: 2023-08-15

## 2023-08-15 VITALS
DIASTOLIC BLOOD PRESSURE: 50 MMHG | TEMPERATURE: 98 F | RESPIRATION RATE: 12 BRPM | HEART RATE: 65 BPM | HEIGHT: 67 IN | OXYGEN SATURATION: 97 % | BODY MASS INDEX: 26.68 KG/M2 | SYSTOLIC BLOOD PRESSURE: 90 MMHG | WEIGHT: 170 LBS

## 2023-08-15 DIAGNOSIS — I25.5 ISCHEMIC CARDIOMYOPATHY: ICD-10-CM

## 2023-08-15 DIAGNOSIS — N28.9 ACUTE RENAL INSUFFICIENCY: ICD-10-CM

## 2023-08-15 DIAGNOSIS — B37.2 SKIN YEAST INFECTION: ICD-10-CM

## 2023-08-15 DIAGNOSIS — Z95.5 STENTED CORONARY ARTERY: ICD-10-CM

## 2023-08-15 PROCEDURE — 3078F DIAST BP <80 MM HG: CPT | Performed by: FAMILY MEDICINE

## 2023-08-15 PROCEDURE — 99214 OFFICE O/P EST MOD 30 MIN: CPT | Performed by: FAMILY MEDICINE

## 2023-08-15 PROCEDURE — 3074F SYST BP LT 130 MM HG: CPT | Performed by: FAMILY MEDICINE

## 2023-08-15 RX ORDER — NYSTATIN 100000 [USP'U]/G
1 POWDER TOPICAL 3 TIMES DAILY
Qty: 60 G | Refills: 1 | Status: SHIPPED | OUTPATIENT
Start: 2023-08-15 | End: 2023-09-01

## 2023-08-15 RX ORDER — TRIAMCINOLONE ACETONIDE 1 MG/G
CREAM TOPICAL
Qty: 80 G | Refills: 1 | Status: SHIPPED | OUTPATIENT
Start: 2023-08-15 | End: 2023-09-01

## 2023-08-15 ASSESSMENT — FIBROSIS 4 INDEX: FIB4 SCORE: 10.5

## 2023-08-15 NOTE — PROGRESS NOTES
[facility] for [facility]      Called and LVM for patient to call the pharmacotherapy clinic back to reschedule appt in clinic (with new labs) for early Sept instead of waiting for current appt scheduled for end of October (per Dr. Bloch's suggestion).     Will reach out to the patient again at a later time.     Sherice ReinosoD

## 2023-08-15 NOTE — PROGRESS NOTES
Subjective:     Chief Complaint   Patient presents with    Results         HPI:   Ally presents today with new renal dysfunction. Stent done July 10th after STEMI.     Renal function has been down since early august/late July.   Echo done July 10 35-40%.  Started on Jardiance in the last week by cardiology.    Does note some bleeding and irritation form the groin as well.  Does not advocate any itching beforehand but now since its been irritated she does have some itching present.    Leaving August 22 nd and coming back Aug 29th.     Current Outpatient Medications Ordered in Epic   Medication Sig Dispense Refill    rosuvastatin (CRESTOR) 20 MG Tab Take 20 mg by mouth every evening.      glipiZIDE (GLUCOTROL) 5 MG Tab Take 5 mg by mouth 2 times a day.      Empagliflozin (JARDIANCE) 10 MG Tab tablet Take 1 Tablet by mouth every day. (Patient not taking: Reported on 7/31/2023) 100 Tablet 3    carvedilol (COREG) 6.25 MG Tab Take 1 Tablet by mouth 2 times a day with meals. 200 Tablet 3    aspirin 81 MG EC tablet Take 1 Tablet by mouth every day. 100 Tablet 7    omeprazole (PRILOSEC) 20 MG delayed-release capsule Take 1 Capsule by mouth every day. 60 Capsule 4    ticagrelor (BRILINTA) 90 MG Tab tablet Take 1 Tablet by mouth 2 times a day. 90 Tablet 8    metformin (GLUCOPHAGE) 1000 MG tablet Take 1 Tablet by mouth 2 times a day with meals. 200 Tablet 1    losartan (COZAAR) 100 MG Tab Take 1 Tablet by mouth every day. 100 Tablet 3    levothyroxine (SYNTHROID) 100 MCG Tab Take 1 tablet by mouth every morning on an empty stomach. 100 Tablet 1    therapeutic multivitamin-minerals (THERAGRAN-M) Tab Take 1 Tab by mouth every day.      Calcium Carbonate-Vitamin D (CALTRATE 600+D PO) Take 1 Tablet by mouth every day.       No current [facility]-ordered facility-administered medications on file.         ROS:  Gen: no fevers/chills, no changes in weight  Eyes: no changes in vision  ENT: no sore throat, no hearing loss, no bloody  "nose  Pulm: no sob, no cough  CV: no chest pain, no palpitations  GI: no nausea/vomiting, no diarrhea  : no dysuria  MSk: no myalgias  Skin: no rash  Neuro: no headaches, no numbness/tingling  Heme/Lymph: no easy bruising      Objective:     Exam:  BP 90/50 (BP Location: Left arm, Patient Position: Sitting, BP Cuff Size: Adult)   Pulse 65   Temp 36.7 °C (98 °F)   Resp 12   Ht 1.702 m (5' 7\")   Wt 77.1 kg (170 lb)   LMP 02/27/1997   SpO2 97%   BMI 26.63 kg/m²  Body mass index is 26.63 kg/m².    Gen: Alert and oriented, No apparent distress.  Neck: Neck is supple without lymphadenopathy.  Lungs: Normal effort, CTA bilaterally, no wheezes, rhonchi, or rales  CV: Regular rate and rhythm. No murmurs, rubs, or gallops.  Ext: No clubbing, cyanosis, edema.  Skin: Bilateral groin erythema and irritation consistent with possible skin yeast infection.  Tender to palpate.    Assessment & Plan:     78 y.o. female with the following -     1. Acute renal insufficiency  Discussed new acute renal insufficiency.  We did discuss that this could be related to some hypotension that she has been having since her stent was placed.  She went into the hospital without very many blood pressure medications and came out with a lot.  Would like her to go ahead and stop her losartan for right now as well as the metformin due to the increased creatinine and lower GFR.  Also would like her to stop the omeprazole as this could be bothering her kidneys as well.  We did discuss the importance of hydration and even adding a little bit of salt to her hydration to see if and get her blood pressure up a little bit.  We did discuss the fact that Jardiance sometimes can have a diuretic effect as well and that she might need to work harder to maintain hydration.  Depending upon what her GFR does in response to these changes we may actually need to change the Jardiance to Farxiga for better kidney coverage.  Also may consider reducing her " carvedilol to half tab twice daily.  She will get her GFR and electrolytes rechecked in about 4 days so we can see how things are going.  May need to have her see nephrology in the future if nothing changes.  - Basic Metabolic Panel; Standing    2. Skin yeast infection  Discussed skin yeast infection.  We did discuss this could be a product of both elevated blood sugars but also sometimes the Jardiance.  We will go and start treating with the nystatin powder to see how she does.  - nystatin (MYCOSTATIN) powder; Apply 1 g topically 3 times a day.  Dispense: 60 g; Refill: 1    3. Ischemic cardiomyopathy  Does have cardiology follow-up.  Started on Jardiance.  Again may need to change to Farxiga for this.    4. Stented coronary artery  See above.  Doing well from a cardiology standpoint with regard to chest pain and shortness of breath.  She does not really have any symptoms ongoing at this time.        No follow-ups on file.    Please note that this dictation was created using voice recognition software. I have made every reasonable attempt to correct obvious errors, but I expect that there are errors of grammar and possibly content that I did not discover before finalizing the note.

## 2023-08-16 ENCOUNTER — PATIENT MESSAGE (OUTPATIENT)
Dept: CARDIOLOGY | Facility: MEDICAL CENTER | Age: 78
End: 2023-08-16

## 2023-08-16 ENCOUNTER — PHARMACY VISIT (OUTPATIENT)
Dept: PHARMACY | Facility: MEDICAL CENTER | Age: 78
End: 2023-08-16
Payer: MEDICARE

## 2023-08-16 DIAGNOSIS — B37.2 SKIN YEAST INFECTION: ICD-10-CM

## 2023-08-16 PROCEDURE — RXMED WILLOW AMBULATORY MEDICATION CHARGE: Performed by: FAMILY MEDICINE

## 2023-08-16 RX ORDER — NYSTATIN 100000 [USP'U]/G
1 POWDER TOPICAL 3 TIMES DAILY
Qty: 60 G | Refills: 1 | Status: CANCELLED | OUTPATIENT
Start: 2023-08-16

## 2023-08-17 ENCOUNTER — PHARMACY VISIT (OUTPATIENT)
Dept: PHARMACY | Facility: MEDICAL CENTER | Age: 78
End: 2023-08-17
Payer: MEDICARE

## 2023-08-17 PROCEDURE — RXMED WILLOW AMBULATORY MEDICATION CHARGE: Performed by: FAMILY MEDICINE

## 2023-08-17 RX ORDER — NYSTATIN 100000 U/G
1 CREAM TOPICAL 2 TIMES DAILY
Qty: 30 G | Refills: 3 | Status: SHIPPED | OUTPATIENT
Start: 2023-08-17 | End: 2023-09-01

## 2023-08-19 ENCOUNTER — HOSPITAL ENCOUNTER (OUTPATIENT)
Dept: LAB | Facility: MEDICAL CENTER | Age: 78
End: 2023-08-19
Attending: FAMILY MEDICINE
Payer: MEDICARE

## 2023-08-19 DIAGNOSIS — N28.9 ACUTE RENAL INSUFFICIENCY: ICD-10-CM

## 2023-08-19 LAB
ANION GAP SERPL CALC-SCNC: 16 MMOL/L (ref 7–16)
BUN SERPL-MCNC: 28 MG/DL (ref 8–22)
CALCIUM SERPL-MCNC: 9.6 MG/DL (ref 8.5–10.5)
CHLORIDE SERPL-SCNC: 109 MMOL/L (ref 96–112)
CO2 SERPL-SCNC: 13 MMOL/L (ref 20–33)
CREAT SERPL-MCNC: 1.6 MG/DL (ref 0.5–1.4)
FASTING STATUS PATIENT QL REPORTED: NORMAL
GFR SERPLBLD CREATININE-BSD FMLA CKD-EPI: 33 ML/MIN/1.73 M 2
GLUCOSE SERPL-MCNC: 166 MG/DL (ref 65–99)
POTASSIUM SERPL-SCNC: 4.3 MMOL/L (ref 3.6–5.5)
SODIUM SERPL-SCNC: 138 MMOL/L (ref 135–145)

## 2023-08-19 PROCEDURE — 36415 COLL VENOUS BLD VENIPUNCTURE: CPT

## 2023-08-19 PROCEDURE — 80048 BASIC METABOLIC PNL TOTAL CA: CPT

## 2023-08-20 ENCOUNTER — HOSPITAL ENCOUNTER (OUTPATIENT)
Dept: RADIOLOGY | Facility: MEDICAL CENTER | Age: 78
End: 2023-08-20
Attending: PHYSICIAN ASSISTANT
Payer: MEDICARE

## 2023-08-20 DIAGNOSIS — K74.60 HEPATIC CIRRHOSIS, UNSPECIFIED HEPATIC CIRRHOSIS TYPE, UNSPECIFIED WHETHER ASCITES PRESENT (HCC): ICD-10-CM

## 2023-08-20 DIAGNOSIS — D50.9 IRON DEFICIENCY ANEMIA, UNSPECIFIED IRON DEFICIENCY ANEMIA TYPE: ICD-10-CM

## 2023-08-20 DIAGNOSIS — I85.00 ESOPHAGEAL VARICES WITHOUT BLEEDING, UNSPECIFIED ESOPHAGEAL VARICES TYPE (HCC): ICD-10-CM

## 2023-08-20 DIAGNOSIS — R13.19 OTHER DYSPHAGIA: ICD-10-CM

## 2023-08-20 PROCEDURE — 76700 US EXAM ABDOM COMPLETE: CPT

## 2023-08-21 ENCOUNTER — TELEPHONE (OUTPATIENT)
Dept: MEDICAL GROUP | Facility: LAB | Age: 78
End: 2023-08-21
Payer: MEDICARE

## 2023-08-21 ENCOUNTER — PHARMACY VISIT (OUTPATIENT)
Dept: PHARMACY | Facility: MEDICAL CENTER | Age: 78
End: 2023-08-21
Payer: MEDICARE

## 2023-08-21 ENCOUNTER — PATIENT MESSAGE (OUTPATIENT)
Dept: MEDICAL GROUP | Facility: LAB | Age: 78
End: 2023-08-21

## 2023-08-21 ENCOUNTER — TELEMEDICINE (OUTPATIENT)
Dept: MEDICAL GROUP | Facility: PHYSICIAN GROUP | Age: 78
End: 2023-08-21
Payer: MEDICARE

## 2023-08-21 VITALS
WEIGHT: 165 LBS | SYSTOLIC BLOOD PRESSURE: 108 MMHG | HEART RATE: 71 BPM | BODY MASS INDEX: 25.9 KG/M2 | DIASTOLIC BLOOD PRESSURE: 70 MMHG | TEMPERATURE: 96.9 F | HEIGHT: 67 IN

## 2023-08-21 DIAGNOSIS — U07.1 COVID: ICD-10-CM

## 2023-08-21 DIAGNOSIS — U07.1 COVID-19 VIRUS INFECTION: ICD-10-CM

## 2023-08-21 PROCEDURE — 99213 OFFICE O/P EST LOW 20 MIN: CPT | Mod: 95 | Performed by: STUDENT IN AN ORGANIZED HEALTH CARE EDUCATION/TRAINING PROGRAM

## 2023-08-21 PROCEDURE — RXMED WILLOW AMBULATORY MEDICATION CHARGE: Performed by: INTERNAL MEDICINE

## 2023-08-21 RX ORDER — BENZONATATE 100 MG/1
100 CAPSULE ORAL 3 TIMES DAILY PRN
Qty: 60 CAPSULE | Refills: 0 | Status: SHIPPED | OUTPATIENT
Start: 2023-08-21 | End: 2023-09-01

## 2023-08-21 ASSESSMENT — FIBROSIS 4 INDEX: FIB4 SCORE: 10.5

## 2023-08-22 NOTE — PROGRESS NOTES
This evaluation was conducted via Zoom using secure and encrypted videoconferencing technology. The patient was in their home in the [place] [facility].    The patient's identity was confirmed and verbal consent was obtained for this virtual visit.     CC:  The encounter diagnosis was COVID-19 virus infection.    HISTORY OF THE PRESENT ILLNESS: Patient is a 78 y.o. female. This pleasant patient is here today to discuss:    1. COVID-19 virus infection  Onset of symptoms yesterday.  Home test returned positive this AM.     Patient endorses nose congestion, pharyngitis, cough that keeps her awake at night.     She denies fever, difficulty with eating or drinking, shortness of breath, myalgias.      Active Diagnosis:    Patient Active Problem List   Diagnosis    Hypertension    Hypothyroid    Type 2 diabetes mellitus, without long-term current use of insulin (HCC)    S/P breast reconstruction    Dyslipidemia    Iron deficiency anemia    Cirrhosis of liver with ascites (HCC)    Pancytopenia (HCC)    Thrombocytopenia (HCC)    History of breast cancer    History of vitamin D deficiency    UGIB (upper gastrointestinal bleed)    Secondary esophageal varices with bleeding (HCC)    MEDINA (nonalcoholic steatohepatitis)    Uncontrolled type 2 diabetes mellitus with hyperglycemia (HCC)    PAD (peripheral artery disease) (HCC)    BMI 29.0-29.9,adult    ACS with STEMI (ST elevation myocardial infarction) (HCC)    Ischemic cardiomyopathy    Major depressive disorder, single episode, unspecified    Chronic gastric ulcer without hemorrhage or perforation    Angiodysplasia of colon without hemorrhage    Stented coronary artery      Current Outpatient Medications Ordered in Epic   Medication Sig Dispense Refill    benzonatate (TESSALON) 100 MG Cap Take 1 Capsule by mouth 3 times a day as needed for Cough. 60 Capsule 0    molnupiravir 200 MG capsule Take 4 Capsules by mouth 2 times a day for 5 days. 40 Capsule 0    nystatin (MYCOSTATIN)  "656273 UNIT/GM Cream topical cream Apply 1 g topically 2 times a day. 30 g 3    nystatin (MYCOSTATIN) powder Apply 1 g topically 3 times a day. 60 g 1    rosuvastatin (CRESTOR) 20 MG Tab Take 20 mg by mouth every evening.      Empagliflozin (JARDIANCE) 10 MG Tab tablet Take 1 Tablet by mouth every day. 100 Tablet 3    carvedilol (COREG) 6.25 MG Tab Take 1 Tablet by mouth 2 times a day with meals. 200 Tablet 3    aspirin 81 MG EC tablet Take 1 Tablet by mouth every day. 100 Tablet 7    ticagrelor (BRILINTA) 90 MG Tab tablet Take 1 Tablet by mouth 2 times a day. 90 Tablet 8    levothyroxine (SYNTHROID) 100 MCG Tab Take 1 tablet by mouth every morning on an empty stomach. 100 Tablet 1    therapeutic multivitamin-minerals (THERAGRAN-M) Tab Take 1 Tab by mouth every day.      Calcium Carbonate-Vitamin D (CALTRATE 600+D PO) Take 1 Tablet by mouth every day.      Nirmatrelvir&Ritonavir 150/100 10 x 150 MG & 10 x 100MG Tablet Therapy Pack Take 150 mg nirmatrelvir (one 150 mg tablet) with 100 mg ritonavir (one 100 mg tablet) by mouth, with both tablets taken together twice daily for 5 days. 20 Each 0    triamcinolone acetonide (KENALOG) 0.1 % Cream Apply pea sized amount to affected areas twice daily. (Patient not taking: Reported on 8/21/2023) 80 g 1     No current [facility]-ordered facility-administered medications on file.     ROS:   See HPI.    Objective:     Exam: /70 (BP Location: Left arm)   Pulse 71   Temp 36.1 °C (96.9 °F)   Ht 1.702 m (5' 7\")   Wt 74.8 kg (165 lb)  Body mass index is 25.84 kg/m².    General: Normal appearing. No distress.  Laboratory: No visible increased WOB.  neurologic: Grossly nonfocal by limited passive exam.  Skin: No obvious lesions.        Assessment & Plan:   78 y.o. female with the following -    Labs:   8/19/2023:  -BMP showing GFR 33.    1. COVID-19 virus infection  -Acute uncomplicated illness.  - benzonatate (TESSALON) 100 MG Cap; Take 1 Capsule by mouth 3 times a day as needed " for Cough.  Dispense: 60 Capsule; Refill: 0  - molnupiravir 200 MG capsule; Take 4 Capsules by mouth 2 times a day for 5 days.  Dispense: 40 Capsule; Refill: 0  -Indications for proceeding to ER reviewed with the patient.  She indicates understanding.    Return if symptoms worsen or fail to improve.    Please note that this dictation was created using voice recognition software. I have made every reasonable attempt to correct obvious errors, but I expect that there are errors of grammar and possibly content that I did not discover before finalizing the note.      David Garcia PA-C 8/22/2023

## 2023-08-23 ENCOUNTER — PATIENT OUTREACH (OUTPATIENT)
Dept: HEALTH INFORMATION MANAGEMENT | Facility: OTHER | Age: 78
End: 2023-08-23
Payer: MEDICARE

## 2023-08-23 DIAGNOSIS — I10 PRIMARY HYPERTENSION: ICD-10-CM

## 2023-08-23 DIAGNOSIS — E11.9 TYPE 2 DIABETES MELLITUS WITHOUT COMPLICATION, WITHOUT LONG-TERM CURRENT USE OF INSULIN (HCC): ICD-10-CM

## 2023-08-23 DIAGNOSIS — E78.5 DYSLIPIDEMIA: ICD-10-CM

## 2023-08-23 NOTE — PROGRESS NOTES
Pt returned call and left voicemail that she is starting to feel better and is appreciative of the check in.

## 2023-08-29 ENCOUNTER — TELEPHONE (OUTPATIENT)
Dept: MEDICAL GROUP | Facility: LAB | Age: 78
End: 2023-08-29
Payer: MEDICARE

## 2023-08-29 NOTE — TELEPHONE ENCOUNTER
ESTABLISHED PATIENT PRE-VISIT PLANNING     Patient was NOT contacted to complete PVP.     Note: Patient will not be contacted if there is no indication to call.     1.  Reviewed notes from the last few office visits within the medical group: Yes    2.  If any orders were placed at last visit or intended to be done for this visit (i.e. 6 mos follow-up), do we have Results/Consult Notes?           Labs - Labs ordered, completed on 8/19/23 and results are in chart.  Note: If patient appointment is for lab review and patient did not complete labs, check with provider if OK to reschedule patient until labs completed.         Imaging - Imaging was not ordered at last office visit.         Referrals - No referrals were ordered at last office visit.    3. Is this appointment scheduled as a Hospital Follow-Up? No    4.  Immunizations were updated in Epic using Reconcile Outside Information activity? Yes    5.  Patient is due for the following Health Maintenance Topics:   Health Maintenance Due   Topic Date Due    Hepatitis A Vaccine (Hep A) (1 of 2 - Risk 2-dose series) Never done    IMM DTaP/Tdap/Td Vaccine (2 - Td or Tdap) 10/04/2022    COVID-19 Vaccine (6 - Pfizer series) 02/10/2023    Diabetes: Monofilament / LE Exam  04/12/2023       6.  AHA (Pulse8) form printed for Provider? N/A

## 2023-09-01 ENCOUNTER — NON-PROVIDER VISIT (OUTPATIENT)
Dept: VASCULAR LAB | Facility: MEDICAL CENTER | Age: 78
End: 2023-09-01
Attending: INTERNAL MEDICINE
Payer: MEDICARE

## 2023-09-01 ENCOUNTER — TELEPHONE (OUTPATIENT)
Dept: CARDIOLOGY | Facility: MEDICAL CENTER | Age: 78
End: 2023-09-01

## 2023-09-01 VITALS — HEART RATE: 66 BPM | DIASTOLIC BLOOD PRESSURE: 78 MMHG | SYSTOLIC BLOOD PRESSURE: 115 MMHG

## 2023-09-01 DIAGNOSIS — I73.9 PAD (PERIPHERAL ARTERY DISEASE) (HCC): ICD-10-CM

## 2023-09-01 DIAGNOSIS — I21.02 ST ELEVATION MYOCARDIAL INFARCTION INVOLVING LEFT ANTERIOR DESCENDING (LAD) CORONARY ARTERY (HCC): ICD-10-CM

## 2023-09-01 DIAGNOSIS — Z95.5 STENTED CORONARY ARTERY: ICD-10-CM

## 2023-09-01 PROCEDURE — 99212 OFFICE O/P EST SF 10 MIN: CPT

## 2023-09-01 RX ORDER — ROSUVASTATIN CALCIUM 20 MG/1
20 TABLET, COATED ORAL EVERY EVENING
Qty: 100 TABLET | Refills: 3 | Status: SHIPPED | OUTPATIENT
Start: 2023-09-01

## 2023-09-01 NOTE — PROGRESS NOTES
[facility] - FollowUp Visit  Date of Service: 09/01/23    Ally Doherty is here for follow up of dyslipidemia.    Subjective    HPI  Pertinent Interval History since last visit:   No significant interval hx to note.  Current Prescription Lipid Lowering Medications - including dose:   Statin: Rosuvastatin 20 mg once daily  Non-Statin: None  Current Lipid Lowering and Related Supplements:   Vitamin D  Any Current Side Effects Potentially Related to Lipid Lowering therapy?   No  Current Adherence to Lipid Lowering Therapies:  Complete  Any Previous History of Statin Intolerance?   Yes, Details: patient reports previously prescribed Lovastatin, and her LDL values actually increased so she was taken off of medication  Baseline Lipids Prior to Treatment:     Latest Reference Range & Units 07/11/23 01:57   Cholesterol,Tot 100 - 199 mg/dL 169   Triglycerides 0 - 149 mg/dL 106   HDL >=40 mg/dL 47   LDL <100 mg/dL 101 (H)       SOCIAL HISTORY  Social History     Tobacco Use   Smoking Status Never   Smokeless Tobacco Never      Change in weight: Stable  Exercise habits: minimal exercise - pt walking daily for 20 min  Diet: low sodium, DASH diet      Objective    Vitals:    09/01/23 0927   BP: 115/78   Pulse: 66      Physical Exam    DATA REVIEW  Most Recent Lipid Panel:   Lab Results   Component Value Date    CHOLSTRLTOT 90 (L) 08/08/2023    TRIGLYCERIDE 128 08/08/2023    HDL 37 (A) 08/08/2023    LDL 27 08/08/2023       Other Pertinent Blood Work:   Lab Results   Component Value Date    SODIUM 138 08/19/2023    POTASSIUM 4.3 08/19/2023    CHLORIDE 109 08/19/2023    CO2 13 (L) 08/19/2023    ANION 16.0 08/19/2023    GLUCOSE 166 (H) 08/19/2023    BUN 28 (H) 08/19/2023    CREATININE 1.60 (H) 08/19/2023    CALCIUM 9.6 08/19/2023    ASTSGOT 65 (H) 07/13/2023    ALTSGPT 24 07/13/2023    ALKPHOSPHAT 76 07/13/2023    TBILIRUBIN 0.4 07/13/2023    ALBUMIN 3.2 07/13/2023    AGRATIO 1.4 07/12/2023    TSHULTRASEN 0.870  11/28/2022       Other:  NA    Recent Imaging Studies:    None relevant since last visit        ASSESSMENT AND PLAN  Patient Type, check all that apply:   Secondary Prevention  Established Atherosclerotic Cardiovascular Disease (ASCVD)  Yes, Details: STEMI with PCI (1 stent to LAD, 2 stents placed to RCA and PDA) in 7/2023  Other Established (non-atherosclerotic) Vascular Disease, if Present:  HTN, DM2  Evidence of Heterozygous Familial Hypercholesterolemia (FH):   No. Of note patient is unsure of family hx as she was adopted and has no information on biological parents.  ACC/AHA Indication for Statin Therapy, mariah all that apply:  Established ASCVD: Indication for High intensity statin   Calculated Risk for ASCVD, if applicable    N/A  Other Significant Risk Markers, if any, mariah all that apply   None  Goal LDL-C and nonHDL-C based on Clinic Protocol  LDL-C:   <70 mg/dL  Lifestyle Recommendations From Today’s Visit:    Eating Plan: Concentrate on  Low sat/Trans fat, Low simple carb, and DASH/Med Style diet and Exercise: continue walking daily and increase as tolerable  Statin Recommendations from Today's Visit  Continue rosuvastatin 20 mg once daily  Non-Statin Medications Recommendations from Today’s Visit:   NOne  Indication for PCSK9 Inhibitor, if applicable:  Not currently indicated  Supplements Recommended at this visit:  Continue vitamin D  Recommendations for Other Cardiovascular Risk Factors, mariah all that apply:   Hypertension- Defer to cards/PCP and Diabetes/Impaired Fasting Glucose- Defer to cards/PCP  Other Issues:  Since pt's last appt, she was able to obtain a repeat lipid panel. LDL now well w/in goal of < 70.  Pt's HDL is low - discussed dietary measure in which to increase to goal of 50 or greater.  Of note, pt's renal function is borderline for rosuvastatin dose adjustment (CrCl ~ 32 mL/min). Will CTM - renal function improving somewhat.  Pt otherwise doing well overall. No issues w/ statin  therapy.     Studies Ordered at Todays Visit:  None   Blood Work Ordered At Today’s visit:   CMP & lipid panel  Follow-Up:   3 months to f/u on HDL and renal function    Efe Watson, PharmD, BCACP    CC:  Lillie Herrera M.D.  Dori, Adrianna ROMAN, *

## 2023-09-01 NOTE — TELEPHONE ENCOUNTER
Patient does need an echocardiogram, please have him schedule with the next available.  If the patient is otherwise feeling well we can reschedule my appointment with them to be after the echocardiogram.  If patient wants to keep my visit as it is that is fine too

## 2023-09-01 NOTE — TELEPHONE ENCOUNTER
Walk in form received. FV scheduled for 10/31/23. Echo ordered at last appointment 7/18/23. Patient was unsure of timeframe for echo and if needs to be completed prior to next OV. Next available echo December.     LH: Please review and advise- do you want echo completed prior to next FV? If so, we will need to order STAT. Last echo completed 7/10/23. Thank you.

## 2023-09-05 ENCOUNTER — OFFICE VISIT (OUTPATIENT)
Dept: MEDICAL GROUP | Facility: LAB | Age: 78
End: 2023-09-05
Payer: MEDICARE

## 2023-09-05 VITALS
RESPIRATION RATE: 12 BRPM | WEIGHT: 169.2 LBS | DIASTOLIC BLOOD PRESSURE: 58 MMHG | BODY MASS INDEX: 26.56 KG/M2 | SYSTOLIC BLOOD PRESSURE: 110 MMHG | OXYGEN SATURATION: 98 % | TEMPERATURE: 97.2 F | HEIGHT: 67 IN | HEART RATE: 60 BPM

## 2023-09-05 DIAGNOSIS — N18.32 TYPE 2 DIABETES MELLITUS WITH STAGE 3B CHRONIC KIDNEY DISEASE, WITHOUT LONG-TERM CURRENT USE OF INSULIN (HCC): ICD-10-CM

## 2023-09-05 DIAGNOSIS — I25.5 ISCHEMIC CARDIOMYOPATHY: ICD-10-CM

## 2023-09-05 DIAGNOSIS — K75.81 NASH (NONALCOHOLIC STEATOHEPATITIS): ICD-10-CM

## 2023-09-05 DIAGNOSIS — Z95.5 STENTED CORONARY ARTERY: ICD-10-CM

## 2023-09-05 DIAGNOSIS — E11.22 TYPE 2 DIABETES MELLITUS WITH STAGE 3B CHRONIC KIDNEY DISEASE, WITHOUT LONG-TERM CURRENT USE OF INSULIN (HCC): ICD-10-CM

## 2023-09-05 DIAGNOSIS — N28.9 ACUTE RENAL INSUFFICIENCY: ICD-10-CM

## 2023-09-05 PROCEDURE — 99214 OFFICE O/P EST MOD 30 MIN: CPT | Performed by: FAMILY MEDICINE

## 2023-09-05 PROCEDURE — 3074F SYST BP LT 130 MM HG: CPT | Performed by: FAMILY MEDICINE

## 2023-09-05 PROCEDURE — 3078F DIAST BP <80 MM HG: CPT | Performed by: FAMILY MEDICINE

## 2023-09-05 ASSESSMENT — FIBROSIS 4 INDEX: FIB4 SCORE: 10.5

## 2023-09-05 NOTE — PROGRESS NOTES
Subjective:     Chief Complaint   Patient presents with    Follow-Up     1 month          HPI:   Ally presents today with follow up from previous visit. Acute renal insufficiency, stent, ischemic cardiomyopathy.   Having some trouble getting her echo scheduled. Supposed to have this done prior to October for cardio appt.     Otherwise feeling good overall. Recovered from COVID well.     Was seen by GI consultants and US ordered, showing cirrhosis. She was referred to [facility] for this. She does have an appt upcoming. Sept 15th.     Denies swelling. Has noticed some weight loss.       Current Outpatient Medications Ordered in Epic   Medication Sig Dispense Refill    rosuvastatin (CRESTOR) 20 MG Tab Take 1 Tablet by mouth every evening. 90 Tablet 3    Empagliflozin (JARDIANCE) 10 MG Tab tablet Take 1 Tablet by mouth every day. 100 Tablet 3    carvedilol (COREG) 6.25 MG Tab Take 1 Tablet by mouth 2 times a day with meals. 200 Tablet 3    aspirin 81 MG EC tablet Take 1 Tablet by mouth every day. 100 Tablet 7    ticagrelor (BRILINTA) 90 MG Tab tablet Take 1 Tablet by mouth 2 times a day. 90 Tablet 8    levothyroxine (SYNTHROID) 100 MCG Tab Take 1 tablet by mouth every morning on an empty stomach. 100 Tablet 1    therapeutic multivitamin-minerals (THERAGRAN-M) Tab Take 1 Tab by mouth every day.      Calcium Carbonate-Vitamin D (CALTRATE 600+D PO) Take 1 Tablet by mouth every day.       No current [facility]-ordered facility-administered medications on file.         ROS:  Gen: no fevers/chills, no changes in weight  Eyes: no changes in vision  ENT: no sore throat, no hearing loss, no bloody nose  Pulm: no sob, no cough  CV: no chest pain, no palpitations  GI: no nausea/vomiting, no diarrhea  : no dysuria  MSk: no myalgias  Skin: no rash  Neuro: no headaches, no numbness/tingling  Heme/Lymph: no easy bruising      Objective:     Exam:  /58 (BP Location: Left arm, Patient Position: Sitting, BP Cuff Size: Large  "adult)   Pulse 60   Temp 36.2 °C (97.2 °F)   Resp 12   Ht 1.702 m (5' 7\")   Wt 76.7 kg (169 lb 3.2 oz)   LMP 02/27/1997   SpO2 98%   BMI 26.50 kg/m²  Body mass index is 26.5 kg/m².    Gen: Alert and oriented, No apparent distress.  Neck: Neck is supple without lymphadenopathy.  Lungs: Normal effort, CTA bilaterally, no wheezes, rhonchi, or rales  CV: Regular rate and rhythm. No murmurs, rubs, or gallops.  Ext: No clubbing, cyanosis, edema.      Assessment & Plan:     78 y.o. female with the following -     1. Ischemic cardiomyopathy  Discussed try to get her echo done sooner rather than later so that she has this since she has been started on medication and now has a stent.  - EC-ECHOCARDIOGRAM COMPLETE W/O CONT; Future    2. Stented coronary artery  See above.  Follow-up with cardiology end of October.  - EC-ECHOCARDIOGRAM COMPLETE W/O CONT; Future    3. Acute renal insufficiency  We will get her kidney function rechecked in about a week.  My suspicion is that there is might be some hypotension and hypoperfusion going on which is causing some of her numbers to go down as well.  We have slowly been reducing some of her medications to accommodate for this.  Blood pressure still relatively low today.  May cut her carvedilol in half depending upon what her labs are showing.  - Basic Metabolic Panel; Future    4. MEDINA (nonalcoholic steatohepatitis)  Has follow-up with [facility] to discuss liver care.  We discussed that she is really not at the point of transplant at this time so my suspicion is that they will do a lot more calculation to give her a score and some direction for the future.    5. Type 2 diabetes mellitus with stage 3b chronic kidney disease, without long-term current use of insulin (HCC)  Continuing her current medications as is for right now.  Still needs to work on some sugar control with this.  We will get this rechecked in the next month and a half or so.  She will continue her " Jardiance for now.  - Basic Metabolic Panel; Future            No follow-ups on file.    Please note that this dictation was created using voice recognition software. I have made every reasonable attempt to correct obvious errors, but I expect that there are errors of grammar and possibly content that I did not discover before finalizing the note.

## 2023-09-06 ENCOUNTER — HOSPITAL ENCOUNTER (OUTPATIENT)
Dept: CARDIOLOGY | Facility: MEDICAL CENTER | Age: 78
End: 2023-09-06
Attending: FAMILY MEDICINE
Payer: MEDICARE

## 2023-09-06 DIAGNOSIS — I25.5 ISCHEMIC CARDIOMYOPATHY: ICD-10-CM

## 2023-09-06 DIAGNOSIS — Z95.5 STENTED CORONARY ARTERY: ICD-10-CM

## 2023-09-06 LAB
LV EJECT FRACT  99904: 65
LV EJECT FRACT MOD 2C 99903: 68.38
LV EJECT FRACT MOD 4C 99902: 69.74
LV EJECT FRACT MOD BP 99901: 69.41

## 2023-09-06 PROCEDURE — 93306 TTE W/DOPPLER COMPLETE: CPT | Mod: 26 | Performed by: INTERNAL MEDICINE

## 2023-09-06 PROCEDURE — 93306 TTE W/DOPPLER COMPLETE: CPT

## 2023-09-08 ENCOUNTER — PHARMACY VISIT (OUTPATIENT)
Dept: PHARMACY | Facility: MEDICAL CENTER | Age: 78
End: 2023-09-08
Payer: COMMERCIAL

## 2023-09-08 PROCEDURE — RXMED WILLOW AMBULATORY MEDICATION CHARGE: Performed by: INTERNAL MEDICINE

## 2023-09-11 ENCOUNTER — HOSPITAL ENCOUNTER (OUTPATIENT)
Dept: LAB | Facility: MEDICAL CENTER | Age: 78
End: 2023-09-11
Attending: FAMILY MEDICINE
Payer: MEDICARE

## 2023-09-11 DIAGNOSIS — N28.9 ACUTE RENAL INSUFFICIENCY: ICD-10-CM

## 2023-09-11 DIAGNOSIS — E11.22 TYPE 2 DIABETES MELLITUS WITH STAGE 3B CHRONIC KIDNEY DISEASE, WITHOUT LONG-TERM CURRENT USE OF INSULIN (HCC): ICD-10-CM

## 2023-09-11 DIAGNOSIS — N18.32 TYPE 2 DIABETES MELLITUS WITH STAGE 3B CHRONIC KIDNEY DISEASE, WITHOUT LONG-TERM CURRENT USE OF INSULIN (HCC): ICD-10-CM

## 2023-09-11 LAB
ANION GAP SERPL CALC-SCNC: 15 MMOL/L (ref 7–16)
BUN SERPL-MCNC: 31 MG/DL (ref 8–22)
CALCIUM SERPL-MCNC: 10.2 MG/DL (ref 8.5–10.5)
CHLORIDE SERPL-SCNC: 107 MMOL/L (ref 96–112)
CO2 SERPL-SCNC: 18 MMOL/L (ref 20–33)
CREAT SERPL-MCNC: 1.25 MG/DL (ref 0.5–1.4)
GFR SERPLBLD CREATININE-BSD FMLA CKD-EPI: 45 ML/MIN/1.73 M 2
GLUCOSE SERPL-MCNC: 234 MG/DL (ref 65–99)
POTASSIUM SERPL-SCNC: 4.8 MMOL/L (ref 3.6–5.5)
SODIUM SERPL-SCNC: 140 MMOL/L (ref 135–145)

## 2023-09-11 PROCEDURE — 80048 BASIC METABOLIC PNL TOTAL CA: CPT

## 2023-09-11 PROCEDURE — 36415 COLL VENOUS BLD VENIPUNCTURE: CPT

## 2023-09-12 ENCOUNTER — APPOINTMENT (OUTPATIENT)
Dept: MEDICAL GROUP | Facility: LAB | Age: 78
End: 2023-09-12
Payer: MEDICARE

## 2023-09-18 ENCOUNTER — NON-PROVIDER VISIT (OUTPATIENT)
Dept: CARDIOLOGY | Facility: MEDICAL CENTER | Age: 78
End: 2023-09-18
Payer: MEDICARE

## 2023-09-18 DIAGNOSIS — Z95.5 STENTED CORONARY ARTERY: Primary | ICD-10-CM

## 2023-09-18 PROCEDURE — G0422 INTENS CARDIAC REHAB W/EXERC: HCPCS | Performed by: INTERNAL MEDICINE

## 2023-09-18 PROCEDURE — G0423 INTENS CARDIAC REHAB NO EXER: HCPCS | Mod: 59 | Performed by: INTERNAL MEDICINE

## 2023-09-18 NOTE — PROGRESS NOTES
Patient arrived for initial 1:1 Intensive Cardiac Rehabilitation Consultation and Education session with the Registered Nurse.  A total of 60 minutes was spent with the patient during which time a focused cardiovascular assessment was completed and musculoskeletal limitations were addressed in preparation to safely start the exercise portion of the program.  Education regarding the program was explained including exercise goals, precautions, and target heart rate during exercise.  Nutrition goals were reviewed and patient was introduced to the [facility] Program.  Stress management goals were dicussed and patient concerns and questions were answered at this time. Patient arrived for education at 1245 and visit was concluded at 1345.  Exercise time was from 1345 to 1545.

## 2023-09-19 ENCOUNTER — NON-PROVIDER VISIT (OUTPATIENT)
Dept: CARDIOLOGY | Facility: MEDICAL CENTER | Age: 78
End: 2023-09-19
Payer: MEDICARE

## 2023-09-19 DIAGNOSIS — Z95.5 STENTED CORONARY ARTERY: ICD-10-CM

## 2023-09-19 PROCEDURE — G0422 INTENS CARDIAC REHAB W/EXERC: HCPCS | Performed by: INTERNAL MEDICINE

## 2023-09-19 PROCEDURE — G0423 INTENS CARDIAC REHAB NO EXER: HCPCS | Mod: 59 | Performed by: INTERNAL MEDICINE

## 2023-09-19 NOTE — PROGRESS NOTES
Ally Doherty attended Intensive Cardiac Rehab today from 1300 to 1500. During her time she exercised and attended class. Her education today was a video titled: Intro To Yoga. Patient received handouts and class discussion pertaining to the topic.

## 2023-09-20 ENCOUNTER — NON-PROVIDER VISIT (OUTPATIENT)
Dept: CARDIOLOGY | Facility: MEDICAL CENTER | Age: 78
End: 2023-09-20
Payer: MEDICARE

## 2023-09-20 DIAGNOSIS — Z95.5 STENTED CORONARY ARTERY: ICD-10-CM

## 2023-09-20 PROCEDURE — G0422 INTENS CARDIAC REHAB W/EXERC: HCPCS | Performed by: INTERNAL MEDICINE

## 2023-09-20 PROCEDURE — G0423 INTENS CARDIAC REHAB NO EXER: HCPCS | Mod: 59 | Performed by: INTERNAL MEDICINE

## 2023-09-20 NOTE — PROGRESS NOTES
Ally Doherty attended Intensive Cardiac Rehab today from 1300 to 1500. During her time she exercised and attended class. Her education today was a cooking school titled: Fast Evening Meals. Patient received handouts and class discussion pertaining to the topic.

## 2023-09-21 ENCOUNTER — NON-PROVIDER VISIT (OUTPATIENT)
Dept: CARDIOLOGY | Facility: MEDICAL CENTER | Age: 78
End: 2023-09-21
Payer: MEDICARE

## 2023-09-21 DIAGNOSIS — Z95.5 STENTED CORONARY ARTERY: ICD-10-CM

## 2023-09-21 PROCEDURE — G0423 INTENS CARDIAC REHAB NO EXER: HCPCS | Mod: 59 | Performed by: INTERNAL MEDICINE

## 2023-09-21 PROCEDURE — G0422 INTENS CARDIAC REHAB W/EXERC: HCPCS | Performed by: INTERNAL MEDICINE

## 2023-09-21 NOTE — PROGRESS NOTES
Ally Doherty attended Intensive Cardiac Rehab today from 1300 to 1500. During her time she exercised and attended class. Her education today was a lecture and discussion titled: Sleep. Patient received handouts and class discussion pertaining to the topic.

## 2023-09-22 ENCOUNTER — TELEPHONE (OUTPATIENT)
Dept: MEDICAL GROUP | Facility: LAB | Age: 78
End: 2023-09-22
Payer: MEDICARE

## 2023-09-22 NOTE — TELEPHONE ENCOUNTER
Hello. Called Pt at 1100 on 9/22/23. She states Lower Leg Extremity Bruising which seems to be increasing since she started the medication in July/2023. States she is concerned about taking ASA and Ticagrelor together and feels this may be causing the bruising. States she can cover the bruising with pants but would prefer to have less bruising, if possible. I went over any emergent side effects, such al uncontrollable bleeding, blood in urine, coffee ground emesis, blood in stool or coughing up blood, and Pt. States NONE of these side effects are happening. I educated pt. If any of these occur please go to the ER for assistance. Please Advise. Pt. Would like a follow up from her provider concerning dosage of either the ASA or Ticagrelor. Thank you.

## 2023-09-22 NOTE — TELEPHONE ENCOUNTER
1. Caller Name:Ally Doherty                         Call Back Number: [phone]      How would the patient prefer to be contacted with a response: MyChart message    Patient is currently taking Brilinta. She states she has them all over her body and seems to get them pretty easily. Patient is concerned she might be taking too much of this medication.     Please advise.    Infección urinaria en niños: Instrucciones de cuidado - [ Urinary Tract Infection in Children: Care Instructions ]  Instrucciones de cuidado    Tasneem infección urinaria, o UTI, por brad siglas en inglés, es un tipo de infección que puede ocurrir en cualquier parte entre el Edil [place] y la uretra (por donde sale la orina). La mayoría de casos de UTI ocurren en la vejiga. Estas infecciones a menudo provocan fiebre y dolor cuando el uriel [facility]. Las UTI deben ser tratadas de inmediato en bebés y niños. Tasneem infección que no se trata rápidamente puede conducir a tasneem infección renal. Por lo general, los niños que abelino medicamentos para la infección sanan completamente. La atención de seguimiento es tasneem parte clave del tratamiento y la seguridad de price hijo. Asegúrese de hacer y acudir a todas las citas, y llame a price médico si price hijo está teniendo problemas. También es tasneem buena idea saber los resultados de los exámenes de price hijo y mantener tasneem lista de los medicamentos que wilfredo. ¿Cómo puede cuidar a price hijo en el hogar? · Si el médico le recetó antibióticos para price hijo, déselos según las indicaciones. No deje de usarlos porque price hijo se sienta mejor. Es necesario que price hijo tome todos los antibióticos hasta terminarlos. · El médico también podría recetarle a price hijo un medicamento para disminuir el ardor de Lexie UTI. [place] medicamento suele hacer que la orina se torne lora o anaranjada. La orina volverá a price color normal después de que price hijo deje de reggie el medicamento. · Jd que price hijo tome líquidos adicionales las próximas 24 horas. [place] ayudará a eliminar las bacterias de la vejiga. No le dé a price hijo bebidas carbonatadas o bebidas que contengan cafeína. Estas bebidas pueden causar irritación en la vejiga. · Dígale a price hijo que orine con frecuencia y que vacíe completamente la vejiga en cada ocasión. · Un baño de agua tibia puede ayudar a price hijo a que se sienta mejor.   Prevención de futuras infecciones urinarias  · Asegúrese de que price hijo ortega abundante agua todos los días. [place] ayudará a que price hijo orine con frecuencia, lo que eliminará las bacterias de price cuerpo. · Anime a price hijo a orinar tan pronto janay tenga ganas. ¿Cuándo debe pedir ayuda? Llame a price médico ahora mismo o busque atención médica inmediata si:  · Price hijo está vomitando y no puede retener medicamentos en el estómago. · Price hijo no puede orinar en absoluto. · Price hijo tiene escalofríos o fiebre nueva o más omer. · Price hijo tiene un dolor nuevo en la espalda courtney debajo de la caja torácica. [place] se llama dolor en el flanco. (Un uriel muy pequeño no podrá decirle si tiene dolor en el flanco). · Los síntomas de price hijo no mejoran, o desaparecen y reaparecen de nuevo. Estos síntomas podrían incluir dolor o ardor cuando el uriel [place] turbia o con color anormal, mal olor en la orina o no poder orinar mucho. Preste especial atención a los [employer] marti de price hijo y asegúrese de comunicarse con price médico si:  · Price hijo no empieza a mejorar después de 2 días. ¿Dónde puede encontrar más información en inglés? Todd Damico a http://gonzález-ansley.info/. Escriba A214 en la búsqueda para aprender más acerca de \"Infección urinaria en niños: Instrucciones de cuidado - [ Urinary Tract Infection in Children: Care Instructions ]. \"  Revisado: 28 noviembre, 2016  Versión del contenido: 11.3  © 3279-3181 Healthwise, Incorporated. Las instrucciones de cuidado fueron adaptadas bajo licencia por [employer] (which disclaims liability or warranty for this information). Si usted tiene [place] afección médica o sobre estas instrucciones, siempre pregunte a price profesional de marti. Healthwise, Incorporated niega toda garantía o responsabilidad por price uso de esta información. We hope that we have addressed all of your medical concerns.  The examination and treatment you received in the Emergency Department were for an emergent problem and were not intended as complete care. It is important that you follow up with your healthcare provider(s) for ongoing care. If your symptoms worsen or do not improve as expected, and you are unable to reach your usual health care provider(s), you should return to the Emergency Department. Today's healthcare is undergoing tremendous change, and patient satisfaction surveys are one of the many tools to assess the quality of medical care. You may receive a survey from the [facility] regarding your experience in the Emergency Department. I hope that your experience has been completely positive, particularly the medical care that I provided. As such, please participate in the survey; anything less than excellent does not meet my expectations or intentions. Thank you for allowing us to provide you with medical care today. We realize that you have many choices for your emergency care needs. Please choose us in the future for any continued health care needs. Eliza Johnson, [address] [place] [address].   Office: [phone]            Recent Results (from the past 24 hour(s))   URINALYSIS W/ REFLEX CULTURE    Collection Time: 07/30/17 11:42 PM   Result Value Ref Range    Color YELLOW/STRAW      Appearance CLEAR CLEAR      Specific gravity >1.030 (H) 1.003 - 1.030    pH (UA) 6.0 5.0 - 8.0      Protein 100 (A) NEG mg/dL    Glucose NEGATIVE  NEG mg/dL    Ketone NEGATIVE  NEG mg/dL    Bilirubin NEGATIVE  NEG      Blood NEGATIVE  NEG      Urobilinogen 0.2 0.2 - 1.0 EU/dL    Nitrites NEGATIVE  NEG      Leukocyte Esterase NEGATIVE  NEG      WBC 20-50 0 - 4 /hpf    RBC 0-5 0 - 5 /hpf    Epithelial cells FEW FEW /lpf    Bacteria 1+ (A) NEG /hpf    UA:UC IF INDICATED URINE CULTURE ORDERED (A) CNI      Mucus 2+ (A) NEG /lpf       No results found.

## 2023-09-25 NOTE — TELEPHONE ENCOUNTER
Phone Number Called: [phone]    Call outcome: Spoke to patient regarding message below.    Message: Called to inform patient that unable to decrease dose. Patient agreeable at this time to continue medication.

## 2023-09-25 NOTE — TELEPHONE ENCOUNTER
Phone Number Called: [phone]    Call outcome: Spoke to patient regarding message below.    Message: Called to inform patient about bruising as expected side effect and likely would experience similar bruising on different anti-platelet medication. Patient is wondering if she could reduce dosage of current medication.     To : please advise, thank you!

## 2023-09-25 NOTE — TELEPHONE ENCOUNTER
Niya Lopez, can you please call this patient and reassure her that her bruising on her legs is a normal side effect. If she is adamant to change her regimen we could do that but all of the anti platelet drugs carry the risk of bruising.

## 2023-09-26 ENCOUNTER — NON-PROVIDER VISIT (OUTPATIENT)
Dept: CARDIOLOGY | Facility: MEDICAL CENTER | Age: 78
End: 2023-09-26
Payer: MEDICARE

## 2023-09-26 DIAGNOSIS — Z95.5 STENTED CORONARY ARTERY: ICD-10-CM

## 2023-09-26 PROCEDURE — G0422 INTENS CARDIAC REHAB W/EXERC: HCPCS | Performed by: INTERNAL MEDICINE

## 2023-09-26 PROCEDURE — G0423 INTENS CARDIAC REHAB NO EXER: HCPCS | Mod: 59 | Performed by: INTERNAL MEDICINE

## 2023-09-26 NOTE — PROGRESS NOTES
Ally Doherty attended Intensive Cardiac Rehab today from 1600 to 1800. During her time she exercised and attended class. Her education today was a video titled: Dining Out. Patient received handouts and class discussion pertaining to the topic.

## 2023-09-27 ENCOUNTER — NON-PROVIDER VISIT (OUTPATIENT)
Dept: CARDIOLOGY | Facility: MEDICAL CENTER | Age: 78
End: 2023-09-27
Payer: MEDICARE

## 2023-09-27 DIAGNOSIS — Z95.5 STENTED CORONARY ARTERY: ICD-10-CM

## 2023-09-27 PROCEDURE — G0422 INTENS CARDIAC REHAB W/EXERC: HCPCS | Performed by: INTERNAL MEDICINE

## 2023-09-27 PROCEDURE — G0423 INTENS CARDIAC REHAB NO EXER: HCPCS | Mod: 59 | Performed by: INTERNAL MEDICINE

## 2023-09-27 NOTE — PROGRESS NOTES
Ally Doherty attended Intensive Cardiac Rehab today from 1300 to 1500. During her time she exercised and attended class. Her education today was a Nutrition Class titled: Comforting Breakfasts. Patient received handouts and class discussion pertaining to the topic.

## 2023-09-28 ENCOUNTER — NON-PROVIDER VISIT (OUTPATIENT)
Dept: CARDIOLOGY | Facility: MEDICAL CENTER | Age: 78
End: 2023-09-28
Payer: MEDICARE

## 2023-09-28 DIAGNOSIS — Z95.5 STENTED CORONARY ARTERY: ICD-10-CM

## 2023-09-28 PROCEDURE — G0423 INTENS CARDIAC REHAB NO EXER: HCPCS | Mod: 59 | Performed by: INTERNAL MEDICINE

## 2023-09-28 PROCEDURE — G0422 INTENS CARDIAC REHAB W/EXERC: HCPCS | Performed by: INTERNAL MEDICINE

## 2023-09-28 NOTE — PROGRESS NOTES
Ally Doherty attended Intensive Cardiac Rehab today from 1300 to 1500. During her time she exercised and attended class. Her education today was a workshop titled: DINING OUT. Patient received handouts and class discussion pertaining to the topic.

## 2023-09-29 ENCOUNTER — PATIENT OUTREACH (OUTPATIENT)
Dept: HEALTH INFORMATION MANAGEMENT | Facility: OTHER | Age: 78
End: 2023-09-29
Payer: MEDICARE

## 2023-09-29 DIAGNOSIS — I10 PRIMARY HYPERTENSION: ICD-10-CM

## 2023-09-29 DIAGNOSIS — E11.9 TYPE 2 DIABETES MELLITUS WITHOUT COMPLICATION, WITHOUT LONG-TERM CURRENT USE OF INSULIN (HCC): ICD-10-CM

## 2023-09-29 DIAGNOSIS — E78.5 DYSLIPIDEMIA: ICD-10-CM

## 2023-09-29 PROCEDURE — 99490 CHRNC CARE MGMT STAFF 1ST 20: CPT | Performed by: FAMILY MEDICINE

## 2023-09-29 NOTE — PROGRESS NOTES
Assessment    Spoke with patient for monthly outreach.  It is currently doing cardiac rehab reports this is going really well.  Shifting to mediterranean diet well with the assistance of this program. Patient saw the liver transplant clinic in [place].  Wants to see a doctor at [facility] to review [place].  Her current provider there is leaving she will need to establish with some one new.  She will call to schedule an appointment.  Patient reports she has lots of brusing on her arms and legs, but was told she needs to stay on current medication regimen for the 1 year after her MI.  Denies any signs of abnormal bleeding and is aware of what to look for.  No needs or concerns at this time.     Education    Medication management    Plan of Care and Goals    Continue plan of care    Barriers:    Difficulty getting into specialist    Progress:    Progressing    Next outreach:  1 month

## 2023-10-01 PROCEDURE — RXMED WILLOW AMBULATORY MEDICATION CHARGE: Performed by: FAMILY MEDICINE

## 2023-10-02 ENCOUNTER — TELEPHONE (OUTPATIENT)
Dept: PHARMACY | Facility: MEDICAL CENTER | Age: 78
End: 2023-10-02
Payer: MEDICARE

## 2023-10-02 NOTE — TELEPHONE ENCOUNTER
Medication: Jardiance 10mg tabs  Type of Insurance: Government funded (Medicare/Medicare Advantage)  Type of Financial assistance requested Foundation  Source: Enabled Employment  Source Phone #:  [phone]  Outcome: Approved  Effective dates: 06/18/2023 until 06/17/2024  Details/Billing Information:   BIN: [id number]  PCN: [id number]  GROUP: [id number]  ID: [id number]  Max Award Amount: $10,000  Final Copay: $0    Patient's co-pay was $404 for her recent fill and [employer] is also issuing her a reimbursement check today 10/02/23 for that fill. Ally was denied for PAP for Jardiance due to over income. I called the patient to advise of denial, and when I offered her the possibility of a [employer] miracle, she had stated that she was previously working on a [employer] miracle with [employer] herself. I advised that I would call [employer] and find out the status of her application for her and give her a call back. Information received from [employer] is noted above

## 2023-10-03 ENCOUNTER — PHARMACY VISIT (OUTPATIENT)
Dept: PHARMACY | Facility: MEDICAL CENTER | Age: 78
End: 2023-10-03
Payer: COMMERCIAL

## 2023-10-03 ENCOUNTER — NON-PROVIDER VISIT (OUTPATIENT)
Dept: CARDIOLOGY | Facility: MEDICAL CENTER | Age: 78
End: 2023-10-03
Payer: MEDICARE

## 2023-10-03 DIAGNOSIS — Z95.5 STENTED CORONARY ARTERY: ICD-10-CM

## 2023-10-03 PROCEDURE — G0422 INTENS CARDIAC REHAB W/EXERC: HCPCS | Performed by: INTERNAL MEDICINE

## 2023-10-03 PROCEDURE — G0423 INTENS CARDIAC REHAB NO EXER: HCPCS | Mod: 59 | Performed by: INTERNAL MEDICINE

## 2023-10-03 NOTE — PROGRESS NOTES
Ally Doherty attended Intensive Cardiac Rehab today from 1300 to 1500. During her time she exercised and attended class. Her education today was a VIDEO titled: HEALTHY MINDS, BODIES AND HEARTS. . Patient received handouts and class discussion pertaining to the topic.

## 2023-10-04 ENCOUNTER — NON-PROVIDER VISIT (OUTPATIENT)
Dept: CARDIOLOGY | Facility: MEDICAL CENTER | Age: 78
End: 2023-10-04
Payer: MEDICARE

## 2023-10-04 DIAGNOSIS — Z95.5 STENTED CORONARY ARTERY: ICD-10-CM

## 2023-10-04 PROCEDURE — G0423 INTENS CARDIAC REHAB NO EXER: HCPCS | Mod: 59 | Performed by: INTERNAL MEDICINE

## 2023-10-04 PROCEDURE — G0422 INTENS CARDIAC REHAB W/EXERC: HCPCS | Performed by: INTERNAL MEDICINE

## 2023-10-04 NOTE — PROGRESS NOTES
Ally Doherty attended Intensive Cardiac Rehab today from 1300 to 1500. During her time she exercised and attended class. Her education today was a nutrition and cooking class titled: Meals In A Snap. Patient received handouts and class discussion pertaining to the topic.

## 2023-10-05 ENCOUNTER — NON-PROVIDER VISIT (OUTPATIENT)
Dept: CARDIOLOGY | Facility: MEDICAL CENTER | Age: 78
End: 2023-10-05
Payer: MEDICARE

## 2023-10-05 DIAGNOSIS — Z95.5 STENTED CORONARY ARTERY: ICD-10-CM

## 2023-10-05 PROCEDURE — G0423 INTENS CARDIAC REHAB NO EXER: HCPCS | Mod: 59 | Performed by: INTERNAL MEDICINE

## 2023-10-05 PROCEDURE — G0422 INTENS CARDIAC REHAB W/EXERC: HCPCS | Performed by: INTERNAL MEDICINE

## 2023-10-05 NOTE — PROGRESS NOTES
Ally Doherty attended Intensive Cardiac Rehab today from 1400 to 1600. During his time he exercised and attended class.   His education today was a workshop titled:new thoughts and new behaviors. Patient received handouts and class discussion pertaining to the topic.

## 2023-10-10 ENCOUNTER — NON-PROVIDER VISIT (OUTPATIENT)
Dept: CARDIOLOGY | Facility: MEDICAL CENTER | Age: 78
End: 2023-10-10
Payer: MEDICARE

## 2023-10-10 DIAGNOSIS — Z95.5 STENTED CORONARY ARTERY: ICD-10-CM

## 2023-10-10 PROCEDURE — G0422 INTENS CARDIAC REHAB W/EXERC: HCPCS | Performed by: INTERNAL MEDICINE

## 2023-10-10 PROCEDURE — G0423 INTENS CARDIAC REHAB NO EXER: HCPCS | Mod: 59 | Performed by: INTERNAL MEDICINE

## 2023-10-10 NOTE — PROGRESS NOTES
Ally Doherty attended Intensive Cardiac Rehab today from 0900 to 1100. During her time she exercised and attended class. Her education today was a video titled: Decoding Lab Results. Patient received handouts and class discussion pertaining to the topic.

## 2023-10-11 ENCOUNTER — NON-PROVIDER VISIT (OUTPATIENT)
Dept: CARDIOLOGY | Facility: MEDICAL CENTER | Age: 78
End: 2023-10-11
Payer: MEDICARE

## 2023-10-11 DIAGNOSIS — Z95.5 STENTED CORONARY ARTERY: ICD-10-CM

## 2023-10-11 PROCEDURE — G0423 INTENS CARDIAC REHAB NO EXER: HCPCS | Mod: 59 | Performed by: INTERNAL MEDICINE

## 2023-10-11 PROCEDURE — G0422 INTENS CARDIAC REHAB W/EXERC: HCPCS | Performed by: INTERNAL MEDICINE

## 2023-10-11 NOTE — PROGRESS NOTES
Ally Doherty attended Intensive Cardiac Rehab today from 0900 to 1100. During her time she exercised and attended class. Her education today was a nutrition and cooking class titled: One Pot Wonders. Patient received handouts and class discussion pertaining to the topic.

## 2023-10-12 ENCOUNTER — NON-PROVIDER VISIT (OUTPATIENT)
Dept: CARDIOLOGY | Facility: MEDICAL CENTER | Age: 78
End: 2023-10-12
Payer: MEDICARE

## 2023-10-12 DIAGNOSIS — Z95.5 STENTED CORONARY ARTERY: ICD-10-CM

## 2023-10-12 PROCEDURE — G0423 INTENS CARDIAC REHAB NO EXER: HCPCS | Mod: 59 | Performed by: INTERNAL MEDICINE

## 2023-10-12 PROCEDURE — G0422 INTENS CARDIAC REHAB W/EXERC: HCPCS | Performed by: INTERNAL MEDICINE

## 2023-10-12 NOTE — PROGRESS NOTES
Ally Doherty attended Intensive Cardiac Rehab today from 0900 to 1100. During her time she exercised and attended class. Her education today was a workshop titled: Mindful Eating. Patient received handouts and class discussion pertaining to the topic.

## 2023-10-17 ENCOUNTER — NON-PROVIDER VISIT (OUTPATIENT)
Dept: CARDIOLOGY | Facility: MEDICAL CENTER | Age: 78
End: 2023-10-17
Payer: MEDICARE

## 2023-10-17 DIAGNOSIS — Z95.5 STENTED CORONARY ARTERY: ICD-10-CM

## 2023-10-17 PROCEDURE — G0422 INTENS CARDIAC REHAB W/EXERC: HCPCS | Performed by: INTERNAL MEDICINE

## 2023-10-17 PROCEDURE — G0423 INTENS CARDIAC REHAB NO EXER: HCPCS | Mod: 59 | Performed by: INTERNAL MEDICINE

## 2023-10-17 NOTE — PROGRESS NOTES
Ally Doherty attended Intensive Cardiac Rehab today from 0900 to 1100. During her time she exercised and attended class. Her education today was a video titled: Metabolic Syndrom and Belly Fat. Patient received handouts and class discussion pertaining to the topic.

## 2023-10-18 ENCOUNTER — NON-PROVIDER VISIT (OUTPATIENT)
Dept: CARDIOLOGY | Facility: MEDICAL CENTER | Age: 78
End: 2023-10-18
Payer: MEDICARE

## 2023-10-18 DIAGNOSIS — Z95.5 STENTED CORONARY ARTERY: ICD-10-CM

## 2023-10-18 PROCEDURE — G0423 INTENS CARDIAC REHAB NO EXER: HCPCS | Mod: 59 | Performed by: INTERNAL MEDICINE

## 2023-10-18 PROCEDURE — G0422 INTENS CARDIAC REHAB W/EXERC: HCPCS | Performed by: INTERNAL MEDICINE

## 2023-10-18 NOTE — PROGRESS NOTES
Ally Doherty attended Intensive Cardiac Rehab today from 0900 to 1100. During her time she exercised and attended class. Her education today was a cooking school titled: Adding Flavor Sodium Free. Patient received handouts and class discussion pertaining to the topic.

## 2023-10-19 ENCOUNTER — NON-PROVIDER VISIT (OUTPATIENT)
Dept: CARDIOLOGY | Facility: MEDICAL CENTER | Age: 78
End: 2023-10-19
Payer: MEDICARE

## 2023-10-19 DIAGNOSIS — Z95.5 STENTED CORONARY ARTERY: ICD-10-CM

## 2023-10-19 PROCEDURE — G0423 INTENS CARDIAC REHAB NO EXER: HCPCS | Mod: 59 | Performed by: INTERNAL MEDICINE

## 2023-10-19 PROCEDURE — G0422 INTENS CARDIAC REHAB W/EXERC: HCPCS | Performed by: INTERNAL MEDICINE

## 2023-10-19 NOTE — PROGRESS NOTES
Ally Doherty attended Intensive Cardiac Rehab today from 0900 to 1100. During her time she exercised and attended class. Her education today was a lecture titled: Building Your Action Plan. Patient received handouts and class discussion pertaining to the topic.

## 2023-10-24 ENCOUNTER — TELEPHONE (OUTPATIENT)
Dept: MEDICAL GROUP | Facility: LAB | Age: 78
End: 2023-10-24

## 2023-10-24 ENCOUNTER — NON-PROVIDER VISIT (OUTPATIENT)
Dept: CARDIOLOGY | Facility: MEDICAL CENTER | Age: 78
End: 2023-10-24
Payer: MEDICARE

## 2023-10-24 DIAGNOSIS — Z95.5 STENTED CORONARY ARTERY: ICD-10-CM

## 2023-10-24 PROCEDURE — G0422 INTENS CARDIAC REHAB W/EXERC: HCPCS | Performed by: INTERNAL MEDICINE

## 2023-10-24 PROCEDURE — G0423 INTENS CARDIAC REHAB NO EXER: HCPCS | Mod: 59 | Performed by: INTERNAL MEDICINE

## 2023-10-24 NOTE — PROGRESS NOTES
Ally Doherty attended Intensive Cardiac Rehab today from 0900 to 1100. During her time she exercised and attended class. Her education today was a video titled: Improve Performance. Patient received handouts and class discussion pertaining to the topic.

## 2023-10-25 ENCOUNTER — NON-PROVIDER VISIT (OUTPATIENT)
Dept: CARDIOLOGY | Facility: MEDICAL CENTER | Age: 78
End: 2023-10-25
Payer: MEDICARE

## 2023-10-25 DIAGNOSIS — Z95.5 STENTED CORONARY ARTERY: ICD-10-CM

## 2023-10-25 PROCEDURE — G0422 INTENS CARDIAC REHAB W/EXERC: HCPCS | Performed by: INTERNAL MEDICINE

## 2023-10-25 PROCEDURE — G0423 INTENS CARDIAC REHAB NO EXER: HCPCS | Mod: 59 | Performed by: INTERNAL MEDICINE

## 2023-10-25 NOTE — PROGRESS NOTES
Ally Doherty attended Intensive Cardiac Rehab today from 0900 to 1100. During her time she exercised and attended class. Her education today was a cooking school titled: Fast and Healthy Breakfasts. Patient received handouts and class discussion pertaining to the topic.

## 2023-10-26 ENCOUNTER — NON-PROVIDER VISIT (OUTPATIENT)
Dept: CARDIOLOGY | Facility: MEDICAL CENTER | Age: 78
End: 2023-10-26
Payer: MEDICARE

## 2023-10-26 DIAGNOSIS — Z95.5 STENTED CORONARY ARTERY: ICD-10-CM

## 2023-10-26 PROCEDURE — G0423 INTENS CARDIAC REHAB NO EXER: HCPCS | Mod: 59 | Performed by: INTERNAL MEDICINE

## 2023-10-26 PROCEDURE — G0422 INTENS CARDIAC REHAB W/EXERC: HCPCS | Performed by: INTERNAL MEDICINE

## 2023-10-26 NOTE — PROGRESS NOTES
Ally Doherty attended Intensive Cardiac Rehab today from 0900 to 1100. During her time she exercised and attended class. Her education today was a workshop titled: Saritha. Patient received handouts and class discussion pertaining to the topic.

## 2023-10-31 ENCOUNTER — TELEPHONE (OUTPATIENT)
Dept: CARDIOLOGY | Facility: MEDICAL CENTER | Age: 78
End: 2023-10-31

## 2023-10-31 ENCOUNTER — APPOINTMENT (OUTPATIENT)
Dept: CARDIOLOGY | Facility: MEDICAL CENTER | Age: 78
End: 2023-10-31
Payer: MEDICARE

## 2023-10-31 ENCOUNTER — NON-PROVIDER VISIT (OUTPATIENT)
Dept: CARDIOLOGY | Facility: MEDICAL CENTER | Age: 78
End: 2023-10-31
Payer: MEDICARE

## 2023-10-31 DIAGNOSIS — Z95.5 STENTED CORONARY ARTERY: ICD-10-CM

## 2023-10-31 PROCEDURE — G0423 INTENS CARDIAC REHAB NO EXER: HCPCS | Mod: 59 | Performed by: INTERNAL MEDICINE

## 2023-10-31 PROCEDURE — G0422 INTENS CARDIAC REHAB W/EXERC: HCPCS | Performed by: INTERNAL MEDICINE

## 2023-10-31 PROCEDURE — RXMED WILLOW AMBULATORY MEDICATION CHARGE

## 2023-10-31 NOTE — TELEPHONE ENCOUNTER
Caller: Ally Doherty    Topic/issue: Patient was in parking lot when she got message that her appointment today was cancelled. No openings with Adrianna Meadows or Dr. Guido for remainder of year and from patient's understanding she had to be worked in for this appointment for Adri[facility]. Next available with care team not until mid December. Please advise.    Callback Number: [phone]    Thank you,  Renata MCNEIL

## 2023-10-31 NOTE — PROGRESS NOTES
Ally Doherty attended Intensive Cardiac Rehab today from 1300 to 1500. During her time she exercised and attended class. Her education today was a workshop titled: Reducing Stress. Patient received handouts and class discussion pertaining to the topic.

## 2023-10-31 NOTE — TELEPHONE ENCOUNTER
Phone Number Called: [phone]    Call outcome: Spoke to patient regarding message below.    Message: Patient rescheduled for 12/7 with [facility]. All questions answered at this time.

## 2023-10-31 NOTE — TELEPHONE ENCOUNTER
Medication : Jardiance 10mg tablets  Copay: 0.00  Quantity: 100.00  Days Supply: 100  Next Call Date : 2024-02-07  First Fill With Pharmacy : 2023-08-01 Teaching Appointment Date : 2023-08-01 Scheduled Delivery Day : 2023-11-02    Patient called and LVM for me to call her back regarding her refill for Jardiance. She stated that she has 5 tabs left and Thursday works great for a  delivery to her home, [address], [place] [address]. I verified that [facility] and [facility] went through for 100 day supply as $0. She said that the 1-4pm timeslot works best for her and she is doing great on her medication. She does not have any questions for the pharmacist regarding her medications.  Delivery slip sent to Mera @ [facility] for processing.

## 2023-10-31 NOTE — TELEPHONE ENCOUNTER
Caller: Ally Doherty    Topic/issue: Patient states she was working with Kerry regarding a miracle that was approved for her medication:    Empagliflozin (JARDIANCE) 10 MG Tab tablet    Patient asking if Kerry put paperwork thru for the pharmacy already or is there anything that the patient needs to do to get her prescription picked up? What is the next step she needs to take? She has approximately four days worth remaining. Please advise.    Callback Number: [phone] (cell)    Thank you,  Renata MCNEIL

## 2023-11-01 ENCOUNTER — NON-PROVIDER VISIT (OUTPATIENT)
Dept: CARDIOLOGY | Facility: MEDICAL CENTER | Age: 78
End: 2023-11-01
Payer: MEDICARE

## 2023-11-01 DIAGNOSIS — Z95.5 STENTED CORONARY ARTERY: ICD-10-CM

## 2023-11-01 PROCEDURE — G0422 INTENS CARDIAC REHAB W/EXERC: HCPCS | Performed by: INTERNAL MEDICINE

## 2023-11-01 PROCEDURE — G0423 INTENS CARDIAC REHAB NO EXER: HCPCS | Mod: 59 | Performed by: INTERNAL MEDICINE

## 2023-11-01 NOTE — PROGRESS NOTES
Ally Doherty attended Intensive Cardiac Rehab today from 0900 to 1100. During her time she exercised and attended class. Her education today was a COOKING CLASS titled: SALADS AND DRESSINGS. Patient received handouts and class discussion pertaining to the topic.

## 2023-11-02 ENCOUNTER — PHARMACY VISIT (OUTPATIENT)
Dept: PHARMACY | Facility: MEDICAL CENTER | Age: 78
End: 2023-11-02
Payer: COMMERCIAL

## 2023-11-02 ENCOUNTER — NON-PROVIDER VISIT (OUTPATIENT)
Dept: CARDIOLOGY | Facility: MEDICAL CENTER | Age: 78
End: 2023-11-02
Payer: MEDICARE

## 2023-11-02 DIAGNOSIS — Z95.5 STENTED CORONARY ARTERY: ICD-10-CM

## 2023-11-02 PROCEDURE — G0423 INTENS CARDIAC REHAB NO EXER: HCPCS | Mod: 59 | Performed by: INTERNAL MEDICINE

## 2023-11-02 PROCEDURE — G0422 INTENS CARDIAC REHAB W/EXERC: HCPCS | Performed by: INTERNAL MEDICINE

## 2023-11-02 NOTE — PROGRESS NOTES
Ally Doherty attended Intensive Cardiac Rehab today from 0900 to 1100. During his time he exercised and attended class.   His education today was a video titled: how our thoughts heal our hearts. Patient received handouts and class discussion pertaining to the topic.

## 2023-11-07 ENCOUNTER — NON-PROVIDER VISIT (OUTPATIENT)
Dept: CARDIOLOGY | Facility: MEDICAL CENTER | Age: 78
End: 2023-11-07
Payer: MEDICARE

## 2023-11-07 DIAGNOSIS — Z95.5 STENTED CORONARY ARTERY: ICD-10-CM

## 2023-11-07 PROCEDURE — G0423 INTENS CARDIAC REHAB NO EXER: HCPCS | Mod: 59 | Performed by: INTERNAL MEDICINE

## 2023-11-07 PROCEDURE — G0422 INTENS CARDIAC REHAB W/EXERC: HCPCS | Performed by: INTERNAL MEDICINE

## 2023-11-07 NOTE — PROGRESS NOTES
Ally Doherty attended Intensive Cardiac Rehab today from 0900 to 1100. During her time she exercised and attended class. Her education today was a video titled: targeting nutritional priorities. Patient received handouts and class discussion pertaining to the topic.

## 2023-11-08 ENCOUNTER — NON-PROVIDER VISIT (OUTPATIENT)
Dept: CARDIOLOGY | Facility: MEDICAL CENTER | Age: 78
End: 2023-11-08
Payer: MEDICARE

## 2023-11-08 DIAGNOSIS — Z95.5 STENTED CORONARY ARTERY: ICD-10-CM

## 2023-11-08 PROCEDURE — G0422 INTENS CARDIAC REHAB W/EXERC: HCPCS | Performed by: INTERNAL MEDICINE

## 2023-11-08 PROCEDURE — G0423 INTENS CARDIAC REHAB NO EXER: HCPCS | Mod: 59 | Performed by: INTERNAL MEDICINE

## 2023-11-08 NOTE — PROGRESS NOTES
Ally Doherty attended Intensive Cardiac Rehab today from 0900 to 1100. During her time she exercised and attended class. Her education today was a cooking school titled: Satarii. Patient received handouts and class discussion pertaining to the topic.

## 2023-11-09 ENCOUNTER — NON-PROVIDER VISIT (OUTPATIENT)
Dept: CARDIOLOGY | Facility: MEDICAL CENTER | Age: 78
End: 2023-11-09
Payer: MEDICARE

## 2023-11-09 DIAGNOSIS — Z95.5 STENTED CORONARY ARTERY: ICD-10-CM

## 2023-11-09 PROCEDURE — G0422 INTENS CARDIAC REHAB W/EXERC: HCPCS | Performed by: INTERNAL MEDICINE

## 2023-11-09 PROCEDURE — RXMED WILLOW AMBULATORY MEDICATION CHARGE: Performed by: INTERNAL MEDICINE

## 2023-11-09 PROCEDURE — RXMED WILLOW AMBULATORY MEDICATION CHARGE

## 2023-11-09 PROCEDURE — G0423 INTENS CARDIAC REHAB NO EXER: HCPCS | Mod: 59 | Performed by: INTERNAL MEDICINE

## 2023-11-09 NOTE — PROGRESS NOTES
Ally Doherty attended Intensive Cardiac Rehab today from 0900 to 1100. During her time she exercised and attended class. Her education today was a workshop titled: Nutritional Priorities. Patient received handouts and class discussion pertaining to the topic.

## 2023-11-10 ENCOUNTER — PHARMACY VISIT (OUTPATIENT)
Dept: PHARMACY | Facility: MEDICAL CENTER | Age: 78
End: 2023-11-10
Payer: COMMERCIAL

## 2023-11-10 ENCOUNTER — PATIENT OUTREACH (OUTPATIENT)
Dept: HEALTH INFORMATION MANAGEMENT | Facility: OTHER | Age: 78
End: 2023-11-10
Payer: MEDICARE

## 2023-11-10 DIAGNOSIS — I10 PRIMARY HYPERTENSION: ICD-10-CM

## 2023-11-10 DIAGNOSIS — E11.9 TYPE 2 DIABETES MELLITUS WITHOUT COMPLICATION, WITHOUT LONG-TERM CURRENT USE OF INSULIN (HCC): ICD-10-CM

## 2023-11-10 DIAGNOSIS — E78.5 DYSLIPIDEMIA: ICD-10-CM

## 2023-11-10 NOTE — PROGRESS NOTES
Attempted to call pt for monthly outreach for personal care management program. Left VM requesting call back.      Quarterly chart review completed. Pt continues to qualify for and benefit from personal care management program. Will continue to follow.

## 2023-11-15 NOTE — PROGRESS NOTES
Unable to contact pt for monthly outreach. Left VM instructing pt to call with any concerns or needs. Will attempt again next month.

## 2023-11-28 ENCOUNTER — NON-PROVIDER VISIT (OUTPATIENT)
Dept: CARDIOLOGY | Facility: MEDICAL CENTER | Age: 78
End: 2023-11-28
Payer: MEDICARE

## 2023-11-28 DIAGNOSIS — Z95.5 STENTED CORONARY ARTERY: ICD-10-CM

## 2023-11-28 PROCEDURE — G0422 INTENS CARDIAC REHAB W/EXERC: HCPCS | Performed by: INTERNAL MEDICINE

## 2023-11-28 PROCEDURE — G0423 INTENS CARDIAC REHAB NO EXER: HCPCS | Mod: 59 | Performed by: INTERNAL MEDICINE

## 2023-11-28 NOTE — PROGRESS NOTES
Ally Doherty attended Intensive Cardiac Rehab today from 0900 to 1100. During her time she exercised and attended class. Her education today was a video titled: Hypertension and Heart Disease. Patient received handouts and class discussion pertaining to the topic.

## 2023-11-29 ENCOUNTER — NON-PROVIDER VISIT (OUTPATIENT)
Dept: CARDIOLOGY | Facility: MEDICAL CENTER | Age: 78
End: 2023-11-29
Payer: MEDICARE

## 2023-11-29 DIAGNOSIS — Z95.5 STENTED CORONARY ARTERY: ICD-10-CM

## 2023-11-29 PROCEDURE — G0423 INTENS CARDIAC REHAB NO EXER: HCPCS | Mod: 59 | Performed by: INTERNAL MEDICINE

## 2023-11-29 PROCEDURE — G0422 INTENS CARDIAC REHAB W/EXERC: HCPCS | Performed by: INTERNAL MEDICINE

## 2023-11-29 NOTE — PROGRESS NOTES
Ally Doherty attended Intensive Cardiac Rehab today from 0900 to 1100. During her time she exercised and attended class. Her education today was cooking class titled: delicious desserts. Patient received handouts and class discussion pertaining to the topic.

## 2023-11-30 ENCOUNTER — HOSPITAL ENCOUNTER (OUTPATIENT)
Dept: LAB | Facility: MEDICAL CENTER | Age: 78
End: 2023-11-30
Payer: MEDICARE

## 2023-11-30 ENCOUNTER — HOSPITAL ENCOUNTER (OUTPATIENT)
Dept: LAB | Facility: MEDICAL CENTER | Age: 78
End: 2023-11-30
Attending: FAMILY MEDICINE
Payer: MEDICARE

## 2023-11-30 ENCOUNTER — NON-PROVIDER VISIT (OUTPATIENT)
Dept: CARDIOLOGY | Facility: MEDICAL CENTER | Age: 78
End: 2023-11-30
Payer: MEDICARE

## 2023-11-30 DIAGNOSIS — Z95.5 STENTED CORONARY ARTERY: ICD-10-CM

## 2023-11-30 DIAGNOSIS — N28.9 ACUTE RENAL INSUFFICIENCY: ICD-10-CM

## 2023-11-30 DIAGNOSIS — E78.5 DYSLIPIDEMIA: ICD-10-CM

## 2023-11-30 PROCEDURE — G0422 INTENS CARDIAC REHAB W/EXERC: HCPCS | Performed by: INTERNAL MEDICINE

## 2023-11-30 PROCEDURE — 80061 LIPID PANEL: CPT

## 2023-11-30 PROCEDURE — 80048 BASIC METABOLIC PNL TOTAL CA: CPT

## 2023-11-30 PROCEDURE — 36415 COLL VENOUS BLD VENIPUNCTURE: CPT

## 2023-11-30 PROCEDURE — G0423 INTENS CARDIAC REHAB NO EXER: HCPCS | Mod: 59 | Performed by: INTERNAL MEDICINE

## 2023-11-30 NOTE — PROGRESS NOTES
Ally JAMES Zackann attended Intensive Cardiac Rehab today from 0900 to 1100. During her time she exercised and attended class. Her education today was a workshop titled: Managing Heart Disease. Patient received handouts and class discussion pertaining to the topic.

## 2023-12-01 ENCOUNTER — NON-PROVIDER VISIT (OUTPATIENT)
Dept: VASCULAR LAB | Facility: MEDICAL CENTER | Age: 78
End: 2023-12-01
Attending: INTERNAL MEDICINE
Payer: MEDICARE

## 2023-12-01 VITALS — SYSTOLIC BLOOD PRESSURE: 113 MMHG | HEART RATE: 67 BPM | DIASTOLIC BLOOD PRESSURE: 71 MMHG

## 2023-12-01 DIAGNOSIS — E78.5 DYSLIPIDEMIA: ICD-10-CM

## 2023-12-01 DIAGNOSIS — Z95.5 STENTED CORONARY ARTERY: ICD-10-CM

## 2023-12-01 LAB
ANION GAP SERPL CALC-SCNC: 14 MMOL/L (ref 7–16)
BUN SERPL-MCNC: 25 MG/DL (ref 8–22)
CALCIUM SERPL-MCNC: 8.9 MG/DL (ref 8.5–10.5)
CHLORIDE SERPL-SCNC: 110 MMOL/L (ref 96–112)
CHOLEST SERPL-MCNC: 88 MG/DL (ref 100–199)
CO2 SERPL-SCNC: 17 MMOL/L (ref 20–33)
CREAT SERPL-MCNC: 1.12 MG/DL (ref 0.5–1.4)
FASTING STATUS PATIENT QL REPORTED: NORMAL
GFR SERPLBLD CREATININE-BSD FMLA CKD-EPI: 51 ML/MIN/1.73 M 2
GLUCOSE SERPL-MCNC: 158 MG/DL (ref 65–99)
HDLC SERPL-MCNC: 46 MG/DL
LDLC SERPL CALC-MCNC: 23 MG/DL
POTASSIUM SERPL-SCNC: 4.2 MMOL/L (ref 3.6–5.5)
SODIUM SERPL-SCNC: 141 MMOL/L (ref 135–145)
TRIGL SERPL-MCNC: 94 MG/DL (ref 0–149)

## 2023-12-01 PROCEDURE — 99212 OFFICE O/P EST SF 10 MIN: CPT

## 2023-12-01 NOTE — PROGRESS NOTES
Chief Complaint   Patient presents with    Cough     for 2 mos. she is a 34y.o. year old female who presents for evaluation of continued cough for past few months. Was dx with URI and severe ear infection in Feb, congestion symptoms and fatigue improved but cough lingering. Was seen by ENT and given abx for ear infection. Spasmatic, non productive in nature, will wake her up from sleep. \"feels like something in back of throat but doesn't move\". Has not used antacids, does states frequent heartburn symptoms especially with certain food intake. Reviewed and agree with Nurse Note duplicated in this note. Reviewed PmHx, RxHx, FmHx, SocHx, AllgHx and updated in chart. Patient Labs were reviewed: yes    Patient Past Records were reviewed:  yes    Review of Systems - negative except as listed above    Objective:     Vitals:    07/16/18 1614 07/16/18 1617   BP: 139/80 136/81   Pulse: 64 60   Resp: 18    Temp: 97.5 °F (36.4 °C)    TempSrc: Oral    SpO2: 97%    Weight: 262 lb 3.2 oz (118.9 kg)    Height: 5' 6\" (1.676 m)      Physical Examination: General appearance - alert, well appearing, and in no distress  Mental status - alert, oriented to person, place, and time  Eyes - pupils equal and reactive, extraocular eye movements intact  Ears - bilateral TM's and external ear canals normal  Neck - supple, no significant adenopathy  Chest - clear to auscultation, no wheezes, rales or rhonchi, symmetric air entry, dry tight cough noted during exam  Heart - normal rate, regular rhythm, normal S1, S2, no murmurs, rubs, clicks or gallops  Abdomen - soft, nontender, nondistended, no masses or organomegaly  Extremities - peripheral pulses normal, no pedal edema, no clubbing or cyanosis    Assessment/ Plan:   Diagnoses and all orders for this visit:    1. Cough       Encounter Diagnoses   Name Primary?  Cough Yes     No orders of the defined types were placed in this encounter.     Follow-up Disposition:  [facility] in [facility] - FollowUp Visit  Date of Service: 12/01/23    Ally Doherty is here for follow up of dyslipidemia    Subjective    HPI  Pertinent Interval History since last visit:   Patient had repeat labs and renal indices we were watching has since recovered and she is doing well on current regimen  Current Prescription Lipid Lowering Medications - including dose:   Statin: Rosuvastatin 20mg QD   Non-Statin: None  Current Lipid Lowering and Related Supplements:   Vit D  Any Current Side Effects Potentially Related to Lipid Lowering therapy?   No  Current Adherence to Lipid Lowering Therapies:  Complete  Any Previous History of Statin Intolerance?   Yes, Details: patient reports previously prescribed Lovastatin, and her LDL values actually increased so she was taken off of medication   Baseline Lipids Prior to Treatment:    Latest Reference Range & Units 07/11/23 01:57   Cholesterol,Tot 100 - 199 mg/dL 169   Triglycerides 0 - 149 mg/dL 106   HDL >=40 mg/dL 47   LDL <100 mg/dL 101 (H)       SOCIAL HISTORY  Social History     Tobacco Use   Smoking Status Never   Smokeless Tobacco Never      Change in weight: Stable - lost 2 lb since last visit  Exercise habits:  Patient is participating in cardiac rehab     Diet: Mediterranean diet mostly      Objective    Vitals:    12/01/23 0906   BP: 113/71   Pulse: 67      Physical Exam    DATA REVIEW  Most Recent Lipid Panel:   Lab Results   Component Value Date    CHOLSTRLTOT 88 (L) 11/30/2023    TRIGLYCERIDE 94 11/30/2023    HDL 46 11/30/2023    LDL 23 11/30/2023       Other Pertinent Blood Work:   Lab Results   Component Value Date    SODIUM 141 11/30/2023    POTASSIUM 4.2 11/30/2023    CHLORIDE 110 11/30/2023    CO2 17 (L) 11/30/2023    ANION 14.0 11/30/2023    GLUCOSE 158 (H) 11/30/2023    BUN 25 (H) 11/30/2023    CREATININE 1.12 11/30/2023    CALCIUM 8.9 11/30/2023    ASTSGOT 50 (H) 09/15/2023    ASTSGOT 65 (H) 07/13/2023    ALTSGPT 46 09/15/2023    ALTSGPT 24  07/13/2023    ALKPHOSPHAT 105 09/15/2023    ALKPHOSPHAT 76 07/13/2023    TBILIRUBIN 1.2 09/15/2023    TBILIRUBIN 0.4 07/13/2023    ALBUMIN 3.9 09/15/2023    ALBUMIN 3.2 07/13/2023    AGRATIO 1.4 07/12/2023    TSHULTRASEN 0.870 11/28/2022     CrCl = 51 ml/min     Other:  NA    Recent Imaging Studies:    None since last visit          ASSESSMENT AND PLAN  Patient Type, check all that apply:   Secondary Prevention  Established Atherosclerotic Cardiovascular Disease (ASCVD)  Yes, Details: STEMI with PCI (1 stent to LAD, 2 stents placed to RCA and PDA) in 7/2023   Other Established (non-atherosclerotic) Vascular Disease, if Present:    HTN, DM2  Evidence of Heterozygous Familial Hypercholesterolemia (FH): No  Of note patient is unsure of family hx as she was adopted and has no information on biological parents   ACC/AHA Indication for Statin Therapy, mariah all that apply:   Established ASCVD: Indication for High intensity statin    Calculated Risk for ASCVD, if applicable:  N/A  Other Significant Risk Markers, if any, mariah all that apply:  None  National Lipid Association (NLA) Goal (if applicable):  LDL-C:   <70 mg/dL  Lifestyle Recommendations From Today’s Visit:   Eating Plan: Concentrate on  Low simple carb and DASH/Med Style diet and Exercise: continue with cardiac rehab and continue walking and increase as tolerable   Statin Recommendations from Today's Visit  Continue with Rosuvastatin 20mg QD   Non-Statin Medications Recommendations from Today’s Visit:   None  Indication for PCSK9 Inhibitor, if applicable:  Not currently indicated  Supplements Recommended at this visit:  Continue with Vit D  Recommendations for Other Cardiovascular Risk Factors, mariah all that apply:   Hypertension- keep good control (managed by PCP/Cards and Diabetes/Impaired Fasting Glucose- continue with good control   Other Issues:  Patient is doing well on current regimen with Rostuvastatin 20mg QD and tolerating well with no complaints.  We  about 3 months (around 10/16/2018). Patient Instructions   Take combo of claritin (any antihistamine) and omeprazole (prilosec). If cough stops, stop the claritin first.  If still not coughing for another 10 days-2 weeks then stop the omeprazole. If cough returns after stopping either - restart. I have discussed the diagnosis with the patient and the intended plan as seen in the above orders. The patient has received an After-Visit Summary and questions were answered concerning future plans.      Medication Side Effects and Warnings were discussed with patient: yes      Sharon Schmitt NP-C  [employer] were watching her renal fxn as last labs showed her borderline for dose adjustment, but her recent labs show good recovery. Will CTM  Patient's LDL well within goal range of <70 and was at 23 mg/dL.   Patient's HDL which was running low is also is good range at 46 mg/dL  Patient will continue with the current regimen and will follow up again with repeat labs in 6 months.    Studies Ordered at Todays Visit:  None   Blood Work Ordered At Today’s visit:   CMP, lipid panel and Lp (A) (per Dr. Bloch request)  Follow-Up:   6 months    Sherice Tian  PharmD      CC:  Lillie Herrera M.D.  Michael Bloch, M.D.

## 2023-12-05 ENCOUNTER — NON-PROVIDER VISIT (OUTPATIENT)
Dept: CARDIOLOGY | Facility: MEDICAL CENTER | Age: 78
End: 2023-12-05
Payer: MEDICARE

## 2023-12-05 DIAGNOSIS — Z95.5 STENTED CORONARY ARTERY: ICD-10-CM

## 2023-12-05 PROCEDURE — G0422 INTENS CARDIAC REHAB W/EXERC: HCPCS | Performed by: INTERNAL MEDICINE

## 2023-12-05 PROCEDURE — G0423 INTENS CARDIAC REHAB NO EXER: HCPCS | Mod: 59 | Performed by: INTERNAL MEDICINE

## 2023-12-05 NOTE — PROGRESS NOTES
Ally JAMES Zackann attended Intensive Cardiac Rehab today from 0900 to 1100. During her time she exercised and attended class. Her education today was a video titled: Label Reading. Patient received handouts and class discussion pertaining to the topic.

## 2023-12-06 ENCOUNTER — HOSPITAL ENCOUNTER (OUTPATIENT)
Dept: LAB | Facility: MEDICAL CENTER | Age: 78
End: 2023-12-06
Attending: FAMILY MEDICINE
Payer: MEDICARE

## 2023-12-06 ENCOUNTER — NON-PROVIDER VISIT (OUTPATIENT)
Dept: CARDIOLOGY | Facility: MEDICAL CENTER | Age: 78
End: 2023-12-06
Payer: MEDICARE

## 2023-12-06 DIAGNOSIS — E11.65 UNCONTROLLED TYPE 2 DIABETES MELLITUS WITH HYPERGLYCEMIA (HCC): ICD-10-CM

## 2023-12-06 DIAGNOSIS — Z95.5 STENTED CORONARY ARTERY: ICD-10-CM

## 2023-12-06 LAB
EST. AVERAGE GLUCOSE BLD GHB EST-MCNC: 203 MG/DL
HBA1C MFR BLD: 8.7 % (ref 4–5.6)

## 2023-12-06 PROCEDURE — 36415 COLL VENOUS BLD VENIPUNCTURE: CPT

## 2023-12-06 PROCEDURE — G0423 INTENS CARDIAC REHAB NO EXER: HCPCS | Mod: 59 | Performed by: INTERNAL MEDICINE

## 2023-12-06 PROCEDURE — 83036 HEMOGLOBIN GLYCOSYLATED A1C: CPT

## 2023-12-06 PROCEDURE — G0422 INTENS CARDIAC REHAB W/EXERC: HCPCS | Performed by: INTERNAL MEDICINE

## 2023-12-06 NOTE — PROGRESS NOTES
Ally Doherty attended Intensive Cardiac Rehab today from 0900 to 1100. During her time she exercised and attended class. Her education today was cooking workshop titled: [employer] . Patient received handouts and class discussion pertaining to the topic.

## 2023-12-07 ENCOUNTER — NON-PROVIDER VISIT (OUTPATIENT)
Dept: CARDIOLOGY | Facility: MEDICAL CENTER | Age: 78
End: 2023-12-07
Payer: MEDICARE

## 2023-12-07 ENCOUNTER — TELEPHONE (OUTPATIENT)
Dept: CARDIOLOGY | Facility: MEDICAL CENTER | Age: 78
End: 2023-12-07

## 2023-12-07 ENCOUNTER — OFFICE VISIT (OUTPATIENT)
Dept: CARDIOLOGY | Facility: MEDICAL CENTER | Age: 78
End: 2023-12-07
Attending: NURSE PRACTITIONER
Payer: MEDICARE

## 2023-12-07 VITALS
RESPIRATION RATE: 16 BRPM | HEART RATE: 63 BPM | DIASTOLIC BLOOD PRESSURE: 62 MMHG | WEIGHT: 166 LBS | BODY MASS INDEX: 26.06 KG/M2 | OXYGEN SATURATION: 95 % | SYSTOLIC BLOOD PRESSURE: 104 MMHG | HEIGHT: 67 IN

## 2023-12-07 DIAGNOSIS — I25.5 ISCHEMIC CARDIOMYOPATHY: ICD-10-CM

## 2023-12-07 DIAGNOSIS — I73.9 PAD (PERIPHERAL ARTERY DISEASE) (HCC): ICD-10-CM

## 2023-12-07 DIAGNOSIS — E78.5 DYSLIPIDEMIA: ICD-10-CM

## 2023-12-07 DIAGNOSIS — I21.02 ST ELEVATION MYOCARDIAL INFARCTION INVOLVING LEFT ANTERIOR DESCENDING (LAD) CORONARY ARTERY (HCC): ICD-10-CM

## 2023-12-07 DIAGNOSIS — E11.9 TYPE 2 DIABETES MELLITUS WITHOUT COMPLICATION, WITHOUT LONG-TERM CURRENT USE OF INSULIN (HCC): ICD-10-CM

## 2023-12-07 DIAGNOSIS — I10 PRIMARY HYPERTENSION: ICD-10-CM

## 2023-12-07 DIAGNOSIS — Z95.5 STENTED CORONARY ARTERY: ICD-10-CM

## 2023-12-07 PROBLEM — E11.65 UNCONTROLLED TYPE 2 DIABETES MELLITUS WITH HYPERGLYCEMIA (HCC): Status: RESOLVED | Noted: 2023-06-08 | Resolved: 2023-12-07

## 2023-12-07 PROCEDURE — G0423 INTENS CARDIAC REHAB NO EXER: HCPCS | Mod: 59 | Performed by: INTERNAL MEDICINE

## 2023-12-07 PROCEDURE — 99212 OFFICE O/P EST SF 10 MIN: CPT | Performed by: NURSE PRACTITIONER

## 2023-12-07 PROCEDURE — 3078F DIAST BP <80 MM HG: CPT | Performed by: NURSE PRACTITIONER

## 2023-12-07 PROCEDURE — 99214 OFFICE O/P EST MOD 30 MIN: CPT | Performed by: NURSE PRACTITIONER

## 2023-12-07 PROCEDURE — 3074F SYST BP LT 130 MM HG: CPT | Performed by: NURSE PRACTITIONER

## 2023-12-07 PROCEDURE — G0422 INTENS CARDIAC REHAB W/EXERC: HCPCS | Performed by: INTERNAL MEDICINE

## 2023-12-07 ASSESSMENT — ENCOUNTER SYMPTOMS
FEVER: 0
PND: 0
ORTHOPNEA: 0
CLAUDICATION: 0
ABDOMINAL PAIN: 0
PALPITATIONS: 0
COUGH: 0
MYALGIAS: 0
SHORTNESS OF BREATH: 0

## 2023-12-07 ASSESSMENT — FIBROSIS 4 INDEX: FIB4 SCORE: 5.84

## 2023-12-07 NOTE — LETTER
PROCEDURE/SURGERY CLEARANCE FORM      Encounter Date: 12/7/2023    Patient: Ally Doherty  YOB: 1945    CARDIOLOGIST:  MALOU Roland    REFERRING DOCTOR:  No ref. provider found      The above patient is cleared to have the following procedure/surgery: EGD with Deep (Propofol) Sedation                                           Additional comments: Surgical clearance for EGD with deep sedation  -Patient is likely a moderate risk for intended procedure  -I do not recommend that patient pause aspirin/antiplatelet therapy until 6 months after stent placement, no sooner than January 12, 2024  -After January 12, 2024, patient would be okay to proceed, patient to continue aspirin/antiplatelet therapy as long as possible.  If deemed necessary for surgery, patient may hold ASA/antiplatelet therapy for 5 days prior to surgery, then restart ASAP once cleared after surgery.        MALOU Roland  Electronically Signed

## 2023-12-07 NOTE — TELEPHONE ENCOUNTER
Surgical clearance for EGD with deep sedation  -Patient is likely a moderate risk for intended procedure  -I do not recommend that patient pause aspirin/antiplatelet therapy until 6 months after stent placement, no sooner than January 12, 2024  -After January 12, 2024, patient would be okay to proceed, patient to continue aspirin/antiplatelet therapy as long as possible.  If deemed necessary for surgery, patient may hold ASA/antiplatelet therapy for 5 days prior to surgery, then restart ASAP once cleared after surgery.

## 2023-12-07 NOTE — PROGRESS NOTES
Ally JAMES Zackann attended Intensive Cardiac Rehab today from 0900 to 1100. During her time she exercised and attended class. Her education today was a workshop titled: Label Reading. Patient received handouts and class discussion pertaining to the topic.

## 2023-12-07 NOTE — PROGRESS NOTES
"Chief Complaint   Patient presents with    Hypertension    Dyslipidemia     Subjective     Ally Doherty is a 78 y.o. female who presents today for 4 month follow up.    She is a patient of Stacie TERRELL in our clinic    She was admitted on 7/10/2023 for STEMI, she was treated with staged PCI on 7/10/2023 she had 1 stent to LAD, on 7/12/2023 she had 2 stents placed to RCA and to PDA.  Echocardiogram showed reduced ejection fraction and she was started on DAPT with aspirin and Brilinta, spironolactone, rosuvastatin, carvedilol, losartan, and Farxiga.      She presents today for follow up, no symptoms of concern. She is in cardiac rehab and doing well with this.    She has no chest pain, shortness of breath, edema, dizziness/lightheadedness, or palpitations.    Past Medical History:   Diagnosis Date    Anemia     Breast cancer (HCC) 2/27/2012    Cancer (HCC) 2010    right breast    DM hyperosmolarity type II (HCC) 2/27/2012    oral meds    DM II (diabetes mellitus, type II), controlled (HCC) 6/21/2012    Hiatus hernia syndrome     High cholesterol 02/10/15    Pt denies    HTN (hypertension) 02/12/15    more \"preventative\" for DM-per her     Hyperlipidemia 2/27/2012    Hypothyroidism 2/27/2012    Liver cirrhosis (HCC)     couldn't take statins    Malignant neoplasm of upper-inner quadrant of right breast in female, estrogen receptor positive (HCC) 3/13/2018    Vitamin D deficiency 5/27/2014    ICD-10 transition     Past Surgical History:   Procedure Laterality Date    CATARACT EXTRACTION WITH IOL Right 08/08/2022    Dr. Monzon    CATARACT EXTRACTION WITH IOL Left 08/03/2022    Dr. Monzon    [place] UPPER GI ENDOSCOPY,DIAGNOSIS N/A 06/18/2022    Procedure: UPPER ENDOSCOPY;  Surgeon: Venkat Hitchcock M.D.;  Location: SURGERY [facility];  Service: Gastroenterology    MASTECTOMY Left 12/09/2019    Procedure: MASTECTOMY;  Surgeon: Edward Boo M.D.;  Location: SURGERY SAME DAY [facility];  Service: Plastics    BREAST " IMPLANT REMOVAL Right 12/09/2019    Procedure: REMOVAL, IMPLANT, BREAST;  Surgeon: Edward Boo M.D.;  Location: SURGERY SAME DAY [facility];  Service: Plastics    BREAST RECONSTRUCTION  02/12/2015    Performed by Edward Boo M.D. at SURGERY [facility] ORS    MAMMOPLASTY REDUCTION  02/12/2015    Performed by Edward Boo M.D. at SURGERY [facility] ORS    MOLE EXCISION  02/12/2015    Performed by Edward Boo M.D. at SURGERY [facility] ORS    CAPSULECTOMY  03/06/2014    Performed by Edward Boo M.D. at SURGERY [facility] ORS    BREAST RECONSTRUCTION  06/24/2013    Performed by Edward Boo M.D. at SURGERY [facility] ORS    TISSUE EXPANDER PLACE/REMOVE  06/24/2013    Performed by Edward Boo M.D. at SURGERY [facility] ORS    CAYETANO BY LAPAROSCOPY  2008    TUBAL LIGATION  1974    TONSILLECTOMY  1953    MASTECTOMY      right breast    CO CHEMOTHERAPY, UNSPECIFIED PROCEDURE      CO RADIATION THERAPY PLAN SIMPLE       Family History   Problem Relation Age of Onset    Other Other         adopted     Social History     Socioeconomic History    Marital status:      Spouse name: Not on file    Number of children: Not on file    Years of education: Not on file    Highest education level: Bachelor's degree (e.g., BA, AB, BS)   Occupational History    Not on file   Tobacco Use    Smoking status: Never    Smokeless tobacco: Never   Vaping Use    Vaping Use: Never used   Substance and Sexual Activity    Alcohol use: Yes     Alcohol/week: 0.0 oz     Comment: < 1 per week    Drug use: No    Sexual activity: Never   Other Topics Concern    Not on file   Social History Narrative    Not on file     Social Determinants of Health     Financial Resource Strain: Low Risk  (6/8/2023)    Overall Financial Resource Strain (CARDIA)     Difficulty of Paying Living Expenses: Not hard at all   Food Insecurity: No Food Insecurity (6/8/2023)    Hunger Vital Sign     Worried About Running Out of Food in the Last Year: Never  true     Ran Out of Food in the Last Year: Never true   Transportation Needs: No Transportation Needs (6/8/2023)    PRAPARE - Transportation     Lack of Transportation (Medical): No     Lack of Transportation (Non-Medical): No   Physical Activity: Insufficiently Active (6/8/2023)    Exercise Vital Sign     Days of Exercise per Week: 4 days     Minutes of Exercise per Session: 20 min   Stress: No Stress Concern Present (6/8/2023)    [employer] - Occupational Stress Questionnaire     Feeling of Stress : Only a little   Social Connections: Socially Integrated (6/8/2023)    Social Connection and Isolation Panel [NHANES]     Frequency of Communication with Friends and Family: Never     Frequency of Social Gatherings with Friends and Family: More than three times a week     Attends Christian Services: More than 4 times per year     Active Member of Clubs or Organizations: Yes     Attends Club or Organization Meetings: More than 4 times per year     Marital Status:    Intimate Partner Violence: Not At Risk (6/8/2023)    Humiliation, Afraid, Rape, and Kick questionnaire     Fear of Current or Ex-Partner: No     Emotionally Abused: No     Physically Abused: No     Sexually Abused: No   Housing Stability: Low Risk  (6/8/2023)    Housing Stability Vital Sign     Unable to Pay for Housing in the Last Year: No     Number of Places Lived in the Last Year: 1     Unstable Housing in the Last Year: No     Allergies   Allergen Reactions    Byetta Unspecified     Pancreatitis    Penicillins Rash     All over    Statins [Hmg-Coa-R Inhibitors] Unspecified     Elevated liver enzymes     Outpatient Encounter Medications as of 12/7/2023   Medication Sig Dispense Refill    rosuvastatin (CRESTOR) 20 MG Tab Take 1 Tablet by mouth every evening. 100 Tablet 3    Empagliflozin (JARDIANCE) 10 MG Tab tablet Take 1 Tablet by mouth every day. 100 Tablet 3    carvedilol (COREG) 6.25 MG Tab Take 1 Tablet by mouth 2  "times a day with meals. 200 Tablet 3    aspirin 81 MG EC tablet Take 1 Tablet by mouth every day. 100 Tablet 7    ticagrelor (BRILINTA) 90 MG Tab tablet Take 1 Tablet by mouth 2 times a day. 90 Tablet 8    levothyroxine (SYNTHROID) 100 MCG Tab Take 1 tablet by mouth every morning on an empty stomach. 100 Tablet 1    therapeutic multivitamin-minerals (THERAGRAN-M) Tab Take 1 Tab by mouth every day.      Calcium Carbonate-Vitamin D (CALTRATE 600+D PO) Take 1 Tablet by mouth every day.       No facility-administered encounter medications on file as of 12/7/2023.     Review of Systems   Constitutional:  Negative for fever and malaise/fatigue.   Respiratory:  Negative for cough and shortness of breath.    Cardiovascular:  Negative for chest pain, palpitations, orthopnea, claudication, leg swelling and PND.   Gastrointestinal:  Negative for abdominal pain.   Musculoskeletal:  Negative for myalgias.              Objective     /62 (BP Location: Left arm, Patient Position: Sitting, BP Cuff Size: Adult)   Pulse 63   Resp 16   Ht 1.702 m (5' 7\")   Wt 75.3 kg (166 lb)   LMP 02/27/1997   SpO2 95%   BMI 26.00 kg/m²     Physical Exam           Assessment & Plan     1. ST elevation myocardial infarction involving left anterior descending (LAD) coronary artery ([facility])        2. Ischemic cardiomyopathy        3. Stented coronary artery        4. Dyslipidemia        5. Primary hypertension        6. PAD (peripheral artery disease) ([facility])        7. Type 2 diabetes mellitus without complication, without long-term current use of insulin ([facility])        8. Uncontrolled type 2 diabetes mellitus with hyperglycemia ([facility])          Medical Decision Making: Today's Assessment/Status/Plan:      1. CAD with history of NSTEMI/STEMI  Status post staged PCI, stent x1 to LAD on 7/10/2023  Stent x1 to RCA, stent x1 to PDA on 7/12/2023; ischemic cardiomyopathy (resolved post PCI)  -doing well  -DAPT with aspirin and Brilinta for 1 year (STOP " DATE 7/2024)  -Carvedilol 6.25 mg twice daily, rosuvastatin  -EF 35 to 40%, now improved to 65%  -Losartan 100 mg daily  -Jardiance 10 mg daily  -Cardiac rehab in place    2. Hypertension  -cont coreg 6.25 mg BID  -BP goal <130/80     3. Hyperlipidemia  -LDL at goal, LDL goal <70  -cont rosuvastatin 20 mg QPM  -annual lipid panel ordered    Patient is to follow up with Macarena TERRELL in 1 year with labs.

## 2023-12-07 NOTE — TELEPHONE ENCOUNTER
"Last OV: 7.18.2023  Proposed Surgery: EGD with Deep (Propofol) Sedation  Surgery Date: TBD  Requesting Office Name: [facility]   Fax Number: [phone]  Preference of Location (default is surgery center unless specified by Cardiologist or MERRY)  Prior Clearance Addressed: No      Anticoags/Antiplatelets: Aspirin  Outstanding Cardiac Imaging : No  Stent, Cardiac Devices, or Catheterization: Yes  Within the last 6 months. Forward to provider to review.  Ablation, TAVR/Valve (including open heart), Cardioversion: No  Recent Cardiac Hospitalization: Yes  Date:  7.10.2023            When: Hospitalized in the last 6 months. Forward to provider to review.   History (cardiac history):   Past Medical History:   Diagnosis Date    Anemia     Breast cancer (HCC) 2/27/2012    Cancer (HCC) 2010    right breast    DM hyperosmolarity type II (HCC) 2/27/2012    oral meds    DM II (diabetes mellitus, type II), controlled (HCC) 6/21/2012    Hiatus hernia syndrome     High cholesterol 02/10/15    Pt denies    HTN (hypertension) 02/12/15    more \"preventative\" for DM-per her DR    Hyperlipidemia 2/27/2012    Hypothyroidism 2/27/2012    Liver cirrhosis (HCC)     couldn't take statins    Malignant neoplasm of upper-inner quadrant of right breast in female, estrogen receptor positive (HCC) 3/13/2018    Vitamin D deficiency 5/27/2014    ICD-10 transition             Surgical Clearance Letter Sent: NO. Provider to advise.   **Scan clearance request letter into [facility].**    "

## 2023-12-07 NOTE — PATIENT INSTRUCTIONS
STOP brilinta in July 2024.    OK to proceed with EGD after that unless needed sooner.    Labs annually to include chemistry panel and lipid panel, these can be done by your primary care provider.

## 2023-12-08 ENCOUNTER — OFFICE VISIT (OUTPATIENT)
Dept: MEDICAL GROUP | Facility: LAB | Age: 78
End: 2023-12-08
Payer: MEDICARE

## 2023-12-08 VITALS
HEIGHT: 67 IN | SYSTOLIC BLOOD PRESSURE: 110 MMHG | TEMPERATURE: 98 F | BODY MASS INDEX: 25.74 KG/M2 | RESPIRATION RATE: 12 BRPM | HEART RATE: 67 BPM | WEIGHT: 164 LBS | DIASTOLIC BLOOD PRESSURE: 54 MMHG | OXYGEN SATURATION: 96 %

## 2023-12-08 DIAGNOSIS — E11.22 TYPE 2 DIABETES MELLITUS WITH STAGE 3B CHRONIC KIDNEY DISEASE, WITHOUT LONG-TERM CURRENT USE OF INSULIN (HCC): ICD-10-CM

## 2023-12-08 DIAGNOSIS — I25.5 ISCHEMIC CARDIOMYOPATHY: ICD-10-CM

## 2023-12-08 DIAGNOSIS — N18.32 TYPE 2 DIABETES MELLITUS WITH STAGE 3B CHRONIC KIDNEY DISEASE, WITHOUT LONG-TERM CURRENT USE OF INSULIN (HCC): ICD-10-CM

## 2023-12-08 PROCEDURE — 99214 OFFICE O/P EST MOD 30 MIN: CPT | Performed by: FAMILY MEDICINE

## 2023-12-08 PROCEDURE — 3074F SYST BP LT 130 MM HG: CPT | Performed by: FAMILY MEDICINE

## 2023-12-08 PROCEDURE — 3078F DIAST BP <80 MM HG: CPT | Performed by: FAMILY MEDICINE

## 2023-12-08 ASSESSMENT — FIBROSIS 4 INDEX: FIB4 SCORE: 5.84

## 2023-12-08 NOTE — PROGRESS NOTES
Subjective:     Chief Complaint   Patient presents with    Follow-Up         HPI:   Ally presents today with follow up blood sugars. A1C increased from 8.3% to 8.7%.  At 1 point she was on a higher dose of Jardiance but she is not quite sure why this got changed.  There were a lot of changes going on last summer when she was in the hospital for her heart.  At 1 point she was on Farxiga and then this was changed back to Jardiance but only at the 10 mg dose.  She has also been on metformin as well as glipizide and glimepiride.  She really does not want to go back on metformin due to GI effects.  Cardiac rehab 3 time per week, then exercising on her own too. Walking and lifting some weights. January she will start yoga as well.     Saw hepatology in [place] and they want to monitor things for now with cirrhosis.       Current Outpatient Medications Ordered in Epic   Medication Sig Dispense Refill    rosuvastatin (CRESTOR) 20 MG Tab Take 1 Tablet by mouth every evening. 100 Tablet 3    Empagliflozin (JARDIANCE) 10 MG Tab tablet Take 1 Tablet by mouth every day. 100 Tablet 3    carvedilol (COREG) 6.25 MG Tab Take 1 Tablet by mouth 2 times a day with meals. 200 Tablet 3    aspirin 81 MG EC tablet Take 1 Tablet by mouth every day. 100 Tablet 7    ticagrelor (BRILINTA) 90 MG Tab tablet Take 1 Tablet by mouth 2 times a day. 90 Tablet 8    levothyroxine (SYNTHROID) 100 MCG Tab Take 1 tablet by mouth every morning on an empty stomach. 100 Tablet 1    therapeutic multivitamin-minerals (THERAGRAN-M) Tab Take 1 Tab by mouth every day.      Calcium Carbonate-Vitamin D (CALTRATE 600+D PO) Take 1 Tablet by mouth every day.       No current [facility]-ordered facility-administered medications on file.         ROS:  Gen: no fevers/chills, no changes in weight  Eyes: no changes in vision  ENT: no sore throat, no hearing loss, no bloody nose  Pulm: no sob, no cough  CV: no chest pain, no palpitations  GI: no nausea/vomiting, no diarrhea  :  "no dysuria  MSk: no myalgias  Skin: no rash  Neuro: no headaches, no numbness/tingling  Heme/Lymph: no easy bruising      Objective:     Exam:  /54 (BP Location: Left arm, Patient Position: Sitting, BP Cuff Size: Adult)   Pulse 67   Temp 36.7 °C (98 °F)   Resp 12   Ht 1.702 m (5' 7\")   Wt 74.4 kg (164 lb)   LMP 02/27/1997   SpO2 96%   BMI 25.69 kg/m²  Body mass index is 25.69 kg/m².    Gen: Alert and oriented, No apparent distress.  Neck: Neck is supple without lymphadenopathy.  Lungs: Normal effort, CTA bilaterally, no wheezes, rhonchi, or rales  CV: Regular rate and rhythm. No murmurs, rubs, or gallops.  Ext: No clubbing, cyanosis, edema.      Assessment & Plan:     78 y.o. female with the following -     1. Ischemic cardiomyopathy  She continues to follow with cardiology.  She is doing cardiac rehab as well.    2. Type 2 diabetes mellitus with stage 3b chronic kidney disease, without long-term current use of insulin (HCC)  Discussed the importance of maintaining good blood sugar with heart disease but also her liver function as well.  Will go back up on the Jardiance to 25 mg from 10.  We did discuss also possibly adding an oral Rybelsus but there is a risk for pancreatitis which she has had in the past with a GLP-1.  She will get her A1c rechecked in 3 months and then we will make more changes after that.  She is definitely encouraged to continue to be as active as possible.  - Empagliflozin 25 MG Tab; Take 1 tablet (25 mg) by mouth every day.  Dispense: 90 Tablet; Refill: 1  - HEMOGLOBIN A1C; Future            No follow-ups on file.    Please note that this dictation was created using voice recognition software. I have made every reasonable attempt to correct obvious errors, but I expect that there are errors of grammar and possibly content that I did not discover before finalizing the note.        "

## 2023-12-11 ENCOUNTER — TELEPHONE (OUTPATIENT)
Dept: MEDICAL GROUP | Facility: LAB | Age: 78
End: 2023-12-11
Payer: MEDICARE

## 2023-12-11 NOTE — TELEPHONE ENCOUNTER
SC    Caller: [facility]     Topic/issue: GI called in regards to no receiving the clearance and would like it to be resent please advise.    Fax: [phone]     Callback Number: [phone]     Thank you,     Chad ARDON

## 2023-12-12 ENCOUNTER — NON-PROVIDER VISIT (OUTPATIENT)
Dept: CARDIOLOGY | Facility: MEDICAL CENTER | Age: 78
End: 2023-12-12
Payer: MEDICARE

## 2023-12-12 DIAGNOSIS — Z95.5 STENTED CORONARY ARTERY: ICD-10-CM

## 2023-12-12 PROCEDURE — G0422 INTENS CARDIAC REHAB W/EXERC: HCPCS | Performed by: INTERNAL MEDICINE

## 2023-12-12 PROCEDURE — G0423 INTENS CARDIAC REHAB NO EXER: HCPCS | Mod: 59 | Performed by: INTERNAL MEDICINE

## 2023-12-12 NOTE — PROGRESS NOTES
Ally JAMES Zackann attended Intensive Cardiac Rehab today from 0900 to 1100. During her time she exercised and attended class. Her education today was a WORKSHOP titled: SLEEP.  Patient received handouts and class discussion pertaining to the topic.

## 2023-12-13 ENCOUNTER — TELEPHONE (OUTPATIENT)
Dept: CARDIOLOGY | Facility: MEDICAL CENTER | Age: 78
End: 2023-12-13

## 2023-12-13 ENCOUNTER — NON-PROVIDER VISIT (OUTPATIENT)
Dept: CARDIOLOGY | Facility: MEDICAL CENTER | Age: 78
End: 2023-12-13
Payer: MEDICARE

## 2023-12-13 DIAGNOSIS — Z95.5 STENTED CORONARY ARTERY: ICD-10-CM

## 2023-12-13 PROCEDURE — G0423 INTENS CARDIAC REHAB NO EXER: HCPCS | Mod: 59 | Performed by: INTERNAL MEDICINE

## 2023-12-13 PROCEDURE — G0422 INTENS CARDIAC REHAB W/EXERC: HCPCS | Performed by: INTERNAL MEDICINE

## 2023-12-13 NOTE — LETTER
"PROCEDURE/SURGERY CLEARANCE FORM      Encounter Date: 12/13/2023    Patient: Ally Doherty  YOB: 1945    CARDIOLOGIST:  TRACEY Cabrera   REFERRING DOCTOR:  [facility]      The above patient is not cleared to have the following procedure/surgery:  Colonoscopy                                           Additional comments: \" We would prefer to wait for non-urgent colonoscopy until July 2024. Thank you!\"              TRACEY Sandoval  [facility]    Electronically Signed    "

## 2023-12-13 NOTE — PROGRESS NOTES
Ally Doherty attended Intensive Cardiac Rehab today from 0900 to 1100. During her time she exercised and attended class. Her education today was a cooking school titled: Fast Evening Meals. Patient received handouts and class discussion pertaining to the topic.

## 2023-12-14 ENCOUNTER — NON-PROVIDER VISIT (OUTPATIENT)
Dept: CARDIOLOGY | Facility: MEDICAL CENTER | Age: 78
End: 2023-12-14
Payer: MEDICARE

## 2023-12-14 DIAGNOSIS — Z95.5 STENTED CORONARY ARTERY: ICD-10-CM

## 2023-12-14 PROCEDURE — G0423 INTENS CARDIAC REHAB NO EXER: HCPCS | Mod: 59 | Performed by: INTERNAL MEDICINE

## 2023-12-14 PROCEDURE — G0422 INTENS CARDIAC REHAB W/EXERC: HCPCS | Performed by: INTERNAL MEDICINE

## 2023-12-14 NOTE — NON-PROVIDER
Ally JAMES Zackann attended Intensive Cardiac Rehab today from 0900 to 1100. During her time she exercised and attended class. Her education today was a video titled: Yoga. Patient received handouts and class discussion pertaining to the topic.

## 2023-12-15 PROCEDURE — RXMED WILLOW AMBULATORY MEDICATION CHARGE: Performed by: INTERNAL MEDICINE

## 2023-12-18 ENCOUNTER — PATIENT OUTREACH (OUTPATIENT)
Dept: HEALTH INFORMATION MANAGEMENT | Facility: OTHER | Age: 78
End: 2023-12-18
Payer: MEDICARE

## 2023-12-18 ENCOUNTER — PHARMACY VISIT (OUTPATIENT)
Dept: PHARMACY | Facility: MEDICAL CENTER | Age: 78
End: 2023-12-18
Payer: COMMERCIAL

## 2023-12-18 DIAGNOSIS — I10 PRIMARY HYPERTENSION: ICD-10-CM

## 2023-12-18 DIAGNOSIS — E78.5 DYSLIPIDEMIA: ICD-10-CM

## 2023-12-18 DIAGNOSIS — E11.9 TYPE 2 DIABETES MELLITUS WITHOUT COMPLICATION, WITHOUT LONG-TERM CURRENT USE OF INSULIN (HCC): ICD-10-CM

## 2023-12-18 NOTE — PROGRESS NOTES
Attempted to call pt for monthly outreach for personal care management program. Left VM requesting call back.  (Pt increased jardiance dose recently due to elevated A1c)

## 2023-12-19 ENCOUNTER — NON-PROVIDER VISIT (OUTPATIENT)
Dept: CARDIOLOGY | Facility: MEDICAL CENTER | Age: 78
End: 2023-12-19
Payer: MEDICARE

## 2023-12-19 DIAGNOSIS — Z95.5 STENTED CORONARY ARTERY: ICD-10-CM

## 2023-12-19 PROCEDURE — G0423 INTENS CARDIAC REHAB NO EXER: HCPCS | Mod: 59 | Performed by: INTERNAL MEDICINE

## 2023-12-19 PROCEDURE — G0422 INTENS CARDIAC REHAB W/EXERC: HCPCS | Performed by: INTERNAL MEDICINE

## 2023-12-19 NOTE — PROGRESS NOTES
Ally Doherty attended Intensive Cardiac Rehab today from 0900 to 1100. During her time she exercised and attended class. Her education today was a VIDEO titled: DINING OUT. Patient received handouts and class discussion pertaining to the topic.

## 2023-12-20 ENCOUNTER — APPOINTMENT (OUTPATIENT)
Dept: CARDIOLOGY | Facility: MEDICAL CENTER | Age: 78
End: 2023-12-20
Payer: MEDICARE

## 2023-12-21 ENCOUNTER — NON-PROVIDER VISIT (OUTPATIENT)
Dept: CARDIOLOGY | Facility: MEDICAL CENTER | Age: 78
End: 2023-12-21
Payer: MEDICARE

## 2023-12-21 DIAGNOSIS — Z95.5 STENTED CORONARY ARTERY: ICD-10-CM

## 2023-12-21 PROCEDURE — G0422 INTENS CARDIAC REHAB W/EXERC: HCPCS | Performed by: INTERNAL MEDICINE

## 2023-12-21 PROCEDURE — G0423 INTENS CARDIAC REHAB NO EXER: HCPCS | Mod: 59 | Performed by: INTERNAL MEDICINE

## 2023-12-21 NOTE — PROGRESS NOTES
Alyl Doherty attended Intensive Cardiac Rehab today from 0900 to 1100. During her time she exercised and attended class. Her education today was a video titled: Healthy Minds, Bodies and Hearts. Patient received handouts and class discussion pertaining to the topic.

## 2024-01-14 DIAGNOSIS — E03.9 HYPOTHYROIDISM, UNSPECIFIED TYPE: ICD-10-CM

## 2024-01-16 PROCEDURE — RXMED WILLOW AMBULATORY MEDICATION CHARGE: Performed by: FAMILY MEDICINE

## 2024-01-16 RX ORDER — LEVOTHYROXINE SODIUM 0.1 MG/1
100 TABLET ORAL
Qty: 100 TABLET | Refills: 1 | Status: SHIPPED | OUTPATIENT
Start: 2024-01-16

## 2024-01-16 NOTE — TELEPHONE ENCOUNTER
Received request via: Pharmacy    Was the patient seen in the last year in this department? Yes 12/8/2023    Does the patient have an active prescription (recently filled or refills available) for medication(s) requested? No    Does the patient have custodial Plus and need 100 day supply (blood pressure, diabetes and cholesterol meds only)? Medication is not for cholesterol, blood pressure or diabetes

## 2024-01-17 ENCOUNTER — PHARMACY VISIT (OUTPATIENT)
Dept: PHARMACY | Facility: MEDICAL CENTER | Age: 79
End: 2024-01-17
Payer: COMMERCIAL

## 2024-01-17 PROCEDURE — RXMED WILLOW AMBULATORY MEDICATION CHARGE: Performed by: FAMILY MEDICINE

## 2024-01-26 ENCOUNTER — PATIENT OUTREACH (OUTPATIENT)
Dept: HEALTH INFORMATION MANAGEMENT | Facility: OTHER | Age: 79
End: 2024-01-26
Payer: MEDICARE

## 2024-01-26 DIAGNOSIS — E11.9 TYPE 2 DIABETES MELLITUS WITHOUT COMPLICATION, WITHOUT LONG-TERM CURRENT USE OF INSULIN (HCC): ICD-10-CM

## 2024-01-26 DIAGNOSIS — E78.5 DYSLIPIDEMIA: ICD-10-CM

## 2024-01-26 DIAGNOSIS — I10 PRIMARY HYPERTENSION: ICD-10-CM

## 2024-01-26 PROCEDURE — 99999 PR NO CHARGE: CPT | Performed by: FAMILY MEDICINE

## 2024-01-26 NOTE — PROGRESS NOTES
Assessment    Spoke to patient for monthly outreach. Pt reports she is doing really well. She is keeping up with exercise and health eating after completing cardiac rehab. She is going to Yoga classes and participating on Tuesdays and Thursday treadmill and bike exercises at the cardiac rehab facility. She reports her weight is stable. She is checking bp at home. Example readings: 117-119/? (Diastolic is under 80, but unsure of example readings). She is taking all medications as prescribed. No needs or concerns at this time.     Education    Mediation management, HTN, activity    Plan of Care and Goals    Continue plan of care    Barriers:    none    Progress:    progressing    Next outreach:  1 month

## 2024-02-20 ENCOUNTER — PHARMACY VISIT (OUTPATIENT)
Dept: PHARMACY | Facility: MEDICAL CENTER | Age: 79
End: 2024-02-20
Payer: COMMERCIAL

## 2024-02-20 PROCEDURE — RXMED WILLOW AMBULATORY MEDICATION CHARGE: Performed by: INTERNAL MEDICINE

## 2024-02-26 ENCOUNTER — PATIENT OUTREACH (OUTPATIENT)
Dept: HEALTH INFORMATION MANAGEMENT | Facility: OTHER | Age: 79
End: 2024-02-26
Payer: MEDICARE

## 2024-02-26 DIAGNOSIS — I10 PRIMARY HYPERTENSION: ICD-10-CM

## 2024-02-26 DIAGNOSIS — E78.5 DYSLIPIDEMIA: ICD-10-CM

## 2024-02-26 DIAGNOSIS — E11.9 TYPE 2 DIABETES MELLITUS WITHOUT COMPLICATION, WITHOUT LONG-TERM CURRENT USE OF INSULIN (HCC): ICD-10-CM

## 2024-02-27 ENCOUNTER — PHARMACY VISIT (OUTPATIENT)
Dept: PHARMACY | Facility: MEDICAL CENTER | Age: 79
End: 2024-02-27
Payer: COMMERCIAL

## 2024-02-27 PROBLEM — I70.0 AORTIC ATHEROSCLEROSIS (HCC): Status: ACTIVE | Noted: 2024-02-27

## 2024-02-27 PROBLEM — N18.31 STAGE 3A CHRONIC KIDNEY DISEASE: Status: ACTIVE | Noted: 2024-02-27

## 2024-02-27 PROBLEM — K74.69 OTHER CIRRHOSIS OF LIVER (HCC): Status: ACTIVE | Noted: 2024-02-27

## 2024-02-27 PROBLEM — I85.10 ESOPHAGEAL VARICES IN CIRRHOSIS (HCC): Status: ACTIVE | Noted: 2019-07-09

## 2024-02-27 PROBLEM — I73.9 PAD (PERIPHERAL ARTERY DISEASE) (HCC): Status: RESOLVED | Noted: 2023-06-26 | Resolved: 2024-02-27

## 2024-02-27 PROBLEM — F32.5 MAJOR DEPRESSIVE DISORDER WITH SINGLE EPISODE, IN FULL REMISSION (HCC): Status: ACTIVE | Noted: 2023-07-18

## 2024-02-27 PROBLEM — I25.10 CORONARY ARTERY DISEASE INVOLVING NATIVE CORONARY ARTERY OF NATIVE HEART WITHOUT ANGINA PECTORIS: Status: ACTIVE | Noted: 2024-02-27

## 2024-02-27 PROCEDURE — RXMED WILLOW AMBULATORY MEDICATION CHARGE

## 2024-02-29 ENCOUNTER — HOSPITAL ENCOUNTER (OUTPATIENT)
Facility: MEDICAL CENTER | Age: 79
End: 2024-02-29
Attending: FAMILY MEDICINE
Payer: MEDICARE

## 2024-02-29 ENCOUNTER — HOSPITAL ENCOUNTER (OUTPATIENT)
Dept: LAB | Facility: MEDICAL CENTER | Age: 79
End: 2024-02-29
Attending: FAMILY MEDICINE
Payer: MEDICARE

## 2024-02-29 DIAGNOSIS — N18.32 TYPE 2 DIABETES MELLITUS WITH STAGE 3B CHRONIC KIDNEY DISEASE, WITHOUT LONG-TERM CURRENT USE OF INSULIN (HCC): ICD-10-CM

## 2024-02-29 DIAGNOSIS — E78.5 DYSLIPIDEMIA: ICD-10-CM

## 2024-02-29 DIAGNOSIS — E11.22 TYPE 2 DIABETES MELLITUS WITH STAGE 3B CHRONIC KIDNEY DISEASE, WITHOUT LONG-TERM CURRENT USE OF INSULIN (HCC): ICD-10-CM

## 2024-02-29 DIAGNOSIS — Z95.5 STENTED CORONARY ARTERY: ICD-10-CM

## 2024-02-29 LAB
ALBUMIN SERPL BCP-MCNC: 3.8 G/DL (ref 3.2–4.9)
ALBUMIN/GLOB SERPL: 1.4 G/DL
ALP SERPL-CCNC: 110 U/L (ref 30–99)
ALT SERPL-CCNC: 28 U/L (ref 2–50)
ANION GAP SERPL CALC-SCNC: 14 MMOL/L (ref 7–16)
AST SERPL-CCNC: 39 U/L (ref 12–45)
BILIRUB SERPL-MCNC: 0.6 MG/DL (ref 0.1–1.5)
BUN SERPL-MCNC: 36 MG/DL (ref 8–22)
CALCIUM ALBUM COR SERPL-MCNC: 9.1 MG/DL (ref 8.5–10.5)
CALCIUM SERPL-MCNC: 8.9 MG/DL (ref 8.5–10.5)
CHLORIDE SERPL-SCNC: 110 MMOL/L (ref 96–112)
CHOLEST SERPL-MCNC: 92 MG/DL (ref 100–199)
CO2 SERPL-SCNC: 16 MMOL/L (ref 20–33)
CREAT SERPL-MCNC: 0.94 MG/DL (ref 0.5–1.4)
EST. AVERAGE GLUCOSE BLD GHB EST-MCNC: 209 MG/DL
GFR SERPLBLD CREATININE-BSD FMLA CKD-EPI: 62 ML/MIN/1.73 M 2
GLOBULIN SER CALC-MCNC: 2.8 G/DL (ref 1.9–3.5)
GLUCOSE SERPL-MCNC: 177 MG/DL (ref 65–99)
HBA1C MFR BLD: 8.9 % (ref 4–5.6)
HDLC SERPL-MCNC: 54 MG/DL
LDLC SERPL CALC-MCNC: 20 MG/DL
POTASSIUM SERPL-SCNC: 4.3 MMOL/L (ref 3.6–5.5)
PROT SERPL-MCNC: 6.6 G/DL (ref 6–8.2)
SODIUM SERPL-SCNC: 140 MMOL/L (ref 135–145)
TRIGL SERPL-MCNC: 89 MG/DL (ref 0–149)

## 2024-02-29 PROCEDURE — 80053 COMPREHEN METABOLIC PANEL: CPT

## 2024-02-29 PROCEDURE — 83695 ASSAY OF LIPOPROTEIN(A): CPT

## 2024-02-29 PROCEDURE — 83036 HEMOGLOBIN GLYCOSYLATED A1C: CPT

## 2024-02-29 PROCEDURE — 80061 LIPID PANEL: CPT

## 2024-02-29 PROCEDURE — 36415 COLL VENOUS BLD VENIPUNCTURE: CPT

## 2024-03-02 LAB — LPA SERPL-MCNC: 55 MG/DL

## 2024-03-07 ENCOUNTER — PATIENT OUTREACH (OUTPATIENT)
Dept: MEDICAL GROUP | Facility: LAB | Age: 79
End: 2024-03-07

## 2024-03-07 ENCOUNTER — OFFICE VISIT (OUTPATIENT)
Dept: MEDICAL GROUP | Facility: LAB | Age: 79
End: 2024-03-07
Payer: MEDICARE

## 2024-03-07 VITALS
BODY MASS INDEX: 25.27 KG/M2 | WEIGHT: 161 LBS | HEIGHT: 67 IN | TEMPERATURE: 97.5 F | DIASTOLIC BLOOD PRESSURE: 56 MMHG | SYSTOLIC BLOOD PRESSURE: 110 MMHG | RESPIRATION RATE: 12 BRPM | HEART RATE: 68 BPM | OXYGEN SATURATION: 97 %

## 2024-03-07 DIAGNOSIS — E11.65 TYPE 2 DIABETES MELLITUS WITH HYPERGLYCEMIA, WITHOUT LONG-TERM CURRENT USE OF INSULIN (HCC): ICD-10-CM

## 2024-03-07 DIAGNOSIS — Z01.818 PREOP EXAMINATION: ICD-10-CM

## 2024-03-07 DIAGNOSIS — I10 PRIMARY HYPERTENSION: ICD-10-CM

## 2024-03-07 DIAGNOSIS — K74.69 OTHER CIRRHOSIS OF LIVER (HCC): ICD-10-CM

## 2024-03-07 DIAGNOSIS — I21.02 ST ELEVATION MYOCARDIAL INFARCTION INVOLVING LEFT ANTERIOR DESCENDING (LAD) CORONARY ARTERY (HCC): ICD-10-CM

## 2024-03-07 DIAGNOSIS — Z23 NEED FOR VACCINATION: ICD-10-CM

## 2024-03-07 PROBLEM — D61.818 PANCYTOPENIA (HCC): Status: RESOLVED | Noted: 2022-04-11 | Resolved: 2024-03-07

## 2024-03-07 PROCEDURE — 3074F SYST BP LT 130 MM HG: CPT | Performed by: FAMILY MEDICINE

## 2024-03-07 PROCEDURE — 3078F DIAST BP <80 MM HG: CPT | Performed by: FAMILY MEDICINE

## 2024-03-07 PROCEDURE — 99214 OFFICE O/P EST MOD 30 MIN: CPT | Performed by: FAMILY MEDICINE

## 2024-03-07 RX ORDER — ORAL SEMAGLUTIDE 7 MG/1
7 TABLET ORAL DAILY
Qty: 90 TABLET | Refills: 1 | Status: SHIPPED | OUTPATIENT
Start: 2024-03-07

## 2024-03-07 RX ORDER — DIAZEPAM 5 MG/1
5 TABLET ORAL
Qty: 1 TABLET | Refills: 0 | Status: SHIPPED | OUTPATIENT
Start: 2024-03-07 | End: 2024-03-16

## 2024-03-07 ASSESSMENT — FIBROSIS 4 INDEX: FIB4 SCORE: 5.91

## 2024-03-07 NOTE — PROGRESS NOTES
Assessment    Met with pt at pcp visit for monthly outreach. Pt is doing well and feeling good. She is disappointed with lab results. She is exercising 4 days a week. Checking bp daily and reports it has been in goal range. Pt reports compliance with heart healthy/diabetic diet. Pt would prefer not to add medications, but discussed need to control diabetes. Taking all medications as prescribed. Has follow up imaging on her liver at the end of this month. No needs at this time.    Education    DM, bp management,medication management    Plan of Care and Goals    Continue plan of care    Barriers:    Intolerance of some diabetic medications in the past    Progress:    A1c went up, but pt has made good progress at lifestyle changes    Next outreach:  1 month

## 2024-03-07 NOTE — PROGRESS NOTES
Subjective:     Chief Complaint   Patient presents with    Follow-Up         HPI:   Ally presents today with 3 month follow up. Has had her A1C increase of late and is poorly controlled at baseline.     Byetta caused some pancreatitis. Metformin caused a lot of GI upset and illness.     Seeing [facility] for liver cirrhosis.   She has been going to the gym 4 times per week.  She was doing cardiac rehab as well.  She reports cutting back sugars and most carbs.  She has been a struggle for her she is does not really like healthy type foods.  She just cannot get her sugars to reduce.  She does have an MRI of her liver upcoming ordered by [facility].  Current Outpatient Medications Ordered in Epic   Medication Sig Dispense Refill    levothyroxine (SYNTHROID) 100 MCG Tab Take 1 tablet by mouth every morning on an empty stomach. 100 Tablet 1    Empagliflozin 25 MG Tab Take 1 tablet (25 mg) by mouth every day. 90 Tablet 1    rosuvastatin (CRESTOR) 20 MG Tab Take 1 Tablet by mouth every evening. 100 Tablet 3    carvedilol (COREG) 6.25 MG Tab Take 1 Tablet by mouth 2 times a day with meals. 200 Tablet 3    aspirin 81 MG EC tablet Take 1 Tablet by mouth every day. 100 Tablet 7    ticagrelor (BRILINTA) 90 MG Tab tablet Take 1 Tablet by mouth 2 times a day. 90 Tablet 8    therapeutic multivitamin-minerals (THERAGRAN-M) Tab Take 1 Tab by mouth every day.      Calcium Carbonate-Vitamin D (CALTRATE 600+D PO) Take 1 Tablet by mouth every day.       No current [facility]-ordered facility-administered medications on file.         ROS:  Gen: no fevers/chills, no changes in weight  Eyes: no changes in vision  ENT: no sore throat, no hearing loss, no bloody nose  Pulm: no sob, no cough  CV: no chest pain, no palpitations  GI: no nausea/vomiting, no diarrhea  : no dysuria  MSk: no myalgias  Skin: no rash  Neuro: no headaches, no numbness/tingling  Heme/Lymph: no easy bruising      Objective:     Exam:  /56 (BP  "Location: Left arm, Patient Position: Sitting, BP Cuff Size: Adult)   Pulse 68   Temp 36.4 °C (97.5 °F)   Resp 12   Ht 1.702 m (5' 7\")   Wt 73 kg (161 lb)   LMP 02/27/1997   SpO2 97%   BMI 25.22 kg/m²  Body mass index is 25.22 kg/m².    Gen: Alert and oriented, No apparent distress.  Neck: Neck is supple without lymphadenopathy.  Lungs: Normal effort, CTA bilaterally, no wheezes, rhonchi, or rales  CV: Regular rate and rhythm. No murmurs, rubs, or gallops.  Ext: No clubbing, cyanosis, edema.  No numbness or tingling.  Skin is intact.      Assessment & Plan:     79 y.o. female with the following -     1. Type 2 diabetes mellitus with hyperglycemia, without long-term current use of insulin (HCC)  Discussed needing to add something to get her sugars better controlled.  She is only on Jardiance at this time.  Will see if we get her oral Rybelsus.  She is very skittish of injectable medications due to history of pancreatitis with Byetta.  Very low risk for this with Rybelsus but it still exists.  We discussed insurance coverage and likely a prior Auth needed.  If this is not covered we may need to get her on something else.  She does think that she did well with in the past with long-acting glimepiride.  - Semaglutide (RYBELSUS) 7 MG Tab; Take 7 mg by mouth every day.  Dispense: 90 Tablet; Refill: 1    2. Other cirrhosis of liver (HCC)  Following with [facility].  Upcoming imaging.    3. ST elevation myocardial infarction involving left anterior descending (LAD) coronary artery (HCC)  Sees cardiology regularly.  Compliant on medications.    4. Preop examination  4 pre-MRI.  We did discuss that she have a  when she takes this.  - diazePAM (VALIUM) 5 MG Tab; Take 1 Tablet by mouth before surgery for 1 dose.  Dispense: 1 Tablet; Refill: 0    5. Need for vaccination  She does need to have a vaccine recommended by liver specialist.  This is likely not covered by her insurance in clinic so she is given a " prescription to take to the pharmacy.            No follow-ups on file.    Please note that this dictation was created using voice recognition software. I have made every reasonable attempt to correct obvious errors, but I expect that there are errors of grammar and possibly content that I did not discover before finalizing the note.

## 2024-03-14 ENCOUNTER — TELEPHONE (OUTPATIENT)
Dept: MEDICAL GROUP | Facility: LAB | Age: 79
End: 2024-03-14
Payer: MEDICARE

## 2024-03-14 NOTE — TELEPHONE ENCOUNTER
Pt calling in with concerns about rybelsus prescription. Pt was quoted $343 for 3 month supply due to falling into the donut hole. Pharmacy notified pt she could get medication for $47 for one month supply. Pt is willing to give medication a try. She has tried medication in this same class in the past but had some side effects. If pt tolerates medication we can look into financial assistance options. Will check in with pt in 3 weeks to see how she is tolerating medication.

## 2024-03-15 ENCOUNTER — PHARMACY VISIT (OUTPATIENT)
Dept: PHARMACY | Facility: MEDICAL CENTER | Age: 79
End: 2024-03-15
Payer: COMMERCIAL

## 2024-03-15 PROCEDURE — RXMED WILLOW AMBULATORY MEDICATION CHARGE: Performed by: FAMILY MEDICINE

## 2024-03-24 PROCEDURE — RXMED WILLOW AMBULATORY MEDICATION CHARGE: Performed by: INTERNAL MEDICINE

## 2024-03-25 ENCOUNTER — PHARMACY VISIT (OUTPATIENT)
Dept: PHARMACY | Facility: MEDICAL CENTER | Age: 79
End: 2024-03-25
Payer: COMMERCIAL

## 2024-03-26 ENCOUNTER — HOSPITAL ENCOUNTER (OUTPATIENT)
Dept: RADIOLOGY | Facility: MEDICAL CENTER | Age: 79
End: 2024-03-26
Attending: INTERNAL MEDICINE
Payer: MEDICARE

## 2024-03-26 DIAGNOSIS — K74.60 LIVER CIRRHOSIS SECONDARY TO NASH (HCC): ICD-10-CM

## 2024-03-26 DIAGNOSIS — K75.81 LIVER CIRRHOSIS SECONDARY TO NASH (HCC): ICD-10-CM

## 2024-03-26 PROCEDURE — 74183 MRI ABD W/O CNTR FLWD CNTR: CPT

## 2024-03-26 PROCEDURE — 700117 HCHG RX CONTRAST REV CODE 255: Performed by: INTERNAL MEDICINE

## 2024-03-26 PROCEDURE — A9579 GAD-BASE MR CONTRAST NOS,1ML: HCPCS | Performed by: INTERNAL MEDICINE

## 2024-03-26 RX ADMIN — GADOTERIDOL 15 ML: 279.3 INJECTION, SOLUTION INTRAVENOUS at 09:55

## 2024-04-05 ENCOUNTER — PATIENT OUTREACH (OUTPATIENT)
Dept: HEALTH INFORMATION MANAGEMENT | Facility: OTHER | Age: 79
End: 2024-04-05
Payer: MEDICARE

## 2024-04-05 DIAGNOSIS — I10 PRIMARY HYPERTENSION: ICD-10-CM

## 2024-04-05 DIAGNOSIS — E11.9 TYPE 2 DIABETES MELLITUS WITHOUT COMPLICATION, WITHOUT LONG-TERM CURRENT USE OF INSULIN (HCC): ICD-10-CM

## 2024-04-05 DIAGNOSIS — E78.5 DYSLIPIDEMIA: ICD-10-CM

## 2024-04-10 NOTE — PROGRESS NOTES
Assessment    Spoke with pt for monthly outreach. Pt reports she is doing pretty well. She was traveling and did not try ryblesus yet. She will start it and let me know if she has any side effects. She does not check blood sugar. She has in the past, but discontinued b/c her diabetes was under control. Discussed starting to check again and pt would like to first see if rybelsus works well for her before considering this. She is keeping up with activity. Chair yoga 2-3 times a week. Saw imaging results through [facility], but has not gotten feed back from ordering provider since she was on vacation. Follow up with gi [facility] in July. [facility] in September probably. Bp at home 118-120/60. No needs or concerns at this time.     Education    DM, medication management, bp management    Plan of Care and Goals    Begin rybelsus    Barriers:    Hx of medication side effects    Progress:    progressing    Next outreach:  1 month

## 2024-04-14 PROCEDURE — RXMED WILLOW AMBULATORY MEDICATION CHARGE: Performed by: FAMILY MEDICINE

## 2024-04-15 ENCOUNTER — PHARMACY VISIT (OUTPATIENT)
Dept: PHARMACY | Facility: MEDICAL CENTER | Age: 79
End: 2024-04-15
Payer: COMMERCIAL

## 2024-04-28 PROCEDURE — RXMED WILLOW AMBULATORY MEDICATION CHARGE: Performed by: FAMILY MEDICINE

## 2024-05-02 ENCOUNTER — TELEPHONE (OUTPATIENT)
Dept: MEDICAL GROUP | Facility: LAB | Age: 79
End: 2024-05-02
Payer: MEDICARE

## 2024-05-02 NOTE — TELEPHONE ENCOUNTER
Caller Name: Ally   Call Back Number: [phone]    How would the patient prefer to be contacted with a response: Phone call OK to leave a detailed message    Patient lvm she is having issues with one of her medications. Having right side pain. She is also requesting for test.     I tried calling the patient back to get more information. Like what medication and what kind of test is she requesting.

## 2024-05-09 ENCOUNTER — TELEPHONE (OUTPATIENT)
Dept: HEALTH INFORMATION MANAGEMENT | Facility: OTHER | Age: 79
End: 2024-05-09
Payer: MEDICARE

## 2024-05-09 ENCOUNTER — OFFICE VISIT (OUTPATIENT)
Dept: URGENT CARE | Facility: CLINIC | Age: 79
End: 2024-05-09
Payer: MEDICARE

## 2024-05-09 VITALS
HEIGHT: 67 IN | OXYGEN SATURATION: 96 % | BODY MASS INDEX: 26.12 KG/M2 | DIASTOLIC BLOOD PRESSURE: 78 MMHG | HEART RATE: 99 BPM | SYSTOLIC BLOOD PRESSURE: 126 MMHG | TEMPERATURE: 96.9 F | RESPIRATION RATE: 16 BRPM | WEIGHT: 166.4 LBS

## 2024-05-09 DIAGNOSIS — R10.13 EPIGASTRIC PAIN: ICD-10-CM

## 2024-05-09 DIAGNOSIS — G25.81 RESTLESS LEG: ICD-10-CM

## 2024-05-09 PROCEDURE — 3074F SYST BP LT 130 MM HG: CPT | Performed by: NURSE PRACTITIONER

## 2024-05-09 PROCEDURE — 3078F DIAST BP <80 MM HG: CPT | Performed by: NURSE PRACTITIONER

## 2024-05-09 PROCEDURE — 99214 OFFICE O/P EST MOD 30 MIN: CPT | Performed by: NURSE PRACTITIONER

## 2024-05-09 ASSESSMENT — FIBROSIS 4 INDEX: FIB4 SCORE: 5.91

## 2024-05-09 NOTE — TELEPHONE ENCOUNTER
Pt called in to ask about specific pains that she has been having for the past week or more. Pt had called in to the PA last week, but they had just exchanged phone calls and she never actually talked to the PA. She was hoping that the symptoms would resolve themselves and were just part of the cold symptoms that she was sick with, but symptoms have not resolved and are getting increasingly worse. Pt has been on Semaglutide for three weeks (it was prescribed in March but it was not until 3 weeks ago that she began taking it). Pt has complaint of symptoms that mimic the symptoms she had with pancreatitis, pain in her right side above her belly button. Pt is also complaining that her legs are starting to hurt and stated that they are getting worse. She was wondering if she could possibly be tested for pancreatitis as she is nervous that this is what the symptoms are leading to.     MD returned message stating that pt needed to be seen, if not with her than at an Urgent Care or the ER and they could do the proper testing and/or imaging that they felt necessary.     I called pt back, telling her that the doctor wants her to be seen. No appointment available with MD, so advised to go to Urgent Care or ER, pt voiced understanding and will be seen.

## 2024-05-09 NOTE — TELEPHONE ENCOUNTER
Needs to be seen.  If she cannot get into us or urgent care she may consider ER.  They would be able to draw labs and do any imaging they felt necessary.    Thank you,   Lillie Herrera MD

## 2024-05-10 ENCOUNTER — TELEPHONE (OUTPATIENT)
Dept: URGENT CARE | Facility: CLINIC | Age: 79
End: 2024-05-10

## 2024-05-10 ENCOUNTER — TELEPHONE (OUTPATIENT)
Dept: MEDICAL GROUP | Facility: LAB | Age: 79
End: 2024-05-10

## 2024-05-10 ENCOUNTER — APPOINTMENT (OUTPATIENT)
Dept: URGENT CARE | Facility: CLINIC | Age: 79
End: 2024-05-10
Payer: MEDICARE

## 2024-05-10 ENCOUNTER — HOSPITAL ENCOUNTER (OUTPATIENT)
Dept: LAB | Facility: MEDICAL CENTER | Age: 79
End: 2024-05-10
Attending: NURSE PRACTITIONER
Payer: MEDICARE

## 2024-05-10 DIAGNOSIS — R10.84 GENERALIZED ABDOMINAL PAIN: ICD-10-CM

## 2024-05-10 LAB
ALBUMIN SERPL BCP-MCNC: 4 G/DL (ref 3.2–4.9)
ALBUMIN/GLOB SERPL: 1.5 G/DL
ALP SERPL-CCNC: 126 U/L (ref 30–99)
ALT SERPL-CCNC: 35 U/L (ref 2–50)
AMYLASE SERPL-CCNC: 155 U/L (ref 20–103)
ANION GAP SERPL CALC-SCNC: 12 MMOL/L (ref 7–16)
ANISOCYTOSIS BLD QL SMEAR: ABNORMAL
AST SERPL-CCNC: 44 U/L (ref 12–45)
BASOPHILS # BLD AUTO: 0.7 % (ref 0–1.8)
BASOPHILS # BLD: 0.03 K/UL (ref 0–0.12)
BILIRUB SERPL-MCNC: 0.5 MG/DL (ref 0.1–1.5)
BUN SERPL-MCNC: 22 MG/DL (ref 8–22)
CALCIUM ALBUM COR SERPL-MCNC: 8.9 MG/DL (ref 8.5–10.5)
CALCIUM SERPL-MCNC: 8.9 MG/DL (ref 8.5–10.5)
CHLORIDE SERPL-SCNC: 112 MMOL/L (ref 96–112)
CO2 SERPL-SCNC: 17 MMOL/L (ref 20–33)
COMMENT 1642: NORMAL
CREAT SERPL-MCNC: 0.96 MG/DL (ref 0.5–1.4)
EOSINOPHIL # BLD AUTO: 0.13 K/UL (ref 0–0.51)
EOSINOPHIL NFR BLD: 2.9 % (ref 0–6.9)
ERYTHROCYTE [DISTWIDTH] IN BLOOD BY AUTOMATED COUNT: 46.1 FL (ref 35.9–50)
GFR SERPLBLD CREATININE-BSD FMLA CKD-EPI: 61 ML/MIN/1.73 M 2
GLOBULIN SER CALC-MCNC: 2.7 G/DL (ref 1.9–3.5)
GLUCOSE SERPL-MCNC: 220 MG/DL (ref 65–99)
HCT VFR BLD AUTO: 39.3 % (ref 37–47)
HGB BLD-MCNC: 11.5 G/DL (ref 12–16)
IMM GRANULOCYTES # BLD AUTO: 0.01 K/UL (ref 0–0.11)
IMM GRANULOCYTES NFR BLD AUTO: 0.2 % (ref 0–0.9)
LIPASE SERPL-CCNC: 128 U/L (ref 11–82)
LYMPHOCYTES # BLD AUTO: 0.73 K/UL (ref 1–4.8)
LYMPHOCYTES NFR BLD: 16.5 % (ref 22–41)
MCH RBC QN AUTO: 22.2 PG (ref 27–33)
MCHC RBC AUTO-ENTMCNC: 29.3 G/DL (ref 32.2–35.5)
MCV RBC AUTO: 75.7 FL (ref 81.4–97.8)
MICROCYTES BLD QL SMEAR: ABNORMAL
MONOCYTES # BLD AUTO: 0.42 K/UL (ref 0–0.85)
MONOCYTES NFR BLD AUTO: 9.5 % (ref 0–13.4)
MORPHOLOGY BLD-IMP: NORMAL
NEUTROPHILS # BLD AUTO: 3.1 K/UL (ref 1.82–7.42)
NEUTROPHILS NFR BLD: 70.2 % (ref 44–72)
NRBC # BLD AUTO: 0 K/UL
NRBC BLD-RTO: 0 /100 WBC (ref 0–0.2)
OVALOCYTES BLD QL SMEAR: NORMAL
PLATELET # BLD AUTO: 115 K/UL (ref 164–446)
PLATELET BLD QL SMEAR: NORMAL
PMV BLD AUTO: 10.3 FL (ref 9–12.9)
POIKILOCYTOSIS BLD QL SMEAR: NORMAL
POTASSIUM SERPL-SCNC: 4.6 MMOL/L (ref 3.6–5.5)
PROT SERPL-MCNC: 6.7 G/DL (ref 6–8.2)
RBC # BLD AUTO: 5.19 M/UL (ref 4.2–5.4)
RBC BLD AUTO: PRESENT
SODIUM SERPL-SCNC: 141 MMOL/L (ref 135–145)
WBC # BLD AUTO: 4.4 K/UL (ref 4.8–10.8)

## 2024-05-10 ASSESSMENT — ENCOUNTER SYMPTOMS
CHILLS: 0
ABDOMINAL PAIN: 1
NAUSEA: 0
SHORTNESS OF BREATH: 0
VOMITING: 0
EYE REDNESS: 0
DIZZINESS: 0
SORE THROAT: 0
FEVER: 0
MYALGIAS: 0

## 2024-05-10 NOTE — TELEPHONE ENCOUNTER
Notified pt of pcp's recommendations. Pt reports she has not been taking jardiance since starting rybelsus. Will send msg to  provider to confirm what testing was recommended. Pt will be leaving town for a couple weeks. Scheduled pt for follow up with pcp upon return. Reviewed red flag symptoms. Encouraged to call with any questions or concerns.     Reviewed with pcp and pt was supposed to continue with jardiance. HeartThis message sent with instructions to restart jardiance.

## 2024-05-10 NOTE — TELEPHONE ENCOUNTER
Yes you can use a post medications.  She can go ahead and hold the Rybelsus for now.  I agree that leg pain does sound like neuropathy but with her history we may have to look into some heart issues as well.    Thank you,   Lillie Herrera MD

## 2024-05-10 NOTE — PROGRESS NOTES
Subjective:   Ally Doherty is a 79 y.o. female who presents for Other (The patient called her PCP and she was instructed to go to urgent care  to determine if one of her medications, Rybelsus is causing pancreatitis and leg pain. She has been on the medication for 3 weeks and the symptoms have been present for about a week and a half.)      HPI  Patient is a 79-year-old female presents urgent care for evaluation of abdominal pain been ongoing for the past couple days.  Patient recently was started on Rybelsus for her diabetes 3 weeks ago is uncertain if it is related to the medication she has been having abdominal pain, no nausea and or vomiting however she is concerned that she may develop acute pancreatitis does have a history of acute pancreatitis.  Patient has also been experiencing twitching of her eyelids and restless legs.  Patient has had her gallbladder removed.  Denies any fevers chills.  Denies any shortness of breath, chest pain, dizziness      Review of Systems   Constitutional:  Negative for chills and fever.   HENT:  Negative for sore throat.    Eyes:  Negative for redness.   Respiratory:  Negative for shortness of breath.    Cardiovascular:  Negative for chest pain.   Gastrointestinal:  Positive for abdominal pain. Negative for nausea and vomiting.   Genitourinary:  Negative for dysuria.   Musculoskeletal:  Negative for myalgias.   Skin:  Negative for rash.   Neurological:  Negative for dizziness.       Medications:    aspirin  CALTRATE 600+D PO  carvedilol Tabs  Empagliflozin Tabs  levothyroxine Tabs  rosuvastatin Tabs  Rybelsus Tabs  therapeutic multivitamin-minerals Tabs  ticagrelor Tabs    Allergies: Byetta, Penicillins, and Statins [hmg-coa-r inhibitors]    Problem List: Ally Doherty does not have any pertinent problems on file.    Surgical History:  Past Surgical History:   Procedure Laterality Date    CATARACT EXTRACTION WITH IOL Right 08/08/2022    Dr. Monzon    CATARACT EXTRACTION  "WITH IOL Left 08/03/2022    Dr. Monzon    TN UPPER GI ENDOSCOPY,DIAGNOSIS N/A 06/18/2022    Procedure: UPPER ENDOSCOPY;  Surgeon: Venkat Hitchcock M.D.;  Location: SURGERY [facility];  Service: Gastroenterology    MASTECTOMY Left 12/09/2019    Procedure: MASTECTOMY;  Surgeon: Edward Boo M.D.;  Location: SURGERY SAME DAY [facility];  Service: Plastics    BREAST IMPLANT REMOVAL Right 12/09/2019    Procedure: REMOVAL, IMPLANT, BREAST;  Surgeon: Edward Boo M.D.;  Location: SURGERY SAME DAY [facility];  Service: Plastics    BREAST RECONSTRUCTION  02/12/2015    Performed by Edward Boo M.D. at SURGERY [facility]    MAMMOPLASTY REDUCTION  02/12/2015    Performed by Edward Boo M.D. at [facility]    MOLE EXCISION  02/12/2015    Performed by Edward Boo M.D. at SURGERY [facility]    CAPSULECTOMY  03/06/2014    Performed by Edward Boo M.D. at SURGERY [facility]    BREAST RECONSTRUCTION  06/24/2013    Performed by Edward Boo M.D. at SURGERY [facility] ORS    TISSUE EXPANDER PLACE/REMOVE  06/24/2013    Performed by Edward Boo M.D. at [facility]    CAYETANO BY LAPAROSCOPY  2008    TUBAL LIGATION  1974    TONSILLECTOMY  1953    MASTECTOMY      right breast    TN CHEMOTHERAPY, UNSPECIFIED PROCEDURE      TN RADIATION THERAPY PLAN SIMPLE         Past Social Hx: Ally Doherty  reports that she has never smoked. She has never used smokeless tobacco. She reports current alcohol use. She reports that she does not use drugs.     Past Family Hx:  Ally Doherty family history includes Other in an other family member.     Problem list, medications, and allergies reviewed by myself today in Epic.     Objective:     /78   Pulse 99   Temp 36.1 °C (96.9 °F) (Temporal)   Resp 16   Ht 1.702 m (5' 7\")   Wt 75.5 kg (166 lb 6.4 oz)   LMP 02/27/1997   SpO2 96%   BMI 26.06 kg/m²     Physical Exam  Constitutional:       General: She is not in acute distress.     " Appearance: She is well-developed.   HENT:      Head: Normocephalic and atraumatic.      Right Ear: Tympanic membrane normal.      Left Ear: Tympanic membrane normal.      Nose: Nose normal.      Right Sinus: No maxillary sinus tenderness or frontal sinus tenderness.      Left Sinus: No maxillary sinus tenderness or frontal sinus tenderness.      Mouth/Throat:      Mouth: Mucous membranes are moist.      Pharynx: Oropharynx is clear. Uvula midline. No posterior oropharyngeal erythema.      Tonsils: No tonsillar exudate or tonsillar abscesses.   Eyes:      General:         Right eye: No discharge.         Left eye: No discharge.      Conjunctiva/sclera: Conjunctivae normal.   Cardiovascular:      Rate and Rhythm: Normal rate and regular rhythm.   Pulmonary:      Effort: Pulmonary effort is normal. No respiratory distress.      Breath sounds: Normal breath sounds.   Abdominal:      General: Bowel sounds are normal.      Palpations: Abdomen is soft.      Tenderness: There is abdominal tenderness in the epigastric area and left upper quadrant. There is rebound. There is no right CVA tenderness, left CVA tenderness or guarding. Negative signs include Norris's sign, Rovsing's sign, McBurney's sign, psoas sign and obturator sign.      Hernia: No hernia is present.   Musculoskeletal:      Cervical back: No rigidity.   Lymphadenopathy:      Cervical: No cervical adenopathy.   Skin:     General: Skin is warm and dry.      Capillary Refill: Capillary refill takes less than 2 seconds.   Neurological:      Mental Status: She is alert and oriented to person, place, and time.      Sensory: No sensory deficit.      Deep Tendon Reflexes: Reflexes are normal and symmetric.   Psychiatric:         Mood and Affect: Mood normal.         Behavior: Behavior normal.       Assessment/Plan:     Diagnosis and associated orders:   1. Epigastric pain  CT-ABDOMEN WITH    Comp Metabolic Panel    CBC WITH DIFFERENTIAL    AMYLASE    LIPASE      2.  Restless leg               Comments/MDM:     I personally reviewed prior external notes and prior test results pertinent to today's visit.  Patient having acute abdominal tenderness possible adverse reaction to new medication patient is a diabetic did advise to follow-up with PCP for reevaluation whether or not to discontinue medication patient is having acute tenderness on exam gallbladder was removed previously will obtain diagnostic CT for further evaluation management did advise limitations in the urgent care stat results.  Advised if symptoms get worse she will need to be evaluated in the emergency room  Discussed management options, risks and benefits, and alternatives to treatment plan agreed upon.   Red flags discussed and indications to immediately call 911 or present to the Emergency Department.   Supportive care, differential diagnoses, and indications for immediate follow-up discussed with patient.    Patient expresses understanding and agrees to plan. Patient denies any other questions or concerns.              Please note that this dictation was created using voice recognition software. I have made a reasonable attempt to correct obvious errors, but I expect that there are errors of grammar and possibly content that I did not discover before finalizing the note.    This note was electronically signed by Jordin TERRELL.

## 2024-05-10 NOTE — TELEPHONE ENCOUNTER
Pt calling in at request of urgent care provider to see if she should stop taking rybelsus. She has not taken it yet today. Notes from UC visit not available yet. Pt reports that blood tests and imaging are to be ordered. Hx of pancreatitis on glp med in the past, which is pt's main concern.     Reviewed symptoms with pt. Pain in upper right abdomen comes and goes. No identifiable cause/trigger. Not associated with eating. No N/V or change in stools. No fevers. Pain in legs is new, but pt reports it is occurring nightly. Feels like a painful burning tingling when she lays down. It is an even sensation in both legs. No swelling noted. No relieving factors. She has tried to get up and walk around, but pain persists.  She does not experience this symptom during the day.

## 2024-05-11 ENCOUNTER — PHARMACY VISIT (OUTPATIENT)
Dept: PHARMACY | Facility: MEDICAL CENTER | Age: 79
End: 2024-05-11
Payer: COMMERCIAL

## 2024-05-11 ENCOUNTER — APPOINTMENT (OUTPATIENT)
Dept: RADIOLOGY | Facility: MEDICAL CENTER | Age: 79
End: 2024-05-11
Attending: EMERGENCY MEDICINE
Payer: MEDICARE

## 2024-05-11 ENCOUNTER — HOSPITAL ENCOUNTER (EMERGENCY)
Facility: MEDICAL CENTER | Age: 79
End: 2024-05-11
Attending: EMERGENCY MEDICINE
Payer: MEDICARE

## 2024-05-11 VITALS
TEMPERATURE: 96.9 F | HEIGHT: 67 IN | SYSTOLIC BLOOD PRESSURE: 114 MMHG | HEART RATE: 55 BPM | BODY MASS INDEX: 25.74 KG/M2 | RESPIRATION RATE: 14 BRPM | WEIGHT: 164.02 LBS | DIASTOLIC BLOOD PRESSURE: 55 MMHG | OXYGEN SATURATION: 94 %

## 2024-05-11 DIAGNOSIS — R10.9 ABDOMINAL PAIN, UNSPECIFIED ABDOMINAL LOCATION: ICD-10-CM

## 2024-05-11 LAB
ALBUMIN SERPL BCP-MCNC: 3.9 G/DL (ref 3.2–4.9)
ALBUMIN/GLOB SERPL: 1.4 G/DL
ALP SERPL-CCNC: 122 U/L (ref 30–99)
ALT SERPL-CCNC: 42 U/L (ref 2–50)
ANION GAP SERPL CALC-SCNC: 16 MMOL/L (ref 7–16)
APPEARANCE UR: CLEAR
AST SERPL-CCNC: 47 U/L (ref 12–45)
BASOPHILS # BLD AUTO: 1.3 % (ref 0–1.8)
BASOPHILS # BLD: 0.05 K/UL (ref 0–0.12)
BILIRUB SERPL-MCNC: 0.5 MG/DL (ref 0.1–1.5)
BILIRUB UR QL STRIP.AUTO: NEGATIVE
BUN SERPL-MCNC: 25 MG/DL (ref 8–22)
CALCIUM ALBUM COR SERPL-MCNC: 8.7 MG/DL (ref 8.5–10.5)
CALCIUM SERPL-MCNC: 8.6 MG/DL (ref 8.5–10.5)
CHLORIDE SERPL-SCNC: 106 MMOL/L (ref 96–112)
CO2 SERPL-SCNC: 14 MMOL/L (ref 20–33)
COLOR UR: YELLOW
CREAT SERPL-MCNC: 0.96 MG/DL (ref 0.5–1.4)
EKG IMPRESSION: NORMAL
EOSINOPHIL # BLD AUTO: 0.1 K/UL (ref 0–0.51)
EOSINOPHIL NFR BLD: 2.5 % (ref 0–6.9)
ERYTHROCYTE [DISTWIDTH] IN BLOOD BY AUTOMATED COUNT: 45 FL (ref 35.9–50)
GFR SERPLBLD CREATININE-BSD FMLA CKD-EPI: 61 ML/MIN/1.73 M 2
GLOBULIN SER CALC-MCNC: 2.7 G/DL (ref 1.9–3.5)
GLUCOSE SERPL-MCNC: 287 MG/DL (ref 65–99)
GLUCOSE UR STRIP.AUTO-MCNC: >=1000 MG/DL
HCT VFR BLD AUTO: 39 % (ref 37–47)
HGB BLD-MCNC: 11.6 G/DL (ref 12–16)
IMM GRANULOCYTES # BLD AUTO: 0.01 K/UL (ref 0–0.11)
IMM GRANULOCYTES NFR BLD AUTO: 0.3 % (ref 0–0.9)
KETONES UR STRIP.AUTO-MCNC: NEGATIVE MG/DL
LEUKOCYTE ESTERASE UR QL STRIP.AUTO: NEGATIVE
LIPASE SERPL-CCNC: 134 U/L (ref 11–82)
LYMPHOCYTES # BLD AUTO: 0.69 K/UL (ref 1–4.8)
LYMPHOCYTES NFR BLD: 17.3 % (ref 22–41)
MCH RBC QN AUTO: 22.3 PG (ref 27–33)
MCHC RBC AUTO-ENTMCNC: 29.7 G/DL (ref 32.2–35.5)
MCV RBC AUTO: 75 FL (ref 81.4–97.8)
MICRO URNS: ABNORMAL
MONOCYTES # BLD AUTO: 0.36 K/UL (ref 0–0.85)
MONOCYTES NFR BLD AUTO: 9 % (ref 0–13.4)
NEUTROPHILS # BLD AUTO: 2.78 K/UL (ref 1.82–7.42)
NEUTROPHILS NFR BLD: 69.6 % (ref 44–72)
NITRITE UR QL STRIP.AUTO: NEGATIVE
NRBC # BLD AUTO: 0 K/UL
NRBC BLD-RTO: 0 /100 WBC (ref 0–0.2)
PH UR STRIP.AUTO: 5.5 [PH] (ref 5–8)
PLATELET # BLD AUTO: 112 K/UL (ref 164–446)
PMV BLD AUTO: 10.1 FL (ref 9–12.9)
POTASSIUM SERPL-SCNC: 4.3 MMOL/L (ref 3.6–5.5)
PROT SERPL-MCNC: 6.6 G/DL (ref 6–8.2)
PROT UR QL STRIP: NEGATIVE MG/DL
RBC # BLD AUTO: 5.2 M/UL (ref 4.2–5.4)
RBC UR QL AUTO: NEGATIVE
SODIUM SERPL-SCNC: 136 MMOL/L (ref 135–145)
SP GR UR STRIP.AUTO: 1.02
TROPONIN T SERPL-MCNC: 9 NG/L (ref 6–19)
UROBILINOGEN UR STRIP.AUTO-MCNC: 0.2 MG/DL
WBC # BLD AUTO: 4 K/UL (ref 4.8–10.8)

## 2024-05-11 PROCEDURE — RXMED WILLOW AMBULATORY MEDICATION CHARGE: Performed by: EMERGENCY MEDICINE

## 2024-05-11 RX ORDER — ONDANSETRON 4 MG/1
4 TABLET, ORALLY DISINTEGRATING ORAL EVERY 6 HOURS PRN
Qty: 10 TABLET | Refills: 0 | Status: SHIPPED | OUTPATIENT
Start: 2024-05-11

## 2024-05-11 RX ORDER — MORPHINE SULFATE 4 MG/ML
4 INJECTION INTRAVENOUS ONCE
Status: COMPLETED | OUTPATIENT
Start: 2024-05-11 | End: 2024-05-11

## 2024-05-11 RX ORDER — ONDANSETRON 4 MG/1
4 TABLET, ORALLY DISINTEGRATING ORAL EVERY 6 HOURS PRN
Qty: 10 TABLET | Refills: 0 | Status: SHIPPED | OUTPATIENT
Start: 2024-05-11 | End: 2024-05-11

## 2024-05-11 RX ORDER — ONDANSETRON 2 MG/ML
4 INJECTION INTRAMUSCULAR; INTRAVENOUS ONCE
Status: COMPLETED | OUTPATIENT
Start: 2024-05-11 | End: 2024-05-11

## 2024-05-11 RX ADMIN — ONDANSETRON 4 MG: 2 INJECTION INTRAMUSCULAR; INTRAVENOUS at 11:52

## 2024-05-11 RX ADMIN — MORPHINE SULFATE 4 MG: 4 INJECTION INTRAVENOUS at 11:54

## 2024-05-11 RX ADMIN — IOHEXOL 100 ML: 350 INJECTION, SOLUTION INTRAVENOUS at 13:00

## 2024-05-11 ASSESSMENT — FIBROSIS 4 INDEX: FIB4 SCORE: 4.98

## 2024-05-11 NOTE — ED NOTES
Pt has discharge orders. Pt educated on discharge instructions and new prescriptions.  Pt educated to follow up with PCP and GI, contact information provided. Pt verbalizes understanding. PIV removed. Pt ambulatory to lobby with steady gait. Pt going home with spouse.

## 2024-05-11 NOTE — ED TRIAGE NOTES
"Chief Complaint   Patient presents with    Abdominal Pain     Sent by  for possible pancreatitis. RUQ abd pain x1 week. Pt had blood work done and was told \"my numbers are bad\".      Hx of pancreatitis     BP (!) 132/94   Pulse 67   Temp 36.7 °C (98 °F) (Temporal)   Resp 16   Ht 1.702 m (5' 7\")   Wt 74.4 kg (164 lb 0.4 oz)   LMP 02/27/1997   SpO2 98%   BMI 25.69 kg/m²     Pt is alert/oriented and follows commands. Pt speaking in full sentences and responds appropriately to questions. No acute distress noted in triage and respirations are even and unlabored.      Pt placed in lobby and educated on triage process. Pt encouraged to alert staff for any changes in condition.    "

## 2024-05-11 NOTE — ED PROVIDER NOTES
"ED Provider Note    CHIEF COMPLAINT  Chief Complaint   Patient presents with    Abdominal Pain     Sent by  for possible pancreatitis. RUQ abd pain x1 week. Pt had blood work done and was told \"my numbers are bad\".        EXTERNAL RECORDS REVIEWED  Outpatient Notes   [facility] urgent care, [facility]    HPI/ROS  LIMITATION TO HISTORY   None  OUTSIDE HISTORIAN(S):  None    Ally Doherty is a 79 y.o. female who presents here for evaluation of abdominal pain.  Patient had blood work done yesterday as outpatient, for abdominal pain.  Has no other lipase was mildly elevated at 128, so she was told to come in here for evaluation of pancreatitis.  Patient has no fever chills or vomiting, she has no chest pain shortness of breath, she has no back pain.  Patient states the pain stays in the right upper quadrant, and does not radiate.  Patient has no other medical concerns at this time.    PAST MEDICAL HISTORY   has a past medical history of Anemia, Breast cancer (HCC) (02/27/2012), Cancer (HCC) (2010), DM hyperosmolarity type II (HCC) (02/27/2012), DM II (diabetes mellitus, type II), controlled (HCC) (06/21/2012), Hiatus hernia syndrome, High cholesterol (02/10/2015), HTN (hypertension) (02/12/2015), Hyperlipidemia (02/27/2012), Hypothyroidism (02/27/2012), Liver cirrhosis (HCC), Malignant neoplasm of upper-inner quadrant of right breast in female, estrogen receptor positive (HCC) (03/13/2018), Pancytopenia (HCC), and Vitamin D deficiency (05/27/2014).    SURGICAL HISTORY   has a past surgical history that includes mastectomy; chemotherapy, unspecified procedure; radiation therapy plan simple; lucila by laparoscopy (2008); breast reconstruction (06/24/2013); tissue expander place/remove (06/24/2013); tubal ligation (1974); tonsillectomy (1953); capsulectomy (03/06/2014); breast reconstruction (02/12/2015); mammoplasty reduction (02/12/2015); mole excision (02/12/2015); mastectomy (Left, 12/09/2019); breast " "implant removal (Right, 12/09/2019); upper gi endoscopy,diagnosis (N/A, 06/18/2022); cataract extraction with iol (Left, 08/03/2022); and cataract extraction with iol (Right, 08/08/2022).    FAMILY HISTORY  Family History   Problem Relation Age of Onset    Other Other         adopted       SOCIAL HISTORY  Social History     Tobacco Use    Smoking status: Never    Smokeless tobacco: Never   Vaping Use    Vaping Use: Never used   Substance and Sexual Activity    Alcohol use: Yes     Alcohol/week: 0.0 oz     Comment: < 1 per week    Drug use: No    Sexual activity: Not Currently       CURRENT MEDICATIONS  Home Medications       Reviewed by Christa Yeboah R.N. (Registered Nurse) on 05/11/24 at 1053  Med List Status: Not Addressed     Medication Last Dose Status   aspirin 81 MG EC tablet  Active   Calcium Carbonate-Vitamin D (CALTRATE 600+D PO)  Active   carvedilol (COREG) 6.25 MG Tab  Active   Empagliflozin 25 MG Tab  Active   levothyroxine (SYNTHROID) 100 MCG Tab  Active   rosuvastatin (CRESTOR) 20 MG Tab  Active   Semaglutide (RYBELSUS) 7 MG Tab  Active   therapeutic multivitamin-minerals (THERAGRAN-M) Tab  Active   ticagrelor (BRILINTA) 90 MG Tab tablet  Active                    ALLERGIES  Allergies   Allergen Reactions    Byetta Unspecified     Pancreatitis    Penicillins Rash     All over    Statins [Hmg-Coa-R Inhibitors] Unspecified     Elevated liver enzymes       PHYSICAL EXAM  VITAL SIGNS: /61   Pulse 64   Temp 36.7 °C (98 °F) (Temporal)   Resp 14   Ht 1.702 m (5' 7\")   Wt 74.4 kg (164 lb 0.4 oz)   LMP 02/27/1997   SpO2 95%   BMI 25.69 kg/m²    Constitutional: Well developed, well nourished.  Mild acute distress.  HEENT: Normocephalic, atraumatic. Posterior pharynx clear and moist.  Eyes:  EOMI. Normal sclera.  Neck: Supple, Full range of motion, nontender.  Chest/Pulmonary: clear to ausculation. Symmetrical expansion.   Cardio: Regular rate and rhythm with no murmur.   Abdomen: Soft, " tenderness to the right upper quadrant and epigastrium, no peritoneal signs. No guarding.   Musculoskeletal: No deformity, no edema, neurovascular intact.   Neuro: Clear speech, appropriate, cooperative, cranial nerves II-XII grossly intact.  Psych: Normal mood and affect      EKG/LABS  Results for orders placed or performed during the hospital encounter of 05/11/24   Complete Metabolic Panel   Result Value Ref Range    Sodium 136 135 - 145 mmol/L    Potassium 4.3 3.6 - 5.5 mmol/L    Chloride 106 96 - 112 mmol/L    Co2 14 (L) 20 - 33 mmol/L    Anion Gap 16.0 7.0 - 16.0    Glucose 287 (H) 65 - 99 mg/dL    Bun 25 (H) 8 - 22 mg/dL    Creatinine 0.96 0.50 - 1.40 mg/dL    Calcium 8.6 8.5 - 10.5 mg/dL    Correct Calcium 8.7 8.5 - 10.5 mg/dL    AST(SGOT) 47 (H) 12 - 45 U/L    ALT(SGPT) 42 2 - 50 U/L    Alkaline Phosphatase 122 (H) 30 - 99 U/L    Total Bilirubin 0.5 0.1 - 1.5 mg/dL    Albumin 3.9 3.2 - 4.9 g/dL    Total Protein 6.6 6.0 - 8.2 g/dL    Globulin 2.7 1.9 - 3.5 g/dL    A-G Ratio 1.4 g/dL   Lipase   Result Value Ref Range    Lipase 134 (H) 11 - 82 U/L   Urinalysis    Specimen: Urine   Result Value Ref Range    Color Yellow     Character Clear     Specific Gravity 1.024 <1.035    Ph 5.5 5.0 - 8.0    Glucose >=1000 (A) Negative mg/dL    Ketones Negative Negative mg/dL    Protein Negative Negative mg/dL    Bilirubin Negative Negative    Urobilinogen, Urine 0.2 Negative    Nitrite Negative Negative    Leukocyte Esterase Negative Negative    Occult Blood Negative Negative    Micro Urine Req see below    ESTIMATED GFR   Result Value Ref Range    GFR (CKD-EPI) 61 >60 mL/min/1.73 m 2   TROPONIN   Result Value Ref Range    Troponin T 9 6 - 19 ng/L   CBC WITH DIFFERENTIAL   Result Value Ref Range    WBC 4.0 (L) 4.8 - 10.8 K/uL    RBC 5.20 4.20 - 5.40 M/uL    Hemoglobin 11.6 (L) 12.0 - 16.0 g/dL    Hematocrit 39.0 37.0 - 47.0 %    MCV 75.0 (L) 81.4 - 97.8 fL    MCH 22.3 (L) 27.0 - 33.0 pg    MCHC 29.7 (L) 32.2 - 35.5 g/dL     RDW 45.0 35.9 - 50.0 fL    Platelet Count 112 (L) 164 - 446 K/uL    MPV 10.1 9.0 - 12.9 fL    Neutrophils-Polys 69.60 44.00 - 72.00 %    Lymphocytes 17.30 (L) 22.00 - 41.00 %    Monocytes 9.00 0.00 - 13.40 %    Eosinophils 2.50 0.00 - 6.90 %    Basophils 1.30 0.00 - 1.80 %    Immature Granulocytes 0.30 0.00 - 0.90 %    Nucleated RBC 0.00 0.00 - 0.20 /100 WBC    Neutrophils (Absolute) 2.78 1.82 - 7.42 K/uL    Lymphs (Absolute) 0.69 (L) 1.00 - 4.80 K/uL    Monos (Absolute) 0.36 0.00 - 0.85 K/uL    Eos (Absolute) 0.10 0.00 - 0.51 K/uL    Baso (Absolute) 0.05 0.00 - 0.12 K/uL    Immature Granulocytes (abs) 0.01 0.00 - 0.11 K/uL    NRBC (Absolute) 0.00 K/uL   EKG   Result Value Ref Range    Report       [facility] Emergency Dept[facility]    Test Date:  2024  Pt Name:    CHARU BAEZ              Department: ER  MRN:        [id number]                      Room:       [facility]  Gender:     Female                       Technician: [id number]  :        [id number]                   Requested By:JEF PEREYRA  Order #:    [id number]                    Reading MD:    Measurements  Intervals                                Axis  Rate:       56                           P:          53  MT:         190                          QRS:        57  QRSD:       135                          T:          15  QT:         481  QTc:        465    Interpretive Statements  Sinus bradycardia  Atrial premature complex  Right bundle branch block  Compared to ECG 2023 07:45:05  Atrial premature complex(es) now present  Sinus rhythm no longer present  Myocardial infarct finding no longer present  T-wave abnormality no longer present  Possible ischemia no longer present       EKG; sinus bradycardia at 56.  No ST elevation, no ST depression.  QTc is 465.  RBBB noted.  Compared to EKG from 2023.      RADIOLOGY/PROCEDURES   I have independently interpreted the diagnostic imaging associated with this visit and am waiting the final  reading from the radiologist.   My preliminary interpretation is as follows: see below    Radiologist interpretation:  CT-ABDOMEN-PELVIS WITH   Final Result      1.  Findings are consistent with cirrhosis with recanalized umbilical vein and varices.      2.  Umbilical vein extends into ventral abdominal wall hernia containing abdominal fat as on the prior CT.      3.  Splenomegaly.      4.  Large hiatal hernia is again present.          COURSE & MEDICAL DECISION MAKING    ASSESSMENT, COURSE AND PLAN  Care Narrative: This is a 79-year-old female here for evaluation abdominal pain.  Patient had a workup here, with a CT scan.  I discussed the results of the CT scan with Dr. Mathur, on for general surgery.  She reviewed the scan and states that patient needs to have a workup with GI, and further evaluation.  Nothing surgical to do from her standpoint.  Patient is nontoxic and afebrile comfortable.  Her right ear pain medications, and have her follow-up with GI as outpatient.  1:34 PM  Dr. Small states all outpatient work up.   He reviewed ct and labs.       DISPOSITION AND DISCUSSIONS  I have discussed management of the patient with the following physicians and MERRY's: None    Discussion of management with other QHP or appropriate source(s): None    Escalation of care considered, and ultimately not performed: No IV fluids indicated    Barriers to care at this time, including but not limited to: Patient does not have established PCP.     Decision tools and prescription drugs considered including, but not limited to: None.    FINAL DIAGNOSIS  Abdominal pain   Cirrhosis , chronic        Electronically signed by: Shawn Morrison D.O., 5/11/2024 11:34 AM

## 2024-05-13 ENCOUNTER — PHARMACY VISIT (OUTPATIENT)
Dept: PHARMACY | Facility: MEDICAL CENTER | Age: 79
End: 2024-05-13
Payer: COMMERCIAL

## 2024-05-14 ENCOUNTER — PHARMACY VISIT (OUTPATIENT)
Dept: PHARMACY | Facility: MEDICAL CENTER | Age: 79
End: 2024-05-14
Payer: COMMERCIAL

## 2024-05-14 PROCEDURE — RXMED WILLOW AMBULATORY MEDICATION CHARGE: Performed by: INTERNAL MEDICINE

## 2024-05-21 DIAGNOSIS — I25.10 CORONARY ARTERY DISEASE INVOLVING NATIVE CORONARY ARTERY OF NATIVE HEART WITHOUT ANGINA PECTORIS: ICD-10-CM

## 2024-05-30 ENCOUNTER — PATIENT OUTREACH (OUTPATIENT)
Dept: HEALTH INFORMATION MANAGEMENT | Facility: OTHER | Age: 79
End: 2024-05-30
Payer: MEDICARE

## 2024-05-30 DIAGNOSIS — E78.5 DYSLIPIDEMIA: ICD-10-CM

## 2024-05-30 DIAGNOSIS — I10 PRIMARY HYPERTENSION: ICD-10-CM

## 2024-05-30 DIAGNOSIS — E11.9 TYPE 2 DIABETES MELLITUS WITHOUT COMPLICATION, WITHOUT LONG-TERM CURRENT USE OF INSULIN (HCC): ICD-10-CM

## 2024-06-01 ENCOUNTER — HOSPITAL ENCOUNTER (OUTPATIENT)
Dept: LAB | Facility: MEDICAL CENTER | Age: 79
End: 2024-06-01
Attending: INTERNAL MEDICINE
Payer: MEDICARE

## 2024-06-01 LAB
CHOLEST SERPL-MCNC: 86 MG/DL (ref 100–199)
FASTING STATUS PATIENT QL REPORTED: NORMAL
HDLC SERPL-MCNC: 55 MG/DL
LDLC SERPL CALC-MCNC: 16 MG/DL
TRIGL SERPL-MCNC: 76 MG/DL (ref 0–149)

## 2024-06-01 PROCEDURE — 36415 COLL VENOUS BLD VENIPUNCTURE: CPT

## 2024-06-01 PROCEDURE — 80061 LIPID PANEL: CPT

## 2024-06-03 ENCOUNTER — APPOINTMENT (OUTPATIENT)
Dept: MEDICAL GROUP | Facility: LAB | Age: 79
End: 2024-06-03
Payer: MEDICARE

## 2024-06-03 VITALS
OXYGEN SATURATION: 97 % | RESPIRATION RATE: 16 BRPM | TEMPERATURE: 97.1 F | WEIGHT: 164 LBS | SYSTOLIC BLOOD PRESSURE: 114 MMHG | HEART RATE: 60 BPM | DIASTOLIC BLOOD PRESSURE: 60 MMHG | BODY MASS INDEX: 25.74 KG/M2 | HEIGHT: 67 IN

## 2024-06-03 DIAGNOSIS — N18.32 TYPE 2 DIABETES MELLITUS WITH STAGE 3B CHRONIC KIDNEY DISEASE, WITHOUT LONG-TERM CURRENT USE OF INSULIN (HCC): ICD-10-CM

## 2024-06-03 DIAGNOSIS — E03.9 HYPOTHYROIDISM, UNSPECIFIED TYPE: ICD-10-CM

## 2024-06-03 DIAGNOSIS — E11.22 TYPE 2 DIABETES MELLITUS WITH STAGE 3B CHRONIC KIDNEY DISEASE, WITHOUT LONG-TERM CURRENT USE OF INSULIN (HCC): ICD-10-CM

## 2024-06-03 DIAGNOSIS — I21.02 ST ELEVATION MYOCARDIAL INFARCTION INVOLVING LEFT ANTERIOR DESCENDING (LAD) CORONARY ARTERY (HCC): ICD-10-CM

## 2024-06-03 DIAGNOSIS — I73.9 PAD (PERIPHERAL ARTERY DISEASE) (HCC): ICD-10-CM

## 2024-06-03 DIAGNOSIS — E11.65 TYPE 2 DIABETES MELLITUS WITH HYPERGLYCEMIA, WITHOUT LONG-TERM CURRENT USE OF INSULIN (HCC): ICD-10-CM

## 2024-06-03 DIAGNOSIS — Z95.5 STENTED CORONARY ARTERY: ICD-10-CM

## 2024-06-03 DIAGNOSIS — I11.0 HYPERTENSIVE HEART DISEASE WITH HEART FAILURE (HCC): ICD-10-CM

## 2024-06-03 LAB
HBA1C MFR BLD: 9.6 % (ref ?–5.8)
POCT INT CON NEG: NEGATIVE
POCT INT CON POS: POSITIVE

## 2024-06-03 PROCEDURE — 3078F DIAST BP <80 MM HG: CPT | Performed by: FAMILY MEDICINE

## 2024-06-03 PROCEDURE — 3074F SYST BP LT 130 MM HG: CPT | Performed by: FAMILY MEDICINE

## 2024-06-03 PROCEDURE — 99214 OFFICE O/P EST MOD 30 MIN: CPT | Performed by: FAMILY MEDICINE

## 2024-06-03 PROCEDURE — 83036 HEMOGLOBIN GLYCOSYLATED A1C: CPT | Performed by: FAMILY MEDICINE

## 2024-06-03 RX ORDER — GLIPIZIDE 5 MG/1
5 TABLET, FILM COATED, EXTENDED RELEASE ORAL DAILY
Qty: 90 TABLET | Refills: 3 | Status: SHIPPED | OUTPATIENT
Start: 2024-06-03 | End: 2024-06-03 | Stop reason: SDUPTHER

## 2024-06-03 RX ORDER — CARVEDILOL 6.25 MG/1
6.25 TABLET ORAL 2 TIMES DAILY WITH MEALS
Qty: 200 TABLET | Refills: 3 | Status: SHIPPED | OUTPATIENT
Start: 2024-06-03

## 2024-06-03 RX ORDER — LEVOTHYROXINE SODIUM 0.1 MG/1
100 TABLET ORAL
Qty: 100 TABLET | Refills: 1 | Status: SHIPPED | OUTPATIENT
Start: 2024-06-03

## 2024-06-03 RX ORDER — ROSUVASTATIN CALCIUM 20 MG/1
20 TABLET, COATED ORAL EVERY EVENING
Qty: 100 TABLET | Refills: 3 | Status: SHIPPED | OUTPATIENT
Start: 2024-06-03

## 2024-06-03 RX ORDER — GLIPIZIDE 5 MG/1
5 TABLET, FILM COATED, EXTENDED RELEASE ORAL DAILY
Qty: 100 TABLET | Refills: 3 | Status: SHIPPED | OUTPATIENT
Start: 2024-06-03

## 2024-06-03 ASSESSMENT — PATIENT HEALTH QUESTIONNAIRE - PHQ9
9. THOUGHTS THAT YOU WOULD BE BETTER OFF DEAD, OR OF HURTING YOURSELF: NOT AT ALL
3. TROUBLE FALLING OR STAYING ASLEEP OR SLEEPING TOO MUCH: NOT AT ALL
6. FEELING BAD ABOUT YOURSELF - OR THAT YOU ARE A FAILURE OR HAVE LET YOURSELF OR YOUR FAMILY DOWN: NOT AL ALL
8. MOVING OR SPEAKING SO SLOWLY THAT OTHER PEOPLE COULD HAVE NOTICED. OR THE OPPOSITE, BEING SO FIGETY OR RESTLESS THAT YOU HAVE BEEN MOVING AROUND A LOT MORE THAN USUAL: NOT AT ALL
5. POOR APPETITE OR OVEREATING: NOT AT ALL
4. FEELING TIRED OR HAVING LITTLE ENERGY: NOT AT ALL
2. FEELING DOWN, DEPRESSED, IRRITABLE, OR HOPELESS: NOT AT ALL
7. TROUBLE CONCENTRATING ON THINGS, SUCH AS READING THE NEWSPAPER OR WATCHING TELEVISION: NOT AT ALL
SUM OF ALL RESPONSES TO PHQ QUESTIONS 1-9: 0
SUM OF ALL RESPONSES TO PHQ9 QUESTIONS 1 AND 2: 0
1. LITTLE INTEREST OR PLEASURE IN DOING THINGS: NOT AT ALL

## 2024-06-03 ASSESSMENT — FIBROSIS 4 INDEX: FIB4 SCORE: 4.99

## 2024-06-03 NOTE — PROGRESS NOTES
Subjective:     Chief Complaint   Patient presents with    Follow-Up         HPI:   Ally presents today with follow-up after an episode of pancreatitis likely caused by Rybelsus.  A1c in late February was still very high at 8.9%.  She stopped Rybelsus due to side effects.  Is only on Jardiance at this point.  Previously was on glipizide and reports this worked well and she had no side effects.  She does not know why this was stopped but it was replaced with a different medication.  Has had intolerance to Byetta as well as metformin as well in the past.  Things with regard to her liver function are actually going fairly well.  She follows with [facility] every 6 months.      A1C today 9.3%.     Current Outpatient Medications Ordered in Epic   Medication Sig Dispense Refill    carvedilol (COREG) 6.25 MG Tab Take 1 Tablet by mouth 2 times a day with meals. 200 Tablet 3    Empagliflozin 25 MG Tab Take 1 tablet (25 mg) by mouth every day. 100 Tablet 1    glipiZIDE ER (GLUCOTROL XL) 5 MG TABLET SR 24 HR Take 1 Tablet by mouth every day. 100 Tablet 3    levothyroxine (SYNTHROID) 100 MCG Tab Take 1 tablet by mouth every morning on an empty stomach. 100 Tablet 1    rosuvastatin (CRESTOR) 20 MG Tab Take 1 Tablet by mouth every evening. 100 Tablet 3    ondansetron (ZOFRAN ODT) 4 MG TABLET DISPERSIBLE Dissolve 1 Tablet by mouth every 6 hours as needed for Nausea/Vomiting. 10 Tablet 0    aspirin 81 MG EC tablet Take 1 Tablet by mouth every day. 100 Tablet 7    ticagrelor (BRILINTA) 90 MG Tab tablet Take 1 Tablet by mouth 2 times a day. 90 Tablet 8    therapeutic multivitamin-minerals (THERAGRAN-M) Tab Take 1 Tab by mouth every day.      Calcium Carbonate-Vitamin D (CALTRATE 600+D PO) Take 1 Tablet by mouth every day.       No current [facility]-ordered facility-administered medications on file.         ROS:  Gen: no fevers/chills, no changes in weight  Eyes: no changes in vision  ENT: no sore throat, no hearing loss, no  "bloody nose  Pulm: no sob, no cough  CV: no chest pain, no palpitations  GI: no nausea/vomiting, no diarrhea  : no dysuria  MSk: no myalgias  Skin: no rash  Neuro: no headaches, no numbness/tingling  Heme/Lymph: no easy bruising      Objective:     Exam:  /60   Pulse 60   Temp 36.2 °C (97.1 °F)   Resp 16   Ht 1.702 m (5' 7\")   Wt 74.4 kg (164 lb)   LMP 02/27/1997   SpO2 97%   BMI 25.69 kg/m²  Body mass index is 25.69 kg/m².    Gen: AAOx3, NAD, well appearing  HEENT: NCAT, EOMI, Nares patent, Mucosa moist  Resp: Normal chest wall rise and fall, not SOB, no tachypnea  Skin: no rash or abnormality of visible skin.   Psych: normal speech, not slurred, good insight, affect full  MSK: Moves all four limbs equally and normally, gait normal        Assessment & Plan:     79 y.o. female with the following -     1. Type 2 diabetes mellitus with hyperglycemia, without long-term current use of insulin ([facility])  Discussed her elevated A1c today.  I really do think it is time for her to see endocrinology.  Will get her back on her glipizide as this did seem to work well in the past.  Discussed strict instructions to eat with this medication to avoid being too low.  She will report any adverse effects.  Will also get some updated labs in the next 3 months.  Discussed possibility of getting on insulin if we cannot seem to find any other medications that will help manage her sugars.  - POCT  A1C  - Referral to Endocrinology  - HEMOGLOBIN A1C; Future  - PROTEIN/CREAT RATIO URINE; Future  - glipiZIDE ER (GLUCOTROL XL) 5 MG TABLET SR 24 HR; Take 1 Tablet by mouth every day.  Dispense: 100 Tablet; Refill: 3    2. ST elevation myocardial infarction involving left anterior descending (LAD) coronary artery ([facility])  Chronic, stable.  Following with cardiology.  Actually follows up with them this week.  Refill sent in.  - carvedilol (COREG) 6.25 MG Tab; Take 1 Tablet by mouth 2 times a day with meals.  Dispense: 200 Tablet; Refill: " 3  - rosuvastatin (CRESTOR) 20 MG Tab; Take 1 Tablet by mouth every evening.  Dispense: 100 Tablet; Refill: 3    3. Type 2 diabetes mellitus with stage 3b chronic kidney disease, without long-term current use of insulin ([facility])  She will continue her Jardiance for blood sugars but also kidney disease.  - Empagliflozin 25 MG Tab; Take 1 tablet (25 mg) by mouth every day.  Dispense: 100 Tablet; Refill: 1    4. Hypothyroidism, unspecified type  Chronic, stable.  Refill sent in.  - levothyroxine (SYNTHROID) 100 MCG Tab; Take 1 tablet by mouth every morning on an empty stomach.  Dispense: 100 Tablet; Refill: 1    5. PAD (peripheral artery disease) ([facility])    - rosuvastatin (CRESTOR) 20 MG Tab; Take 1 Tablet by mouth every evening.  Dispense: 100 Tablet; Refill: 3    6. Stented coronary artery  - rosuvastatin (CRESTOR) 20 MG Tab; Take 1 Tablet by mouth every evening.  Dispense: 100 Tablet; Refill: 3            No follow-ups on file.    Please note that this dictation was created using voice recognition software. I have made every reasonable attempt to correct obvious errors, but I expect that there are errors of grammar and possibly content that I did not discover before finalizing the note.

## 2024-06-04 ENCOUNTER — NON-PROVIDER VISIT (OUTPATIENT)
Dept: VASCULAR LAB | Facility: MEDICAL CENTER | Age: 79
End: 2024-06-04
Attending: INTERNAL MEDICINE
Payer: MEDICARE

## 2024-06-04 ENCOUNTER — PHARMACY VISIT (OUTPATIENT)
Dept: PHARMACY | Facility: MEDICAL CENTER | Age: 79
End: 2024-06-04
Payer: COMMERCIAL

## 2024-06-04 PROCEDURE — 99212 OFFICE O/P EST SF 10 MIN: CPT

## 2024-06-04 PROCEDURE — RXMED WILLOW AMBULATORY MEDICATION CHARGE: Performed by: FAMILY MEDICINE

## 2024-06-04 NOTE — PROGRESS NOTES
[facility] - FollowUp Visit  Date of Service: 06/04/24    Ally Doherty is here for follow up of dyslipidemia    Subjective    HPI  Pertinent Interval History since last visit:   none  Current Prescription Lipid Lowering Medications - including dose:   Statin: Rosuvastatin 20mg QD   Non-Statin: None  Current Lipid Lowering and Related Supplements:   Vit D  Any Current Side Effects Potentially Related to Lipid Lowering therapy?   No  Current Adherence to Lipid Lowering Therapies:  Complete  Any Previous History of Statin Intolerance?   Yes, Details: patient reports previously prescribed Lovastatin, and her LDL values actually increased so she was taken off of medication   Baseline Lipids Prior to Treatment:    Latest Reference Range & Units 07/11/23 01:57   Cholesterol,Tot 100 - 199 mg/dL 169   Triglycerides 0 - 149 mg/dL 106   HDL >=40 mg/dL 47   LDL <100 mg/dL 101 (H)       SOCIAL HISTORY  Social History     Tobacco Use   Smoking Status Never   Smokeless Tobacco Never      Change in weight: Stable  Exercise habits:  Patient is participating in cardiac rehab     Diet: Mediterranean diet mostly      Objective    There were no vitals filed for this visit.       DATA REVIEW  Most Recent Lipid Panel:   Lab Results   Component Value Date    CHOLSTRLTOT 86 (L) 06/01/2024    TRIGLYCERIDE 76 06/01/2024    HDL 55 06/01/2024    LDL 16 06/01/2024       Other Pertinent Blood Work:   Lab Results   Component Value Date    SODIUM 136 05/11/2024    POTASSIUM 4.3 05/11/2024    CHLORIDE 106 05/11/2024    CO2 14 (L) 05/11/2024    ANION 16.0 05/11/2024    GLUCOSE 287 (H) 05/11/2024    BUN 25 (H) 05/11/2024    CREATININE 0.96 05/11/2024    CALCIUM 8.6 05/11/2024    ASTSGOT 47 (H) 05/11/2024    ALTSGPT 42 05/11/2024    ALKPHOSPHAT 122 (H) 05/11/2024    TBILIRUBIN 0.5 05/11/2024    ALBUMIN 3.9 05/11/2024    AGRATIO 1.4 05/11/2024    LIPOPROTA 55 (H) 02/29/2024    TSHULTRASEN 0.870 11/28/2022     Calculated CrCl = 58 ml/min      Other:  NA    Recent Imaging Studies:    None since last visit          ASSESSMENT AND PLAN  Patient Type, check all that apply:   Secondary Prevention  Established Atherosclerotic Cardiovascular Disease (ASCVD)  Yes, Details: STEMI with PCI (1 stent to LAD, 2 stents placed to RCA and PDA) in 7/2023   Other Established (non-atherosclerotic) Vascular Disease, if Present:    HTN, DM2  Evidence of Heterozygous Familial Hypercholesterolemia (FH): No  Of note patient is unsure of family hx as she was adopted and has no information on biological parents   ACC/AHA Indication for Statin Therapy, mariah all that apply:   Established ASCVD: Indication for High intensity statin    Calculated Risk for ASCVD, if applicable:  N/A  Other Significant Risk Markers, if any, mariah all that apply:  None  National Lipid Association (NLA) Goal (if applicable):  LDL-C:   <70 mg/dL  Lifestyle Recommendations From Today’s Visit:   Eating Plan: Concentrate on  Low simple carb and DASH/Med Style diet and Exercise: continue with cardiac rehab and continue walking and increase as tolerable   Statin Recommendations from Today's Visit  Continue with Rosuvastatin 20mg QD   Non-Statin Medications Recommendations from Today’s Visit:   None  Indication for PCSK9 Inhibitor, if applicable:  Not currently indicated  Supplements Recommended at this visit:  Continue with Vit D  Recommendations for Other Cardiovascular Risk Factors, mariah all that apply:   Hypertension- keep good control (managed by PCP/Cards and Diabetes/Impaired Fasting Glucose- A1C is above goal, see discussion below regarding uncontrolled diabetes.   Other Issues:  Had conversation with patient regarding seeing us for uncontrolled diabetes. She would like to be referred to us if endocrinology does not have appointments available within the next couple of months.  Patient is doing well on current regimen with Rostuvastatin 20mg QD and tolerating well with no complaints.  Renal fxn  continues to be stable.  Patient's LDL well within goal range of <70 and was at 16 mg/dL.   Patient's HDL is also in good range at 55 mg/dL  Given patient's lipids have been well controlled and stable, will discharge from pharmacotherapy service.  Patient will continue with the current regimen.    Studies Ordered at Todays Visit:  None   Blood Work Ordered At Today’s visit:   None  Follow-Up:   None - will defer lipid management to cardiology    Michelle Nguyễn, Pharmacy Student     Mulu Ortiz, PharmD    CC:  Lillie Herrera M.D.

## 2024-06-04 NOTE — Clinical Note
Hi Dr Herrera, our pharmacotherapy clinic has been seeing this patient for her lipids. Since she is controlled, we discharged her from our service. However, we did bring up that our clinic can also help manage DM. She prefers to establish with Endo first, but if she is unable to establish with them in a timely manner, you can place a Referral to Pharmacotherapy for diabetes management in the future and we can get her in sooner. Thank you! Mulu Ortiz, PharmD

## 2024-06-06 PROCEDURE — RXMED WILLOW AMBULATORY MEDICATION CHARGE: Performed by: FAMILY MEDICINE

## 2024-06-07 ENCOUNTER — PHARMACY VISIT (OUTPATIENT)
Dept: PHARMACY | Facility: MEDICAL CENTER | Age: 79
End: 2024-06-07
Payer: COMMERCIAL

## 2024-06-19 NOTE — PROGRESS NOTES
Left  for pt requesting return call. Would like to check in on medication changes made at last appointment and see if she was able to schedule with endocrinology.

## 2024-06-25 ENCOUNTER — PATIENT OUTREACH (OUTPATIENT)
Dept: HEALTH INFORMATION MANAGEMENT | Facility: OTHER | Age: 79
End: 2024-06-25
Payer: MEDICARE

## 2024-06-25 DIAGNOSIS — E11.9 TYPE 2 DIABETES MELLITUS WITHOUT COMPLICATION, WITHOUT LONG-TERM CURRENT USE OF INSULIN (HCC): ICD-10-CM

## 2024-06-25 DIAGNOSIS — I10 ESSENTIAL HYPERTENSION: ICD-10-CM

## 2024-06-25 DIAGNOSIS — I10 PRIMARY HYPERTENSION: ICD-10-CM

## 2024-06-25 DIAGNOSIS — E78.5 DYSLIPIDEMIA: ICD-10-CM

## 2024-06-26 ENCOUNTER — TELEPHONE (OUTPATIENT)
Dept: CARDIOLOGY | Facility: MEDICAL CENTER | Age: 79
End: 2024-06-26
Payer: MEDICARE

## 2024-06-26 NOTE — LETTER
PROCEDURE/SURGERY CLEARANCE FORM      Encounter Date: 6/26/2024    Patient: Ally Doherty  YOB: 1945    CARDIOLOGIST:  TRACEY Cabrera.    REFERRING DOCTOR:  No ref. provider found    Procedure/surgery: EGD w/ deep sedation     PROCEDURE/SURGERY CLEARANCE FORM    Date: 6/26/2024   Patient Name: Ally Doherty    Dear Surgeon or Proceduralist,      Thank you for your request for cardiac stratification of our mutual patient Ally Doherty 1945. We have reviewed their [facility] records; and to the best of our understanding this patient has not had stenting, ablation, cardiothoracic surgery or hospitalization for cardiovascular reasons in the past 6 months.  Ally Doherty has been seen within the past 18 months and is considered to have non-modifiable cardiac risk for this low-risk procedure/surgery. They may proceed from a cardiovascular standpoint and may hold their antiplatelet/anticoagulation as briefly as possible. Please have patient resume this medication when hemodynamically stable to do so.     Aspirin or Prasugrel   - hold 7 days prior to procedure/surgery, resume when hemodynamically stable      Clopidrogrel or Ticagrelor  - hold 7 days for all neurological procedures, hold 5 days prior to all other procedure/surgery,  resume when hemodynamically stable     Warfarin - hold 7 days for all neurological procedures, hold 5 days prior to all other procedure/surgery and coordinate with [facility] ([phone]) INR testing and dose management.      Pradaxa/Xarelto/Eliquis/Savesya - hold 1 day prior to procedure for low bleeding risk procedure, 2 days for high bleeding risk procedure, or consider holding 3 days or longer for patients with reduced kidney function (CrCl <30mL/min) or spinal/cranial surgeries/procedures.      If they have a mechanical heart valve, please coordinate with [facility] ([phone]) the proper management of  their anticoagulant in the periprocedural or perioperative period.      Some patients have higher risk for cardiovascular complications or holding medication. If our patient has had prior complications of holding antiplatelet or anticoagulants in the past and we have seen them after these events, we have addressed these concerns with the patient. They are at an unknown degree of increased risk for recurrent complication.  You may hold anticoagulation/antiplatelets for the procedure or surgery if the benefits of the procedure or surgery outweigh this nonmodifiable risk.      If Ally Doherty 1945 has new symptoms of heart failure decompensation, unstable arrythmia, or angina please reach out and we will assess the patient.      If you have other patient-specific concerns, please feel free to reach out to the patient's cardiologist directly at [phone].     Thank you,       [facility]      Electronically signed       MD Signature   TRACEY Cabrera.

## 2024-07-03 ENCOUNTER — PHARMACY VISIT (OUTPATIENT)
Dept: PHARMACY | Facility: MEDICAL CENTER | Age: 79
End: 2024-07-03
Payer: COMMERCIAL

## 2024-07-03 PROCEDURE — RXMED WILLOW AMBULATORY MEDICATION CHARGE: Performed by: FAMILY MEDICINE

## 2024-07-25 ENCOUNTER — PATIENT OUTREACH (OUTPATIENT)
Dept: HEALTH INFORMATION MANAGEMENT | Facility: OTHER | Age: 79
End: 2024-07-25
Payer: MEDICARE

## 2024-07-25 DIAGNOSIS — E11.9 TYPE 2 DIABETES MELLITUS WITHOUT COMPLICATION, WITHOUT LONG-TERM CURRENT USE OF INSULIN (HCC): ICD-10-CM

## 2024-07-25 DIAGNOSIS — I10 PRIMARY HYPERTENSION: ICD-10-CM

## 2024-07-25 DIAGNOSIS — E78.5 DYSLIPIDEMIA: ICD-10-CM

## 2024-07-25 PROCEDURE — 99490 CHRNC CARE MGMT STAFF 1ST 20: CPT | Performed by: FAMILY MEDICINE

## 2024-07-25 NOTE — PROGRESS NOTES
Assessment    Spoke to patient for monthly outreach. Pt reports she is doing well. She has been traveling quite a bit, so admits diet hasn't been great, although she is trying to eat more vegetables. She is keeping up with her activity levels. Sees endo next month. Taking medication as prescribed and tolerating it without side effects. No needs or concerns at this time.     Education    Medication management    Plan of Care and Goals    Continue plan of care    Barriers:    Frequent travel disrupting normal eating habits    Progress:    progressing    Next outreach:  1 month

## 2024-07-26 PROCEDURE — RXMED WILLOW AMBULATORY MEDICATION CHARGE: Performed by: FAMILY MEDICINE

## 2024-07-29 ENCOUNTER — PHARMACY VISIT (OUTPATIENT)
Dept: PHARMACY | Facility: MEDICAL CENTER | Age: 79
End: 2024-07-29
Payer: COMMERCIAL

## 2024-08-14 ENCOUNTER — TELEPHONE (OUTPATIENT)
Dept: CARDIOLOGY | Facility: MEDICAL CENTER | Age: 79
End: 2024-08-14
Payer: MEDICARE

## 2024-08-14 PROCEDURE — RXMED WILLOW AMBULATORY MEDICATION CHARGE: Performed by: FAMILY MEDICINE

## 2024-08-14 NOTE — TELEPHONE ENCOUNTER
Received notification from [facility] that there is an issue with her Jardiance co-pay being high. Upon review of her Jardiance, I saw that she has a [employer] Wally that should be covering her co-pay for Jardiance.     I ran a test claim through the insurance with the wally and the wally rejected for being .     I started the renewal of her wally and she was flagged for the random income verification through the [employer].    I called and M for Ally requesting a call back regarding her prescription.     Patient called me back and confirmed that she is under the income maximum for the wally. I sent an email to her confirmed email address on file requesting for page 1 & 2 of her 1040 tax forms to provide to the [employer] to provide her eligibility for the assistance.     Ally consented and will be replying to my email with her income documents as requested. I let her know that I would update her when I have an updated status after I provide the documents to [employer] for review.    I also verified that she has 12 tablets on hand at this time, so if we do not hear back within the 7 days days that I advised her it could take for review, I would get her a 2 week supply (samples) on 24 to ensure that she does not run out of medication.    Patient understands and is grateful for the assistance.

## 2024-08-16 ENCOUNTER — PATIENT OUTREACH (OUTPATIENT)
Dept: HEALTH INFORMATION MANAGEMENT | Facility: OTHER | Age: 79
End: 2024-08-16
Payer: MEDICARE

## 2024-08-16 DIAGNOSIS — I21.02 ST ELEVATION MYOCARDIAL INFARCTION INVOLVING LEFT ANTERIOR DESCENDING (LAD) CORONARY ARTERY (HCC): ICD-10-CM

## 2024-08-16 NOTE — TELEPHONE ENCOUNTER
Id for sure defer to cardio. I can send a refill until she sees them.   Thank you,   Lillie Herrera MD

## 2024-08-16 NOTE — PROGRESS NOTES
Assessment    Pt calling in with questions about brilinta. She has about 5 days left, but she is unsure if she needs to continue it. Pt was told she would need to be on this therapy for 1 year and she is at the 1 year mariah. Will review with pcp and get back to pt with decision. Ok to cheo pt.     Completed monthly outreach. Pt states she is doing really well. She met with the endocrinologist and they stopped glipizide and started mounjaro. She will continue on jardiance and has applied for a miracle. Pt was given some samples of mounjaro from endo. No side effects after 1st dose. Pt reports she has been getting lots of activity with travel. She has been able to focus on healthy eating. No new symptoms or concerns.     Education and Self Management of Care    Medication management, pt is demonstrating good progress at self managing her chronic conditions    Plan of Care and Goals    Continue plan of care    Barriers:    none    Progress:    progressing    Next outreach:  1 month    Quarterly chart review completed. Pt continues to qualify for and benefit from personal care management program. Will continue to follow.

## 2024-08-20 ENCOUNTER — PATIENT MESSAGE (OUTPATIENT)
Dept: CARDIOLOGY | Facility: MEDICAL CENTER | Age: 79
End: 2024-08-20
Payer: MEDICARE

## 2024-08-20 NOTE — TELEPHONE ENCOUNTER
Medication: Jardiance 25mg tabs  Type of Insurance: Government funded (Medicare/Medicare Advantage)  Type of Financial assistance requested Foundation  Source: [employer]  Source Phone #: [phone]  Outcome: Approved  Effective dates: 07/15/24 until 07/14/25  Details/Billing Information:   [id number]  PCN: [id number]  GRP: [id number]  ID: [id number]  Max Award Amount: $10,000  Final Copay: $0

## 2024-08-20 NOTE — TELEPHONE ENCOUNTER
Patient provided income documents for required income verification. I uploaded them to [employer] and called the [employer] to request they expedite the review.     Within 24 hours it has been fully approved for use of her $10,000 miracle with the [employer].

## 2024-08-27 ENCOUNTER — PHARMACY VISIT (OUTPATIENT)
Dept: PHARMACY | Facility: MEDICAL CENTER | Age: 79
End: 2024-08-27
Payer: COMMERCIAL

## 2024-08-27 ENCOUNTER — PATIENT MESSAGE (OUTPATIENT)
Dept: CARDIOLOGY | Facility: MEDICAL CENTER | Age: 79
End: 2024-08-27
Payer: MEDICARE

## 2024-09-03 ENCOUNTER — HOSPITAL ENCOUNTER (OUTPATIENT)
Dept: LAB | Facility: MEDICAL CENTER | Age: 79
End: 2024-09-03
Attending: FAMILY MEDICINE
Payer: MEDICARE

## 2024-09-03 DIAGNOSIS — E11.65 TYPE 2 DIABETES MELLITUS WITH HYPERGLYCEMIA, WITHOUT LONG-TERM CURRENT USE OF INSULIN (HCC): ICD-10-CM

## 2024-09-03 LAB
CREAT UR-MCNC: 75.88 MG/DL
EST. AVERAGE GLUCOSE BLD GHB EST-MCNC: 163 MG/DL
HBA1C MFR BLD: 7.3 % (ref 4–5.6)
PROT UR-MCNC: 9 MG/DL (ref 0–15)
PROT/CREAT UR: 119 MG/G (ref 10–107)

## 2024-09-03 PROCEDURE — 36415 COLL VENOUS BLD VENIPUNCTURE: CPT

## 2024-09-03 PROCEDURE — 84156 ASSAY OF PROTEIN URINE: CPT

## 2024-09-03 PROCEDURE — 82570 ASSAY OF URINE CREATININE: CPT

## 2024-09-03 PROCEDURE — 83036 HEMOGLOBIN GLYCOSYLATED A1C: CPT

## 2024-09-04 NOTE — TELEPHONE ENCOUNTER
----- Message from MALOU Cabrera sent at 12/13/2023  9:12 AM PST -----  Regarding: FW: GI clearance  Can you send clearance letter to [facility] that we would prefer to wait for non-urgent colonoscopy until July 2024. Thanks,    Harriett  ----- Message -----  From: Lillie Herrera M.D.  Sent: 12/11/2023   4:04 PM PST  To: MALOU Cabrera  Subject: GI clearance                                     [facility] phoned the appt line and then the appt line called us. They want clearance for EGD. I read your note that said they need to wait until July 2024 when she is off of Brilinta.   Can we phone [facility] and let them know that? They gave us the wrong number to call them back anyway so we need to call their main line.   Thank you,   Lillie Herrera MD            [Routine Follow-Up] : routine follow-up visit for [Other: ___] : [unfilled] [Family Member] : family member [Other: _____] : [unfilled] [Patient Declined  Services] : - None: Patient declined  services [Interpreters_FullName] : Mely Begum [Interpreters_Relationshiptopatient] : Daughter [TWNoteComboBox1] : [place]

## 2024-09-09 ENCOUNTER — OFFICE VISIT (OUTPATIENT)
Dept: MEDICAL GROUP | Facility: LAB | Age: 79
End: 2024-09-09
Payer: MEDICARE

## 2024-09-09 VITALS
TEMPERATURE: 97.6 F | SYSTOLIC BLOOD PRESSURE: 110 MMHG | WEIGHT: 162 LBS | DIASTOLIC BLOOD PRESSURE: 62 MMHG | HEIGHT: 67 IN | RESPIRATION RATE: 16 BRPM | BODY MASS INDEX: 25.43 KG/M2 | OXYGEN SATURATION: 99 % | HEART RATE: 64 BPM

## 2024-09-09 DIAGNOSIS — E11.65 TYPE 2 DIABETES MELLITUS WITH HYPERGLYCEMIA, WITHOUT LONG-TERM CURRENT USE OF INSULIN (HCC): ICD-10-CM

## 2024-09-09 PROCEDURE — RXMED WILLOW AMBULATORY MEDICATION CHARGE: Performed by: PHYSICIAN ASSISTANT

## 2024-09-09 PROCEDURE — RXMED WILLOW AMBULATORY MEDICATION CHARGE: Performed by: FAMILY MEDICINE

## 2024-09-09 PROCEDURE — 3074F SYST BP LT 130 MM HG: CPT | Performed by: FAMILY MEDICINE

## 2024-09-09 PROCEDURE — 99213 OFFICE O/P EST LOW 20 MIN: CPT | Performed by: FAMILY MEDICINE

## 2024-09-09 PROCEDURE — 3078F DIAST BP <80 MM HG: CPT | Performed by: FAMILY MEDICINE

## 2024-09-09 ASSESSMENT — FIBROSIS 4 INDEX: FIB4 SCORE: 4.99

## 2024-09-09 NOTE — PROGRESS NOTES
Subjective:     Chief Complaint   Patient presents with    Diabetes Follow-up         HPI:   Ally presents today for diabetes follow up.   A1C has improved drastically.   9.6% --> 7.3%. she did stop eating oatmeal. Using mounjaro. No new exercise. Cardio said she could stop brilinta.     Tolerating mounjaro as well. No GI effects.     Due for eye exam, but needs new office.     Having EGD on 9/17/24. [facility].       Current Outpatient Medications Ordered in Epic   Medication Sig Dispense Refill    Tirzepatide (MOUNJARO) 5 MG/0.5ML Solution Pen-injector Inject one pen under the skin once a week 6 mL 1    carvedilol (COREG) 6.25 MG Tab Take 1 Tablet by mouth 2 times a day with meals. 200 Tablet 3    Empagliflozin 25 MG Tab Take 1 tablet (25 mg) by mouth every day. 100 Tablet 1    levothyroxine (SYNTHROID) 100 MCG Tab Take 1 tablet by mouth every morning on an empty stomach. 100 Tablet 1    rosuvastatin (CRESTOR) 20 MG Tab Take 1 Tablet by mouth every evening. 100 Tablet 3    ondansetron (ZOFRAN ODT) 4 MG TABLET DISPERSIBLE Dissolve 1 Tablet by mouth every 6 hours as needed for Nausea/Vomiting. 10 Tablet 0    aspirin 81 MG EC tablet Take 1 Tablet by mouth every day. 100 Tablet 7    therapeutic multivitamin-minerals (THERAGRAN-M) Tab Take 1 Tab by mouth every day.      Calcium Carbonate-Vitamin D (CALTRATE 600+D PO) Take 1 Tablet by mouth every day.      ticagrelor (BRILINTA) 90 MG Tab tablet Take 1 Tablet by mouth 2 times a day for 30 days. 30 Tablet 8     No current Epic-ordered facility-administered medications on file.         ROS:  Gen: no fevers/chills, no changes in weight  Eyes: no changes in vision  ENT: no sore throat, no hearing loss, no bloody nose  Pulm: no sob, no cough  CV: no chest pain, no palpitations  GI: no nausea/vomiting, no diarrhea  : no dysuria  MSk: no myalgias  Skin: no rash  Neuro: no headaches, no numbness/tingling  Heme/Lymph: no easy bruising      Objective:     Exam:  /62  "  Pulse 64   Temp 36.4 °C (97.6 °F)   Resp 16   Ht 1.702 m (5' 7\")   Wt 73.5 kg (162 lb)   LMP 02/27/1997   SpO2 99%   BMI 25.37 kg/m²  Body mass index is 25.37 kg/m².    Gen: AAOx3, NAD, well appearing  HEENT: NCAT, EOMI, Nares patent, Mucosa moist  Resp: Normal chest wall rise and fall, not SOB, no tachypnea  Skin: no rash or abnormality of visible skin.   Psych: normal speech, not slurred, good insight, affect full  MSK: Moves all four limbs equally and normally, gait normal          Assessment & Plan:     79 y.o. female with the following -     1. Type 2 diabetes mellitus with hyperglycemia, without long-term current use of insulin ([facility])  She is congratulated on changes that she is made.  She is doing very well tolerating medications right now.  She is really everything the same.  Should follow-up with us in the next 6 months.  She does have an eye exam scheduled in the near future.  Continue regular monitoring of her labs.              No follow-ups on file.    Please note that this dictation was created using voice recognition software. I have made every reasonable attempt to correct obvious errors, but I expect that there are errors of grammar and possibly content that I did not discover before finalizing the note.        "

## 2024-09-10 ENCOUNTER — PHARMACY VISIT (OUTPATIENT)
Dept: PHARMACY | Facility: MEDICAL CENTER | Age: 79
End: 2024-09-10
Payer: COMMERCIAL

## 2024-09-17 ENCOUNTER — OFFICE VISIT (OUTPATIENT)
Dept: URGENT CARE | Facility: CLINIC | Age: 79
End: 2024-09-17
Payer: MEDICARE

## 2024-09-17 VITALS
OXYGEN SATURATION: 96 % | RESPIRATION RATE: 14 BRPM | HEIGHT: 67 IN | SYSTOLIC BLOOD PRESSURE: 122 MMHG | HEART RATE: 85 BPM | DIASTOLIC BLOOD PRESSURE: 76 MMHG | BODY MASS INDEX: 25.11 KG/M2 | TEMPERATURE: 98.3 F | WEIGHT: 160 LBS

## 2024-09-17 DIAGNOSIS — U07.1 COVID-19 VIRUS INFECTION: Primary | ICD-10-CM

## 2024-09-17 DIAGNOSIS — J06.9 ACUTE UPPER RESPIRATORY INFECTION, UNSPECIFIED: ICD-10-CM

## 2024-09-17 DIAGNOSIS — R05.9 COUGH, UNSPECIFIED TYPE: ICD-10-CM

## 2024-09-17 DIAGNOSIS — Z20.822 EXPOSURE TO CONFIRMED CASE OF COVID-19: ICD-10-CM

## 2024-09-17 LAB
FLUAV RNA SPEC QL NAA+PROBE: NEGATIVE
FLUBV RNA SPEC QL NAA+PROBE: NEGATIVE
RSV RNA SPEC QL NAA+PROBE: NEGATIVE
SARS-COV-2 RNA RESP QL NAA+PROBE: POSITIVE

## 2024-09-17 PROCEDURE — 0241U POCT CEPHEID COV-2, FLU A/B, RSV - PCR: CPT | Performed by: PHYSICIAN ASSISTANT

## 2024-09-17 PROCEDURE — 3074F SYST BP LT 130 MM HG: CPT | Performed by: PHYSICIAN ASSISTANT

## 2024-09-17 PROCEDURE — 3078F DIAST BP <80 MM HG: CPT | Performed by: PHYSICIAN ASSISTANT

## 2024-09-17 PROCEDURE — 99213 OFFICE O/P EST LOW 20 MIN: CPT | Performed by: PHYSICIAN ASSISTANT

## 2024-09-17 ASSESSMENT — ENCOUNTER SYMPTOMS
COUGH: 1
CHILLS: 0
SHORTNESS OF BREATH: 0
SPUTUM PRODUCTION: 1
FEVER: 0
RHINORRHEA: 1
SORE THROAT: 1
WHEEZING: 0
DIARRHEA: 0

## 2024-09-17 ASSESSMENT — FIBROSIS 4 INDEX: FIB4 SCORE: 4.99

## 2024-09-17 NOTE — PROGRESS NOTES
Subjective     Ally Doherty is a 79 y.o. female who presents with Coronavirus Screening (Covid exposure ,  tested positive x 4 days, onset symptoms x last night )    PMH:  has a past medical history of Anemia, Breast cancer (HCC) (02/27/2012), Cancer (HCC) (2010), DM hyperosmolarity type II (HCC) (02/27/2012), DM II (diabetes mellitus, type II), controlled (HCC) (06/21/2012), Hiatus hernia syndrome, High cholesterol (02/10/2015), HTN (hypertension) (02/12/2015), Hyperlipidemia (02/27/2012), Hypothyroidism (02/27/2012), Liver cirrhosis (HCC), Malignant neoplasm of upper-inner quadrant of right breast in female, estrogen receptor positive (HCC) (03/13/2018), Pancytopenia (HCC), and Vitamin D deficiency (05/27/2014).  MEDS:   Current Outpatient Medications:     Nirmatrelvir&Ritonavir 300/100 20 x 150 MG & 10 x 100MG Tablet Therapy Pack, Take 300 mg nirmatrelvir (two 150 mg tablets) with 100 mg ritonavir (one 100 mg tablet) by mouth, with all three tablets taken together twice daily for 5 days., Disp: 30 Each, Rfl: 0    Empagliflozin (JARDIANCE) 10 MG Tab tablet, Take 1 tablet by mouth once daily, Disp: 90 Tablet, Rfl: 3    Tirzepatide (MOUNJARO) 7.5 MG/0.5ML Solution Pen-injector, Inject 7.5 mg under the skin once a week, Disp: 6 mL, Rfl: 3    levothyroxine (SYNTHROID) 100 MCG Tab, Take 1 tablet by mouth in the morning on an empty stomach once daily, Disp: 90 Tablet, Rfl: 3    Tirzepatide (MOUNJARO) 5 MG/0.5ML Solution Pen-injector, Inject one pen under the skin once a week, Disp: 6 mL, Rfl: 1    carvedilol (COREG) 6.25 MG Tab, Take 1 Tablet by mouth 2 times a day with meals., Disp: 200 Tablet, Rfl: 3    Empagliflozin 25 MG Tab, Take 1 tablet (25 mg) by mouth every day., Disp: 100 Tablet, Rfl: 1    levothyroxine (SYNTHROID) 100 MCG Tab, Take 1 tablet by mouth every morning on an empty stomach., Disp: 100 Tablet, Rfl: 1    rosuvastatin (CRESTOR) 20 MG Tab, Take 1 Tablet by mouth every evening., Disp: 100  Tablet, Rfl: 3    ondansetron (ZOFRAN ODT) 4 MG TABLET DISPERSIBLE, Dissolve 1 Tablet by mouth every 6 hours as needed for Nausea/Vomiting., Disp: 10 Tablet, Rfl: 0    aspirin 81 MG EC tablet, Take 1 Tablet by mouth every day., Disp: 100 Tablet, Rfl: 7    therapeutic multivitamin-minerals (THERAGRAN-M) Tab, Take 1 Tab by mouth every day., Disp: , Rfl:     Calcium Carbonate-Vitamin D (CALTRATE 600+D PO), Take 1 Tablet by mouth every day., Disp: , Rfl:   ALLERGIES:   Allergies   Allergen Reactions    Byetta Unspecified     Pancreatitis    Penicillins Rash     All over    Statins [Hmg-Coa-R Inhibitors] Unspecified     Elevated liver enzymes    Metformin Unspecified     SURGHX:   Past Surgical History:   Procedure Laterality Date    CATARACT EXTRACTION WITH IOL Right 08/08/2022    Dr. Monzon    CATARACT EXTRACTION WITH IOL Left 08/03/2022    Dr. Monzon    [place] UPPER GI ENDOSCOPY,DIAGNOSIS N/A 06/18/2022    Procedure: UPPER ENDOSCOPY;  Surgeon: Venkat Hitchcock M.D.;  Location: [facility];  Service: Gastroenterology    MASTECTOMY Left 12/09/2019    Procedure: MASTECTOMY;  Surgeon: Edward Boo M.D.;  Location: SURGERY SAME DAY [facility];  Service: Plastics    BREAST IMPLANT REMOVAL Right 12/09/2019    Procedure: REMOVAL, IMPLANT, BREAST;  Surgeon: Edward Boo M.D.;  Location: SURGERY SAME DAY [facility];  Service: Plastics    BREAST RECONSTRUCTION  02/12/2015    Performed by Edward Boo M.D. at SURGERY [facility] ORS    MAMMOPLASTY REDUCTION  02/12/2015    Performed by Edward Boo M.D. at [facility] ORS    MOLE EXCISION  02/12/2015    Performed by Edward Boo M.D. at [facility] ORS    CAPSULECTOMY  03/06/2014    Performed by Edward Boo M.D. at [facility] ORS    BREAST RECONSTRUCTION  06/24/2013    Performed by Edward Boo M.D. at [facility] ORS    TISSUE EXPANDER PLACE/REMOVE  06/24/2013    Performed by Edward Boo M.D. at [facility] ORS    CAYETANO  "BY LAPAROSCOPY  2008    TUBAL LIGATION  1974    TONSILLECTOMY  1953    MASTECTOMY      right breast    AK CHEMOTHERAPY, UNSPECIFIED PROCEDURE      AK RADIATION THERAPY PLAN SIMPLE       SOCHX:  reports that she has never smoked. She has never used smokeless tobacco. She reports current alcohol use. She reports that she does not use drugs.  FH: Reviewed with patient, not pertinent to this visit.           Patient presents with:  Coronavirus Screening: PT  tested positive  4 days ago, patient onset of symptoms began last night.  Patient complaining of congestion, cough, body aches and sore throat.  Patient had COVID in August 2023, was prescribed molnupiravir.  Patient states it worked well and she would like to have a prescription for that as well.  Patient has not taken anything for her symptoms yet today.  Patient denies chest pain, shortness of breath, nausea vomiting or diarrhea.  No other complaints.      URI   This is a new problem. The current episode started yesterday. The problem has been gradually worsening. There has been no fever. Associated symptoms include congestion, coughing, a plugged ear sensation, rhinorrhea and a sore throat. Pertinent negatives include no diarrhea or wheezing. She has tried nothing for the symptoms. The treatment provided no relief.       Review of Systems   Constitutional:  Negative for chills and fever.   HENT:  Positive for congestion, rhinorrhea and sore throat.    Respiratory:  Positive for cough and sputum production. Negative for shortness of breath and wheezing.    Gastrointestinal:  Negative for diarrhea.   All other systems reviewed and are negative.             Objective     /76 (BP Location: Left arm, Patient Position: Sitting, BP Cuff Size: Adult)   Pulse 85   Temp 36.8 °C (98.3 °F) (Temporal)   Resp 14   Ht 1.702 m (5' 7\")   Wt 72.6 kg (160 lb)   LMP 02/27/1997   SpO2 96%   BMI 25.06 kg/m²      Physical Exam  Vitals and nursing note reviewed. "   Constitutional:       General: She is not in acute distress.     Appearance: Normal appearance. She is well-developed and normal weight. She is not ill-appearing or toxic-appearing.   HENT:      Head: Normocephalic and atraumatic.      Right Ear: Tympanic membrane normal.      Left Ear: Tympanic membrane normal.      Nose: Congestion and rhinorrhea present.      Mouth/Throat:      Lips: Pink.      Mouth: Mucous membranes are moist.      Pharynx: Oropharynx is clear. Uvula midline. No posterior oropharyngeal erythema.   Eyes:      Extraocular Movements: Extraocular movements intact.      Conjunctiva/sclera: Conjunctivae normal.      Pupils: Pupils are equal, round, and reactive to light.   Cardiovascular:      Rate and Rhythm: Normal rate and regular rhythm.      Pulses: Normal pulses.      Heart sounds: Normal heart sounds.   Pulmonary:      Effort: Pulmonary effort is normal. No respiratory distress.      Breath sounds: Normal breath sounds. No wheezing, rhonchi or rales.   Chest:      Chest wall: No tenderness.   Abdominal:      General: Bowel sounds are normal.      Palpations: Abdomen is soft.   Musculoskeletal:         General: Normal range of motion.      Cervical back: Normal range of motion and neck supple.   Skin:     General: Skin is warm and dry.      Capillary Refill: Capillary refill takes less than 2 seconds.   Neurological:      General: No focal deficit present.      Mental Status: She is alert and oriented to person, place, and time.      Cranial Nerves: No cranial nerve deficit.      Motor: Motor function is intact.      Coordination: Coordination is intact.      Gait: Gait normal.   Psychiatric:         Mood and Affect: Mood normal.         Behavior: Behavior is cooperative.                             Assessment & Plan        Assessment & Plan  Exposure to confirmed case of COVID-19    Orders:    POCT CoV-2, Flu A/B, RSV by PCR    Nirmatrelvir&Ritonavir 300/100 20 x 150 MG & 10 x 100MG Tablet  Therapy Pack; Take 300 mg nirmatrelvir (two 150 mg tablets) with 100 mg ritonavir (one 100 mg tablet) by mouth, with all three tablets taken together twice daily for 5 days.    Cough, unspecified type    Orders:    POCT CoV-2, Flu A/B, RSV by PCR    Nirmatrelvir&Ritonavir 300/100 20 x 150 MG & 10 x 100MG Tablet Therapy Pack; Take 300 mg nirmatrelvir (two 150 mg tablets) with 100 mg ritonavir (one 100 mg tablet) by mouth, with all three tablets taken together twice daily for 5 days.    Acute upper respiratory infection, unspecified    Orders:    POCT CoV-2, Flu A/B, RSV by PCR    Nirmatrelvir&Ritonavir 300/100 20 x 150 MG & 10 x 100MG Tablet Therapy Pack; Take 300 mg nirmatrelvir (two 150 mg tablets) with 100 mg ritonavir (one 100 mg tablet) by mouth, with all three tablets taken together twice daily for 5 days.    COVID-19 virus infection               COVID/RSV/flu: covid positive    Patient has positive COVID exposure at home by her .  I have given patient a written prescription for molnupiravir to take twice daily x 5 days.  Patient was also given a prescription for Paxlovid in case the molnupiravir was unavailable to her in a timely manner.    Patient can take over-the-counter cough cold medication as desired.    Differential diagnosis, supportive care, and indications for immediate follow-up discussed with patient.  Instructed to return to clinic or nearest emergency department for any change in condition, further concerns, or worsening of symptoms.    I personally reviewed prior external notes and test results pertinent to today's visit.  I have independently reviewed and interpreted all diagnostics ordered during this urgent care visit.    PT should follow up with PCP in 1-2 days for re-evaluation if symptoms have not improved.      Discussed red flags and reasons to return to UC or ED.      Pt and/or family verbalized understanding of diagnosis and follow up instructions and was offered informational  handout on diagnosis.  PT discharged.     Please note that this dictation was created using voice recognition software. I have made every reasonable attempt to correct obvious errors, but I expect that there may be errors of grammar and possibly content that I did not discover before finalizing the note.

## 2024-09-27 ENCOUNTER — PATIENT OUTREACH (OUTPATIENT)
Dept: HEALTH INFORMATION MANAGEMENT | Facility: OTHER | Age: 79
End: 2024-09-27
Payer: MEDICARE

## 2024-09-27 DIAGNOSIS — I10 PRIMARY HYPERTENSION: ICD-10-CM

## 2024-09-27 DIAGNOSIS — E11.9 TYPE 2 DIABETES MELLITUS WITHOUT COMPLICATION, WITHOUT LONG-TERM CURRENT USE OF INSULIN (HCC): ICD-10-CM

## 2024-10-03 PROCEDURE — RXMED WILLOW AMBULATORY MEDICATION CHARGE: Performed by: FAMILY MEDICINE

## 2024-10-04 ENCOUNTER — PHARMACY VISIT (OUTPATIENT)
Dept: PHARMACY | Facility: MEDICAL CENTER | Age: 79
End: 2024-10-04
Payer: COMMERCIAL

## 2024-10-04 PROCEDURE — RXMED WILLOW AMBULATORY MEDICATION CHARGE: Performed by: PHYSICIAN ASSISTANT

## 2024-10-15 PROCEDURE — RXMED WILLOW AMBULATORY MEDICATION CHARGE: Performed by: PHYSICIAN ASSISTANT

## 2024-10-16 ENCOUNTER — TELEPHONE (OUTPATIENT)
Dept: HEALTH INFORMATION MANAGEMENT | Facility: OTHER | Age: 79
End: 2024-10-16
Payer: MEDICARE

## 2024-10-21 ENCOUNTER — PHARMACY VISIT (OUTPATIENT)
Dept: PHARMACY | Facility: MEDICAL CENTER | Age: 79
End: 2024-10-21
Payer: COMMERCIAL

## 2024-10-22 PROCEDURE — RXMED WILLOW AMBULATORY MEDICATION CHARGE: Performed by: PHYSICIAN ASSISTANT

## 2024-10-23 ENCOUNTER — PHARMACY VISIT (OUTPATIENT)
Dept: PHARMACY | Facility: MEDICAL CENTER | Age: 79
End: 2024-10-23
Payer: COMMERCIAL

## 2024-11-12 ENCOUNTER — PATIENT OUTREACH (OUTPATIENT)
Dept: HEALTH INFORMATION MANAGEMENT | Facility: OTHER | Age: 79
End: 2024-11-12
Payer: MEDICARE

## 2024-11-12 DIAGNOSIS — E11.9 TYPE 2 DIABETES MELLITUS WITHOUT COMPLICATION, WITHOUT LONG-TERM CURRENT USE OF INSULIN (HCC): ICD-10-CM

## 2024-11-12 DIAGNOSIS — E78.5 DYSLIPIDEMIA: ICD-10-CM

## 2024-11-12 DIAGNOSIS — I10 PRIMARY HYPERTENSION: ICD-10-CM

## 2024-11-12 NOTE — PROGRESS NOTES
Assessment    Pt calling in to report dizziness the day after getting her flu and covid shot. She felt generally unwell, but is starting to feel better. However, she was still dizzy when she got up this morning. She has been checking her bp which is slightly lower than normal for her around: 106/56. Heart rate is in the 70s. She did have one systolic reading over the weekend in the 90s. She believes she is drinking enough fluid. She is worried about her monjauro dose that is due today since she isn't feeling well and doesn't want to further lower her bp or bs. Advised she can wait a day since the symptoms seem to be improving. Reviewed medication instructions that if dose is taken within 4 days of usual schedule, pt can return to previous schedule. Otherwise skip and resume normal schedule. Pt reports current weight is 150lb now. She is comfortable at this weight and does not want not to lose anymore. BMI is now in normal range. She is eating well. Using protein drinks as a supplement since she has a poor appetite with monjuaro. She did recently move up to the 7.5mg dose from 5mg. She does not monitor her blood sugars. She will follow up with endo 12/13 and will plan to discuss weight concern. Encouraged pt to reach out sooner or schedule appointment with any continued symptoms. Pt has otherwise been feeling well. Taking all medications as prescribed. Discussed upcoming appointments. No other needs at this time.     Education and Self-Management of Care    Medication management, pt demonstrating progress at self management of chronic conditions     Plan of Care and Goals    Care plan updated    Barriers:    Does not wish to monitor blood sugar at home otherwise none    Progress:    progressing    Next outreach: 1 month

## 2024-11-16 ENCOUNTER — HOSPITAL ENCOUNTER (OUTPATIENT)
Dept: LAB | Facility: MEDICAL CENTER | Age: 79
End: 2024-11-16
Attending: PLASTIC SURGERY
Payer: MEDICARE

## 2024-11-16 LAB
ALBUMIN SERPL BCP-MCNC: 4 G/DL (ref 3.2–4.9)
ALBUMIN/GLOB SERPL: 1.4 G/DL
ALP SERPL-CCNC: 84 U/L (ref 30–99)
ALT SERPL-CCNC: 28 U/L (ref 2–50)
ANION GAP SERPL CALC-SCNC: 12 MMOL/L (ref 7–16)
ANISOCYTOSIS BLD QL SMEAR: ABNORMAL
AST SERPL-CCNC: 47 U/L (ref 12–45)
BASOPHILS # BLD AUTO: 1 % (ref 0–1.8)
BASOPHILS # BLD: 0.04 K/UL (ref 0–0.12)
BILIRUB SERPL-MCNC: 0.8 MG/DL (ref 0.1–1.5)
BUN SERPL-MCNC: 20 MG/DL (ref 8–22)
CALCIUM ALBUM COR SERPL-MCNC: 9.5 MG/DL (ref 8.5–10.5)
CALCIUM SERPL-MCNC: 9.5 MG/DL (ref 8.5–10.5)
CHLORIDE SERPL-SCNC: 108 MMOL/L (ref 96–112)
CO2 SERPL-SCNC: 21 MMOL/L (ref 20–33)
COMMENT 1642: NORMAL
CREAT SERPL-MCNC: 1.02 MG/DL (ref 0.5–1.4)
EOSINOPHIL # BLD AUTO: 0.12 K/UL (ref 0–0.51)
EOSINOPHIL NFR BLD: 3 % (ref 0–6.9)
ERYTHROCYTE [DISTWIDTH] IN BLOOD BY AUTOMATED COUNT: 45.7 FL (ref 35.9–50)
GFR SERPLBLD CREATININE-BSD FMLA CKD-EPI: 56 ML/MIN/1.73 M 2
GLOBULIN SER CALC-MCNC: 2.8 G/DL (ref 1.9–3.5)
GLUCOSE SERPL-MCNC: 107 MG/DL (ref 65–99)
HCT VFR BLD AUTO: 37.1 % (ref 37–47)
HGB BLD-MCNC: 10.3 G/DL (ref 12–16)
HYPOCHROMIA BLD QL SMEAR: ABNORMAL
IMM GRANULOCYTES # BLD AUTO: 0.01 K/UL (ref 0–0.11)
IMM GRANULOCYTES NFR BLD AUTO: 0.3 % (ref 0–0.9)
INR PPP: 1.26 (ref 0.87–1.13)
LYMPHOCYTES # BLD AUTO: 0.85 K/UL (ref 1–4.8)
LYMPHOCYTES NFR BLD: 21.3 % (ref 22–41)
MCH RBC QN AUTO: 18.7 PG (ref 27–33)
MCHC RBC AUTO-ENTMCNC: 27.8 G/DL (ref 32.2–35.5)
MCV RBC AUTO: 67.5 FL (ref 81.4–97.8)
MICROCYTES BLD QL SMEAR: ABNORMAL
MONOCYTES # BLD AUTO: 0.34 K/UL (ref 0–0.85)
MONOCYTES NFR BLD AUTO: 8.5 % (ref 0–13.4)
MORPHOLOGY BLD-IMP: NORMAL
NEUTROPHILS # BLD AUTO: 2.63 K/UL (ref 1.82–7.42)
NEUTROPHILS NFR BLD: 65.9 % (ref 44–72)
NRBC # BLD AUTO: 0 K/UL
NRBC BLD-RTO: 0 /100 WBC (ref 0–0.2)
OVALOCYTES BLD QL SMEAR: NORMAL
PLATELET # BLD AUTO: 131 K/UL (ref 164–446)
PLATELET BLD QL SMEAR: NORMAL
PMV BLD AUTO: 9.6 FL (ref 9–12.9)
POIKILOCYTOSIS BLD QL SMEAR: NORMAL
POTASSIUM SERPL-SCNC: 4 MMOL/L (ref 3.6–5.5)
PROT SERPL-MCNC: 6.8 G/DL (ref 6–8.2)
PROTHROMBIN TIME: 15.8 SEC (ref 12–14.6)
RBC # BLD AUTO: 5.5 M/UL (ref 4.2–5.4)
RBC BLD AUTO: PRESENT
SCHISTOCYTES BLD QL SMEAR: NORMAL
SODIUM SERPL-SCNC: 141 MMOL/L (ref 135–145)
WBC # BLD AUTO: 4 K/UL (ref 4.8–10.8)

## 2024-11-16 PROCEDURE — 80053 COMPREHEN METABOLIC PANEL: CPT

## 2024-11-16 PROCEDURE — 36415 COLL VENOUS BLD VENIPUNCTURE: CPT

## 2024-11-16 PROCEDURE — 85610 PROTHROMBIN TIME: CPT

## 2024-11-16 PROCEDURE — 85025 COMPLETE CBC W/AUTO DIFF WBC: CPT

## 2024-11-16 PROCEDURE — 82105 ALPHA-FETOPROTEIN SERUM: CPT

## 2024-11-18 LAB — AFP-TM SERPL-MCNC: 4 NG/ML (ref 0–9)

## 2024-11-19 ENCOUNTER — HOSPITAL ENCOUNTER (OUTPATIENT)
Dept: RADIOLOGY | Facility: MEDICAL CENTER | Age: 79
End: 2024-11-19
Attending: PHYSICIAN ASSISTANT
Payer: MEDICARE

## 2024-11-19 DIAGNOSIS — K75.81 NONALCOHOLIC STEATOHEPATITIS: ICD-10-CM

## 2024-11-19 DIAGNOSIS — K74.60 PROGRESSIVE NEURONAL DEGENERATION WITH LIVER CIRRHOSIS (HCC): ICD-10-CM

## 2024-11-19 DIAGNOSIS — G31.89 PROGRESSIVE NEURONAL DEGENERATION WITH LIVER CIRRHOSIS (HCC): ICD-10-CM

## 2024-11-19 PROCEDURE — 76700 US EXAM ABDOM COMPLETE: CPT

## 2024-12-03 ENCOUNTER — PHARMACY VISIT (OUTPATIENT)
Dept: PHARMACY | Facility: MEDICAL CENTER | Age: 79
End: 2024-12-03
Payer: COMMERCIAL

## 2024-12-03 PROCEDURE — RXMED WILLOW AMBULATORY MEDICATION CHARGE: Performed by: PHYSICIAN ASSISTANT

## 2024-12-05 ENCOUNTER — HOSPITAL ENCOUNTER (OUTPATIENT)
Dept: LAB | Facility: MEDICAL CENTER | Age: 79
End: 2024-12-05
Attending: PHYSICIAN ASSISTANT
Payer: MEDICARE

## 2024-12-05 LAB
ANISOCYTOSIS BLD QL SMEAR: ABNORMAL
BASOPHILS # BLD AUTO: 1.1 % (ref 0–1.8)
BASOPHILS # BLD: 0.03 K/UL (ref 0–0.12)
BURR CELLS BLD QL SMEAR: NORMAL
COMMENT 1642: NORMAL
DACRYOCYTES BLD QL SMEAR: NORMAL
EOSINOPHIL # BLD AUTO: 0.08 K/UL (ref 0–0.51)
EOSINOPHIL NFR BLD: 2.8 % (ref 0–6.9)
ERYTHROCYTE [DISTWIDTH] IN BLOOD BY AUTOMATED COUNT: 46.4 FL (ref 35.9–50)
HCT VFR BLD AUTO: 34.8 % (ref 37–47)
HGB BLD-MCNC: 9.8 G/DL (ref 12–16)
HYPOCHROMIA BLD QL SMEAR: ABNORMAL
IMM GRANULOCYTES # BLD AUTO: 0.01 K/UL (ref 0–0.11)
IMM GRANULOCYTES NFR BLD AUTO: 0.4 % (ref 0–0.9)
LYMPHOCYTES # BLD AUTO: 0.64 K/UL (ref 1–4.8)
LYMPHOCYTES NFR BLD: 22.5 % (ref 22–41)
MCH RBC QN AUTO: 18.9 PG (ref 27–33)
MCHC RBC AUTO-ENTMCNC: 28.2 G/DL (ref 32.2–35.5)
MCV RBC AUTO: 67.1 FL (ref 81.4–97.8)
MICROCYTES BLD QL SMEAR: ABNORMAL
MONOCYTES # BLD AUTO: 0.31 K/UL (ref 0–0.85)
MONOCYTES NFR BLD AUTO: 10.9 % (ref 0–13.4)
MORPHOLOGY BLD-IMP: NORMAL
NEUTROPHILS # BLD AUTO: 1.78 K/UL (ref 1.82–7.42)
NEUTROPHILS NFR BLD: 62.3 % (ref 44–72)
NRBC # BLD AUTO: 0 K/UL
NRBC BLD-RTO: 0 /100 WBC (ref 0–0.2)
OVALOCYTES BLD QL SMEAR: NORMAL
PLATELET # BLD AUTO: 71 K/UL (ref 164–446)
PLATELET BLD QL SMEAR: NORMAL
PLATELETS.RETICULATED NFR BLD AUTO: 3.8 % (ref 0.6–13.1)
POIKILOCYTOSIS BLD QL SMEAR: NORMAL
POLYCHROMASIA BLD QL SMEAR: NORMAL
RBC # BLD AUTO: 5.19 M/UL (ref 4.2–5.4)
RBC BLD AUTO: PRESENT
SCHISTOCYTES BLD QL SMEAR: NORMAL
WBC # BLD AUTO: 2.9 K/UL (ref 4.8–10.8)

## 2024-12-05 PROCEDURE — 82043 UR ALBUMIN QUANTITATIVE: CPT

## 2024-12-05 PROCEDURE — 85025 COMPLETE CBC W/AUTO DIFF WBC: CPT

## 2024-12-05 PROCEDURE — 80061 LIPID PANEL: CPT

## 2024-12-05 PROCEDURE — 85055 RETICULATED PLATELET ASSAY: CPT

## 2024-12-05 PROCEDURE — 84439 ASSAY OF FREE THYROXINE: CPT

## 2024-12-05 PROCEDURE — 84443 ASSAY THYROID STIM HORMONE: CPT

## 2024-12-05 PROCEDURE — 80053 COMPREHEN METABOLIC PANEL: CPT

## 2024-12-05 PROCEDURE — 82570 ASSAY OF URINE CREATININE: CPT

## 2024-12-05 PROCEDURE — 36415 COLL VENOUS BLD VENIPUNCTURE: CPT

## 2024-12-06 LAB
ALBUMIN SERPL BCP-MCNC: 3.9 G/DL (ref 3.2–4.9)
ALBUMIN/GLOB SERPL: 1.6 G/DL
ALP SERPL-CCNC: 77 U/L (ref 30–99)
ALT SERPL-CCNC: 31 U/L (ref 2–50)
ANION GAP SERPL CALC-SCNC: 12 MMOL/L (ref 7–16)
AST SERPL-CCNC: 50 U/L (ref 12–45)
BILIRUB SERPL-MCNC: 0.6 MG/DL (ref 0.1–1.5)
BUN SERPL-MCNC: 19 MG/DL (ref 8–22)
CALCIUM ALBUM COR SERPL-MCNC: 9 MG/DL (ref 8.5–10.5)
CALCIUM SERPL-MCNC: 8.9 MG/DL (ref 8.5–10.5)
CHLORIDE SERPL-SCNC: 109 MMOL/L (ref 96–112)
CHOLEST SERPL-MCNC: 75 MG/DL (ref 100–199)
CO2 SERPL-SCNC: 18 MMOL/L (ref 20–33)
CREAT SERPL-MCNC: 1.05 MG/DL (ref 0.5–1.4)
GFR SERPLBLD CREATININE-BSD FMLA CKD-EPI: 54 ML/MIN/1.73 M 2
GLOBULIN SER CALC-MCNC: 2.5 G/DL (ref 1.9–3.5)
GLUCOSE SERPL-MCNC: 100 MG/DL (ref 65–99)
HDLC SERPL-MCNC: 45 MG/DL
LDLC SERPL CALC-MCNC: 17 MG/DL
POTASSIUM SERPL-SCNC: 3.7 MMOL/L (ref 3.6–5.5)
PROT SERPL-MCNC: 6.4 G/DL (ref 6–8.2)
SODIUM SERPL-SCNC: 139 MMOL/L (ref 135–145)
T4 FREE SERPL-MCNC: 1.93 NG/DL (ref 0.93–1.7)
TRIGL SERPL-MCNC: 64 MG/DL (ref 0–149)
TSH SERPL-ACNC: 0.11 UIU/ML (ref 0.35–5.5)

## 2024-12-09 LAB
CREAT UR-MCNC: 87.83 MG/DL
MICROALBUMIN UR-MCNC: 1.7 MG/DL
MICROALBUMIN/CREAT UR: 19 MG/G (ref 0–30)

## 2024-12-10 ENCOUNTER — OFFICE VISIT (OUTPATIENT)
Dept: CARDIOLOGY | Facility: MEDICAL CENTER | Age: 79
End: 2024-12-10
Payer: MEDICARE

## 2024-12-10 VITALS
WEIGHT: 150.3 LBS | HEIGHT: 67 IN | HEART RATE: 70 BPM | DIASTOLIC BLOOD PRESSURE: 58 MMHG | SYSTOLIC BLOOD PRESSURE: 110 MMHG | RESPIRATION RATE: 14 BRPM | BODY MASS INDEX: 23.59 KG/M2 | OXYGEN SATURATION: 98 %

## 2024-12-10 DIAGNOSIS — E11.65 TYPE 2 DIABETES MELLITUS WITH HYPERGLYCEMIA, WITHOUT LONG-TERM CURRENT USE OF INSULIN (HCC): ICD-10-CM

## 2024-12-10 DIAGNOSIS — I21.02 ST ELEVATION MYOCARDIAL INFARCTION INVOLVING LEFT ANTERIOR DESCENDING (LAD) CORONARY ARTERY (HCC): ICD-10-CM

## 2024-12-10 DIAGNOSIS — I25.2 HISTORY OF ST ELEVATION MYOCARDIAL INFARCTION (STEMI): ICD-10-CM

## 2024-12-10 DIAGNOSIS — E78.5 DYSLIPIDEMIA: ICD-10-CM

## 2024-12-10 DIAGNOSIS — I10 PRIMARY HYPERTENSION: ICD-10-CM

## 2024-12-10 DIAGNOSIS — Z95.5 STENTED CORONARY ARTERY: ICD-10-CM

## 2024-12-10 DIAGNOSIS — I70.0 AORTIC ATHEROSCLEROSIS (HCC): ICD-10-CM

## 2024-12-10 DIAGNOSIS — I25.5 ISCHEMIC CARDIOMYOPATHY: ICD-10-CM

## 2024-12-10 PROCEDURE — 99212 OFFICE O/P EST SF 10 MIN: CPT

## 2024-12-10 PROCEDURE — 99214 OFFICE O/P EST MOD 30 MIN: CPT

## 2024-12-10 PROCEDURE — 3078F DIAST BP <80 MM HG: CPT

## 2024-12-10 PROCEDURE — 3074F SYST BP LT 130 MM HG: CPT

## 2024-12-10 RX ORDER — CARVEDILOL 3.12 MG/1
3.12 TABLET ORAL 2 TIMES DAILY WITH MEALS
Qty: 200 TABLET | Refills: 3 | Status: SHIPPED | OUTPATIENT
Start: 2024-12-10

## 2024-12-10 RX ORDER — LOSARTAN POTASSIUM 25 MG/1
25 TABLET ORAL DAILY
Qty: 100 TABLET | Refills: 3 | Status: SHIPPED | OUTPATIENT
Start: 2024-12-10

## 2024-12-10 ASSESSMENT — ENCOUNTER SYMPTOMS
EYES NEGATIVE: 1
GASTROINTESTINAL NEGATIVE: 1
NERVOUS/ANXIOUS: 0
SHORTNESS OF BREATH: 0
PALPITATIONS: 0
CONSTITUTIONAL NEGATIVE: 1
DEPRESSION: 0
MUSCULOSKELETAL NEGATIVE: 1
PND: 0
NEUROLOGICAL NEGATIVE: 1
ORTHOPNEA: 0

## 2024-12-10 ASSESSMENT — FIBROSIS 4 INDEX: FIB4 SCORE: 9.74

## 2024-12-10 NOTE — PROGRESS NOTES
"Chief Complaint   Patient presents with    Other     Stented coronary artery         Subjective     Ally Doherty is a 79 y.o. female who presents today for follow-up.  She has a history of CAD, STEMI status post PCI CHARLIE to LAD on 7/10/2023 and staged PCI CHARLIE x 2 to RCA and PDA on 7/12/2023, echo with reduced EF, diabetes type 2, HTN, DLD, MEDINA, PAD, breast cancer, CKD stage III AA    She was seen by XANDER Mcmahon on 12/7/2023, at that visit she was doing well with well-controlled risk factors.  Tolerating ICR well    Today 12/10/2024 she has been doing well other than some dizziness a week ago.  No chest pain, shortness of breath, lower extremity edema    Past Medical History:   Diagnosis Date    Anemia     Breast cancer (HCC) 02/27/2012    Cancer (HCC) 2010    right breast    DM hyperosmolarity type II (HCC) 02/27/2012    oral meds    DM II (diabetes mellitus, type II), controlled (HCC) 06/21/2012    Hiatus hernia syndrome     High cholesterol 02/10/2015    Pt denies    HTN (hypertension) 02/12/2015    more \"preventative\" for DM-per her DR    Hyperlipidemia 02/27/2012    Hypothyroidism 02/27/2012    Liver cirrhosis (HCC)     couldn't take statins    Malignant neoplasm of upper-inner quadrant of right breast in female, estrogen receptor positive (HCC) 03/13/2018    Pancytopenia (HCC)     Vitamin D deficiency 05/27/2014    ICD-10 transition     Past Surgical History:   Procedure Laterality Date    CATARACT EXTRACTION WITH IOL Right 08/08/2022    Dr. Monzon    CATARACT EXTRACTION WITH IOL Left 08/03/2022    Dr. Monzon    SC UPPER GI ENDOSCOPY,DIAGNOSIS N/A 06/18/2022    Procedure: UPPER ENDOSCOPY;  Surgeon: Venkat Hitchcock M.D.;  Location: SURGERY [facility];  Service: Gastroenterology    MASTECTOMY Left 12/09/2019    Procedure: MASTECTOMY;  Surgeon: Edward Boo M.D.;  Location: SURGERY SAME DAY [facility];  Service: Plastics    BREAST IMPLANT REMOVAL Right 12/09/2019    Procedure: REMOVAL, IMPLANT, " BREAST;  Surgeon: Edward Boo M.D.;  Location: SURGERY SAME DAY [facility];  Service: Plastics    BREAST RECONSTRUCTION  02/12/2015    Performed by Edward Boo M.D. at SURGERY [facility]    MAMMOPLASTY REDUCTION  02/12/2015    Performed by Edward Boo M.D. at SURGERY [facility]    MOLE EXCISION  02/12/2015    Performed by Edward Boo M.D. at SURGERY [facility]    CAPSULECTOMY  03/06/2014    Performed by Edward Boo M.D. at SURGERY [facility]    BREAST RECONSTRUCTION  06/24/2013    Performed by Edward Boo M.D. at SURGERY [facility]    TISSUE EXPANDER PLACE/REMOVE  06/24/2013    Performed by Edward Boo M.D. at SURGERY [facility]    CAYETANO BY LAPAROSCOPY  2008    TUBAL LIGATION  1974    TONSILLECTOMY  1953    MASTECTOMY      right breast    CA CHEMOTHERAPY, UNSPECIFIED PROCEDURE      CA RADIATION THERAPY PLAN SIMPLE       Family History   Problem Relation Age of Onset    Other Other         adopted     Social History     Socioeconomic History    Marital status:      Spouse name: Not on file    Number of children: Not on file    Years of education: Not on file    Highest education level: Bachelor's degree (e.g., BA, AB, BS)   Occupational History    Not on file   Tobacco Use    Smoking status: Never    Smokeless tobacco: Never   Vaping Use    Vaping status: Never Used   Substance and Sexual Activity    Alcohol use: Yes     Alcohol/week: 0.0 oz     Comment: < 1 per week    Drug use: No    Sexual activity: Not Currently   Other Topics Concern    Not on file   Social History Narrative    Not on file     Social Drivers of Health     Financial Resource Strain: Low Risk  (6/8/2023)    Overall Financial Resource Strain (CARDIA)     Difficulty of Paying Living Expenses: Not hard at all   Food Insecurity: No Food Insecurity (6/8/2023)    Hunger Vital Sign     Worried About Running Out of Food in the Last Year: Never true     Ran Out of Food in the Last Year: Never true    Transportation Needs: No Transportation Needs (6/8/2023)    PRAPARE - Transportation     Lack of Transportation (Medical): No     Lack of Transportation (Non-Medical): No   Physical Activity: Insufficiently Active (6/8/2023)    Exercise Vital Sign     Days of Exercise per Week: 4 days     Minutes of Exercise per Session: 20 min   Stress: No Stress Concern Present (6/8/2023)    [employer] - Occupational Stress Questionnaire     Feeling of Stress : Only a little   Social Connections: Socially Integrated (6/8/2023)    Social Connection and Isolation Panel [NHANES]     Frequency of Communication with Friends and Family: Never     Frequency of Social Gatherings with Friends and Family: More than three times a week     Attends L[employer] Services: More than 4 times per year     Active Member of Clubs or Organizations: Yes     Attends Club or Organization Meetings: More than 4 times per year     Marital Status:    Intimate Partner Violence: Not At Risk (6/8/2023)    Humiliation, Afraid, Rape, and Kick questionnaire     Fear of Current or Ex-Partner: No     Emotionally Abused: No     Physically Abused: No     Sexually Abused: No   Housing Stability: Low Risk  (6/8/2023)    Housing Stability Vital Sign     Unable to Pay for Housing in the Last Year: No     Number of Places Lived in the Last Year: 1     Unstable Housing in the Last Year: No     Allergies   Allergen Reactions    [place] Unspecified     Pancreatitis    Penicillins Rash     All over    Statins [Hmg-Coa-R Inhibitors] Unspecified     Elevated liver enzymes    Metformin Unspecified     Outpatient Encounter Medications as of 12/10/2024   Medication Sig Dispense Refill    Tirzepatide (MOUNJARO) 7.5 MG/0.5ML Solution Auto-injector Inject 7.5 mg as directed every 7 days. 2 mL 3    Empagliflozin (JARDIANCE) 10 MG Tab tablet Take 1 tablet by mouth once daily 90 Tablet 3    Tirzepatide (MOUNJARO) 7.5 MG/0.5ML Solution Auto-injector  Inject 7.5 mg under the skin once a week 6 mL 3    levothyroxine (SYNTHROID) 100 MCG Tab Take 1 tablet by mouth in the morning on an empty stomach once daily 90 Tablet 3    carvedilol (COREG) 6.25 MG Tab Take 1 Tablet by mouth 2 times a day with meals. 200 Tablet 3    rosuvastatin (CRESTOR) 20 MG Tab Take 1 Tablet by mouth every evening. 100 Tablet 3    aspirin 81 MG EC tablet Take 1 Tablet by mouth every day. 100 Tablet 7    therapeutic multivitamin-minerals (THERAGRAN-M) Tab Take 1 Tab by mouth every day.      Calcium Carbonate-Vitamin D (CALTRATE 600+D PO) Take 1 Tablet by mouth every day.      [DISCONTINUED] Nirmatrelvir&Ritonavir 300/100 20 x 150 MG & 10 x 100MG Tablet Therapy Pack Take 300 mg nirmatrelvir (two 150 mg tablets) with 100 mg ritonavir (one 100 mg tablet) by mouth, with all three tablets taken together twice daily for 5 days. 30 Each 0    [DISCONTINUED] Tirzepatide (MOUNJARO) 5 MG/0.5ML Solution Pen-injector Inject one pen under the skin once a week 6 mL 1    [DISCONTINUED] Empagliflozin 25 MG Tab Take 1 tablet (25 mg) by mouth every day. 100 Tablet 1    [DISCONTINUED] levothyroxine (SYNTHROID) 100 MCG Tab Take 1 tablet by mouth every morning on an empty stomach. 100 Tablet 1    [DISCONTINUED] ondansetron (ZOFRAN ODT) 4 MG TABLET DISPERSIBLE Dissolve 1 Tablet by mouth every 6 hours as needed for Nausea/Vomiting. 10 Tablet 0     No facility-administered encounter medications on file as of 12/10/2024.     Review of Systems   Constitutional: Negative.    HENT: Negative.     Eyes: Negative.    Respiratory:  Negative for shortness of breath.    Cardiovascular:  Negative for chest pain, palpitations, orthopnea, leg swelling and PND.   Gastrointestinal: Negative.    Genitourinary: Negative.    Musculoskeletal: Negative.    Skin: Negative.    Neurological: Negative.    Endo/Heme/Allergies: Negative.    Psychiatric/Behavioral:  Negative for depression. The patient is not nervous/anxious.          "      Objective     /58 (BP Location: Left arm, Patient Position: Sitting, BP Cuff Size: Adult)   Pulse 70   Resp 14   Ht 1.702 m (5' 7\")   Wt 68.2 kg (150 lb 4.8 oz)   LMP 02/27/1997   SpO2 98%   BMI 23.54 kg/m²     Physical Exam  Constitutional:       Appearance: Normal appearance.   HENT:      Head: Normocephalic and atraumatic.   Neck:      Vascular: No JVD.   Cardiovascular:      Rate and Rhythm: Normal rate and regular rhythm.      Pulses: Normal pulses.      Heart sounds: Normal heart sounds. No murmur heard.     No friction rub.   Pulmonary:      Effort: Pulmonary effort is normal. No respiratory distress.      Breath sounds: Normal breath sounds.   Abdominal:      General: There is no distension.      Palpations: Abdomen is soft.   Musculoskeletal:      Right lower leg: No edema.      Left lower leg: No edema.   Skin:     General: Skin is warm and dry.   Neurological:      General: No focal deficit present.      Mental Status: She is alert and oriented to person, place, and time.   Psychiatric:         Mood and Affect: Mood normal.         Behavior: Behavior normal.            Lab Results   Component Value Date/Time    WBC 2.9 (L) 12/05/2024 10:01 AM    RBC 5.19 12/05/2024 10:01 AM    HEMOGLOBIN 9.8 (L) 12/05/2024 10:01 AM    HEMATOCRIT 34.8 (L) 12/05/2024 10:01 AM    MCV 67.1 (L) 12/05/2024 10:01 AM    MCH 18.9 (L) 12/05/2024 10:01 AM    MCHC 28.2 (L) 12/05/2024 10:01 AM    MPV 9.6 11/16/2024 09:52 AM    NEUTSPOLYS 62.30 12/05/2024 10:01 AM    LYMPHOCYTES 22.50 12/05/2024 10:01 AM    MONOCYTES 10.90 12/05/2024 10:01 AM    EOSINOPHILS 2.80 12/05/2024 10:01 AM    BASOPHILS 1.10 12/05/2024 10:01 AM    HYPOCHROMIA 1+ 12/05/2024 10:01 AM    ANISOCYTOSIS 1+ 12/05/2024 10:01 AM      Lab Results   Component Value Date/Time    CHOLSTRLTOT 75 (L) 12/05/2024 10:01 AM    LDL 17 12/05/2024 10:01 AM    HDL 45 12/05/2024 10:01 AM    TRIGLYCERIDE 64 12/05/2024 10:01 AM       Lab Results   Component Value " Date/Time    SODIUM 139 12/05/2024 10:01 AM    POTASSIUM 3.7 12/05/2024 10:01 AM    CHLORIDE 109 12/05/2024 10:01 AM    CO2 18 (L) 12/05/2024 10:01 AM    GLUCOSE 100 (H) 12/05/2024 10:01 AM    BUN 19 12/05/2024 10:01 AM    CREATININE 1.05 12/05/2024 10:01 AM     Lab Results   Component Value Date/Time    ALKPHOSPHAT 77 12/05/2024 10:01 AM    ASTSGOT 50 (H) 12/05/2024 10:01 AM    ALTSGPT 31 12/05/2024 10:01 AM    TBILIRUBIN 0.6 12/05/2024 10:01 AM      Angiogram 7/10/2023  PROCEDURES PERFORMED:  Left heart catheterization  Coronary angiography  Percutaneous coronary intervention of mid LAD  Intravascular ultrasound to guide revascularization  Moderate conscious sedation     INDICATIONS:  Anterior/anterolateral STEMI     HEMODYNAMICS:  Aortic pressure: 126 /83/104 mmHg  LVEDP: 35 mmHg  No significant aortic gradient on pullback     LEFT VENTRICULOGRAPHY   Estimated LVEF= 50%. Hypokinesis in the anterior wall     CORONARY ANGIOGRAPHY:  The left main coronary artery: Short large caliber that bifurcates to LAD and left circumflex  The left anterior descending coronary artery: Large caliber transapical vessel with 100% mid occlusion status post accessible IVUS guided PCI as detailed below  The left circumflex coronary artery: Large-caliber vessel that gives rise to large caliber OM1 and a large in caliber left PLB  The right coronary artery: Large-caliber dominant vessel with a long segment of severe 80% proximal/mid RCA stenosis     PERCUTANEOUS CORONARY INTERVENTION of mid LAD:  Pre: 100% stenosis and BEVERLY 0 flow  Post: 0% residual stenosis and BEVERLY III flow.   Guide Catheter(s): 6 [place] EBU 3.5  Guidewire(s): Run-through  Anticoagulation:  Heparin to maintain ACT > 250  Antiplatelet(s): Aspirin, Integrilin bolus, Brilinta  Pre-dilation balloon(s): 3 x 12 mm  IVUS or OCT used: IVUS guided  Intracoronary Atherectomy / Lithotripsy: None  Stent: Synergy 3.5 x 20 mm CHARLIE  Post-dilation balloon(s): 3.75 x 12 mm NC to about  3.8 mm     IMPRESSION:  1.  Severe two-vessel CAD (occluded mid LAD -STEMI culprit , and proximal/mid RCA) status post accessible IVUS guided revascularization of the mid LAD deploying Synergy 3.5 x 20 mm CHARLIE postdilated to about 3.8 mm.  2.  Low-normal estimated LV systolic function 50% with anterior hypokinesis  3.  Significantly elevated resting LVEDP 35 mmHg with no significant transaortic gradient on pullback     RECOMMENDATIONS:  Dual antiplatelet therapy for at least 12 months with close monitoring of her platelet count given MEDINA cirrhosis with chronic mild thrombocytopenia  Staged PCI to the RCA, either inpatient or outpatient  HI statin: Target LDL < 70 and TG < 150  GDMT and lifestyle modifications for secondary ASCVD prevention  Cardiac Rehab referral placed     Angiogram 7/12/2023  IMPRESSION:  1.  Severe proximal/mid RCA stenoses status post successful IVUS guided PCI deploying overlapping Synergy 3 x 38 mm and 3 x 16 mm CHARLIE postdilated proximally to about 3.6 mm.  2.  Severe mid right PDA stenosis status post successful PCI deploying Synergy 2.5 x 16 mm CHARLIE, postdilated to about 2.6 mm.  3.  Normal resting LVEDP 12 mmHg with no significant transaortic gradient on pullback     RECOMMENDATIONS:  Dual antiplatelet therapy for at least 24 months if tolerated given stent burden (LAD and RCA/PDA) then consider switching to monotherapy with Plavix 75 mg daily for life  HI statin or bempedoic acid-ezetimibe or [id number] if intolerant to statins: Target LDL < 70 and TG < 150  GDMT and lifestyle modifications for secondary ASCVD prevention  Cardiac Rehab referral     Echocardiogram 7/11/2023  CONCLUSIONS  Moderately reduced left ventricular systolic function.  The left ventricular ejection fraction is visually estimated to be 35-  40%.  Nearly the entire distal to apical left ventricle is akinetic.  No apical thrombus.  Grade I diastolic dysfunction.  The right ventricle is normal in size and systolic  function.  Estimated right ventricular systolic pressure is 39 mmHg.  No significant valvular abnormalities.    Echocardiogram 9/6/2023  CONCLUSIONS  Normal left ventricular systolic function.  The left ventricular ejection fraction is visually estimated to be 65-  70%.  Indeterminate diastolic function.  Normal right ventricular size and systolic function.  Estimated right ventricular systolic pressure is 32 mmHg.  Normal left atrial size.  Mild tricuspid regurgitation.  Normal inferior vena cava size and inspiratory collapse.     Compared to the prior study on 07/11/2023, left ventricular systolic   function has normalized.    Assessment & Plan     1. Ischemic cardiomyopathy        2. Stented coronary artery        3. Dyslipidemia        4. History of ST elevation myocardial infarction (STEMI)        5. Primary hypertension        6. Type 2 diabetes mellitus with hyperglycemia, without long-term current use of insulin ([facility])        7. Aortic atherosclerosis ([facility])            Medical Decision Making: Today's Assessment/Status/Plan:        CAD with history of NSTEMI/STEMI  Status post staged PCI, stent x1 to LAD on 7/10/2023  Stent x1 to RCA, stent x1 to PDA on 7/12/2023  ischemic cardiomyopathy, recovered ejection fraction  Type 2 diabetes  -Euvolemic on exam  -Continue aspirin  -Statin: Continue rosuvastatin  -BB: Reduce dose of carvedilol to 3.125 mg twice daily  -EF improved on repeat echocardiogram to 65 to 70%  -Restart losartan at 25 mg daily for renal protection with type 2 diabetes  -Jardiance 10 mg daily   -She completed ICR     Hypertension  -Good control continue medications as above     Hyperlipidemia with aortic atherosclerosis  -Most recent LDL 17  -Goal of less than 70, check lipid panel annually    We did discuss that she had several abnormalities on her most recent lab work including anemia and her thyroid panel.  She has ongoing follow-up with endocrinology and her primary care provider to discuss  these issues    Follow-up with XANDER Roberts in 1 year    This note was dictated using Dragon speech recognition software.

## 2024-12-24 PROCEDURE — RXMED WILLOW AMBULATORY MEDICATION CHARGE

## 2024-12-24 PROCEDURE — RXMED WILLOW AMBULATORY MEDICATION CHARGE: Performed by: PHYSICIAN ASSISTANT

## 2024-12-27 ENCOUNTER — PHARMACY VISIT (OUTPATIENT)
Dept: PHARMACY | Facility: MEDICAL CENTER | Age: 79
End: 2024-12-27
Payer: COMMERCIAL

## 2025-01-06 ENCOUNTER — PATIENT OUTREACH (OUTPATIENT)
Dept: HEALTH INFORMATION MANAGEMENT | Facility: OTHER | Age: 80
End: 2025-01-06
Payer: MEDICARE

## 2025-01-06 DIAGNOSIS — I10 PRIMARY HYPERTENSION: ICD-10-CM

## 2025-01-06 DIAGNOSIS — E78.5 DYSLIPIDEMIA: ICD-10-CM

## 2025-01-06 DIAGNOSIS — E11.9 TYPE 2 DIABETES MELLITUS WITHOUT COMPLICATION, WITHOUT LONG-TERM CURRENT USE OF INSULIN (HCC): ICD-10-CM

## 2025-01-06 NOTE — PROGRESS NOTES
Reason for Follow-Up:      Spoke with pt for routine monthly outreach.     Current Health Status:  Pt reports she is feeling good. Changes in medications from cardiology going well so far. Bp has been in normal range and she has not had anymore dips below normal. No more dizziness. Endocrinologist took pt off jardiance due to rapid weight loss on this medication along with monjauro. Pt is concerned about cbc/platelets. No longer sees hem/onc. Wondering if pcp can prescribe iron infusions. She is also concerned about weight loss, currently at 139lbs. She is likely not getting enough calories with the appetite suppression from the monjauro. She states that endo will likely decrease her dose if her numbers continue to trend to goal. They did not check an A1C at her December visit. She is drinking protein drinks as a supplement.  Pt reports her energy has been good and she is keeping up with exercise. No immediate needs or concerns    Medical Updates:  reports endo recommended she speak to pcp about iron infusion    Medication Updates:  jardiance removed by endo. All other medications up to date. Tolerating well     Symptoms:  none    Plan of Care Goals and Progress:    Goal 1. Maintain current weight. Pt will meet with pcp to discuss nutrition. She will monitor calorie intake to get a baseline of what she is consuming on a daily basis due to poor appetite. She will continue to track her weight for progress.      Progress:  pt has achieved a comfortable goal weight, but has concerns about additional weight loss      Barriers:  appetite suppression from medication     Interventions:  scheduled with pcp to address medications and nutrition     Education:  diet/activity    Goal 2. Maintain labs in goal range. Monitor kidney funtion with lab work. A1c < 7.0     Progress:  progressing, A1c at 5.7      Barriers:  disease processes     Interventions:  monitor for completion of recommended lab work      Education:  discussed  diet/ adequate water intake       Patient's Concerns and Feedback (Self Management of Care):     Continued weight loss, recent lab results    Next Steps:     Follow-Up Plan:  pt notified of provider's recommendations and assisted to schedule follow up      Appointments:  1/14/25 with pcp     Contact Information:  Call [phone] with any questions or concerns.

## 2025-01-14 ENCOUNTER — OFFICE VISIT (OUTPATIENT)
Dept: MEDICAL GROUP | Facility: LAB | Age: 80
End: 2025-01-14
Payer: MEDICARE

## 2025-01-14 ENCOUNTER — HOSPITAL ENCOUNTER (OUTPATIENT)
Dept: LAB | Facility: MEDICAL CENTER | Age: 80
End: 2025-01-14
Attending: FAMILY MEDICINE
Payer: MEDICARE

## 2025-01-14 VITALS
DIASTOLIC BLOOD PRESSURE: 60 MMHG | BODY MASS INDEX: 23.54 KG/M2 | TEMPERATURE: 97 F | SYSTOLIC BLOOD PRESSURE: 114 MMHG | RESPIRATION RATE: 16 BRPM | WEIGHT: 150 LBS | HEART RATE: 66 BPM | OXYGEN SATURATION: 99 % | HEIGHT: 67 IN

## 2025-01-14 DIAGNOSIS — M81.0 POSTMENOPAUSAL BONE LOSS: ICD-10-CM

## 2025-01-14 DIAGNOSIS — E61.1 IRON DEFICIENCY: ICD-10-CM

## 2025-01-14 DIAGNOSIS — E11.22 TYPE 2 DIABETES MELLITUS WITH CHRONIC KIDNEY DISEASE, WITHOUT LONG-TERM CURRENT USE OF INSULIN, UNSPECIFIED CKD STAGE (HCC): ICD-10-CM

## 2025-01-14 DIAGNOSIS — R79.89 LOW SERUM LOW DENSITY LIPOPROTEIN (LDL) CHOLESTEROL: ICD-10-CM

## 2025-01-14 LAB
EST. AVERAGE GLUCOSE BLD GHB EST-MCNC: 117 MG/DL
FERRITIN SERPL-MCNC: 9.3 NG/ML (ref 10–291)
HBA1C MFR BLD: 5.7 % (ref 4–5.6)
IRON SATN MFR SERPL: 8 % (ref 15–55)
IRON SERPL-MCNC: 32 UG/DL (ref 40–170)
TIBC SERPL-MCNC: 415 UG/DL (ref 250–450)
UIBC SERPL-MCNC: 383 UG/DL (ref 110–370)

## 2025-01-14 PROCEDURE — 99214 OFFICE O/P EST MOD 30 MIN: CPT | Performed by: FAMILY MEDICINE

## 2025-01-14 PROCEDURE — 3074F SYST BP LT 130 MM HG: CPT | Performed by: FAMILY MEDICINE

## 2025-01-14 PROCEDURE — 83036 HEMOGLOBIN GLYCOSYLATED A1C: CPT

## 2025-01-14 PROCEDURE — 82728 ASSAY OF FERRITIN: CPT

## 2025-01-14 PROCEDURE — 3078F DIAST BP <80 MM HG: CPT | Performed by: FAMILY MEDICINE

## 2025-01-14 PROCEDURE — 83550 IRON BINDING TEST: CPT

## 2025-01-14 PROCEDURE — RXMED WILLOW AMBULATORY MEDICATION CHARGE: Performed by: PHYSICIAN ASSISTANT

## 2025-01-14 PROCEDURE — 83540 ASSAY OF IRON: CPT

## 2025-01-14 PROCEDURE — 36415 COLL VENOUS BLD VENIPUNCTURE: CPT

## 2025-01-14 ASSESSMENT — FIBROSIS 4 INDEX: FIB4 SCORE: 9.74

## 2025-01-14 NOTE — PROGRESS NOTES
Subjective:     Chief Complaint   Patient presents with    Follow-Up         HPI:   Ally presents today for follow up.  Recently had labs done through endocrinology.  They did stop her Jardiance stating that she did not need it anymore.  Her fasting blood sugar was only 100 but an A1c was not drawn at that time.  She has continued to lose weight with being on the Mounjaro and there was a question of possibly reducing the Mounjaro to 5 mg depending on how she does going forward.  Cholesterol was also done and showed very low total cholesterol and very low LDL at a level of 17.  She does have a history of heart disease NSTEMI with a stent in the LAD.  Also has a history of elevated lipids a.  Labs also showed possible iron deficiency based on anemia present and microcytosis.  No iron labs were drawn however.  She has in the past notoriously gotten iron infusions for this previously.  She does find it hard to eat if she is just not hungry.  Portions are actually quite small as well.  With endocrinology visit recently she was 139 but today on our scale we do have her at 150.    Last eye exam January 4th. [facility].           Current Outpatient Medications Ordered in Epic   Medication Sig Dispense Refill    carvedilol (COREG) 3.125 MG Tab Take 1 Tablet by mouth 2 times a day with meals. 200 Tablet 3    losartan (COZAAR) 25 MG Tab Take 1 Tablet by mouth every day. 100 Tablet 3    Tirzepatide (MOUNJARO) 7.5 MG/0.5ML Solution Auto-injector Inject 7.5 mg under the skin once a week 6 mL 3    levothyroxine (SYNTHROID) 100 MCG Tab Take 1 tablet by mouth in the morning on an empty stomach once daily 90 Tablet 3    rosuvastatin (CRESTOR) 20 MG Tab Take 1 Tablet by mouth every evening. 100 Tablet 3    aspirin 81 MG EC tablet Take 1 Tablet by mouth every day. 100 Tablet 7    therapeutic multivitamin-minerals (THERAGRAN-M) Tab Take 1 Tab by mouth every day.      Calcium Carbonate-Vitamin D (CALTRATE 600+D PO) Take 1  "Tablet by mouth every day.      Tirzepatide (MOUNJARO) 7.5 MG/0.5ML Solution Auto-injector Inject 7.5 mg as directed every 7 days. 2 mL 3     No current [facility]-ordered facility-administered medications on file.         ROS:  Gen: no fevers/chills  Eyes: no changes in vision  ENT: no sore throat, no hearing loss, no bloody nose  Pulm: no sob, no cough  CV: no chest pain, no palpitations  GI: no nausea/vomiting, no diarrhea  : no dysuria  MSk: no myalgias  Skin: no rash  Neuro: no headaches, no numbness/tingling  Heme/Lymph: no easy bruising      Objective:     Exam:  /60   Pulse 66   Temp 36.1 °C (97 °F)   Resp 16   Ht 1.702 m (5' 7\")   Wt 68 kg (150 lb)   LMP 02/27/1997   SpO2 99%   BMI 23.49 kg/m²  Body mass index is 23.49 kg/m².    Gen: AAOx3, NAD, well appearing  HEENT: NCAT, EOMI, Nares patent, Mucosa moist  Resp: Normal chest wall rise and fall, not SOB, no tachypnea  Skin: no rash or abnormality of visible skin.   Psych: normal speech, not slurred, good insight, affect full  MSK: Moves all four limbs equally and normally, gait normal        Assessment & Plan:     79 y.o. female with the following -     1. Type 2 diabetes mellitus with chronic kidney disease, without long-term current use of insulin, unspecified CKD stage (HCC)  Reviewed recent blood work done with endocrinology.  Will go ahead and get an A1c since this was not drawn previously.  We did discuss the possibility of reducing her Mounjaro to 5 mg depending upon this level.  - HEMOGLOBIN A1C; Future    2. Iron deficiency  Will also get an iron check today.  May need to reinstitute iron dextran which she was doing previously.  - IRON/TOTAL IRON BIND; Future  - FERRITIN; Future    3. Low serum low density lipoprotein (LDL) cholesterol  I will send a message to cardiology to see what they think about her very low LDL.  There is certainly there are increased mortality morbidity risks with a very low number but this is difficult [place] " because she also has a history of heart disease.  May consider reducing her Crestor depending upon advice from cardiology.    4. Postmenopausal bone loss  Last full DEXA done in 2016.  Normal density at that point.  Forearm DEXA done in 2019 which did show some osteopenia.  Will get this repeated.  - DS-BONE DENSITY STUDY (DEXA); Future            No follow-ups on file.    Please note that this dictation was created using voice recognition software. I have made every reasonable attempt to correct obvious errors, but I expect that there are errors of grammar and possibly content that I did not discover before finalizing the note.

## 2025-01-16 RX ORDER — SODIUM CHLORIDE 9 MG/ML
INJECTION, SOLUTION INTRAVENOUS CONTINUOUS
Status: CANCELLED | OUTPATIENT
Start: 2025-01-16

## 2025-01-16 RX ORDER — DIPHENHYDRAMINE HYDROCHLORIDE 50 MG/ML
50 INJECTION INTRAMUSCULAR; INTRAVENOUS PRN
Status: CANCELLED | OUTPATIENT
Start: 2025-01-16

## 2025-01-16 RX ORDER — EPINEPHRINE 1 MG/ML(1)
0.5 AMPUL (ML) INJECTION PRN
Status: CANCELLED | OUTPATIENT
Start: 2025-01-16

## 2025-01-16 RX ORDER — METHYLPREDNISOLONE SODIUM SUCCINATE 125 MG/2ML
125 INJECTION, POWDER, LYOPHILIZED, FOR SOLUTION INTRAMUSCULAR; INTRAVENOUS PRN
Status: CANCELLED | OUTPATIENT
Start: 2025-01-16

## 2025-01-16 NOTE — CARE PLAN
Problem: Knowledge Deficit with Chronic Kidney Disease process  Goal: Patient will maintain/demonstrate improvement in lab values  Description: Long term  Outcome: Progressing     Problem: Knowledge deficit of hypertension  Goal: Demonstrate Knowledge of Hypertension  Description: Short term  Outcome: Progressing     Problem: Knowledge deficit of self-monitoring blood pressure  Goal: Demonstrate the elements of self-monitoring blood pressure  Description: Short term  Outcome: Progressing  Note:   -Patient has verbalized commitment to self-monitoring blood pressure at least once a week.    -Patient will log home blood pressures and bring the log to medical provider appointments.    Intervention: Educate on importance of keeping a home blood pressure log     Problem: Knowledge deficit of diabetes  Goal: Patient educated about diabetes  Description: Long term  Outcome: Met  Intervention: Patient will, In conjunction with their medical provider, identify goal A1c of <7. Pt recent A1c at 5.7

## 2025-01-17 ENCOUNTER — PHARMACY VISIT (OUTPATIENT)
Dept: PHARMACY | Facility: MEDICAL CENTER | Age: 80
End: 2025-01-17
Payer: COMMERCIAL

## 2025-01-19 PROCEDURE — RXMED WILLOW AMBULATORY MEDICATION CHARGE: Performed by: FAMILY MEDICINE

## 2025-01-19 PROCEDURE — RXMED WILLOW AMBULATORY MEDICATION CHARGE: Performed by: PHYSICIAN ASSISTANT

## 2025-01-22 ENCOUNTER — PHARMACY VISIT (OUTPATIENT)
Dept: PHARMACY | Facility: MEDICAL CENTER | Age: 80
End: 2025-01-22
Payer: COMMERCIAL

## 2025-01-23 ENCOUNTER — OUTPATIENT INFUSION SERVICES (OUTPATIENT)
Dept: ONCOLOGY | Facility: MEDICAL CENTER | Age: 80
End: 2025-01-23
Attending: FAMILY MEDICINE
Payer: MEDICARE

## 2025-01-23 VITALS
SYSTOLIC BLOOD PRESSURE: 112 MMHG | DIASTOLIC BLOOD PRESSURE: 45 MMHG | TEMPERATURE: 96.7 F | WEIGHT: 149.47 LBS | RESPIRATION RATE: 18 BRPM | OXYGEN SATURATION: 97 % | BODY MASS INDEX: 23.46 KG/M2 | HEIGHT: 67 IN | HEART RATE: 69 BPM

## 2025-01-23 DIAGNOSIS — D50.8 OTHER IRON DEFICIENCY ANEMIA: ICD-10-CM

## 2025-01-23 PROCEDURE — 700111 HCHG RX REV CODE 636 W/ 250 OVERRIDE (IP): Mod: JZ,TB | Performed by: FAMILY MEDICINE

## 2025-01-23 PROCEDURE — 96365 THER/PROPH/DIAG IV INF INIT: CPT

## 2025-01-23 PROCEDURE — 700105 HCHG RX REV CODE 258: Performed by: FAMILY MEDICINE

## 2025-01-23 RX ORDER — METHYLPREDNISOLONE SODIUM SUCCINATE 125 MG/2ML
125 INJECTION, POWDER, LYOPHILIZED, FOR SOLUTION INTRAMUSCULAR; INTRAVENOUS PRN
Status: DISCONTINUED | OUTPATIENT
Start: 2025-01-23 | End: 2025-01-23 | Stop reason: HOSPADM

## 2025-01-23 RX ORDER — EPINEPHRINE 1 MG/ML(1)
0.5 AMPUL (ML) INJECTION PRN
Status: DISCONTINUED | OUTPATIENT
Start: 2025-01-23 | End: 2025-01-23 | Stop reason: HOSPADM

## 2025-01-23 RX ORDER — METHYLPREDNISOLONE SODIUM SUCCINATE 125 MG/2ML
125 INJECTION, POWDER, LYOPHILIZED, FOR SOLUTION INTRAMUSCULAR; INTRAVENOUS PRN
OUTPATIENT
Start: 2025-01-23

## 2025-01-23 RX ORDER — SODIUM CHLORIDE 9 MG/ML
INJECTION, SOLUTION INTRAVENOUS CONTINUOUS
OUTPATIENT
Start: 2025-01-23

## 2025-01-23 RX ORDER — DIPHENHYDRAMINE HYDROCHLORIDE 50 MG/ML
50 INJECTION INTRAMUSCULAR; INTRAVENOUS PRN
OUTPATIENT
Start: 2025-01-23

## 2025-01-23 RX ORDER — EPINEPHRINE 1 MG/ML(1)
0.5 AMPUL (ML) INJECTION PRN
OUTPATIENT
Start: 2025-01-23

## 2025-01-23 RX ORDER — DIPHENHYDRAMINE HYDROCHLORIDE 50 MG/ML
50 INJECTION INTRAMUSCULAR; INTRAVENOUS PRN
Status: DISCONTINUED | OUTPATIENT
Start: 2025-01-23 | End: 2025-01-23 | Stop reason: HOSPADM

## 2025-01-23 RX ADMIN — SODIUM CHLORIDE 1000 MG: 9 INJECTION, SOLUTION INTRAVENOUS at 10:07

## 2025-01-23 ASSESSMENT — FIBROSIS 4 INDEX: FIB4 SCORE: 9.74

## 2025-01-23 NOTE — PROGRESS NOTES
Pt arrives to [facility] for Iron Dextran.  Pt had last infusion of iron in 10/2022.  Discussed plan of care and possible adverse reactions with pt.  Pt verbalized understanding.  Labs reviewed drawn on 1/14/2025.  PIV established to L-.  Iron Dextran initiated at 75ml/hr for 5 minutes.  Iron Dextran paused and observed pt for 10 minutes.  No adverse reaction noted.  The remainder of Iron Dextran infused over 1 hour.  Iron infusion completed without issue.  PIV flushed with NS and PIV site removed.  Site wrapped with pressure dressing.  Pt dc home with family.

## 2025-02-12 ENCOUNTER — HOSPITAL ENCOUNTER (OUTPATIENT)
Dept: RADIOLOGY | Facility: MEDICAL CENTER | Age: 80
End: 2025-02-12
Attending: FAMILY MEDICINE
Payer: MEDICARE

## 2025-02-12 DIAGNOSIS — M81.0 POSTMENOPAUSAL BONE LOSS: ICD-10-CM

## 2025-02-12 PROCEDURE — 77080 DXA BONE DENSITY AXIAL: CPT

## 2025-02-13 ENCOUNTER — RESULTS FOLLOW-UP (OUTPATIENT)
Dept: MEDICAL GROUP | Facility: LAB | Age: 80
End: 2025-02-13

## 2025-02-18 ENCOUNTER — PATIENT OUTREACH (OUTPATIENT)
Dept: HEALTH INFORMATION MANAGEMENT | Facility: OTHER | Age: 80
End: 2025-02-18
Payer: MEDICARE

## 2025-02-18 DIAGNOSIS — E78.5 DYSLIPIDEMIA: ICD-10-CM

## 2025-02-18 DIAGNOSIS — E11.9 TYPE 2 DIABETES MELLITUS WITHOUT COMPLICATION, WITHOUT LONG-TERM CURRENT USE OF INSULIN (HCC): ICD-10-CM

## 2025-02-18 NOTE — PROGRESS NOTES
Reason for Follow-Up:    Spoke to patient for routine monthly outreach for pcm program    Current Health Status:    Following pt for DM, HTN, dyslipidemia. Doing well.  Has not been checking bp regularly since it has consistently been in goal range. Maintaining weight- appetite suppressed. Exercising regularly to include weight bearing exercise. Pleased to see improvement in bone density. Notified pt that cardiology recommended no changes to her medication based off of last lipid panel with low ldl per pcp.     Medical Updates:  No ER or UC visits    Medication Updates:  no changes. Taking everything regularly as prescribed    Symptoms:  no new symptoms     Plan of Care Goals and Progress:    Goal 1. Maintain current weight.      Progress:  pt has not lost anymore weight since last month's call      Barriers:  medication side effects     Interventions:  none     Education:  pt continues to monitor closely    Goal 2. Maintain labs in goal range. Monitor kidney funtion with lab work. A1c < 7.0      Progress:  A1c at 5.7. pt up to date with recommended lab work       Barriers:  none     Interventions:  n/a     Education:  n/a      Patient's Concerns and Feedback (Self Management of Care):     Pt is doing very well. Feels more energetic after iron infusion. No new concerns or needs at this time. Agreeable to plan of care. Will plan to discharge after next month's call if she continues to do well.     Next Steps:     Follow-Up Plan:  1 month,       Appointments:  linette in 1 week (~2/25/25)     Contact Information:  Call [phone] with any questions or concerns.      Quarterly chart review completed. Pt continues to qualify for and benefit from personal care management program. Plan of care updated. Will continue to follow.

## 2025-02-24 ENCOUNTER — HOSPITAL ENCOUNTER (OUTPATIENT)
Dept: LAB | Facility: MEDICAL CENTER | Age: 80
End: 2025-02-24
Attending: PHYSICIAN ASSISTANT
Payer: MEDICARE

## 2025-02-24 LAB
ANISOCYTOSIS BLD QL SMEAR: ABNORMAL
BASOPHILS # BLD AUTO: 2 % (ref 0–1.8)
BASOPHILS # BLD: 0.08 K/UL (ref 0–0.12)
BURR CELLS BLD QL SMEAR: NORMAL
COMMENT 1642: NORMAL
EOSINOPHIL # BLD AUTO: 0.18 K/UL (ref 0–0.51)
EOSINOPHIL NFR BLD: 4.4 % (ref 0–6.9)
HCT VFR BLD AUTO: 44.4 % (ref 37–47)
HGB BLD-MCNC: 13.3 G/DL (ref 12–16)
HYPOCHROMIA BLD QL SMEAR: ABNORMAL
IMM GRANULOCYTES # BLD AUTO: 0 K/UL (ref 0–0.11)
IMM GRANULOCYTES NFR BLD AUTO: 0 % (ref 0–0.9)
LYMPHOCYTES # BLD AUTO: 1 K/UL (ref 1–4.8)
LYMPHOCYTES NFR BLD: 24.7 % (ref 22–41)
MCH RBC QN AUTO: 23.6 PG (ref 27–33)
MCHC RBC AUTO-ENTMCNC: 30 G/DL (ref 32.2–35.5)
MCV RBC AUTO: 78.9 FL (ref 81.4–97.8)
MICROCYTES BLD QL SMEAR: ABNORMAL
MONOCYTES # BLD AUTO: 0.33 K/UL (ref 0–0.85)
MONOCYTES NFR BLD AUTO: 8.1 % (ref 0–13.4)
MORPHOLOGY BLD-IMP: NORMAL
NEUTROPHILS # BLD AUTO: 2.46 K/UL (ref 1.82–7.42)
NEUTROPHILS NFR BLD: 60.8 % (ref 44–72)
NRBC # BLD AUTO: 0 K/UL
NRBC BLD-RTO: 0 /100 WBC (ref 0–0.2)
OVALOCYTES BLD QL SMEAR: NORMAL
PLATELET # BLD AUTO: 70 K/UL (ref 164–446)
PLATELET BLD QL SMEAR: NORMAL
PLATELETS.RETICULATED NFR BLD AUTO: 2.8 % (ref 0.6–13.1)
POIKILOCYTOSIS BLD QL SMEAR: NORMAL
RBC # BLD AUTO: 5.63 M/UL (ref 4.2–5.4)
RBC BLD AUTO: PRESENT
SCHISTOCYTES BLD QL SMEAR: NORMAL
WBC # BLD AUTO: 4.1 K/UL (ref 4.8–10.8)

## 2025-02-24 PROCEDURE — 85055 RETICULATED PLATELET ASSAY: CPT

## 2025-02-24 PROCEDURE — 80053 COMPREHEN METABOLIC PANEL: CPT

## 2025-02-24 PROCEDURE — RXMED WILLOW AMBULATORY MEDICATION CHARGE: Performed by: PHYSICIAN ASSISTANT

## 2025-02-24 PROCEDURE — 84439 ASSAY OF FREE THYROXINE: CPT

## 2025-02-24 PROCEDURE — 84443 ASSAY THYROID STIM HORMONE: CPT

## 2025-02-24 PROCEDURE — 85025 COMPLETE CBC W/AUTO DIFF WBC: CPT

## 2025-02-24 PROCEDURE — 36415 COLL VENOUS BLD VENIPUNCTURE: CPT

## 2025-02-25 LAB
ALBUMIN SERPL BCP-MCNC: 3.9 G/DL (ref 3.2–4.9)
ALBUMIN/GLOB SERPL: 1.4 G/DL
ALP SERPL-CCNC: 115 U/L (ref 30–99)
ALT SERPL-CCNC: 73 U/L (ref 2–50)
ANION GAP SERPL CALC-SCNC: 12 MMOL/L (ref 7–16)
AST SERPL-CCNC: 84 U/L (ref 12–45)
BILIRUB SERPL-MCNC: 0.6 MG/DL (ref 0.1–1.5)
BUN SERPL-MCNC: 20 MG/DL (ref 8–22)
CALCIUM ALBUM COR SERPL-MCNC: 9.6 MG/DL (ref 8.5–10.5)
CALCIUM SERPL-MCNC: 9.5 MG/DL (ref 8.5–10.5)
CHLORIDE SERPL-SCNC: 112 MMOL/L (ref 96–112)
CO2 SERPL-SCNC: 17 MMOL/L (ref 20–33)
CREAT SERPL-MCNC: 0.9 MG/DL (ref 0.5–1.4)
GFR SERPLBLD CREATININE-BSD FMLA CKD-EPI: 65 ML/MIN/1.73 M 2
GLOBULIN SER CALC-MCNC: 2.8 G/DL (ref 1.9–3.5)
GLUCOSE SERPL-MCNC: 97 MG/DL (ref 65–99)
POTASSIUM SERPL-SCNC: 4.4 MMOL/L (ref 3.6–5.5)
PROT SERPL-MCNC: 6.7 G/DL (ref 6–8.2)
SODIUM SERPL-SCNC: 141 MMOL/L (ref 135–145)
T4 FREE SERPL-MCNC: 1.7 NG/DL (ref 0.93–1.7)
TSH SERPL-ACNC: 0.2 UIU/ML (ref 0.35–5.5)

## 2025-02-26 ENCOUNTER — PHARMACY VISIT (OUTPATIENT)
Dept: PHARMACY | Facility: MEDICAL CENTER | Age: 80
End: 2025-02-26
Payer: COMMERCIAL

## 2025-02-26 NOTE — CARE PLAN
Problem: Knowledge Deficit with Chronic Kidney Disease process  Goal: Patient will maintain/demonstrate improvement in lab values  Description: Long term  Outcome: Met  Intervention: Monitor labs electrolytes, GFR, microalbumin, BUN, Cr  Intervention: If Diabetic monitor glucose levels and A1c  Intervention: BP maintained within goal range:  130/80     Problem: Knowledge deficit of hypertension  Goal: Demonstrate Knowledge of Hypertension  Description: Short term  Outcome: Met  Intervention: In conjunction with their medical provider, identify goal blood pressure: 130/80     Problem: Knowledge deficit of self-monitoring blood pressure  Goal: Demonstrate the elements of self-monitoring blood pressure  Description: Short term  Outcome: Met  Note:   -Patient has verbalized commitment to self-monitoring blood pressure at least once a week.    -Patient will log home blood pressures and bring the log to medical provider appointments.

## 2025-03-13 NOTE — PROGRESS NOTES
Assessment:    Phone outreach to patient to complete her monthly & quarterly follow-up for the PCM program.  Ally had an appointment today with her new PCP in the [facility] office. Her previous PCP (Dr. Randhawa) recently moved to [facility].  The appointment went well.  Things are going pretty well for Ally, she has lost some more weight, 10 pounds since the last time we spoke.  She has lost a significant amount of weight over the last couple of years. She has accomplished this by eating better and paying attention to what she eats.  She is trying to eat more vegtables than fruit.  She feels good after losing the weight.  Her great grandchildren were here visiting over the weekend, they did a lot of walking and Ally could keep up.  She will do her next set of labs at the end of October.  Her new PCP thinks the infusion she gets to treat her anemia may be causing the increase in her A1c.  She will get the lab work completed before her next infusion.      Education:    No updates at this time    Care Plan:    Not updates at this time, Care Plan still pertinent    Progress:    Progressing    Next Outreach: 10/12/22, call @[phone]   Episodes of uncontrolled nausea or vomiting not relieved by anti-nausea medication

## 2025-03-25 PROCEDURE — RXMED WILLOW AMBULATORY MEDICATION CHARGE

## 2025-03-28 ENCOUNTER — PATIENT OUTREACH (OUTPATIENT)
Dept: HEALTH INFORMATION MANAGEMENT | Facility: OTHER | Age: 80
End: 2025-03-28
Payer: MEDICARE

## 2025-03-28 DIAGNOSIS — E78.5 DYSLIPIDEMIA: ICD-10-CM

## 2025-03-28 DIAGNOSIS — E11.9 TYPE 2 DIABETES MELLITUS WITHOUT COMPLICATION, WITHOUT LONG-TERM CURRENT USE OF INSULIN (HCC): ICD-10-CM

## 2025-03-28 DIAGNOSIS — I10 PRIMARY HYPERTENSION: ICD-10-CM

## 2025-03-31 ENCOUNTER — PHARMACY VISIT (OUTPATIENT)
Dept: PHARMACY | Facility: MEDICAL CENTER | Age: 80
End: 2025-03-31
Payer: COMMERCIAL

## 2025-04-17 PROCEDURE — RXMED WILLOW AMBULATORY MEDICATION CHARGE: Performed by: PHYSICIAN ASSISTANT

## 2025-04-21 PROCEDURE — RXMED WILLOW AMBULATORY MEDICATION CHARGE: Performed by: PHYSICIAN ASSISTANT

## 2025-04-22 ENCOUNTER — PHARMACY VISIT (OUTPATIENT)
Dept: PHARMACY | Facility: MEDICAL CENTER | Age: 80
End: 2025-04-22
Payer: COMMERCIAL

## 2025-04-26 ENCOUNTER — HOSPITAL ENCOUNTER (OUTPATIENT)
Facility: MEDICAL CENTER | Age: 80
End: 2025-04-26
Payer: MEDICARE

## 2025-04-26 ENCOUNTER — RESULTS FOLLOW-UP (OUTPATIENT)
Dept: FAMILY PLANNING/WOMEN'S HEALTH CLINIC | Facility: PHYSICIAN GROUP | Age: 80
End: 2025-04-26

## 2025-04-26 ENCOUNTER — APPOINTMENT (OUTPATIENT)
Dept: FAMILY PLANNING/WOMEN'S HEALTH CLINIC | Facility: PHYSICIAN GROUP | Age: 80
End: 2025-04-26
Payer: MEDICARE

## 2025-04-26 DIAGNOSIS — E11.65 TYPE 2 DIABETES MELLITUS WITH HYPERGLYCEMIA, WITHOUT LONG-TERM CURRENT USE OF INSULIN (HCC): ICD-10-CM

## 2025-04-26 LAB
CREAT UR-MCNC: 127 MG/DL
HBA1C MFR BLD: 5.7 % (ref ?–5.8)
MICROALBUMIN UR-MCNC: 1.2 MG/DL
MICROALBUMIN/CREAT UR: 9 MG/G (ref 0–30)
POCT INT CON NEG: NEGATIVE
POCT INT CON POS: POSITIVE

## 2025-04-26 PROCEDURE — 83036 HEMOGLOBIN GLYCOSYLATED A1C: CPT

## 2025-04-26 PROCEDURE — 82043 UR ALBUMIN QUANTITATIVE: CPT

## 2025-04-26 PROCEDURE — 82570 ASSAY OF URINE CREATININE: CPT

## 2025-04-26 PROCEDURE — RXMED WILLOW AMBULATORY MEDICATION CHARGE: Performed by: FAMILY MEDICINE

## 2025-04-27 ENCOUNTER — RESULTS FOLLOW-UP (OUTPATIENT)
Dept: MEDICAL GROUP | Facility: MEDICAL CENTER | Age: 80
End: 2025-04-27

## 2025-04-28 NOTE — TELEPHONE ENCOUNTER
----- Message from Nurse Practitioner MALOU Springer sent at 4/28/2025  8:17 AM PDT -----  Stable hemoglobin A1C, recommend following up with PCP for ongoing DM management

## 2025-05-02 ENCOUNTER — PHARMACY VISIT (OUTPATIENT)
Dept: PHARMACY | Facility: MEDICAL CENTER | Age: 80
End: 2025-05-02
Payer: COMMERCIAL

## 2025-05-09 ENCOUNTER — PATIENT OUTREACH (OUTPATIENT)
Dept: HEALTH INFORMATION MANAGEMENT | Facility: OTHER | Age: 80
End: 2025-05-09
Payer: MEDICARE

## 2025-05-09 DIAGNOSIS — E78.5 DYSLIPIDEMIA: ICD-10-CM

## 2025-05-09 DIAGNOSIS — E11.9 TYPE 2 DIABETES MELLITUS WITHOUT COMPLICATION, WITHOUT LONG-TERM CURRENT USE OF INSULIN (HCC): ICD-10-CM

## 2025-05-09 DIAGNOSIS — I10 PRIMARY HYPERTENSION: ICD-10-CM

## 2025-05-09 NOTE — PROGRESS NOTES
Attempted to call pt for monthly outreach for personal care management program. Left VM requesting call back.      Quarterly chart review completed. Pt continues to qualify for and benefit from personal care management program. Will plan to discharge as pt has met goals.

## 2025-05-13 NOTE — PROGRESS NOTES
Unable to contact pt for monthly outreach. Will graduate pt from pcm program at this time which was previously discussed with pt. Pt may re-enroll if she needs more support in her health care.

## 2025-06-12 ENCOUNTER — HOSPITAL ENCOUNTER (OUTPATIENT)
Dept: LAB | Facility: MEDICAL CENTER | Age: 80
End: 2025-06-12
Attending: PHYSICIAN ASSISTANT
Payer: MEDICARE

## 2025-06-12 PROCEDURE — 84480 ASSAY TRIIODOTHYRONINE (T3): CPT

## 2025-06-12 PROCEDURE — 84481 FREE ASSAY (FT-3): CPT

## 2025-06-12 PROCEDURE — 80053 COMPREHEN METABOLIC PANEL: CPT

## 2025-06-12 PROCEDURE — 36415 COLL VENOUS BLD VENIPUNCTURE: CPT

## 2025-06-12 PROCEDURE — 84443 ASSAY THYROID STIM HORMONE: CPT

## 2025-06-12 PROCEDURE — 84439 ASSAY OF FREE THYROXINE: CPT

## 2025-06-13 LAB
ALBUMIN SERPL BCP-MCNC: 4 G/DL (ref 3.2–4.9)
ALBUMIN/GLOB SERPL: 1.5 G/DL
ALP SERPL-CCNC: 95 U/L (ref 30–99)
ALT SERPL-CCNC: 60 U/L (ref 2–50)
ANION GAP SERPL CALC-SCNC: 11 MMOL/L (ref 7–16)
AST SERPL-CCNC: 60 U/L (ref 12–45)
BILIRUB SERPL-MCNC: 0.9 MG/DL (ref 0.1–1.5)
BUN SERPL-MCNC: 27 MG/DL (ref 8–22)
CALCIUM ALBUM COR SERPL-MCNC: 9.4 MG/DL (ref 8.5–10.5)
CALCIUM SERPL-MCNC: 9.4 MG/DL (ref 8.5–10.5)
CHLORIDE SERPL-SCNC: 109 MMOL/L (ref 96–112)
CO2 SERPL-SCNC: 20 MMOL/L (ref 20–33)
CREAT SERPL-MCNC: 0.89 MG/DL (ref 0.5–1.4)
FASTING STATUS PATIENT QL REPORTED: NORMAL
GFR SERPLBLD CREATININE-BSD FMLA CKD-EPI: 66 ML/MIN/1.73 M 2
GLOBULIN SER CALC-MCNC: 2.7 G/DL (ref 1.9–3.5)
GLUCOSE SERPL-MCNC: 108 MG/DL (ref 65–99)
POTASSIUM SERPL-SCNC: 4.1 MMOL/L (ref 3.6–5.5)
PROT SERPL-MCNC: 6.7 G/DL (ref 6–8.2)
SODIUM SERPL-SCNC: 140 MMOL/L (ref 135–145)
T3 SERPL-MCNC: 85 NG/DL (ref 60–181)
T3FREE SERPL-MCNC: 2.34 PG/ML (ref 2–4.4)
T4 FREE SERPL-MCNC: 1.55 NG/DL (ref 0.93–1.7)
TSH SERPL-ACNC: 0.29 UIU/ML (ref 0.38–5.33)

## 2025-06-17 ENCOUNTER — RESULTS FOLLOW-UP (OUTPATIENT)
Dept: CARDIOLOGY | Facility: MEDICAL CENTER | Age: 80
End: 2025-06-17

## 2025-07-07 ENCOUNTER — TELEPHONE (OUTPATIENT)
Dept: MEDICAL GROUP | Facility: LAB | Age: 80
End: 2025-07-07
Payer: MEDICARE

## 2025-07-07 DIAGNOSIS — Z12.83 SKIN CANCER SCREENING: Primary | ICD-10-CM

## 2025-07-07 PROCEDURE — RXMED WILLOW AMBULATORY MEDICATION CHARGE

## 2025-07-07 NOTE — TELEPHONE ENCOUNTER
Pt calling in with a couple questions. She is wondering if the non invasive, Laser activated glucose testers are covered by insurance. She does not wish to use a finger stick device, but thought this might be helpful to watch trends in her blood sugar. She is also requesting a derm referral for skin check.     Requested assistance to schedule routine follow up with pcp. Pt last seen jan 2025.

## 2025-07-09 ENCOUNTER — PHARMACY VISIT (OUTPATIENT)
Dept: PHARMACY | Facility: MEDICAL CENTER | Age: 80
End: 2025-07-09
Payer: COMMERCIAL

## 2025-07-17 NOTE — Clinical Note
REFERRAL APPROVAL NOTICE         Sent on July 17, 2025                   Ally Doherty  [address]  [place] [address]                   Dear Ms. Doherty,    After a careful review of the medical information and benefit coverage, [facility] has processed your referral. See below for additional details.    If applicable, you must be actively enrolled with your insurance for coverage of the authorized service. If you have any questions regarding your coverage, please contact your insurance directly.    REFERRAL INFORMATION   Referral #:  [id number]  Referred-To Department    Referred-By Provider:  Dermatology    Lillie Herrera M.D.   [facility]      [address]  [place] [address]  [phone] [address]  [place] [id number] [phone]    Referral Start Date:  07/17/2025  Referral End Date:   07/16/2026             SCHEDULING  If you do not already have an appointment, please call [phone] to make an appointment.     MORE INFORMATION  If you do not already have a [employer] account, sign up at: [email or web address]  You can access your medical information, make appointments, see lab results, billing information, and more.  If you have questions regarding this referral, please contact  the [facility] Referrals department at:             [phone]. Monday - Friday 8:00AM - 5:00PM.     Sincerely,    [facility]

## 2025-07-28 PROCEDURE — RXMED WILLOW AMBULATORY MEDICATION CHARGE: Performed by: PHYSICIAN ASSISTANT

## 2025-07-29 ENCOUNTER — PHARMACY VISIT (OUTPATIENT)
Dept: PHARMACY | Facility: MEDICAL CENTER | Age: 80
End: 2025-07-29
Payer: COMMERCIAL

## 2025-08-05 ENCOUNTER — HOSPITAL ENCOUNTER (OUTPATIENT)
Dept: LAB | Facility: MEDICAL CENTER | Age: 80
End: 2025-08-05
Attending: FAMILY MEDICINE
Payer: MEDICARE

## 2025-08-05 ENCOUNTER — OFFICE VISIT (OUTPATIENT)
Dept: MEDICAL GROUP | Facility: LAB | Age: 80
End: 2025-08-05
Payer: MEDICARE

## 2025-08-05 VITALS
OXYGEN SATURATION: 96 % | HEART RATE: 62 BPM | SYSTOLIC BLOOD PRESSURE: 110 MMHG | BODY MASS INDEX: 24.17 KG/M2 | WEIGHT: 154 LBS | DIASTOLIC BLOOD PRESSURE: 60 MMHG | TEMPERATURE: 97.3 F | HEIGHT: 67 IN | RESPIRATION RATE: 16 BRPM

## 2025-08-05 DIAGNOSIS — R04.0 BLOODY NOSE: Primary | ICD-10-CM

## 2025-08-05 DIAGNOSIS — E61.1 IRON DEFICIENCY: ICD-10-CM

## 2025-08-05 DIAGNOSIS — R04.0 BLOODY NOSE: ICD-10-CM

## 2025-08-05 LAB
BASOPHILS # BLD AUTO: 1.3 % (ref 0–1.8)
BASOPHILS # BLD: 0.05 K/UL (ref 0–0.12)
EOSINOPHIL # BLD AUTO: 0.13 K/UL (ref 0–0.51)
EOSINOPHIL NFR BLD: 3.4 % (ref 0–6.9)
ERYTHROCYTE [DISTWIDTH] IN BLOOD BY AUTOMATED COUNT: 48.7 FL (ref 35.9–50)
FERRITIN SERPL-MCNC: 38.3 NG/ML (ref 10–291)
HCT VFR BLD AUTO: 43 % (ref 37–47)
HGB BLD-MCNC: 13.7 G/DL (ref 12–16)
IMM GRANULOCYTES # BLD AUTO: 0.01 K/UL (ref 0–0.11)
IMM GRANULOCYTES NFR BLD AUTO: 0.3 % (ref 0–0.9)
IRON SATN MFR SERPL: 20 % (ref 15–55)
IRON SERPL-MCNC: 68 UG/DL (ref 40–170)
LYMPHOCYTES # BLD AUTO: 0.78 K/UL (ref 1–4.8)
LYMPHOCYTES NFR BLD: 20.1 % (ref 22–41)
MCH RBC QN AUTO: 28.5 PG (ref 27–33)
MCHC RBC AUTO-ENTMCNC: 31.9 G/DL (ref 32.2–35.5)
MCV RBC AUTO: 89.6 FL (ref 81.4–97.8)
MONOCYTES # BLD AUTO: 0.37 K/UL (ref 0–0.85)
MONOCYTES NFR BLD AUTO: 9.5 % (ref 0–13.4)
NEUTROPHILS # BLD AUTO: 2.54 K/UL (ref 1.82–7.42)
NEUTROPHILS NFR BLD: 65.4 % (ref 44–72)
NRBC # BLD AUTO: 0 K/UL
NRBC BLD-RTO: 0 /100 WBC (ref 0–0.2)
PLATELET # BLD AUTO: 75 K/UL (ref 164–446)
PLATELETS.RETICULATED NFR BLD AUTO: 2.7 % (ref 0.6–13.1)
PMV BLD AUTO: 10.6 FL (ref 9–12.9)
RBC # BLD AUTO: 4.8 M/UL (ref 4.2–5.4)
TIBC SERPL-MCNC: 344 UG/DL (ref 250–450)
UIBC SERPL-MCNC: 276 UG/DL (ref 110–370)
WBC # BLD AUTO: 3.9 K/UL (ref 4.8–10.8)

## 2025-08-05 PROCEDURE — 36415 COLL VENOUS BLD VENIPUNCTURE: CPT

## 2025-08-05 PROCEDURE — 85055 RETICULATED PLATELET ASSAY: CPT

## 2025-08-05 PROCEDURE — 99213 OFFICE O/P EST LOW 20 MIN: CPT | Performed by: FAMILY MEDICINE

## 2025-08-05 PROCEDURE — 3078F DIAST BP <80 MM HG: CPT | Performed by: FAMILY MEDICINE

## 2025-08-05 PROCEDURE — 3074F SYST BP LT 130 MM HG: CPT | Performed by: FAMILY MEDICINE

## 2025-08-05 PROCEDURE — 83540 ASSAY OF IRON: CPT

## 2025-08-05 PROCEDURE — 82728 ASSAY OF FERRITIN: CPT

## 2025-08-05 PROCEDURE — 83550 IRON BINDING TEST: CPT

## 2025-08-05 PROCEDURE — 85025 COMPLETE CBC W/AUTO DIFF WBC: CPT

## 2025-08-05 ASSESSMENT — FIBROSIS 4 INDEX: FIB4 SCORE: 8.63

## 2025-08-07 ENCOUNTER — RESULTS FOLLOW-UP (OUTPATIENT)
Dept: MEDICAL GROUP | Facility: LAB | Age: 80
End: 2025-08-07
Payer: MEDICARE

## 2025-08-07 DIAGNOSIS — R04.0 RECURRENT EPISTAXIS: Primary | ICD-10-CM

## 2025-08-11 DIAGNOSIS — I73.9 PAD (PERIPHERAL ARTERY DISEASE) (HCC): ICD-10-CM

## 2025-08-11 DIAGNOSIS — I21.02 ST ELEVATION MYOCARDIAL INFARCTION INVOLVING LEFT ANTERIOR DESCENDING (LAD) CORONARY ARTERY (HCC): ICD-10-CM

## 2025-08-11 DIAGNOSIS — Z95.5 STENTED CORONARY ARTERY: ICD-10-CM

## 2025-08-11 PROCEDURE — RXMED WILLOW AMBULATORY MEDICATION CHARGE: Performed by: PHYSICIAN ASSISTANT

## 2025-08-11 PROCEDURE — RXMED WILLOW AMBULATORY MEDICATION CHARGE: Performed by: FAMILY MEDICINE

## 2025-08-11 RX ORDER — ROSUVASTATIN CALCIUM 20 MG/1
20 TABLET, COATED ORAL EVERY EVENING
Qty: 100 TABLET | Refills: 3 | Status: SHIPPED | OUTPATIENT
Start: 2025-08-11

## 2025-08-13 ENCOUNTER — PHARMACY VISIT (OUTPATIENT)
Dept: PHARMACY | Facility: MEDICAL CENTER | Age: 80
End: 2025-08-13
Payer: COMMERCIAL

## 2025-08-15 ASSESSMENT — ACTIVITIES OF DAILY LIVING (ADL): BATHING_REQUIRES_ASSISTANCE: 0

## 2025-08-15 ASSESSMENT — PATIENT HEALTH QUESTIONNAIRE - PHQ9
1. LITTLE INTEREST OR PLEASURE IN DOING THINGS: NOT AT ALL
2. FEELING DOWN, DEPRESSED, IRRITABLE, OR HOPELESS: NOT AT ALL

## 2025-08-15 ASSESSMENT — ENCOUNTER SYMPTOMS: GENERAL WELL-BEING: GOOD

## 2025-08-18 ASSESSMENT — PATIENT HEALTH QUESTIONNAIRE - PHQ9: CLINICAL INTERPRETATION OF PHQ2 SCORE: 0

## 2025-08-20 ENCOUNTER — OFFICE VISIT (OUTPATIENT)
Dept: FAMILY PLANNING/WOMEN'S HEALTH CLINIC | Facility: PHYSICIAN GROUP | Age: 80
End: 2025-08-20
Attending: FAMILY MEDICINE
Payer: MEDICARE

## 2025-08-20 VITALS
WEIGHT: 156 LBS | BODY MASS INDEX: 24.48 KG/M2 | HEART RATE: 70 BPM | DIASTOLIC BLOOD PRESSURE: 54 MMHG | SYSTOLIC BLOOD PRESSURE: 102 MMHG | OXYGEN SATURATION: 96 % | HEIGHT: 67 IN

## 2025-08-20 DIAGNOSIS — E11.9 TYPE 2 DIABETES MELLITUS WITHOUT COMPLICATION, WITHOUT LONG-TERM CURRENT USE OF INSULIN (HCC): ICD-10-CM

## 2025-08-20 DIAGNOSIS — F32.5 MAJOR DEPRESSIVE DISORDER WITH SINGLE EPISODE, IN FULL REMISSION (HCC): ICD-10-CM

## 2025-08-20 DIAGNOSIS — I85.11 SECONDARY ESOPHAGEAL VARICES WITH BLEEDING (HCC): ICD-10-CM

## 2025-08-20 DIAGNOSIS — I25.5 ISCHEMIC CARDIOMYOPATHY: ICD-10-CM

## 2025-08-20 DIAGNOSIS — K74.69 OTHER CIRRHOSIS OF LIVER (HCC): ICD-10-CM

## 2025-08-20 DIAGNOSIS — I10 PRIMARY HYPERTENSION: ICD-10-CM

## 2025-08-20 DIAGNOSIS — E03.9 HYPOTHYROIDISM, UNSPECIFIED TYPE: ICD-10-CM

## 2025-08-20 DIAGNOSIS — I25.10 CORONARY ARTERY DISEASE INVOLVING NATIVE CORONARY ARTERY OF NATIVE HEART WITHOUT ANGINA PECTORIS: Primary | ICD-10-CM

## 2025-08-20 DIAGNOSIS — E78.5 DYSLIPIDEMIA: ICD-10-CM

## 2025-08-20 PROBLEM — K92.2 UGIB (UPPER GASTROINTESTINAL BLEED): Status: RESOLVED | Noted: 2022-06-17 | Resolved: 2025-08-20

## 2025-08-20 PROCEDURE — 3074F SYST BP LT 130 MM HG: CPT | Performed by: PHYSICIAN ASSISTANT

## 2025-08-20 PROCEDURE — 1126F AMNT PAIN NOTED NONE PRSNT: CPT | Performed by: PHYSICIAN ASSISTANT

## 2025-08-20 PROCEDURE — 3078F DIAST BP <80 MM HG: CPT | Performed by: PHYSICIAN ASSISTANT

## 2025-08-20 PROCEDURE — G0439 PPPS, SUBSEQ VISIT: HCPCS | Performed by: PHYSICIAN ASSISTANT

## 2025-08-20 SDOH — ECONOMIC STABILITY: FOOD INSECURITY: WITHIN THE PAST 12 MONTHS, THE FOOD YOU BOUGHT JUST DIDN'T LAST AND YOU DIDN'T HAVE MONEY TO GET MORE.: NEVER TRUE

## 2025-08-20 SDOH — ECONOMIC STABILITY: HOUSING INSECURITY: AT ANY TIME IN THE PAST 12 MONTHS, WERE YOU HOMELESS OR LIVING IN A SHELTER (INCLUDING NOW)?: NO

## 2025-08-20 SDOH — ECONOMIC STABILITY: FOOD INSECURITY: HOW HARD IS IT FOR YOU TO PAY FOR THE VERY BASICS LIKE FOOD, HOUSING, MEDICAL CARE, AND HEATING?: NOT HARD AT ALL

## 2025-08-20 SDOH — ECONOMIC STABILITY: FOOD INSECURITY: WITHIN THE PAST 12 MONTHS, YOU WORRIED THAT YOUR FOOD WOULD RUN OUT BEFORE YOU GOT THE MONEY TO BUY MORE.: NEVER TRUE

## 2025-08-20 SDOH — ECONOMIC STABILITY: TRANSPORTATION INSECURITY: IN THE PAST 12 MONTHS, HAS LACK OF TRANSPORTATION KEPT YOU FROM MEDICAL APPOINTMENTS OR FROM GETTING MEDICATIONS?: NO

## 2025-08-20 SDOH — ECONOMIC STABILITY: HOUSING INSECURITY: IN THE LAST 12 MONTHS, WAS THERE A TIME WHEN YOU WERE NOT ABLE TO PAY THE MORTGAGE OR RENT ON TIME?: NO

## 2025-08-20 SDOH — ECONOMIC STABILITY: HOUSING INSECURITY: IN THE PAST 12 MONTHS, HOW MANY TIMES HAVE YOU MOVED WHERE YOU WERE LIVING?: 1

## 2025-08-20 ASSESSMENT — LIFESTYLE VARIABLES
HOW OFTEN DO YOU HAVE SIX OR MORE DRINKS ON ONE OCCASION: LESS THAN MONTHLY
AUDIT-C TOTAL SCORE: 2
SKIP TO QUESTIONS 9-10: 0
HOW OFTEN DO YOU HAVE A DRINK CONTAINING ALCOHOL: MONTHLY OR LESS
HOW MANY STANDARD DRINKS CONTAINING ALCOHOL DO YOU HAVE ON A TYPICAL DAY: 1 OR 2

## 2025-08-20 ASSESSMENT — ACTIVITIES OF DAILY LIVING (ADL): LACK_OF_TRANSPORTATION: NO

## 2025-08-20 ASSESSMENT — FIBROSIS 4 INDEX: FIB4 SCORE: 8.06

## 2025-08-20 ASSESSMENT — PAIN SCALES - GENERAL: PAINLEVEL_OUTOF10: NO PAIN

## 2025-08-27 ENCOUNTER — OFFICE VISIT (OUTPATIENT)
Dept: MEDICAL GROUP | Facility: LAB | Age: 80
End: 2025-08-27
Payer: MEDICARE

## 2025-08-27 VITALS
WEIGHT: 153.1 LBS | BODY MASS INDEX: 24.03 KG/M2 | TEMPERATURE: 96.8 F | RESPIRATION RATE: 16 BRPM | DIASTOLIC BLOOD PRESSURE: 60 MMHG | OXYGEN SATURATION: 96 % | SYSTOLIC BLOOD PRESSURE: 110 MMHG | HEIGHT: 67 IN | HEART RATE: 64 BPM

## 2025-08-27 DIAGNOSIS — H61.21 IMPACTED CERUMEN OF RIGHT EAR: Primary | ICD-10-CM

## 2025-08-27 ASSESSMENT — ENCOUNTER SYMPTOMS
VOMITING: 0
WEIGHT LOSS: 0
FEVER: 0
NAUSEA: 0
ABDOMINAL PAIN: 0
SHORTNESS OF BREATH: 0
DIARRHEA: 0
CHILLS: 0
COUGH: 0

## 2025-08-27 ASSESSMENT — FIBROSIS 4 INDEX: FIB4 SCORE: 8.06

## (undated) DEVICE — ELECTRODE 850 FOAM ADHESIVE - HYDROGEL RADIOTRNSPRNT (50/PK)

## (undated) DEVICE — KIT ANESTHESIA W/CIRCUIT & 3/LT BAG W/FILTER (20EA/CA)

## (undated) DEVICE — TUBE CONNECTING SUCTION - CLEAR PLASTIC STERILE 72 IN (50EA/CA)

## (undated) DEVICE — CATHETER IV 20 GA X 1-1/4 ---SURG.& SDS ONLY--- (50EA/BX)

## (undated) DEVICE — PAD LAP STERILE 18 X 18 - (5/PK 40PK/CA)

## (undated) DEVICE — MASK, LARYNGEAL AIRWAY #4

## (undated) DEVICE — DRESSING NON ADHERENT 3 X 4 - STERILE (100/BX 12BX/CA)

## (undated) DEVICE — SUTURE 5-0 CHROMIC GUT PS-5 - (12/BX) 18 INCH

## (undated) DEVICE — PACK BREAST RECONSTRUCTION (4/CA)

## (undated) DEVICE — BALL COTTON STERILE 5/PK - (5/PK 25PK/CA)

## (undated) DEVICE — SUTURE 0 VICRYL PLUS CT-1 - 36 INCH (36/BX)

## (undated) DEVICE — SODIUM CHL IRRIGATION 0.9% 1000ML (12EA/CA)

## (undated) DEVICE — SET LEADWIRE 5 LEAD BEDSIDE DISPOSABLE ECG (1SET OF 5/EA)

## (undated) DEVICE — SUTURE 2-0 VICRYL PLUS CT-1 36 (36PK/BX)"

## (undated) DEVICE — CONTAINER, SPECIMEN, STERILE

## (undated) DEVICE — CHLORAPREP 26 ML APPLICATOR - ORANGE TINT(25/CA)

## (undated) DEVICE — PROTECTOR ULNA NERVE - (36PR/CA)

## (undated) DEVICE — BITE BLOCK ADULT 60FR (100EA/CA)

## (undated) DEVICE — LACTATED RINGERS INJ 1000 ML - (14EA/CA 60CA/PF)

## (undated) DEVICE — GOWN WARMING STANDARD FLEX - (30/CA)

## (undated) DEVICE — MEDICINE CUP STERILE 2 OZ - (100/CA)

## (undated) DEVICE — SUTURE 4-0 SILK SH C/R 8 X 18 (12EA/BX)"

## (undated) DEVICE — SUTURE 3-0 MONOCRYL PLUS PS-1 - 27 INCH (36/BX)

## (undated) DEVICE — MANIFOLD NEPTUNE 1 PORT (20/PK)

## (undated) DEVICE — RESERVOIR SUCTION 100 CC - SILICONE (20EA/CA)

## (undated) DEVICE — GLOVE BIOGEL SZ 7.5 SURGICAL PF LTX - (50PR/BX 4BX/CA)

## (undated) DEVICE — PENCIL ELECTSURG 10FT BTN SWH - (50/CA)

## (undated) DEVICE — CANISTER SUCTION RIGID RED 1500CC (40EA/CA)

## (undated) DEVICE — SUTURE 4-0 MONOCRYL PLUSPC-3 - 18 INCH (12/BX)

## (undated) DEVICE — TUBING CLEARLINK DUO-VENT - C-FLO (48EA/CA)

## (undated) DEVICE — SPEEDBAND SUPERVIEW SUPER 7 (4/BX)

## (undated) DEVICE — SUTURE GENERAL

## (undated) DEVICE — HEAD HOLDER JUNIOR/ADULT

## (undated) DEVICE — SPONGE DRAIN 4 X 4IN 6-PLY - (2/PK25PK/BX12BX/CS)

## (undated) DEVICE — CANNULA O2 COMFORT SOFT EAR ADULT 7 FT TUBING (50/CA)

## (undated) DEVICE — SENSOR SPO2 NEO LNCS ADHESIVE (20/BX) SEE USER NOTES

## (undated) DEVICE — SLEEVE, VASO, THIGH, MED

## (undated) DEVICE — DRAIN J-VAC 10MM FLAT - (10/CA)

## (undated) DEVICE — BLADE SURGICAL #15 - (50/BX 3BX/CA)

## (undated) DEVICE — SHEET TRANSVERSE LAP - (12EA/CA)

## (undated) DEVICE — ELECTRODE DUAL RETURN W/ CORD - (50/PK)

## (undated) DEVICE — BANDAGE ELASTIC STERILE MATRIX 6 X 10 (20EA/CA)

## (undated) DEVICE — CANISTER SUCTION 3000ML MECHANICAL FILTER AUTO SHUTOFF MEDI-VAC NONSTERILE LF DISP  (40EA/CA)

## (undated) DEVICE — KIT  I.V. START (100EA/CA)

## (undated) DEVICE — DRESSING TRANSPARENT FILM TEGADERM 4 X 4.75" (50EA/BX)"

## (undated) DEVICE — MASK ANESTHESIA ADULT  - (100/CA)

## (undated) DEVICE — TOWEL STOP TIMEOUT SAFETY FLAG (40EA/CA)

## (undated) DEVICE — SUCTION INSTRUMENT YANKAUER BULBOUS TIP W/O VENT (50EA/CA)

## (undated) DEVICE — SET EXTENSION WITH 2 PORTS (48EA/CA) ***PART #2C8610 IS A SUBSTITUTE*****

## (undated) DEVICE — KIT CUSTOM PROCEDURE SINGLE FOR ENDO  (15/CA)